# Patient Record
Sex: MALE | Race: OTHER | NOT HISPANIC OR LATINO | Employment: OTHER | RURAL
[De-identification: names, ages, dates, MRNs, and addresses within clinical notes are randomized per-mention and may not be internally consistent; named-entity substitution may affect disease eponyms.]

---

## 2018-01-29 ENCOUNTER — HISTORICAL (OUTPATIENT)
Dept: ADMINISTRATIVE | Facility: HOSPITAL | Age: 63
End: 2018-01-29

## 2018-01-30 ENCOUNTER — HISTORICAL (OUTPATIENT)
Dept: ADMINISTRATIVE | Facility: HOSPITAL | Age: 63
End: 2018-01-30

## 2018-01-30 LAB
LAB AP CLINICAL INFORMATION: NORMAL
LAB AP DIAGNOSIS - HISTORICAL: NORMAL
LAB AP GROSS PATHOLOGY - HISTORICAL: NORMAL
LAB AP SPECIMEN SUBMITTED - HISTORICAL: NORMAL

## 2018-01-31 LAB
LAB AP CLINICAL INFORMATION: NORMAL
LAB AP COMMENTS: NORMAL
LAB AP DIAGNOSIS - HISTORICAL: NORMAL
LAB AP GROSS PATHOLOGY - HISTORICAL: NORMAL
LAB AP SPECIMEN SUBMITTED - HISTORICAL: NORMAL

## 2020-06-09 ENCOUNTER — HISTORICAL (OUTPATIENT)
Dept: ADMINISTRATIVE | Facility: HOSPITAL | Age: 65
End: 2020-06-09

## 2020-06-10 LAB
ALT SERPL W P-5'-P-CCNC: 46 U/L (ref 16–61)
ANISOCYTOSIS BLD QL SMEAR: ABNORMAL
AST SERPL W P-5'-P-CCNC: 28 U/L (ref 15–37)
BASOPHILS # BLD AUTO: 0.04 X10E3/UL (ref 0–0.2)
BASOPHILS NFR BLD AUTO: 0.6 % (ref 0–1)
EOSINOPHIL # BLD AUTO: 0.07 X10E3/UL (ref 0–0.5)
EOSINOPHIL NFR BLD AUTO: 1.1 % (ref 1–4)
EOSINOPHIL NFR BLD MANUAL: 1 % (ref 1–4)
ERYTHROCYTE [DISTWIDTH] IN BLOOD BY AUTOMATED COUNT: 16 % (ref 11.5–14.5)
HCT VFR BLD AUTO: 45 % (ref 40–54)
HGB BLD-MCNC: 13.5 G/DL (ref 13.5–18)
IMM GRANULOCYTES # BLD AUTO: 0.05 X10E3/UL (ref 0–0.04)
IMM GRANULOCYTES NFR BLD: 0.8 % (ref 0–0.4)
LYMPHOCYTES # BLD AUTO: 1.84 X10E3/UL (ref 1–4.8)
LYMPHOCYTES NFR BLD AUTO: 29 % (ref 27–41)
LYMPHOCYTES NFR BLD MANUAL: 40 % (ref 27–41)
MCH RBC QN AUTO: 28.4 PG (ref 27–31)
MCHC RBC AUTO-ENTMCNC: 30 G/DL (ref 32–36)
MCV RBC AUTO: 94.7 FL (ref 80–96)
MONOCYTES # BLD AUTO: 0.4 X10E3/UL (ref 0–0.8)
MONOCYTES NFR BLD AUTO: 6.3 % (ref 2–6)
MONOCYTES NFR BLD MANUAL: 6 % (ref 2–6)
MPC BLD CALC-MCNC: 10.6 FL (ref 9.4–12.4)
NEUTROPHILS # BLD AUTO: 3.94 X10E3/UL (ref 1.8–7.7)
NEUTROPHILS NFR BLD AUTO: 62.2 % (ref 53–65)
NEUTS SEG NFR BLD MANUAL: 53 % (ref 50–62)
NRBC # BLD AUTO: 0 X10E3/UL (ref 0–0)
NRBC, AUTO (.00): 0 /100 (ref 0–0)
PLATELET # BLD AUTO: 352 X10E3/UL (ref 150–400)
PLATELET MORPHOLOGY: NORMAL
RBC # BLD AUTO: 4.75 X10E6/UL (ref 4.6–6.2)
WBC # BLD AUTO: 6.34 X10E3/UL (ref 4.5–11)

## 2020-06-12 LAB — CREAT SERPL-MCNC: 1.38 MG/DL (ref 0.7–1.3)

## 2021-03-18 ENCOUNTER — OFFICE VISIT (OUTPATIENT)
Dept: NEUROLOGY | Facility: CLINIC | Age: 66
End: 2021-03-18
Payer: MEDICARE

## 2021-03-18 VITALS
WEIGHT: 222 LBS | SYSTOLIC BLOOD PRESSURE: 118 MMHG | DIASTOLIC BLOOD PRESSURE: 76 MMHG | BODY MASS INDEX: 28.49 KG/M2 | HEART RATE: 113 BPM | OXYGEN SATURATION: 96 % | RESPIRATION RATE: 18 BRPM | HEIGHT: 74 IN

## 2021-03-18 DIAGNOSIS — G43.019 INTRACTABLE MIGRAINE WITHOUT AURA AND WITHOUT STATUS MIGRAINOSUS: Primary | ICD-10-CM

## 2021-03-18 DIAGNOSIS — G89.29 CHRONIC NONINTRACTABLE HEADACHE, UNSPECIFIED HEADACHE TYPE: ICD-10-CM

## 2021-03-18 DIAGNOSIS — R20.2 NUMBNESS AND TINGLING OF LEFT ARM AND LEG: ICD-10-CM

## 2021-03-18 DIAGNOSIS — R42 DIZZINESS: ICD-10-CM

## 2021-03-18 DIAGNOSIS — R20.0 NUMBNESS AND TINGLING OF LEFT ARM AND LEG: ICD-10-CM

## 2021-03-18 DIAGNOSIS — R51.9 CHRONIC NONINTRACTABLE HEADACHE, UNSPECIFIED HEADACHE TYPE: ICD-10-CM

## 2021-03-18 PROCEDURE — 99214 OFFICE O/P EST MOD 30 MIN: CPT | Mod: PBBFAC | Performed by: NURSE PRACTITIONER

## 2021-03-18 PROCEDURE — 99999 PR PBB SHADOW E&M-EST. PATIENT-LVL IV: CPT | Mod: PBBFAC,,, | Performed by: NURSE PRACTITIONER

## 2021-03-18 PROCEDURE — 99213 OFFICE O/P EST LOW 20 MIN: CPT | Mod: S$PBB,,, | Performed by: NURSE PRACTITIONER

## 2021-03-18 PROCEDURE — 99213 PR OFFICE/OUTPT VISIT, EST, LEVL III, 20-29 MIN: ICD-10-PCS | Mod: S$PBB,,, | Performed by: NURSE PRACTITIONER

## 2021-03-18 PROCEDURE — 99999 PR PBB SHADOW E&M-EST. PATIENT-LVL IV: ICD-10-PCS | Mod: PBBFAC,,, | Performed by: NURSE PRACTITIONER

## 2021-03-18 RX ORDER — FERROUS SULFATE 325(65) MG
325 TABLET ORAL
COMMUNITY
End: 2023-04-17 | Stop reason: SDUPTHER

## 2021-03-18 RX ORDER — HYDROCODONE BITARTRATE AND ACETAMINOPHEN 10; 325 MG/1; MG/1
1 TABLET ORAL 2 TIMES DAILY
Status: ON HOLD | COMMUNITY
End: 2021-08-28 | Stop reason: SDUPTHER

## 2021-03-18 RX ORDER — OMEPRAZOLE 40 MG/1
40 CAPSULE, DELAYED RELEASE ORAL DAILY
COMMUNITY
End: 2021-12-22

## 2021-03-18 RX ORDER — HYDROXYCHLOROQUINE SULFATE 200 MG/1
200 TABLET, FILM COATED ORAL 2 TIMES DAILY
COMMUNITY
End: 2023-04-17 | Stop reason: SDUPTHER

## 2021-03-18 RX ORDER — ZONISAMIDE 50 MG/1
50 CAPSULE ORAL NIGHTLY
COMMUNITY
End: 2021-03-18

## 2021-03-18 RX ORDER — TOFACITINIB 11 MG/1
11 TABLET, FILM COATED, EXTENDED RELEASE ORAL DAILY
Status: ON HOLD | COMMUNITY
End: 2021-08-27 | Stop reason: ALTCHOICE

## 2021-03-18 RX ORDER — PRAVASTATIN SODIUM 40 MG/1
40 TABLET ORAL DAILY
COMMUNITY
End: 2021-08-02 | Stop reason: SDUPTHER

## 2021-03-18 RX ORDER — METHOCARBAMOL 500 MG/1
500 TABLET, FILM COATED ORAL 3 TIMES DAILY PRN
COMMUNITY
End: 2021-08-02 | Stop reason: SDUPTHER

## 2021-03-18 RX ORDER — SILODOSIN 8 MG/1
8 CAPSULE ORAL DAILY
COMMUNITY
End: 2023-04-17 | Stop reason: SDUPTHER

## 2021-03-18 RX ORDER — PROMETHAZINE HYDROCHLORIDE 25 MG/1
25 TABLET ORAL 2 TIMES DAILY PRN
COMMUNITY
End: 2023-04-17 | Stop reason: SDUPTHER

## 2021-03-18 RX ORDER — LISINOPRIL 20 MG/1
20 TABLET ORAL DAILY
COMMUNITY
End: 2021-08-02 | Stop reason: SDUPTHER

## 2021-03-18 RX ORDER — PREGABALIN 150 MG/1
150 CAPSULE ORAL 3 TIMES DAILY
COMMUNITY
End: 2022-08-29

## 2021-03-18 RX ORDER — DULOXETIN HYDROCHLORIDE 60 MG/1
60 CAPSULE, DELAYED RELEASE ORAL DAILY
COMMUNITY
End: 2023-04-17 | Stop reason: SDUPTHER

## 2021-04-30 DIAGNOSIS — G47.30 SLEEP APNEA, UNSPECIFIED: Primary | ICD-10-CM

## 2021-05-12 ENCOUNTER — HOSPITAL ENCOUNTER (OUTPATIENT)
Dept: RADIOLOGY | Facility: HOSPITAL | Age: 66
Discharge: HOME | End: 2021-05-12
Attending: FAMILY MEDICINE
Payer: MEDICARE

## 2021-05-12 DIAGNOSIS — E05.10 THYROTOXICOSIS WITH TOXIC SINGLE THYROID NODULE WITHOUT THYROTOXIC CRISIS OR STORM: ICD-10-CM

## 2021-05-12 DIAGNOSIS — M54.50 LOWER BACK PAIN: ICD-10-CM

## 2021-05-12 DIAGNOSIS — N18.32 CHRONIC KIDNEY DISEASE, STAGE 3B: ICD-10-CM

## 2021-05-12 PROCEDURE — 76770 US KIDNEY: ICD-10-PCS | Mod: 26,,, | Performed by: RADIOLOGY

## 2021-05-12 PROCEDURE — 76536 US EXAM OF HEAD AND NECK: CPT | Mod: TC

## 2021-05-12 PROCEDURE — 76770 US EXAM ABDO BACK WALL COMP: CPT | Mod: TC

## 2021-05-12 PROCEDURE — 76536 US EXAM OF HEAD AND NECK: CPT | Mod: 26,,, | Performed by: RADIOLOGY

## 2021-05-12 PROCEDURE — 76536 US THYROID: ICD-10-PCS | Mod: 26,,, | Performed by: RADIOLOGY

## 2021-05-12 PROCEDURE — 76770 US EXAM ABDO BACK WALL COMP: CPT | Mod: 26,,, | Performed by: RADIOLOGY

## 2021-05-17 DIAGNOSIS — E04.1 THYROID NODULE: Primary | ICD-10-CM

## 2021-05-19 ENCOUNTER — HOSPITAL ENCOUNTER (OUTPATIENT)
Dept: RADIOLOGY | Facility: HOSPITAL | Age: 66
Discharge: HOME | End: 2021-05-19
Attending: FAMILY MEDICINE
Payer: MEDICARE

## 2021-05-19 VITALS — RESPIRATION RATE: 18 BRPM | HEART RATE: 79 BPM | SYSTOLIC BLOOD PRESSURE: 110 MMHG | DIASTOLIC BLOOD PRESSURE: 70 MMHG

## 2021-05-19 DIAGNOSIS — E04.9 NON-TOXIC GOITER: ICD-10-CM

## 2021-05-19 PROCEDURE — 10005 FNA BX W/US GDN 1ST LES: CPT | Mod: ,,,

## 2021-05-19 PROCEDURE — 10006 PR FINE NEEDLE ASP BIOPSY, W/US GUIDANCE, EA ADDTL LESION: ICD-10-PCS | Mod: ,,,

## 2021-05-19 PROCEDURE — 88173 CYTOPATH EVAL FNA REPORT: CPT | Mod: MCY | Performed by: FAMILY MEDICINE

## 2021-05-19 PROCEDURE — 10006 FNA BX W/US GDN EA ADDL: CPT | Mod: ,,,

## 2021-05-19 PROCEDURE — 10005 FNA BX W/US GDN 1ST LES: CPT

## 2021-05-19 PROCEDURE — 88305 TISSUE EXAM BY PATHOLOGIST: CPT | Mod: MCY | Performed by: FAMILY MEDICINE

## 2021-05-19 PROCEDURE — 10005 US FINE NEEDLE ASPIRATION BIOPSY, FIRST LESION: ICD-10-PCS | Mod: ,,,

## 2021-05-20 LAB
ESTROGEN SERPL-MCNC: NORMAL PG/ML
INSULIN SERPL-ACNC: NORMAL U[IU]/ML
LAB AP CLINICAL INFORMATION: NORMAL
LAB AP COMMENTS: NORMAL
T3RU NFR SERPL: NORMAL %

## 2021-06-02 ENCOUNTER — OFFICE VISIT (OUTPATIENT)
Dept: FAMILY MEDICINE | Facility: CLINIC | Age: 66
End: 2021-06-02
Payer: MEDICARE

## 2021-06-02 VITALS
TEMPERATURE: 98 F | HEIGHT: 74 IN | OXYGEN SATURATION: 95 % | SYSTOLIC BLOOD PRESSURE: 122 MMHG | RESPIRATION RATE: 18 BRPM | DIASTOLIC BLOOD PRESSURE: 84 MMHG | BODY MASS INDEX: 27.67 KG/M2 | WEIGHT: 215.63 LBS | HEART RATE: 91 BPM

## 2021-06-02 DIAGNOSIS — E04.1 THYROID NODULE: Primary | ICD-10-CM

## 2021-06-02 PROCEDURE — 99212 PR OFFICE/OUTPT VISIT, EST, LEVL II, 10-19 MIN: ICD-10-PCS | Mod: ,,, | Performed by: FAMILY MEDICINE

## 2021-06-02 PROCEDURE — 99212 OFFICE O/P EST SF 10 MIN: CPT | Mod: ,,, | Performed by: FAMILY MEDICINE

## 2021-06-03 DIAGNOSIS — E04.1 THYROID NODULE: Primary | ICD-10-CM

## 2021-06-10 ENCOUNTER — HOSPITAL ENCOUNTER (OUTPATIENT)
Dept: RADIOLOGY | Facility: HOSPITAL | Age: 66
Discharge: HOME | End: 2021-06-10
Attending: FAMILY MEDICINE
Payer: MEDICARE

## 2021-06-10 DIAGNOSIS — E04.1 THYROID NODULE: ICD-10-CM

## 2021-06-10 PROCEDURE — 78014 NM THYROID UPTAKE AND SCAN: ICD-10-PCS | Mod: 26,,, | Performed by: RADIOLOGY

## 2021-06-10 PROCEDURE — 78014 THYROID IMAGING W/BLOOD FLOW: CPT | Mod: TC

## 2021-06-10 PROCEDURE — 78014 THYROID IMAGING W/BLOOD FLOW: CPT | Mod: 26,,, | Performed by: RADIOLOGY

## 2021-06-11 ENCOUNTER — HOSPITAL ENCOUNTER (OUTPATIENT)
Dept: RADIOLOGY | Facility: HOSPITAL | Age: 66
Discharge: HOME | End: 2021-06-11
Attending: FAMILY MEDICINE
Payer: MEDICARE

## 2021-08-02 RX ORDER — CETIRIZINE HYDROCHLORIDE 5 MG/1
5 TABLET ORAL DAILY
Qty: 90 TABLET | Refills: 0 | Status: SHIPPED | OUTPATIENT
Start: 2021-08-02 | End: 2021-11-11 | Stop reason: SDUPTHER

## 2021-08-02 RX ORDER — METHOCARBAMOL 500 MG/1
500 TABLET, FILM COATED ORAL 3 TIMES DAILY PRN
Qty: 90 TABLET | Refills: 0 | Status: SHIPPED | OUTPATIENT
Start: 2021-08-02 | End: 2021-11-11 | Stop reason: SDUPTHER

## 2021-08-02 RX ORDER — CETIRIZINE HYDROCHLORIDE 5 MG/1
5 TABLET ORAL DAILY
COMMUNITY
Start: 2021-07-26 | End: 2021-08-02 | Stop reason: SDUPTHER

## 2021-08-02 RX ORDER — LISINOPRIL 20 MG/1
20 TABLET ORAL DAILY
Qty: 90 TABLET | Refills: 0 | Status: SHIPPED | OUTPATIENT
Start: 2021-08-02 | End: 2021-11-11 | Stop reason: SDUPTHER

## 2021-08-02 RX ORDER — PRAVASTATIN SODIUM 40 MG/1
40 TABLET ORAL DAILY
Qty: 90 TABLET | Refills: 0 | Status: SHIPPED | OUTPATIENT
Start: 2021-08-02 | End: 2021-11-11 | Stop reason: SDUPTHER

## 2021-08-23 ENCOUNTER — OFFICE VISIT (OUTPATIENT)
Dept: SURGERY | Facility: CLINIC | Age: 66
End: 2021-08-23
Attending: SURGERY
Payer: MEDICARE

## 2021-08-23 ENCOUNTER — CLINICAL SUPPORT (OUTPATIENT)
Dept: CARDIOLOGY | Facility: CLINIC | Age: 66
End: 2021-08-23
Attending: SURGERY
Payer: MEDICARE

## 2021-08-23 DIAGNOSIS — E04.2 MULTIPLE THYROID NODULES: ICD-10-CM

## 2021-08-23 DIAGNOSIS — E04.2 MULTIPLE THYROID NODULES: Primary | ICD-10-CM

## 2021-08-23 PROCEDURE — 99212 OFFICE O/P EST SF 10 MIN: CPT | Mod: PBBFAC

## 2021-08-23 PROCEDURE — 93010 ELECTROCARDIOGRAM REPORT: CPT | Mod: S$PBB,,, | Performed by: INTERNAL MEDICINE

## 2021-08-23 PROCEDURE — 93010 EKG 12-LEAD: ICD-10-PCS | Mod: S$PBB,,, | Performed by: INTERNAL MEDICINE

## 2021-08-23 PROCEDURE — 99204 PR OFFICE/OUTPT VISIT, NEW, LEVL IV, 45-59 MIN: ICD-10-PCS | Mod: S$PBB,,, | Performed by: SURGERY

## 2021-08-23 PROCEDURE — 99215 OFFICE O/P EST HI 40 MIN: CPT | Mod: PBBFAC | Performed by: SURGERY

## 2021-08-23 PROCEDURE — 93005 ELECTROCARDIOGRAM TRACING: CPT | Mod: PBBFAC | Performed by: INTERNAL MEDICINE

## 2021-08-23 PROCEDURE — 99204 OFFICE O/P NEW MOD 45 MIN: CPT | Mod: S$PBB,,, | Performed by: SURGERY

## 2021-08-23 RX ORDER — SODIUM CHLORIDE 9 MG/ML
INJECTION, SOLUTION INTRAVENOUS CONTINUOUS
Status: CANCELLED | OUTPATIENT
Start: 2021-08-23

## 2021-08-27 ENCOUNTER — ANESTHESIA (OUTPATIENT)
Dept: SURGERY | Facility: HOSPITAL | Age: 66
End: 2021-08-27
Payer: MEDICARE

## 2021-08-27 ENCOUNTER — HOSPITAL ENCOUNTER (OUTPATIENT)
Facility: HOSPITAL | Age: 66
LOS: 1 days | Discharge: HOME | End: 2021-08-28
Attending: SURGERY | Admitting: SURGERY
Payer: MEDICARE

## 2021-08-27 ENCOUNTER — ANESTHESIA EVENT (OUTPATIENT)
Dept: SURGERY | Facility: HOSPITAL | Age: 66
End: 2021-08-27
Payer: MEDICARE

## 2021-08-27 DIAGNOSIS — E04.2 MULTIPLE THYROID NODULES: Primary | ICD-10-CM

## 2021-08-27 PROCEDURE — D9220A PRA ANESTHESIA: Mod: ANES,ICN,, | Performed by: ANESTHESIOLOGY

## 2021-08-27 PROCEDURE — 36000707: Performed by: SURGERY

## 2021-08-27 PROCEDURE — 63600175 PHARM REV CODE 636 W HCPCS: Performed by: ANESTHESIOLOGY

## 2021-08-27 PROCEDURE — 88311 DECALCIFY TISSUE: CPT | Mod: 26,,, | Performed by: PATHOLOGY

## 2021-08-27 PROCEDURE — 36000706: Performed by: SURGERY

## 2021-08-27 PROCEDURE — 25000003 PHARM REV CODE 250: Performed by: SURGERY

## 2021-08-27 PROCEDURE — D9220A PRA ANESTHESIA: ICD-10-PCS | Mod: ANES,ICN,, | Performed by: ANESTHESIOLOGY

## 2021-08-27 PROCEDURE — 27000716 HC OXISENSOR PROBE, ANY SIZE: Performed by: ANESTHESIOLOGY

## 2021-08-27 PROCEDURE — 88311 SURGICAL PATHOLOGY: ICD-10-PCS | Mod: 26,,, | Performed by: PATHOLOGY

## 2021-08-27 PROCEDURE — 25000003 PHARM REV CODE 250: Performed by: NURSE ANESTHETIST, CERTIFIED REGISTERED

## 2021-08-27 PROCEDURE — D9220A PRA ANESTHESIA: Mod: CRNA,,, | Performed by: NURSE ANESTHETIST, CERTIFIED REGISTERED

## 2021-08-27 PROCEDURE — 27000260 *HC AIRWAY ORAL: Performed by: ANESTHESIOLOGY

## 2021-08-27 PROCEDURE — 37000009 HC ANESTHESIA EA ADD 15 MINS: Performed by: SURGERY

## 2021-08-27 PROCEDURE — 88307 TISSUE EXAM BY PATHOLOGIST: CPT | Mod: 26,,, | Performed by: PATHOLOGY

## 2021-08-27 PROCEDURE — 27201423 OPTIME MED/SURG SUP & DEVICES STERILE SUPPLY: Performed by: SURGERY

## 2021-08-27 PROCEDURE — 27000509 HC TUBE SALEM SUMP ANY SIZE: Performed by: ANESTHESIOLOGY

## 2021-08-27 PROCEDURE — 63600175 PHARM REV CODE 636 W HCPCS: Performed by: NURSE ANESTHETIST, CERTIFIED REGISTERED

## 2021-08-27 PROCEDURE — 37000008 HC ANESTHESIA 1ST 15 MINUTES: Performed by: SURGERY

## 2021-08-27 PROCEDURE — D9220A PRA ANESTHESIA: ICD-10-PCS | Mod: CRNA,,, | Performed by: NURSE ANESTHETIST, CERTIFIED REGISTERED

## 2021-08-27 PROCEDURE — 27000510 HC BLANKET BAIR HUGGER ANY SIZE: Performed by: ANESTHESIOLOGY

## 2021-08-27 PROCEDURE — 60240 PR THYROIDECTOMY: ICD-10-PCS | Mod: ,,, | Performed by: SURGERY

## 2021-08-27 PROCEDURE — 88307 SURGICAL PATHOLOGY: ICD-10-PCS | Mod: 26,,, | Performed by: PATHOLOGY

## 2021-08-27 PROCEDURE — 94761 N-INVAS EAR/PLS OXIMETRY MLT: CPT | Mod: GZ

## 2021-08-27 PROCEDURE — 71000016 HC POSTOP RECOV ADDL HR: Performed by: SURGERY

## 2021-08-27 PROCEDURE — 99900035 HC TECH TIME PER 15 MIN (STAT)

## 2021-08-27 PROCEDURE — 71000033 HC RECOVERY, INTIAL HOUR: Performed by: SURGERY

## 2021-08-27 PROCEDURE — 60240 REMOVAL OF THYROID: CPT | Mod: ,,, | Performed by: SURGERY

## 2021-08-27 PROCEDURE — 71000015 HC POSTOP RECOV 1ST HR: Performed by: SURGERY

## 2021-08-27 PROCEDURE — 88307 TISSUE EXAM BY PATHOLOGIST: CPT | Mod: SUR,59 | Performed by: SURGERY

## 2021-08-27 PROCEDURE — 27000689 HC BLADE LARYNGOSCOPE ANY SIZE: Performed by: ANESTHESIOLOGY

## 2021-08-27 PROCEDURE — 27000165 HC TUBE, ETT CUFFED: Performed by: ANESTHESIOLOGY

## 2021-08-27 RX ORDER — HYDROCODONE BITARTRATE AND ACETAMINOPHEN 7.5; 325 MG/1; MG/1
1 TABLET ORAL EVERY 6 HOURS PRN
Status: DISCONTINUED | OUTPATIENT
Start: 2021-08-27 | End: 2021-08-28 | Stop reason: HOSPADM

## 2021-08-27 RX ORDER — ACETAMINOPHEN 325 MG/1
650 TABLET ORAL EVERY 6 HOURS PRN
Status: DISCONTINUED | OUTPATIENT
Start: 2021-08-27 | End: 2021-08-28 | Stop reason: HOSPADM

## 2021-08-27 RX ORDER — LIDOCAINE HYDROCHLORIDE 20 MG/ML
INJECTION, SOLUTION EPIDURAL; INFILTRATION; INTRACAUDAL; PERINEURAL
Status: DISCONTINUED | OUTPATIENT
Start: 2021-08-27 | End: 2021-08-27

## 2021-08-27 RX ORDER — DIPHENHYDRAMINE HYDROCHLORIDE 50 MG/ML
25 INJECTION INTRAMUSCULAR; INTRAVENOUS EVERY 6 HOURS PRN
Status: DISCONTINUED | OUTPATIENT
Start: 2021-08-27 | End: 2021-08-27 | Stop reason: HOSPADM

## 2021-08-27 RX ORDER — MIDAZOLAM HYDROCHLORIDE 1 MG/ML
INJECTION INTRAMUSCULAR; INTRAVENOUS
Status: DISCONTINUED | OUTPATIENT
Start: 2021-08-27 | End: 2021-08-27

## 2021-08-27 RX ORDER — MEPERIDINE HYDROCHLORIDE 25 MG/ML
25 INJECTION INTRAMUSCULAR; INTRAVENOUS; SUBCUTANEOUS EVERY 10 MIN PRN
Status: DISCONTINUED | OUTPATIENT
Start: 2021-08-27 | End: 2021-08-27 | Stop reason: HOSPADM

## 2021-08-27 RX ORDER — SODIUM CHLORIDE, SODIUM LACTATE, POTASSIUM CHLORIDE, CALCIUM CHLORIDE 600; 310; 30; 20 MG/100ML; MG/100ML; MG/100ML; MG/100ML
INJECTION, SOLUTION INTRAVENOUS CONTINUOUS
Status: CANCELLED | OUTPATIENT
Start: 2021-08-27

## 2021-08-27 RX ORDER — PROPOFOL 10 MG/ML
VIAL (ML) INTRAVENOUS
Status: DISCONTINUED | OUTPATIENT
Start: 2021-08-27 | End: 2021-08-27

## 2021-08-27 RX ORDER — FENTANYL CITRATE 50 UG/ML
INJECTION, SOLUTION INTRAMUSCULAR; INTRAVENOUS
Status: DISCONTINUED | OUTPATIENT
Start: 2021-08-27 | End: 2021-08-27

## 2021-08-27 RX ORDER — CEFAZOLIN SODIUM 2 G/50ML
2 SOLUTION INTRAVENOUS
Status: DISCONTINUED | OUTPATIENT
Start: 2021-08-27 | End: 2021-08-27 | Stop reason: HOSPADM

## 2021-08-27 RX ORDER — PHENYLEPHRINE HYDROCHLORIDE 10 MG/ML
INJECTION INTRAVENOUS
Status: DISCONTINUED | OUTPATIENT
Start: 2021-08-27 | End: 2021-08-27

## 2021-08-27 RX ORDER — MORPHINE SULFATE 10 MG/ML
4 INJECTION INTRAMUSCULAR; INTRAVENOUS; SUBCUTANEOUS EVERY 5 MIN PRN
Status: DISCONTINUED | OUTPATIENT
Start: 2021-08-27 | End: 2021-08-27 | Stop reason: HOSPADM

## 2021-08-27 RX ORDER — OXYCODONE HYDROCHLORIDE 5 MG/1
5 TABLET ORAL
Status: DISCONTINUED | OUTPATIENT
Start: 2021-08-27 | End: 2021-08-27 | Stop reason: HOSPADM

## 2021-08-27 RX ORDER — EPHEDRINE SULFATE 50 MG/ML
INJECTION, SOLUTION INTRAVENOUS
Status: DISCONTINUED | OUTPATIENT
Start: 2021-08-27 | End: 2021-08-27

## 2021-08-27 RX ORDER — LIDOCAINE HYDROCHLORIDE 10 MG/ML
1 INJECTION, SOLUTION EPIDURAL; INFILTRATION; INTRACAUDAL; PERINEURAL ONCE
Status: CANCELLED | OUTPATIENT
Start: 2021-08-27 | End: 2021-08-27

## 2021-08-27 RX ORDER — ROCURONIUM BROMIDE 50 MG/5 ML
SYRINGE (ML) INTRAVENOUS
Status: DISCONTINUED | OUTPATIENT
Start: 2021-08-27 | End: 2021-08-27

## 2021-08-27 RX ORDER — IPRATROPIUM BROMIDE AND ALBUTEROL SULFATE 2.5; .5 MG/3ML; MG/3ML
3 SOLUTION RESPIRATORY (INHALATION)
Status: DISCONTINUED | OUTPATIENT
Start: 2021-08-27 | End: 2021-08-28 | Stop reason: HOSPADM

## 2021-08-27 RX ORDER — SODIUM CHLORIDE, SODIUM LACTATE, POTASSIUM CHLORIDE, CALCIUM CHLORIDE 600; 310; 30; 20 MG/100ML; MG/100ML; MG/100ML; MG/100ML
125 INJECTION, SOLUTION INTRAVENOUS CONTINUOUS
Status: DISCONTINUED | OUTPATIENT
Start: 2021-08-27 | End: 2021-08-28 | Stop reason: HOSPADM

## 2021-08-27 RX ORDER — CEFAZOLIN SODIUM 1 G/3ML
INJECTION, POWDER, FOR SOLUTION INTRAMUSCULAR; INTRAVENOUS
Status: DISCONTINUED | OUTPATIENT
Start: 2021-08-27 | End: 2021-08-27

## 2021-08-27 RX ORDER — HYDROMORPHONE HYDROCHLORIDE 2 MG/ML
0.5 INJECTION, SOLUTION INTRAMUSCULAR; INTRAVENOUS; SUBCUTANEOUS EVERY 5 MIN PRN
Status: DISCONTINUED | OUTPATIENT
Start: 2021-08-27 | End: 2021-08-27 | Stop reason: HOSPADM

## 2021-08-27 RX ORDER — ONDANSETRON 2 MG/ML
4 INJECTION INTRAMUSCULAR; INTRAVENOUS DAILY PRN
Status: DISCONTINUED | OUTPATIENT
Start: 2021-08-27 | End: 2021-08-27 | Stop reason: HOSPADM

## 2021-08-27 RX ORDER — DOCUSATE SODIUM 100 MG/1
100 CAPSULE, LIQUID FILLED ORAL 2 TIMES DAILY
Status: DISCONTINUED | OUTPATIENT
Start: 2021-08-27 | End: 2021-08-28 | Stop reason: HOSPADM

## 2021-08-27 RX ORDER — SODIUM CHLORIDE 9 MG/ML
INJECTION, SOLUTION INTRAVENOUS CONTINUOUS
Status: DISCONTINUED | OUTPATIENT
Start: 2021-08-27 | End: 2021-08-28 | Stop reason: HOSPADM

## 2021-08-27 RX ORDER — ZOLPIDEM TARTRATE 5 MG/1
5 TABLET ORAL NIGHTLY PRN
Status: DISCONTINUED | OUTPATIENT
Start: 2021-08-27 | End: 2021-08-28 | Stop reason: HOSPADM

## 2021-08-27 RX ORDER — ONDANSETRON 2 MG/ML
INJECTION INTRAMUSCULAR; INTRAVENOUS
Status: DISCONTINUED | OUTPATIENT
Start: 2021-08-27 | End: 2021-08-27

## 2021-08-27 RX ORDER — MUPIROCIN 20 MG/G
1 OINTMENT TOPICAL 2 TIMES DAILY
Status: DISCONTINUED | OUTPATIENT
Start: 2021-08-27 | End: 2021-08-28 | Stop reason: HOSPADM

## 2021-08-27 RX ORDER — ONDANSETRON 4 MG/1
8 TABLET, ORALLY DISINTEGRATING ORAL EVERY 8 HOURS PRN
Status: DISCONTINUED | OUTPATIENT
Start: 2021-08-27 | End: 2021-08-28 | Stop reason: HOSPADM

## 2021-08-27 RX ORDER — DEXAMETHASONE SODIUM PHOSPHATE 4 MG/ML
INJECTION, SOLUTION INTRA-ARTICULAR; INTRALESIONAL; INTRAMUSCULAR; INTRAVENOUS; SOFT TISSUE
Status: DISCONTINUED | OUTPATIENT
Start: 2021-08-27 | End: 2021-08-27

## 2021-08-27 RX ADMIN — DOCUSATE SODIUM 100 MG: 100 CAPSULE, LIQUID FILLED ORAL at 09:08

## 2021-08-27 RX ADMIN — SODIUM CHLORIDE: 9 INJECTION, SOLUTION INTRAVENOUS at 09:08

## 2021-08-27 RX ADMIN — SODIUM CHLORIDE, POTASSIUM CHLORIDE, SODIUM LACTATE AND CALCIUM CHLORIDE: 600; 310; 30; 20 INJECTION, SOLUTION INTRAVENOUS at 08:08

## 2021-08-27 RX ADMIN — MIDAZOLAM HYDROCHLORIDE 2 MG: 1 INJECTION, SOLUTION INTRAMUSCULAR; INTRAVENOUS at 07:08

## 2021-08-27 RX ADMIN — PHENYLEPHRINE HYDROCHLORIDE 100 MCG: 10 INJECTION INTRAVENOUS at 08:08

## 2021-08-27 RX ADMIN — MORPHINE SULFATE 4 MG: 10 INJECTION INTRAVENOUS at 09:08

## 2021-08-27 RX ADMIN — PHENYLEPHRINE HYDROCHLORIDE 100 MCG: 10 INJECTION INTRAVENOUS at 09:08

## 2021-08-27 RX ADMIN — SUGAMMADEX 200 MG: 100 INJECTION, SOLUTION INTRAVENOUS at 09:08

## 2021-08-27 RX ADMIN — FENTANYL CITRATE 50 MCG: 50 INJECTION INTRAMUSCULAR; INTRAVENOUS at 07:08

## 2021-08-27 RX ADMIN — EPHEDRINE SULFATE 10 MG: 50 INJECTION INTRAVENOUS at 09:08

## 2021-08-27 RX ADMIN — MUPIROCIN 1 G: 20 OINTMENT TOPICAL at 09:08

## 2021-08-27 RX ADMIN — FENTANYL CITRATE 50 MCG: 50 INJECTION INTRAMUSCULAR; INTRAVENOUS at 08:08

## 2021-08-27 RX ADMIN — LIDOCAINE HYDROCHLORIDE 80 MG: 20 INJECTION, SOLUTION INTRAVENOUS at 07:08

## 2021-08-27 RX ADMIN — SODIUM CHLORIDE: 9 INJECTION, SOLUTION INTRAVENOUS at 07:08

## 2021-08-27 RX ADMIN — DEXAMETHASONE SODIUM PHOSPHATE 12 MG: 4 INJECTION, SOLUTION INTRA-ARTICULAR; INTRALESIONAL; INTRAMUSCULAR; INTRAVENOUS; SOFT TISSUE at 07:08

## 2021-08-27 RX ADMIN — HYDROCODONE BITARTRATE AND ACETAMINOPHEN 1 TABLET: 7.5; 325 TABLET ORAL at 09:08

## 2021-08-27 RX ADMIN — PHENYLEPHRINE HYDROCHLORIDE 200 MCG: 10 INJECTION INTRAVENOUS at 08:08

## 2021-08-27 RX ADMIN — CEFAZOLIN 2 G: 1 INJECTION, POWDER, FOR SOLUTION INTRAMUSCULAR; INTRAVENOUS; PARENTERAL at 07:08

## 2021-08-27 RX ADMIN — ONDANSETRON 8 MG: 2 INJECTION INTRAMUSCULAR; INTRAVENOUS at 07:08

## 2021-08-27 RX ADMIN — SODIUM CHLORIDE, POTASSIUM CHLORIDE, SODIUM LACTATE AND CALCIUM CHLORIDE 125 ML/HR: 600; 310; 30; 20 INJECTION, SOLUTION INTRAVENOUS at 01:08

## 2021-08-27 RX ADMIN — Medication 50 MG: at 07:08

## 2021-08-27 RX ADMIN — PHENYLEPHRINE HYDROCHLORIDE 200 MCG: 10 INJECTION INTRAVENOUS at 09:08

## 2021-08-27 RX ADMIN — HYDROCODONE BITARTRATE AND ACETAMINOPHEN 1 TABLET: 7.5; 325 TABLET ORAL at 01:08

## 2021-08-27 RX ADMIN — MORPHINE SULFATE 2 MG: 10 INJECTION INTRAVENOUS at 10:08

## 2021-08-27 RX ADMIN — PHENYLEPHRINE HYDROCHLORIDE 100 MCG: 10 INJECTION INTRAVENOUS at 07:08

## 2021-08-27 RX ADMIN — PROPOFOL 150 MG: 10 INJECTION, EMULSION INTRAVENOUS at 07:08

## 2021-08-28 VITALS
OXYGEN SATURATION: 93 % | TEMPERATURE: 98 F | RESPIRATION RATE: 18 BRPM | DIASTOLIC BLOOD PRESSURE: 80 MMHG | SYSTOLIC BLOOD PRESSURE: 135 MMHG | WEIGHT: 210 LBS | BODY MASS INDEX: 26.95 KG/M2 | HEART RATE: 89 BPM | HEIGHT: 74 IN

## 2021-08-28 LAB
ANION GAP SERPL CALCULATED.3IONS-SCNC: 13 MMOL/L (ref 7–16)
BUN SERPL-MCNC: 21 MG/DL (ref 7–18)
BUN/CREAT SERPL: 14 (ref 6–20)
CALCIUM SERPL-MCNC: 8.3 MG/DL (ref 8.5–10.1)
CHLORIDE SERPL-SCNC: 109 MMOL/L (ref 98–107)
CO2 SERPL-SCNC: 26 MMOL/L (ref 21–32)
CREAT SERPL-MCNC: 1.46 MG/DL (ref 0.7–1.3)
GLUCOSE SERPL-MCNC: 104 MG/DL (ref 74–106)
POTASSIUM SERPL-SCNC: 5.1 MMOL/L (ref 3.5–5.1)
SODIUM SERPL-SCNC: 143 MMOL/L (ref 136–145)

## 2021-08-28 PROCEDURE — 25000003 PHARM REV CODE 250: Performed by: SURGERY

## 2021-08-28 PROCEDURE — 80048 BASIC METABOLIC PNL TOTAL CA: CPT | Performed by: SURGERY

## 2021-08-28 PROCEDURE — 36415 COLL VENOUS BLD VENIPUNCTURE: CPT | Performed by: SURGERY

## 2021-08-28 RX ORDER — HYDROCODONE BITARTRATE AND ACETAMINOPHEN 10; 325 MG/1; MG/1
1 TABLET ORAL EVERY 4 HOURS PRN
Qty: 15 TABLET | Refills: 0 | Status: SHIPPED | OUTPATIENT
Start: 2021-08-28

## 2021-08-28 RX ORDER — LEVOTHYROXINE SODIUM 75 UG/1
75 TABLET ORAL
Qty: 30 TABLET | Refills: 0 | Status: SHIPPED | OUTPATIENT
Start: 2021-08-28 | End: 2021-08-30

## 2021-08-28 RX ORDER — CALCITRIOL 0.25 UG/1
0.25 CAPSULE ORAL 2 TIMES DAILY
Qty: 28 CAPSULE | Refills: 0 | Status: SHIPPED | OUTPATIENT
Start: 2021-08-28 | End: 2022-04-11

## 2021-08-28 RX ADMIN — DOCUSATE SODIUM 100 MG: 100 CAPSULE, LIQUID FILLED ORAL at 09:08

## 2021-08-28 RX ADMIN — SODIUM CHLORIDE: 9 INJECTION, SOLUTION INTRAVENOUS at 05:08

## 2021-08-28 RX ADMIN — MUPIROCIN 1 G: 20 OINTMENT TOPICAL at 09:08

## 2021-08-31 ENCOUNTER — TELEPHONE (OUTPATIENT)
Dept: SURGERY | Facility: CLINIC | Age: 66
End: 2021-08-31

## 2021-09-01 LAB
ESTROGEN SERPL-MCNC: NORMAL PG/ML
LAB AP GROSS DESCRIPTION: NORMAL
LAB AP LABORATORY NOTES: NORMAL
T3RU NFR SERPL: NORMAL %

## 2021-09-14 ENCOUNTER — OFFICE VISIT (OUTPATIENT)
Dept: SURGERY | Facility: CLINIC | Age: 66
End: 2021-09-14
Attending: SURGERY
Payer: MEDICARE

## 2021-09-14 DIAGNOSIS — Z09 FOLLOW-UP EXAMINATION, FOLLOWING OTHER SURGERY: Primary | ICD-10-CM

## 2021-09-14 PROCEDURE — 99024 PR POST-OP FOLLOW-UP VISIT: ICD-10-PCS | Mod: ,,, | Performed by: SURGERY

## 2021-09-14 PROCEDURE — 99024 POSTOP FOLLOW-UP VISIT: CPT | Mod: ,,, | Performed by: SURGERY

## 2021-09-14 PROCEDURE — 99213 OFFICE O/P EST LOW 20 MIN: CPT | Mod: PBBFAC | Performed by: SURGERY

## 2021-09-21 ENCOUNTER — PROCEDURE VISIT (OUTPATIENT)
Dept: SLEEP MEDICINE | Facility: HOSPITAL | Age: 66
End: 2021-09-21
Attending: INTERNAL MEDICINE
Payer: MEDICARE

## 2021-09-21 DIAGNOSIS — G47.30 SLEEP APNEA, UNSPECIFIED: ICD-10-CM

## 2021-09-21 PROCEDURE — 95811 POLYSOM 6/>YRS CPAP 4/> PARM: CPT | Mod: PO

## 2021-09-22 ENCOUNTER — OFFICE VISIT (OUTPATIENT)
Dept: FAMILY MEDICINE | Facility: CLINIC | Age: 66
End: 2021-09-22
Payer: MEDICARE

## 2021-09-22 VITALS
DIASTOLIC BLOOD PRESSURE: 78 MMHG | WEIGHT: 209.63 LBS | TEMPERATURE: 98 F | BODY MASS INDEX: 26.9 KG/M2 | SYSTOLIC BLOOD PRESSURE: 109 MMHG | HEART RATE: 110 BPM | HEIGHT: 74 IN | OXYGEN SATURATION: 95 % | RESPIRATION RATE: 16 BRPM

## 2021-09-22 DIAGNOSIS — Z23 NEED FOR IMMUNIZATION AGAINST INFLUENZA: ICD-10-CM

## 2021-09-22 DIAGNOSIS — E03.9 ACQUIRED HYPOTHYROIDISM: ICD-10-CM

## 2021-09-22 DIAGNOSIS — M54.41 CHRONIC BILATERAL LOW BACK PAIN WITH BILATERAL SCIATICA: Primary | ICD-10-CM

## 2021-09-22 DIAGNOSIS — M54.42 CHRONIC BILATERAL LOW BACK PAIN WITH BILATERAL SCIATICA: Primary | ICD-10-CM

## 2021-09-22 DIAGNOSIS — G47.33 OSA (OBSTRUCTIVE SLEEP APNEA): Primary | ICD-10-CM

## 2021-09-22 DIAGNOSIS — M06.9 RHEUMATOID ARTHRITIS INVOLVING MULTIPLE SITES, UNSPECIFIED WHETHER RHEUMATOID FACTOR PRESENT: ICD-10-CM

## 2021-09-22 DIAGNOSIS — G89.29 CHRONIC BILATERAL LOW BACK PAIN WITH BILATERAL SCIATICA: Primary | ICD-10-CM

## 2021-09-22 PROBLEM — E55.9 VITAMIN D DEFICIENCY: Status: ACTIVE | Noted: 2021-09-22

## 2021-09-22 PROBLEM — J30.9 ALLERGIC RHINITIS: Status: ACTIVE | Noted: 2021-09-22

## 2021-09-22 PROBLEM — Z87.891 EX-SMOKER: Status: ACTIVE | Noted: 2021-09-22

## 2021-09-22 PROBLEM — K59.09 CHRONIC CONSTIPATION: Status: ACTIVE | Noted: 2021-09-22

## 2021-09-22 PROBLEM — N18.32 CHRONIC KIDNEY DISEASE, STAGE 3B: Status: ACTIVE | Noted: 2021-09-22

## 2021-09-22 PROBLEM — K21.9 GASTROESOPHAGEAL REFLUX DISEASE WITHOUT ESOPHAGITIS: Status: ACTIVE | Noted: 2021-09-22

## 2021-09-22 PROCEDURE — 99215 PR OFFICE/OUTPT VISIT, EST, LEVL V, 40-54 MIN: ICD-10-PCS | Mod: 25,,, | Performed by: FAMILY MEDICINE

## 2021-09-22 PROCEDURE — G0008 ADMIN INFLUENZA VIRUS VAC: HCPCS | Mod: ,,, | Performed by: FAMILY MEDICINE

## 2021-09-22 PROCEDURE — 90686 IIV4 VACC NO PRSV 0.5 ML IM: CPT | Mod: ,,, | Performed by: FAMILY MEDICINE

## 2021-09-22 PROCEDURE — 96372 THER/PROPH/DIAG INJ SC/IM: CPT | Mod: 59,,, | Performed by: FAMILY MEDICINE

## 2021-09-22 PROCEDURE — 99215 OFFICE O/P EST HI 40 MIN: CPT | Mod: 25,,, | Performed by: FAMILY MEDICINE

## 2021-09-22 PROCEDURE — G0008 FLU VACCINE (QUAD) GREATER THAN OR EQUAL TO 3YO PRESERVATIVE FREE IM: ICD-10-PCS | Mod: ,,, | Performed by: FAMILY MEDICINE

## 2021-09-22 PROCEDURE — 96372 PR INJECTION,THERAP/PROPH/DIAG2ST, IM OR SUBCUT: ICD-10-PCS | Mod: 59,,, | Performed by: FAMILY MEDICINE

## 2021-09-22 PROCEDURE — 90686 FLU VACCINE (QUAD) GREATER THAN OR EQUAL TO 3YO PRESERVATIVE FREE IM: ICD-10-PCS | Mod: ,,, | Performed by: FAMILY MEDICINE

## 2021-09-22 RX ORDER — KETOROLAC TROMETHAMINE 30 MG/ML
60 INJECTION, SOLUTION INTRAMUSCULAR; INTRAVENOUS
Status: COMPLETED | OUTPATIENT
Start: 2021-09-22 | End: 2021-09-22

## 2021-09-22 RX ORDER — METHOTREXATE 25 MG/ML
INJECTION INTRA-ARTERIAL; INTRAMUSCULAR; INTRATHECAL; INTRAVENOUS
COMMUNITY
Start: 2021-06-01 | End: 2021-09-22

## 2021-09-22 RX ORDER — FOLIC ACID 1 MG/1
1000 TABLET ORAL DAILY
COMMUNITY
Start: 2021-04-29 | End: 2021-09-22

## 2021-09-22 RX ORDER — CYANOCOBALAMIN 1000 UG/ML
1000 INJECTION, SOLUTION INTRAMUSCULAR; SUBCUTANEOUS
Status: COMPLETED | OUTPATIENT
Start: 2021-09-22 | End: 2021-09-22

## 2021-09-22 RX ORDER — LEVOTHYROXINE SODIUM 100 UG/1
100 TABLET ORAL
COMMUNITY
End: 2021-11-15 | Stop reason: SDUPTHER

## 2021-09-22 RX ORDER — LEFLUNOMIDE 20 MG/1
20 TABLET ORAL DAILY
COMMUNITY
End: 2022-04-11 | Stop reason: SDUPTHER

## 2021-09-22 RX ADMIN — KETOROLAC TROMETHAMINE 60 MG: 30 INJECTION, SOLUTION INTRAMUSCULAR; INTRAVENOUS at 09:09

## 2021-09-22 RX ADMIN — CYANOCOBALAMIN 1000 MCG: 1000 INJECTION, SOLUTION INTRAMUSCULAR; SUBCUTANEOUS at 09:09

## 2021-10-25 ENCOUNTER — PROCEDURE VISIT (OUTPATIENT)
Dept: SLEEP MEDICINE | Facility: HOSPITAL | Age: 66
End: 2021-10-25
Payer: MEDICARE

## 2021-10-25 DIAGNOSIS — G47.33 OSA (OBSTRUCTIVE SLEEP APNEA): ICD-10-CM

## 2021-10-25 PROCEDURE — 95811 POLYSOM 6/>YRS CPAP 4/> PARM: CPT | Mod: PO | Performed by: INTERNAL MEDICINE

## 2021-11-11 ENCOUNTER — OFFICE VISIT (OUTPATIENT)
Dept: FAMILY MEDICINE | Facility: CLINIC | Age: 66
End: 2021-11-11
Payer: MEDICARE

## 2021-11-11 VITALS
OXYGEN SATURATION: 99 % | SYSTOLIC BLOOD PRESSURE: 124 MMHG | RESPIRATION RATE: 18 BRPM | HEART RATE: 94 BPM | WEIGHT: 216 LBS | DIASTOLIC BLOOD PRESSURE: 81 MMHG | HEIGHT: 74 IN | BODY MASS INDEX: 27.72 KG/M2 | TEMPERATURE: 98 F

## 2021-11-11 DIAGNOSIS — Z23 NEED FOR VACCINATION: ICD-10-CM

## 2021-11-11 DIAGNOSIS — I10 PRIMARY HYPERTENSION: ICD-10-CM

## 2021-11-11 DIAGNOSIS — K21.9 GASTROESOPHAGEAL REFLUX DISEASE WITHOUT ESOPHAGITIS: ICD-10-CM

## 2021-11-11 DIAGNOSIS — E78.2 MIXED HYPERLIPIDEMIA: ICD-10-CM

## 2021-11-11 DIAGNOSIS — M47.816 LUMBAR SPONDYLOSIS: ICD-10-CM

## 2021-11-11 DIAGNOSIS — N18.32 CHRONIC KIDNEY DISEASE, STAGE 3B: Primary | ICD-10-CM

## 2021-11-11 DIAGNOSIS — J30.9 ALLERGIC RHINITIS, UNSPECIFIED SEASONALITY, UNSPECIFIED TRIGGER: ICD-10-CM

## 2021-11-11 DIAGNOSIS — E03.9 ACQUIRED HYPOTHYROIDISM: ICD-10-CM

## 2021-11-11 DIAGNOSIS — E04.2 MULTIPLE THYROID NODULES: ICD-10-CM

## 2021-11-11 DIAGNOSIS — Z23 NEED FOR INFLUENZA VACCINATION: ICD-10-CM

## 2021-11-11 DIAGNOSIS — E55.9 VITAMIN D DEFICIENCY: ICD-10-CM

## 2021-11-11 PROBLEM — M15.9 GENERALIZED OSTEOARTHRITIS: Status: ACTIVE | Noted: 2021-11-11

## 2021-11-11 PROBLEM — G89.29 CHRONIC PAIN: Status: ACTIVE | Noted: 2021-11-11

## 2021-11-11 PROBLEM — M10.9 GOUT: Status: ACTIVE | Noted: 2021-11-11

## 2021-11-11 PROBLEM — M51.369 DEGENERATION OF LUMBAR INTERVERTEBRAL DISC: Status: ACTIVE | Noted: 2021-11-11

## 2021-11-11 PROBLEM — M51.36 DEGENERATION OF LUMBAR INTERVERTEBRAL DISC: Status: ACTIVE | Noted: 2021-11-11

## 2021-11-11 LAB
ALBUMIN SERPL BCP-MCNC: 3.8 G/DL (ref 3.5–5)
ALBUMIN/GLOB SERPL: 0.8 {RATIO}
ALP SERPL-CCNC: 134 U/L (ref 45–115)
ALT SERPL W P-5'-P-CCNC: 40 U/L (ref 16–61)
ANION GAP SERPL CALCULATED.3IONS-SCNC: 10 MMOL/L (ref 7–16)
AST SERPL W P-5'-P-CCNC: 33 U/L (ref 15–37)
BILIRUB SERPL-MCNC: 0.5 MG/DL (ref 0–1.2)
BUN SERPL-MCNC: 20 MG/DL (ref 7–18)
BUN/CREAT SERPL: 12 (ref 6–20)
CALCIUM SERPL-MCNC: 9.3 MG/DL (ref 8.5–10.1)
CHLORIDE SERPL-SCNC: 104 MMOL/L (ref 98–107)
CHOLEST SERPL-MCNC: 243 MG/DL (ref 0–200)
CHOLEST/HDLC SERPL: 3.7 {RATIO}
CO2 SERPL-SCNC: 30 MMOL/L (ref 21–32)
CREAT SERPL-MCNC: 1.67 MG/DL (ref 0.7–1.3)
GLOBULIN SER-MCNC: 4.6 G/DL (ref 2–4)
GLUCOSE SERPL-MCNC: 93 MG/DL (ref 74–106)
HDLC SERPL-MCNC: 66 MG/DL (ref 40–60)
LDLC SERPL CALC-MCNC: 134 MG/DL
LDLC/HDLC SERPL: 2 {RATIO}
NONHDLC SERPL-MCNC: 177 MG/DL
POTASSIUM SERPL-SCNC: 4.6 MMOL/L (ref 3.5–5.1)
PROT SERPL-MCNC: 8.4 G/DL (ref 6.4–8.2)
SODIUM SERPL-SCNC: 139 MMOL/L (ref 136–145)
TRIGL SERPL-MCNC: 216 MG/DL (ref 35–150)
TSH SERPL DL<=0.005 MIU/L-ACNC: 15.9 UIU/ML (ref 0.36–3.74)
VLDLC SERPL-MCNC: 43 MG/DL

## 2021-11-11 PROCEDURE — 99214 OFFICE O/P EST MOD 30 MIN: CPT | Mod: 25,,, | Performed by: FAMILY MEDICINE

## 2021-11-11 PROCEDURE — 84443 TSH: ICD-10-PCS | Mod: ,,, | Performed by: CLINICAL MEDICAL LABORATORY

## 2021-11-11 PROCEDURE — 80053 COMPREHEN METABOLIC PANEL: CPT | Mod: ,,, | Performed by: CLINICAL MEDICAL LABORATORY

## 2021-11-11 PROCEDURE — 84443 ASSAY THYROID STIM HORMONE: CPT | Mod: ,,, | Performed by: CLINICAL MEDICAL LABORATORY

## 2021-11-11 PROCEDURE — 90670 PCV13 VACCINE IM: CPT | Mod: ,,, | Performed by: FAMILY MEDICINE

## 2021-11-11 PROCEDURE — G0009 PNEUMOCOCCAL CONJUGATE VACCINE 13-VALENT LESS THAN 5YO & GREATER THAN: ICD-10-PCS | Mod: 59,,, | Performed by: FAMILY MEDICINE

## 2021-11-11 PROCEDURE — G0009 ADMIN PNEUMOCOCCAL VACCINE: HCPCS | Mod: 59,,, | Performed by: FAMILY MEDICINE

## 2021-11-11 PROCEDURE — 90670 PNEUMOCOCCAL CONJUGATE VACCINE 13-VALENT LESS THAN 5YO & GREATER THAN: ICD-10-PCS | Mod: ,,, | Performed by: FAMILY MEDICINE

## 2021-11-11 PROCEDURE — 96372 THER/PROPH/DIAG INJ SC/IM: CPT | Mod: ,,, | Performed by: FAMILY MEDICINE

## 2021-11-11 PROCEDURE — 96372 PR INJECTION,THERAP/PROPH/DIAG2ST, IM OR SUBCUT: ICD-10-PCS | Mod: ,,, | Performed by: FAMILY MEDICINE

## 2021-11-11 PROCEDURE — 80061 LIPID PANEL: ICD-10-PCS | Mod: ,,, | Performed by: CLINICAL MEDICAL LABORATORY

## 2021-11-11 PROCEDURE — 99214 PR OFFICE/OUTPT VISIT, EST, LEVL IV, 30-39 MIN: ICD-10-PCS | Mod: 25,,, | Performed by: FAMILY MEDICINE

## 2021-11-11 PROCEDURE — 80061 LIPID PANEL: CPT | Mod: ,,, | Performed by: CLINICAL MEDICAL LABORATORY

## 2021-11-11 PROCEDURE — 80053 COMPREHENSIVE METABOLIC PANEL: ICD-10-PCS | Mod: ,,, | Performed by: CLINICAL MEDICAL LABORATORY

## 2021-11-11 RX ORDER — KETOROLAC TROMETHAMINE 30 MG/ML
30 INJECTION, SOLUTION INTRAMUSCULAR; INTRAVENOUS
Status: COMPLETED | OUTPATIENT
Start: 2021-11-11 | End: 2021-11-11

## 2021-11-11 RX ORDER — PRAVASTATIN SODIUM 40 MG/1
40 TABLET ORAL DAILY
Qty: 90 TABLET | Refills: 0 | Status: SHIPPED | OUTPATIENT
Start: 2021-11-11 | End: 2022-01-07 | Stop reason: SDUPTHER

## 2021-11-11 RX ORDER — MONTELUKAST SODIUM 10 MG/1
10 TABLET ORAL NIGHTLY
Qty: 90 TABLET | Refills: 3 | Status: SHIPPED | OUTPATIENT
Start: 2021-11-11 | End: 2022-04-11 | Stop reason: SDUPTHER

## 2021-11-11 RX ORDER — METHOCARBAMOL 500 MG/1
500 TABLET, FILM COATED ORAL 3 TIMES DAILY PRN
Qty: 90 TABLET | Refills: 0 | Status: SHIPPED | OUTPATIENT
Start: 2021-11-11 | End: 2022-01-07 | Stop reason: SDUPTHER

## 2021-11-11 RX ORDER — KETOROLAC TROMETHAMINE 30 MG/ML
30 INJECTION, SOLUTION INTRAMUSCULAR; INTRAVENOUS
Status: DISCONTINUED | OUTPATIENT
Start: 2021-11-11 | End: 2021-11-11

## 2021-11-11 RX ORDER — LISINOPRIL 20 MG/1
20 TABLET ORAL DAILY
Qty: 90 TABLET | Refills: 0 | Status: SHIPPED | OUTPATIENT
Start: 2021-11-11 | End: 2022-01-07 | Stop reason: SDUPTHER

## 2021-11-11 RX ORDER — LEVOTHYROXINE SODIUM 100 UG/1
100 TABLET ORAL
Qty: 90 TABLET | Refills: 1 | Status: CANCELLED | OUTPATIENT
Start: 2021-11-11

## 2021-11-11 RX ORDER — METHYLPREDNISOLONE ACETATE 40 MG/ML
40 INJECTION, SUSPENSION INTRA-ARTICULAR; INTRALESIONAL; INTRAMUSCULAR; SOFT TISSUE
Status: COMPLETED | OUTPATIENT
Start: 2021-11-11 | End: 2021-11-11

## 2021-11-11 RX ORDER — CETIRIZINE HYDROCHLORIDE 5 MG/1
5 TABLET ORAL DAILY
Qty: 90 TABLET | Refills: 0 | Status: SHIPPED | OUTPATIENT
Start: 2021-11-11 | End: 2022-01-07 | Stop reason: ALTCHOICE

## 2021-11-11 RX ADMIN — METHYLPREDNISOLONE ACETATE 40 MG: 40 INJECTION, SUSPENSION INTRA-ARTICULAR; INTRALESIONAL; INTRAMUSCULAR; SOFT TISSUE at 02:11

## 2021-11-11 RX ADMIN — KETOROLAC TROMETHAMINE 30 MG: 30 INJECTION, SOLUTION INTRAMUSCULAR; INTRAVENOUS at 02:11

## 2021-11-15 DIAGNOSIS — E04.2 MULTIPLE THYROID NODULES: Primary | ICD-10-CM

## 2021-11-15 RX ORDER — LEVOTHYROXINE SODIUM 125 UG/1
125 TABLET ORAL
Qty: 90 TABLET | Refills: 1 | Status: SHIPPED | OUTPATIENT
Start: 2021-11-15 | End: 2022-01-07 | Stop reason: SDUPTHER

## 2021-12-29 ENCOUNTER — OFFICE VISIT (OUTPATIENT)
Dept: FAMILY MEDICINE | Facility: CLINIC | Age: 66
End: 2021-12-29
Payer: MEDICARE

## 2021-12-29 VITALS
RESPIRATION RATE: 20 BRPM | HEART RATE: 94 BPM | WEIGHT: 212.38 LBS | TEMPERATURE: 98 F | HEIGHT: 74 IN | OXYGEN SATURATION: 94 % | BODY MASS INDEX: 27.25 KG/M2 | DIASTOLIC BLOOD PRESSURE: 75 MMHG | SYSTOLIC BLOOD PRESSURE: 108 MMHG

## 2021-12-29 DIAGNOSIS — G89.29 CHRONIC MIDLINE LOW BACK PAIN, UNSPECIFIED WHETHER SCIATICA PRESENT: Primary | ICD-10-CM
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
## 2021-12-29 DIAGNOSIS — M54.50 CHRONIC MIDLINE LOW BACK PAIN, UNSPECIFIED WHETHER SCIATICA PRESENT: Primary | ICD-10-CM

## 2021-12-29 DIAGNOSIS — R21 RASH AND NONSPECIFIC SKIN ERUPTION: ICD-10-CM

## 2021-12-29 PROCEDURE — 96372 THER/PROPH/DIAG INJ SC/IM: CPT | Mod: ,,, | Performed by: NURSE PRACTITIONER

## 2021-12-29 PROCEDURE — 96372 PR INJECTION,THERAP/PROPH/DIAG2ST, IM OR SUBCUT: ICD-10-PCS | Mod: ,,, | Performed by: NURSE PRACTITIONER

## 2021-12-29 PROCEDURE — 99213 PR OFFICE/OUTPT VISIT, EST, LEVL III, 20-29 MIN: ICD-10-PCS | Mod: 25,,, | Performed by: NURSE PRACTITIONER

## 2021-12-29 PROCEDURE — 99213 OFFICE O/P EST LOW 20 MIN: CPT | Mod: 25,,, | Performed by: NURSE PRACTITIONER

## 2021-12-29 RX ORDER — METHYLPREDNISOLONE 4 MG/1
TABLET ORAL
Qty: 21 EACH | Refills: 0 | Status: SHIPPED | OUTPATIENT
Start: 2021-12-29 | End: 2022-01-07 | Stop reason: ALTCHOICE

## 2021-12-29 RX ORDER — DEXAMETHASONE SODIUM PHOSPHATE 4 MG/ML
4 INJECTION, SOLUTION INTRA-ARTICULAR; INTRALESIONAL; INTRAMUSCULAR; INTRAVENOUS; SOFT TISSUE
Status: COMPLETED | OUTPATIENT
Start: 2021-12-29 | End: 2021-12-29

## 2021-12-29 RX ORDER — KETOROLAC TROMETHAMINE 30 MG/ML
30 INJECTION, SOLUTION INTRAMUSCULAR; INTRAVENOUS
Status: COMPLETED | OUTPATIENT
Start: 2021-12-29 | End: 2021-12-29

## 2021-12-29 RX ORDER — KETOROLAC TROMETHAMINE 10 MG/1
10 TABLET, FILM COATED ORAL EVERY 6 HOURS
Qty: 30 TABLET | Refills: 0 | Status: SHIPPED | OUTPATIENT
Start: 2021-12-29 | End: 2022-01-03

## 2021-12-29 RX ADMIN — KETOROLAC TROMETHAMINE 30 MG: 30 INJECTION, SOLUTION INTRAMUSCULAR; INTRAVENOUS at 03:12

## 2021-12-29 RX ADMIN — DEXAMETHASONE SODIUM PHOSPHATE 4 MG: 4 INJECTION, SOLUTION INTRA-ARTICULAR; INTRALESIONAL; INTRAMUSCULAR; INTRAVENOUS; SOFT TISSUE at 03:12

## 2022-01-04 NOTE — PROGRESS NOTES
ROBBIE Li   [facility]  [address] [address]  [phone]      PATIENT NAME: Niranjan Whittaker Jr.  : 1955  DATE: 21  MRN: [id number]      Billing Provider: ROBBIE Li  Level of Service:   Patient PCP Information     Provider PCP Type    Kristine Mehta MD General          Reason for Visit / Chief Complaint: Back Pain and Rash (Itching, welts for 3 days)       Update PCP  Update Chief Complaint         History of Present Illness / Problem Focused Workflow     Presents with complaints of low back pain and rash to bilat arms x 3 days  Denies contact with any known allergens. He has had back pain since falling last week.     Review of Systems     Review of Systems   Constitutional: Negative for chills and fever.   HENT: Negative for congestion, ear pain and sore throat.    Eyes: Negative for discharge.   Respiratory: Negative for cough.    Cardiovascular: Negative for chest pain.   Gastrointestinal: Negative for abdominal pain, diarrhea and nausea.   Genitourinary: Negative for dysuria and hematuria.   Musculoskeletal: Positive for back pain.   Skin: Positive for rash.   Neurological: Positive for weakness, numbness and headaches.   Psychiatric/Behavioral: Negative for dysphoric mood. The patient is not nervous/anxious.        Medical / Social / Family History     Past Medical History:   Diagnosis Date    FH: total knee replacement     Headache     Hypertension     Neuropathy        Past Surgical History:   Procedure Laterality Date    BACK SURGERY      THYROIDECTOMY N/A 2021    Procedure: THYROIDECTOMY;  Surgeon: Manish Valadez MD;  Location: [facility];  Service: General;  Laterality: N/A;       Social History    reports that he has never smoked. He has never used smokeless tobacco. He reports current alcohol use. He reports current drug use. Drug: Hydrocodone.    Family History  MrLauryn's family history  includes Heart disease in his mother; Hypertension in his father and mother.    Medications and Allergies     Medications  Outpatient Medications Marked as Taking for the 12/29/21 encounter (Office Visit) with ROBBIE Li   Medication Sig Dispense Refill    calcitRIOL (ROCALTROL) 0.25 MCG Cap Take 1 capsule (0.25 mcg total) by mouth 2 (two) times daily. 28 capsule 0    cetirizine (ZYRTEC) 5 MG tablet Take 1 tablet (5 mg total) by mouth once daily. 90 tablet 0    DULoxetine (CYMBALTA) 60 MG capsule Take 60 mg by mouth once daily.      ferrous sulfate (FEOSOL) 325 mg (65 mg iron) Tab tablet Take 325 mg by mouth daily with breakfast.      HYDROcodone-acetaminophen (NORCO)  mg per tablet Take 1 tablet by mouth every 4 (four) hours as needed for Pain. 15 tablet 0    hydrOXYchloroQUINE (PLAQUENIL) 200 mg tablet Take 200 mg by mouth 2 (two) times a day.      leflunomide (ARAVA) 20 MG Tab Take 20 mg by mouth once daily.      levothyroxine (SYNTHROID) 125 MCG tablet Take 1 tablet (125 mcg total) by mouth before breakfast. 90 tablet 1    lisinopriL (PRINIVIL,ZESTRIL) 20 MG tablet Take 1 tablet (20 mg total) by mouth once daily. 90 tablet 0    methocarbamoL (ROBAXIN) 500 MG Tab Take 1 tablet (500 mg total) by mouth 3 (three) times daily as needed (cramps). 90 tablet 0    montelukast (SINGULAIR) 10 mg tablet Take 1 tablet (10 mg total) by mouth every evening. 90 tablet 3    omeprazole (PRILOSEC) 40 MG capsule TAKE 1 CAPSULE BY MOUTH EVERY DAY 90 capsule 1    pravastatin (PRAVACHOL) 40 MG tablet Take 1 tablet (40 mg total) by mouth once daily. 90 tablet 0    pregabalin (LYRICA) 150 MG capsule Take 150 mg by mouth 3 (three) times daily.      promethazine (PHENERGAN) 25 MG tablet Take 25 mg by mouth 2 (two) times daily as needed for Nausea. 2 times daily prn headache no more then 2 days in a week      silodosin (RAPAFLO) 8 mg Cap capsule Take 8 mg by mouth once daily.         Allergies  Review of  patient's allergies indicates:  No Known Allergies    Physical Examination     Vitals:    12/29/21 1343   BP: 108/75   Pulse: 94   Resp: 20   Temp: 97.8 °F (36.6 °C)     Physical Exam  Constitutional:       General: He is not in acute distress.  HENT:      Head: Normocephalic.      Nose: Nose normal. No congestion.      Mouth/Throat:      Mouth: Mucous membranes are moist.      Pharynx: No posterior oropharyngeal erythema.   Eyes:      Extraocular Movements: Extraocular movements intact.   Cardiovascular:      Rate and Rhythm: Normal rate.      Heart sounds: Normal heart sounds.   Pulmonary:      Effort: Pulmonary effort is normal. No respiratory distress.   Abdominal:      General: Bowel sounds are normal.   Musculoskeletal:         General: Signs of injury (fall at home) present. Normal range of motion.      Cervical back: Normal range of motion.      Comments: Uses cane   Skin:     General: Skin is warm.      Findings: Erythema and rash present.   Neurological:      Mental Status: He is alert and oriented to person, place, and time.   Psychiatric:         Mood and Affect: Mood normal.           Imaging / Labs       No visits with results within 1 Day(s) from this visit.   Latest known visit with results is:   Office Visit on 11/11/2021   Component Date Value Ref Range Status    Sodium 11/11/2021 139  136 - 145 mmol/L Final    Potassium 11/11/2021 4.6  3.5 - 5.1 mmol/L Final    Chloride 11/11/2021 104  98 - 107 mmol/L Final    CO2 11/11/2021 30  21 - 32 mmol/L Final    Anion Gap 11/11/2021 10  7 - 16 mmol/L Final    Glucose 11/11/2021 93  74 - 106 mg/dL Final    BUN 11/11/2021 20* 7 - 18 mg/dL Final    Creatinine 11/11/2021 1.67* 0.70 - 1.30 mg/dL Final    BUN/Creatinine Ratio 11/11/2021 12  6 - 20 Final    Calcium 11/11/2021 9.3  8.5 - 10.1 mg/dL Final    Total Protein 11/11/2021 8.4* 6.4 - 8.2 g/dL Final    Albumin 11/11/2021 3.8  3.5 - 5.0 g/dL Final    Globulin 11/11/2021 4.6* 2.0 - 4.0 g/dL Final     A/G Ratio 11/11/2021 0.8   Final    Bilirubin, Total 11/11/2021 0.5  0.0 - 1.2 mg/dL Final    Alk Phos 11/11/2021 134* 45 - 115 U/L Final    ALT 11/11/2021 40  16 - 61 U/L Final    AST 11/11/2021 33  15 - 37 U/L Final    eGFR  11/11/2021 53* >=60 mL/min/1.73m² Final    Triglycerides 11/11/2021 216* 35 - 150 mg/dL Final    Cholesterol 11/11/2021 243* 0 - 200 mg/dL Final    HDL Cholesterol 11/11/2021 66* 40 - 60 mg/dL Final    Cholesterol/HDL Ratio (Risk Factor) 11/11/2021 3.7   Final    Non-HDL 11/11/2021 177  mg/dL Final    LDL Calculated 11/11/2021 134  mg/dL Final    LDL/HDL 11/11/2021 2.0   Final    VLDL 11/11/2021 43  mg/dL Final    TSH 11/11/2021 15.900* 0.358 - 3.740 uIU/mL Final       Assessment and Plan (including Health Maintenance)      Problem List  Smart Sets  Document Outside HM   :    Health Maintenance Due   Topic Date Due    Hepatitis C Screening  Never done    TETANUS VACCINE  Never done    COVID-19 Vaccine (3 - Booster for Pfizer series) 09/18/2021       Problem List Items Addressed This Visit        Derm    Rash and nonspecific skin eruption       Orthopedic    Low back pain - Primary    Overview     Dr Anna pain management is following         Relevant Medications    methylPREDNISolone (MEDROL DOSEPACK) 4 mg tablet      Other Visit Diagnoses     Fall, initial encounter        Relevant Medications    ketorolac injection 30 mg (Completed)    methylPREDNISolone (MEDROL DOSEPACK) 4 mg tablet    dexamethasone injection 4 mg (Completed)          Health Maintenance Topics with due status: Not Due       Topic Last Completion Date    Colorectal Cancer Screening 01/29/2018    Pneumococcal Vaccines (Age 65+) 11/11/2021    Lipid Panel 11/11/2021       Future Appointments   Date Time Provider Department Center   1/7/2022  1:15 PM [facility] [place]          Signature:  ROBBIE Li [facility]  [address] [address]  [phone]    Date of encounter: 12/29/21

## 2022-01-07 ENCOUNTER — APPOINTMENT (OUTPATIENT)
Dept: RADIOLOGY | Facility: CLINIC | Age: 67
End: 2022-01-07
Attending: NURSE PRACTITIONER
Payer: MEDICARE

## 2022-01-07 ENCOUNTER — OFFICE VISIT (OUTPATIENT)
Dept: FAMILY MEDICINE | Facility: CLINIC | Age: 67
End: 2022-01-07
Payer: MEDICARE

## 2022-01-07 VITALS
HEIGHT: 74 IN | OXYGEN SATURATION: 95 % | BODY MASS INDEX: 27.46 KG/M2 | WEIGHT: 214 LBS | DIASTOLIC BLOOD PRESSURE: 72 MMHG | SYSTOLIC BLOOD PRESSURE: 136 MMHG | TEMPERATURE: 99 F | HEART RATE: 99 BPM | RESPIRATION RATE: 18 BRPM

## 2022-01-07 DIAGNOSIS — M51.36 DEGENERATION OF LUMBAR INTERVERTEBRAL DISC: ICD-10-CM

## 2022-01-07 DIAGNOSIS — E78.2 MIXED HYPERLIPIDEMIA: ICD-10-CM

## 2022-01-07 DIAGNOSIS — E04.2 MULTIPLE THYROID NODULES: ICD-10-CM

## 2022-01-07 DIAGNOSIS — I10 PRIMARY HYPERTENSION: ICD-10-CM

## 2022-01-07 DIAGNOSIS — R21 RASH AND NONSPECIFIC SKIN ERUPTION: ICD-10-CM

## 2022-01-07 DIAGNOSIS — K21.9 GASTROESOPHAGEAL REFLUX DISEASE WITHOUT ESOPHAGITIS: ICD-10-CM

## 2022-01-07 DIAGNOSIS — M25.552 LEFT HIP PAIN: ICD-10-CM

## 2022-01-07 DIAGNOSIS — M25.552 LEFT HIP PAIN: Primary | ICD-10-CM

## 2022-01-07 DIAGNOSIS — Z91.09 ENVIRONMENTAL ALLERGIES: ICD-10-CM

## 2022-01-07 PROCEDURE — 73502 X-RAY EXAM HIP UNI 2-3 VIEWS: CPT | Mod: 26,LT,, | Performed by: RADIOLOGY

## 2022-01-07 PROCEDURE — 73502 X-RAY EXAM HIP UNI 2-3 VIEWS: CPT | Mod: TC,RHCUB,FY,LT | Performed by: NURSE PRACTITIONER

## 2022-01-07 PROCEDURE — 96372 PR INJECTION,THERAP/PROPH/DIAG2ST, IM OR SUBCUT: ICD-10-PCS | Mod: ,,, | Performed by: NURSE PRACTITIONER

## 2022-01-07 PROCEDURE — 99213 PR OFFICE/OUTPT VISIT, EST, LEVL III, 20-29 MIN: ICD-10-PCS | Mod: 25,,, | Performed by: NURSE PRACTITIONER

## 2022-01-07 PROCEDURE — 99213 OFFICE O/P EST LOW 20 MIN: CPT | Mod: 25,,, | Performed by: NURSE PRACTITIONER

## 2022-01-07 PROCEDURE — 73502 XR HIP WITH PELVIS WHEN PERFORMED, 2 OR 3 VIEWS LEFT: ICD-10-PCS | Mod: 26,LT,, | Performed by: RADIOLOGY

## 2022-01-07 PROCEDURE — 96372 THER/PROPH/DIAG INJ SC/IM: CPT | Mod: ,,, | Performed by: NURSE PRACTITIONER

## 2022-01-07 RX ORDER — LORATADINE 10 MG/1
10 TABLET ORAL DAILY
Qty: 90 TABLET | Refills: 1 | Status: SHIPPED | OUTPATIENT
Start: 2022-01-07 | End: 2022-04-11 | Stop reason: SDUPTHER

## 2022-01-07 RX ORDER — LEVOTHYROXINE SODIUM 125 UG/1
125 TABLET ORAL
Qty: 90 TABLET | Refills: 1 | Status: SHIPPED | OUTPATIENT
Start: 2022-01-07 | End: 2022-04-11 | Stop reason: SDUPTHER

## 2022-01-07 RX ORDER — KETOROLAC TROMETHAMINE 30 MG/ML
30 INJECTION, SOLUTION INTRAMUSCULAR; INTRAVENOUS
Status: COMPLETED | OUTPATIENT
Start: 2022-01-07 | End: 2022-01-07

## 2022-01-07 RX ORDER — PRAVASTATIN SODIUM 40 MG/1
40 TABLET ORAL DAILY
Qty: 90 TABLET | Refills: 1 | Status: SHIPPED | OUTPATIENT
Start: 2022-01-07 | End: 2022-04-11 | Stop reason: SDUPTHER

## 2022-01-07 RX ORDER — LISINOPRIL 20 MG/1
20 TABLET ORAL DAILY
Qty: 90 TABLET | Refills: 1 | Status: SHIPPED | OUTPATIENT
Start: 2022-01-07 | End: 2022-04-11 | Stop reason: SDUPTHER

## 2022-01-07 RX ORDER — METHOCARBAMOL 500 MG/1
500 TABLET, FILM COATED ORAL 3 TIMES DAILY PRN
Qty: 90 TABLET | Refills: 0 | Status: SHIPPED | OUTPATIENT
Start: 2022-01-07 | End: 2022-04-11 | Stop reason: SDUPTHER

## 2022-01-07 RX ORDER — OMEPRAZOLE 40 MG/1
40 CAPSULE, DELAYED RELEASE ORAL DAILY
Qty: 90 CAPSULE | Refills: 1 | Status: SHIPPED | OUTPATIENT
Start: 2022-01-07 | End: 2022-04-11 | Stop reason: SDUPTHER

## 2022-01-07 RX ADMIN — KETOROLAC TROMETHAMINE 30 MG: 30 INJECTION, SOLUTION INTRAMUSCULAR; INTRAVENOUS at 02:01

## 2022-01-10 NOTE — PROGRESS NOTES
ROBBIE Li   [facility]  [address] [address]  [phone]      PATIENT NAME: Niranjan Whittaker Jr.  : 1955  DATE: 22  MRN: [id number]      Billing Provider: ROBBIE Li  Level of Service:   Patient PCP Information     Provider PCP Type    Kristine Mehta MD General          Reason for Visit / Chief Complaint: Itching (Arms, feels like its more on the inside. )       Update PCP  Update Chief Complaint         History of Present Illness / Problem Focused Workflow     Presents for follow up on rash to bilat arms. He reports he has decreased itching and arms with decreased redness  He is seeing Dr Pierre at [facility] center for hip pain and wants x-ray done. States he would like to have it here      Review of Systems     Review of Systems   Constitutional: Negative for chills and fever.   HENT: Negative for congestion, ear pain and sore throat.    Eyes: Negative for discharge.   Respiratory: Negative for cough.    Cardiovascular: Negative for chest pain.   Gastrointestinal: Negative for abdominal pain, diarrhea and nausea.   Genitourinary: Negative for dysuria and hematuria.   Musculoskeletal: Positive for back pain.   Skin: Positive for rash.   Neurological: Positive for weakness, numbness and headaches.   Psychiatric/Behavioral: Negative for dysphoric mood. The patient is not nervous/anxious.        Medical / Social / Family History     Past Medical History:   Diagnosis Date    FH: total knee replacement     Headache     Hypertension     Neuropathy        Past Surgical History:   Procedure Laterality Date    BACK SURGERY      THYROIDECTOMY N/A 2021    Procedure: THYROIDECTOMY;  Surgeon: Manish Valadez MD;  Location: [facility];  Service: General;  Laterality: N/A;       Social History    reports that he has never smoked. He has never used smokeless tobacco. He reports current alcohol use. He reports current drug  use. Drug: Hydrocodone.    Family History  's family history includes Heart disease in his mother; Hypertension in his father and mother.    Medications and Allergies     Medications  Outpatient Medications Marked as Taking for the 1/7/22 encounter (Office Visit) with ROBBIE Li   Medication Sig Dispense Refill    calcitRIOL (ROCALTROL) 0.25 MCG Cap Take 1 capsule (0.25 mcg total) by mouth 2 (two) times daily. 28 capsule 0    DULoxetine (CYMBALTA) 60 MG capsule Take 60 mg by mouth once daily.      ferrous sulfate (FEOSOL) 325 mg (65 mg iron) Tab tablet Take 325 mg by mouth daily with breakfast.      HYDROcodone-acetaminophen (NORCO)  mg per tablet Take 1 tablet by mouth every 4 (four) hours as needed for Pain. 15 tablet 0    hydrOXYchloroQUINE (PLAQUENIL) 200 mg tablet Take 200 mg by mouth 2 (two) times a day.      leflunomide (ARAVA) 20 MG Tab Take 20 mg by mouth once daily.      montelukast (SINGULAIR) 10 mg tablet Take 1 tablet (10 mg total) by mouth every evening. 90 tablet 3    omeprazole (PRILOSEC) 40 MG capsule Take 1 capsule (40 mg total) by mouth once daily. 90 capsule 1    pregabalin (LYRICA) 150 MG capsule Take 150 mg by mouth 3 (three) times daily.      promethazine (PHENERGAN) 25 MG tablet Take 25 mg by mouth 2 (two) times daily as needed for Nausea. 2 times daily prn headache no more then 2 days in a week      silodosin (RAPAFLO) 8 mg Cap capsule Take 8 mg by mouth once daily.      [DISCONTINUED] cetirizine (ZYRTEC) 5 MG tablet Take 1 tablet (5 mg total) by mouth once daily. 90 tablet 0    [DISCONTINUED] levothyroxine (SYNTHROID) 125 MCG tablet Take 1 tablet (125 mcg total) by mouth before breakfast. 90 tablet 1    [DISCONTINUED] lisinopriL (PRINIVIL,ZESTRIL) 20 MG tablet Take 1 tablet (20 mg total) by mouth once daily. 90 tablet 0    [DISCONTINUED] methocarbamoL (ROBAXIN) 500 MG Tab Take 1 tablet (500 mg total) by mouth 3 (three) times daily as needed (cramps). 90  tablet 0    [DISCONTINUED] methylPREDNISolone (MEDROL DOSEPACK) 4 mg tablet use as directed 21 each 0    [DISCONTINUED] omeprazole (PRILOSEC) 40 MG capsule TAKE 1 CAPSULE BY MOUTH EVERY DAY 90 capsule 1    [DISCONTINUED] pravastatin (PRAVACHOL) 40 MG tablet Take 1 tablet (40 mg total) by mouth once daily. 90 tablet 0       Allergies  Review of patient's allergies indicates:  No Known Allergies    Physical Examination     Vitals:    01/07/22 1333   BP: 136/72   Pulse: 99   Resp: 18   Temp: 98.5 °F (36.9 °C)     Physical Exam  Constitutional:       General: He is not in acute distress.  HENT:      Head: Normocephalic.      Nose: Nose normal. No congestion.      Mouth/Throat:      Mouth: Mucous membranes are moist.      Pharynx: No posterior oropharyngeal erythema.   Eyes:      Extraocular Movements: Extraocular movements intact.   Cardiovascular:      Rate and Rhythm: Normal rate.      Heart sounds: Normal heart sounds.   Pulmonary:      Effort: Pulmonary effort is normal. No respiratory distress.   Abdominal:      General: Bowel sounds are normal.   Musculoskeletal:         General: Signs of injury (fall at home) present. Normal range of motion.      Cervical back: Normal range of motion.      Comments: Uses cane   Skin:     General: Skin is warm.      Findings: Erythema and rash present.   Neurological:      Mental Status: He is alert and oriented to person, place, and time.   Psychiatric:         Mood and Affect: Mood normal.           Imaging / Labs       No visits with results within 1 Day(s) from this visit.   Latest known visit with results is:   Office Visit on 11/11/2021   Component Date Value Ref Range Status    Sodium 11/11/2021 139  136 - 145 mmol/L Final    Potassium 11/11/2021 4.6  3.5 - 5.1 mmol/L Final    Chloride 11/11/2021 104  98 - 107 mmol/L Final    CO2 11/11/2021 30  21 - 32 mmol/L Final    Anion Gap 11/11/2021 10  7 - 16 mmol/L Final    Glucose 11/11/2021 93  74 - 106 mg/dL Final     BUN 11/11/2021 20* 7 - 18 mg/dL Final    Creatinine 11/11/2021 1.67* 0.70 - 1.30 mg/dL Final    BUN/Creatinine Ratio 11/11/2021 12  6 - 20 Final    Calcium 11/11/2021 9.3  8.5 - 10.1 mg/dL Final    Total Protein 11/11/2021 8.4* 6.4 - 8.2 g/dL Final    Albumin 11/11/2021 3.8  3.5 - 5.0 g/dL Final    Globulin 11/11/2021 4.6* 2.0 - 4.0 g/dL Final    A/G Ratio 11/11/2021 0.8   Final    Bilirubin, Total 11/11/2021 0.5  0.0 - 1.2 mg/dL Final    Alk Phos 11/11/2021 134* 45 - 115 U/L Final    ALT 11/11/2021 40  16 - 61 U/L Final    AST 11/11/2021 33  15 - 37 U/L Final    eGFR  11/11/2021 53* >=60 mL/min/1.73m² Final    Triglycerides 11/11/2021 216* 35 - 150 mg/dL Final    Cholesterol 11/11/2021 243* 0 - 200 mg/dL Final    HDL Cholesterol 11/11/2021 66* 40 - 60 mg/dL Final    Cholesterol/HDL Ratio (Risk Factor) 11/11/2021 3.7   Final    Non-HDL 11/11/2021 177  mg/dL Final    LDL Calculated 11/11/2021 134  mg/dL Final    LDL/HDL 11/11/2021 2.0   Final    VLDL 11/11/2021 43  mg/dL Final    TSH 11/11/2021 15.900* 0.358 - 3.740 uIU/mL Final       Assessment and Plan (including Health Maintenance)      Problem List  Smart Sets  Document Outside HM   :    Health Maintenance Due   Topic Date Due    Hepatitis C Screening  Never done    TETANUS VACCINE  Never done    COVID-19 Vaccine (3 - Booster for Pfizer series) 09/18/2021       Problem List Items Addressed This Visit        Neuro    Degeneration of lumbar intervertebral disc    Relevant Medications    methocarbamoL (ROBAXIN) 500 MG Tab       Derm    Rash and nonspecific skin eruption       Endocrine    Multiple thyroid nodules    Relevant Medications    levothyroxine (SYNTHROID) 125 MCG tablet       GI    Gastroesophageal reflux disease without esophagitis    Relevant Medications    omeprazole (PRILOSEC) 40 MG capsule      Other Visit Diagnoses     Left hip pain    -  Primary    Relevant Medications    ketorolac injection 30 mg (Completed)     Other Relevant Orders    X-Ray Hip 2 or 3 views Left (with Pelvis when performed) (Completed)    Primary hypertension        Relevant Medications    lisinopriL (PRINIVIL,ZESTRIL) 20 MG tablet    Mixed hyperlipidemia        Relevant Medications    pravastatin (PRAVACHOL) 40 MG tablet    Environmental allergies        Relevant Medications    loratadine (CLARITIN) 10 mg tablet        Will x-ray right hip and sent to Dr Singh for treatment. Will continue claritin for rash. Follow up 6 months    Health Maintenance Topics with due status: Not Due       Topic Last Completion Date    Colorectal Cancer Screening 01/29/2018    Pneumococcal Vaccines (Age 65+) 11/11/2021    Lipid Panel 11/11/2021       Future Appointments   Date Time Provider Department Center   2/7/2022  1:00 PM AWV NURSE, LA[facility]          Signature:  ROBBIE Li  [facility]  [id number] [place] [id number]  [phone]    Date of encounter: 1/7/22

## 2022-02-10 ENCOUNTER — OFFICE VISIT (OUTPATIENT)
Dept: FAMILY MEDICINE | Facility: CLINIC | Age: 67
End: 2022-02-10
Payer: MEDICARE

## 2022-02-10 VITALS
WEIGHT: 211.06 LBS | BODY MASS INDEX: 27.09 KG/M2 | SYSTOLIC BLOOD PRESSURE: 110 MMHG | HEIGHT: 74 IN | TEMPERATURE: 98 F | DIASTOLIC BLOOD PRESSURE: 74 MMHG | HEART RATE: 97 BPM | OXYGEN SATURATION: 99 % | RESPIRATION RATE: 20 BRPM

## 2022-02-10 DIAGNOSIS — Z00.00 ENCOUNTER FOR INITIAL ANNUAL WELLNESS VISIT (AWV) IN MEDICARE PATIENT: Primary | ICD-10-CM

## 2022-02-10 DIAGNOSIS — Z13.5 SCREENING FOR EYE CONDITION: ICD-10-CM

## 2022-02-10 DIAGNOSIS — Z11.59 ENCOUNTER FOR HEPATITIS C SCREENING TEST FOR LOW RISK PATIENT: ICD-10-CM

## 2022-02-10 PROCEDURE — G0438 PR WELCOME MEDICARE ANNUAL WELLNESS INITIAL VISIT: ICD-10-PCS | Mod: ,,, | Performed by: FAMILY MEDICINE

## 2022-02-10 PROCEDURE — G0438 PPPS, INITIAL VISIT: HCPCS | Mod: ,,, | Performed by: FAMILY MEDICINE

## 2022-02-10 RX ORDER — LEVOTHYROXINE SODIUM 75 UG/1
TABLET ORAL
COMMUNITY
Start: 2021-10-15 | End: 2022-04-11

## 2022-02-10 RX ORDER — OXYCODONE HYDROCHLORIDE 30 MG/1
30 TABLET, FILM COATED, EXTENDED RELEASE ORAL 2 TIMES DAILY PRN
COMMUNITY
Start: 2022-01-13

## 2022-02-10 RX ORDER — TAMSULOSIN HYDROCHLORIDE 0.4 MG/1
CAPSULE ORAL
COMMUNITY
End: 2022-04-11 | Stop reason: SDUPTHER

## 2022-02-10 RX ORDER — GABAPENTIN 300 MG/1
CAPSULE ORAL
COMMUNITY
Start: 2022-02-04 | End: 2022-08-15

## 2022-02-10 RX ORDER — SULFASALAZINE 500 MG/1
500 TABLET ORAL 2 TIMES DAILY
COMMUNITY
Start: 2022-02-04 | End: 2023-04-17 | Stop reason: SDUPTHER

## 2022-02-10 NOTE — PROGRESS NOTES
[facility]     PATIENT NAME: Niranjan Whittaker Jr.   : 1955    AGE: 67 y.o. DATE: 02/10/2022   MRN: [id number]        Reason for Visit / Chief Complaint: Medicare AWV (Medicare Initial AWV )        Niranjan Whittaker Jr. presents for an Initial Medicare AWV today.     The following components were reviewed and updated:    Medical/Social/Family History:  Past Medical History:   Diagnosis Date    FH: total knee replacement     GERD (gastroesophageal reflux disease)     Headache     Hyperlipidemia     Hypertension     Neuropathy     Thyroid disease         Family History   Problem Relation Age of Onset    Heart disease Mother     Hypertension Mother     Hypertension Father         Social History     Tobacco Use   Smoking Status Never Smoker   Smokeless Tobacco Never Used      Social History     Substance and Sexual Activity   Alcohol Use Not Currently       Family History   Problem Relation Age of Onset    Heart disease Mother     Hypertension Mother     Hypertension Father        Past Surgical History:   Procedure Laterality Date    BACK SURGERY      THYROIDECTOMY N/A 2021    Procedure: THYROIDECTOMY;  Surgeon: Manish Valadez MD;  Location: [facility];  Service: General;  Laterality: N/A;         · Allergies and Current Medications   Review of patient's allergies indicates:  No Known Allergies    Current Outpatient Medications:     DULoxetine (CYMBALTA) 60 MG capsule, Take 60 mg by mouth once daily., Disp: , Rfl:     gabapentin (NEURONTIN) 300 MG capsule, , Disp: , Rfl:     HYDROcodone-acetaminophen (NORCO)  mg per tablet, Take 1 tablet by mouth every 4 (four) hours as needed for Pain., Disp: 15 tablet, Rfl: 0    hydrOXYchloroQUINE (PLAQUENIL) 200 mg tablet, Take 200 mg by mouth 2 (two) times a day., Disp: , Rfl:     leflunomide (ARAVA) 20 MG Tab, Take 20 mg by mouth once daily., Disp: , Rfl:     levothyroxine (SYNTHROID) 75 MCG tablet,  , Disp: , Rfl:     lisinopriL (PRINIVIL,ZESTRIL) 20 MG tablet, Take 1 tablet (20 mg total) by mouth once daily., Disp: 90 tablet, Rfl: 1    loratadine (CLARITIN) 10 mg tablet, Take 1 tablet (10 mg total) by mouth once daily., Disp: 90 tablet, Rfl: 1    methocarbamoL (ROBAXIN) 500 MG Tab, Take 1 tablet (500 mg total) by mouth 3 (three) times daily as needed (cramps)., Disp: 90 tablet, Rfl: 0    montelukast (SINGULAIR) 10 mg tablet, Take 1 tablet (10 mg total) by mouth every evening., Disp: 90 tablet, Rfl: 3    omeprazole (PRILOSEC) 40 MG capsule, Take 1 capsule (40 mg total) by mouth once daily., Disp: 90 capsule, Rfl: 1    OXYCONTIN 30 mg TR12 12 hr tablet, Take 30 mg by mouth 2 (two) times daily as needed., Disp: , Rfl:     pravastatin (PRAVACHOL) 40 MG tablet, Take 1 tablet (40 mg total) by mouth once daily., Disp: 90 tablet, Rfl: 1    pregabalin (LYRICA) 150 MG capsule, Take 150 mg by mouth 3 (three) times daily., Disp: , Rfl:     silodosin (RAPAFLO) 8 mg Cap capsule, Take 8 mg by mouth once daily., Disp: , Rfl:     sulfaSALAzine (AZULFIDINE) 500 mg Tab, Take 500 mg by mouth 2 (two) times daily., Disp: , Rfl:     tamsulosin (FLOMAX) 0.4 mg Cap, TAKE 1 CAPSULE BY MOUTH EVERY DAY 30 MINS FOLLOWING THE SAME MEAL EVERY DAY, Disp: , Rfl:     calcitRIOL (ROCALTROL) 0.25 MCG Cap, Take 1 capsule (0.25 mcg total) by mouth 2 (two) times daily. (Patient not taking: Reported on 2/10/2022), Disp: 28 capsule, Rfl: 0    ferrous sulfate (FEOSOL) 325 mg (65 mg iron) Tab tablet, Take 325 mg by mouth daily with breakfast., Disp: , Rfl:     levothyroxine (SYNTHROID) 125 MCG tablet, Take 1 tablet (125 mcg total) by mouth before breakfast. (Patient not taking: Reported on 2/10/2022), Disp: 90 tablet, Rfl: 1    promethazine (PHENERGAN) 25 MG tablet, Take 25 mg by mouth 2 (two) times daily as needed for Nausea. 2 times daily prn headache no more then 2 days in a week, Disp: , Rfl:     · Health Risk Assessment   Fall Risk:  No   Obesity: BMI 27.10. Education given     Advance Directive:  Does not have an advanced directive. Verbal education and written education included in today's AVS.   Depression: PHQ9 2    HTN:  yes, DASH diet, exercise, weight management, med compliance, home BP monitoring, and follow-up discussed   Tobacco use: Denies  STI: Na    Alcohol misuse: Denies   Statin Use: Yes      · Health Risk Assessment  What [age] 65-69  Are you male or female?: Male  During the past four weeks, how much have you been bothered by emotional problems such as feeling anxious, depressed, irritable, sad, or downhearted and blue?: Not at all  During the past five weeks, has your physical and/or emotional health limited your social activities with family, friends, neighbors, or groups?: Not at all  During the past four weeks, how much bodily pain have you generally had?: Severe pain  During the past four weeks, was someone available to help if you needed and wanted help?: Yes, as much as I wanted  During the past four weeks, what was the hardest physical activity you could do for at least two minutes?: Heavy  Can you get to places out of walking distance without help?  (For example, can you travel alone on buses or taxis, or drive your own car?): Yes  Can you go shopping for groceries or clothes without someone's help?: Yes  Can you prepare your own meals?: Yes  Can you do your own housework without help?: No  Because of any health problems, do you need the help of another person with your personal care needs such as eating, bathing, dressing, or getting around the house?: No  Can you handle your own money without help?: Yes  During the past four weeks, how would you rate your health in general?: Fair  How have things been going for you during the past four weeks?: Pretty well  Are you having difficulties driving your car?: No  Do you always fasten your seat belt when you are in a car?: Yes, usually  How often in the past four  weeks have you been bothered by falling or dizzy when standing up?: Never  How often in the past four weeks have you been bothered by sexual problems?: Never  How often in the past four weeks have you been bothered by trouble eating well?: Never  How often in the past four weeks have you been bothered by teeth or denture problems?: Never  How often in the past four weeks have you been bothered with problems using the telephone?: Never  How often in the past four weeks have you been bothered by tiredness or fatigue?: Sometimes  Have you fallen two or more times in the past year?: No  Are you afraid of falling?: Yes  Are you a smoker?: No  During the past four weeks, how many drinks of wine, beer, or other alcoholic beverages did you have?: No alcohol at all  Do you exercise for about 20 minutes three or more days a week?: No, I usually do not exercise this much  Have you been given any information to help you with hazards in your house that might hurt you?: No  Have you been given any information to help you with keeping track of your medications?: No  How often do you have trouble taking medicines the way you've been told to take them?: I always take them as prescribed  How confident are you that you can control and manage most of your health problems?: Very confident  What is your race? (Check all that apply.):     · Health Maintenance   Last eye exam: Pt reports been few years and request referral   Last CV screen with lipids: 04/29/2021   Diabetes screening with fasting glucose or A1c: 08/23/2021   Colonoscopy: 01/29/2018   Flu Vaccine: 09/22/2021   Pneumonia vaccines: 11/11/2021-13   COVID vaccine: Pfizer 02/25/2021 03/18/2021 11/22/2021   Hep B vaccine: NA   DEXA: NA   Last pap/pelvic: NA   Last Mammogram: NA   Last PSA screen: Will request records   AAA screening: NA (once in lifetime for males 65-75 who have smoked > 100 cigarettes in lifetime)  HIV Screeing: NA  Hepatitis C Screen:  Eligible  Low Dose CT Scan: NA    Health Maintenance Topics with due status: Not Due       Topic Last Completion Date    Colorectal Cancer Screening 01/29/2018    Pneumococcal Vaccines (Age 65+) 11/11/2021    Lipid Panel 11/11/2021     Health Maintenance Due   Topic Date Due    Hepatitis C Screening  Never done        Incontinence  Bowel: No  Bladder: No    Lab results available in Epic or see dates from [facility] above:   Lab Results   Component Value Date    CHOL 243 (H) 11/11/2021    CHOL 202 (H) 04/29/2021     Lab Results   Component Value Date    HDL 66 (H) 11/11/2021    HDL 59 04/29/2021     Lab Results   Component Value Date    LDLCALC 134 11/11/2021    LDLCALC 113 04/29/2021     Lab Results   Component Value Date    TRIG 216 (H) 11/11/2021    TRIG 151 (H) 04/29/2021     Lab Results   Component Value Date    CHOLHDL 3.7 11/11/2021    CHOLHDL 3.4 04/29/2021       No results found for: LABA1C, HGBA1C    Sodium   Date Value Ref Range Status   11/11/2021 139 136 - 145 mmol/L Final     Potassium   Date Value Ref Range Status   11/11/2021 4.6 3.5 - 5.1 mmol/L Final     Chloride   Date Value Ref Range Status   11/11/2021 104 98 - 107 mmol/L Final     CO2   Date Value Ref Range Status   11/11/2021 30 21 - 32 mmol/L Final     Glucose   Date Value Ref Range Status   11/11/2021 93 74 - 106 mg/dL Final     BUN   Date Value Ref Range Status   11/11/2021 20 (H) 7 - 18 mg/dL Final     Creatinine   Date Value Ref Range Status   11/11/2021 1.67 (H) 0.70 - 1.30 mg/dL Final     Calcium   Date Value Ref Range Status   11/11/2021 9.3 8.5 - 10.1 mg/dL Final     Total Protein   Date Value Ref Range Status   11/11/2021 8.4 (H) 6.4 - 8.2 g/dL Final     Albumin   Date Value Ref Range Status   11/11/2021 3.8 3.5 - 5.0 g/dL Final     Bilirubin, Total   Date Value Ref Range Status   11/11/2021 0.5 0.0 - 1.2 mg/dL Final     Alk Phos   Date Value Ref Range Status   11/11/2021 134 (H) 45 - 115 U/L Final     AST   Date Value Ref Range Status  "  11/11/2021 33 15 - 37 U/L Final     ALT   Date Value Ref Range Status   11/11/2021 40 16 - 61 U/L Final     Anion Gap   Date Value Ref Range Status   11/11/2021 10 7 - 16 mmol/L Final     eGFR    Date Value Ref Range Status   11/11/2021 53 (L) >=60 mL/min/1.73m² Final     eGFR   Date Value Ref Range Status   06/09/2020 55           No results found for: PSA (Delete this line if female pt)        · Care Team   PCP: Dr. Mehta    Urologist: Dr. Ladd       **See Completed Assessments for Annual Wellness visit within the encounter summary    The following assessments were completed & reviewed:  · Depression Screening  · Cognitive function Screening  · Timed Get Up Test  · Whisper Test  · Vision Screen  · Health Risk Assessment  · Checklist of ADLs and IADLs      Objective  Vitals:    02/10/22 0903   BP: 110/74   Pulse: 97   Resp: 20   Temp: 97.6 °F (36.4 °C)   SpO2: 99%   Weight: 95.7 kg (211 lb 0.6 oz)   Height: 6' 2" (1.88 m)   PainSc:   7   PainLoc: Back      Body mass index is 27.1 kg/m².  Ideal body weight: 82.2 kg (181 lb 3.5 oz)       Physical Exam  Constitutional:       Appearance: Normal appearance. He is obese. He is not ill-appearing.   Cardiovascular:      Rate and Rhythm: Normal rate and regular rhythm.   Pulmonary:      Effort: Pulmonary effort is normal.      Breath sounds: Normal breath sounds.   Neurological:      Mental Status: He is alert and oriented to person, place, and time.   Psychiatric:         Mood and Affect: Mood normal.         Behavior: Behavior normal.         Judgment: Judgment normal.           Assessment:     1. Encounter for initial annual wellness visit (AWV) in Medicare patient    2. Body mass index 27.0-27.9, adult    3. Encounter for hepatitis C screening test for low risk patient    4. Screening for eye condition  - Ambulatory referral/consult to Ophthalmology; Future         Plan:    Referrals:       Advised to call office if does not hear from anyone with " referral appt within 2-3 weeks to check on status of referral. Voiced understanding.        Discussed and provided with a screening schedule and personal prevention plan in accordance with USPSTF age appropriate recommendations and Medicare screening guidelines.   Education, counseling, and referrals were provided as needed.  After Visit Summary printed and given to patient which includes written education and a list of any referrals if indicated.     F/u plan for yearly AWV.    Signature: Kristine Mehta MD

## 2022-02-10 NOTE — PATIENT INSTRUCTIONS
"Annual  Counseling and Referral of Other Preventative  (Italic type indicates deductible and co-insurance are waived)    Patient Name: Niranjan Whittaker  Today's Date: 2/10/2022    Health Maintenance       Date Due Completion Date    Hepatitis C Screening Never done ---    Shingles Vaccine (1 of 2) 11/11/2022 (Originally 1/9/2006) ---    TETANUS VACCINE 02/10/2023 (Originally 1/9/1973) ---    Pneumococcal Vaccines (Age 65+) (2 of 2 - PPSV23) 11/11/2022 11/11/2021    Lipid Panel 11/11/2022 11/11/2021    Colorectal Cancer Screening 01/29/2023 1/29/2018 (Done)    Override on 1/29/2018: Done        No orders of the defined types were placed in this encounter.  Patient Education       Preventive Health Care for Older Adults   The Basics   Written by the doctors and editors at [facility]   What is preventive health care? -- Preventive health care is any medical treatment or test that you have to try to prevent a health problem. Some of the most important preventive steps you can take are listed below.  · Lead a healthy lifestyle - People don't tend to think of lifestyle changes as a form of "treatment." But the truth is, lifestyle changes are often just as effective as, or even more effective than, any medicine you can take. If you exercise often, eat right, maintain a healthy weight, limit alcohol, and avoid smoking, you are less likely to develop all sorts of health problems than people who do not have these habits.  · Get all the right vaccines - Vaccines are shots that help protect you from certain diseases. They help prevent infections such as the flu and pneumonia. If you are 65 or older, you should get:  ? The flu vaccine once a year - Ask about getting the high-dose version, if it is available. In older people, the high-dose version works a little better than the standard dose version. But the standard dose is also helpful.  ? A pneumonia vaccine - Some people might need 2 vaccines for this.  ? The shingles vaccine " "once  ? A tetanus-diphtheria (Td) vaccine booster, probably once every 10 years. (A booster is a dose of a vaccine given to "jog" the immune system after you've gotten the first group of vaccine shots. Some experts think it's OK to have Td boosters less often.)   · Get screened when screening might help - Screening refers to any test that looks for early signs of a disease before the disease has started causing symptoms or problems. The most well-known screening tests are the ones that check for cancer, but other screening tests also exist.  ? All people age 65 and older should ask their doctor which forms of cancer screening might be appropriate for them. As you get older, some screening tests might no longer be needed. If your doctor suggests that you no longer need a screening test, such as a Pap test, mammogram, or colon cancer test, this does not mean that they think you are too old to care about. Rather, since many cancers take a long time to develop, screening as you get older might not be helpful and can even be harmful. That's because screening can lead to unnecessary treatment for findings that will never cause problems for you.  ? Men should get screened for a condition called "abdominal aortic aneurysm" once between the ages of 65 and 75 if they have ever smoked or have a close relative who  from or needed surgery for an abdominal aneurysm. This condition is a swelling of the main blood vessel that feeds the lower half of the body.  ? Women age 65 and older should get screened for osteoporosis (a bone thinning disorder).  · See your doctor or nurse regularly - During each visit, you will likely have several routine tests that are important in keeping track of your health. For instance, the doctor or nurse will probably check your blood pressure, body weight, and ask about your mood. They might also check your cholesterol level, depending on what other health issues you have. Plus, the doctor or nurse " will review your medicines, and might suggest stopping some that are less helpful and that can cause side effects as you get older.  · Take medicines that can prevent problems - Some people need to take medicines to keep from having heart attacks, strokes, or other problems. For example, many people in this age group also take calcium and vitamin D to reduce the risk of breaking a bone.  Why is my age important? -- As people get older, the way their body responds to medical problems changes. For instance, the body cannot fight infections as well. As a result, it is more important to get certain vaccines. The way people respond to medicines can also change as they get older. That's because the liver and kidneys, which break down or get rid of medicines, do not work as well as they once did.  Certain diseases also become more common as people age. Most forms of cancer and heart disease, for example, develop more often in older people than they do in younger people.  Keep in mind, though, that getting older does not have to mean that you get sicker or frail. Always check with your doctor about any changes in your body or your health. That way you can find out if the changes might be a problem, and if there are any treatments that might help.  What else can I do to stay as healthy as possible? -- You should:  · Take care to avoid falling - Here are some things that might help:  ? Make sure that all walkways in your home are well-lit, and clear of clutter, electrical cords, and loose rugs. Tuck electric cords out of the way and secure them to the wall or floor. Check that your loose rugs have nonskid backing, so they won't slip (figure 1).  ? Wear sturdy, comfortable shoes.  ? Try to stay active, because people who do some form of exercise are less likely to fall than people who don't.  ? Review the medicines you take with your doctor. Some medicines, such as sleeping pills, can increase your risk of falling and can be  "unsafe to use as you get older.  · Ask your doctor if it is still safe to drive - This is a tough question to ask, but it's an important one. As people get older, they sometimes have vision and hearing loss, and they react more slowly to things. These changes can increase the risk of car accidents.  · Talk to your doctor or nurse if you have trouble controlling your bladder or bowels - Not being able to control your bladder or bowels is called being "incontinent." If you have this problem, don't be embarrassed to bring it up at the doctor's office. Many treatments are available.  · Keep an up-to-date medicine list - Always bring your medicine list with you when you see the doctor or nurse. You can find an example of this kind of list at the following website: www.fda.gov/drugs/resources-you-drugs/my-medicine-record.  · Get help with bills or meals if you need it - If you cannot afford to pay all your bills or have trouble making meals for yourself, find out about services in your area that can help. This website is a good place to start: www.eldercare.[facility].gov.  · Stay socially connected - Social connection is important to good health. Let your doctor or nurse know if you spend almost all your time alone. They can help you find ways to meet new people and become involved in new activities.  Tell your doctor if you are being hurt, neglected, or cheated -- If one of your family members or someone who is supposed to take care of you is mistreating you, stealing money, or taking advantage of you in any way, tell your doctor. They can get you the help you need.  All topics are updated as new evidence becomes available and our peer review process is complete.  This topic retrieved from [employer] on: Sep 21, 2021.  Topic 44513 Version 20.0  Release: 29.4.2 - C29.263  © 2021 UpToDate, Inc. and/or its affiliates. All rights reserved.  figure 1: How to avoid falling at home     This picture shows some of the things that can " cause a fall in your home. Look around and remove any loose rugs, electrical cords, clutter, or furniture that could trip you.  Graphic 79633 Version 1.0    Consumer Information Use and Disclaimer   This information is not specific medical advice and does not replace information you receive from your health care provider. This is only a brief summary of general information. It does NOT include all information about conditions, illnesses, injuries, tests, procedures, treatments, therapies, discharge instructions or life-style choices that may apply to you. You must talk with your health care provider for complete information about your health and treatment options. This information should not be used to decide whether or not to accept your health care provider's advice, instructions or recommendations. Only your health care provider has the knowledge and training to provide advice that is right for you. The use of this information is governed by the [employer] End User License Agreement, available at https://www.PrivateGriffe/en/solutions/KPA/about/apolonia.The use of [employer] content is governed by the [employer] Terms of Use. ©2021 UpToDate, Inc. All rights reserved.  Copyright   © 2021 UpToDate, Inc. and/or its affiliates. All rights reserved.  Patient Education       Advance Directives   The Basics   Written by the doctors and editors at [facility]   What are advance directives? -- Advance directives are legal documents that allow you to spell out ahead of time what of types medical care you would want if you ever became unable to speak for yourself. These documents can help ensure that you get the care you want even if you have an unexpected serious illness or accident. The documents can also make things easier for the people who will need to make decisions for you if you ever become unable to make them for yourself.  Are there different kinds of advance directives? -- Yes. The most useful kinds of advance  directives are:  · Health care proxy (also called the durable power of  for health care) - The health care proxy document allows you to choose someone to make medical decisions for you if you become unable to speak for yourself. The benefit of having this document is that it makes your choice of a decision-maker clear to your doctors and family members. When you choose a health care proxy, it is important to talk to the person you choose about the things that you do or don't want. That way your decision-maker knows what to do later on if they ever have to speak for you.  · Living will - A living will is the document that tells health care providers what type of care you want if you become unable to speak for yourself. For instance, a living will allows you to record in writing whether you would want a feeding tube put in if you had a serious illness or accident.  · Do not resuscitate/do not intubate order (also called a DNR/DNI) - If you decide you do not want your heart restarted if it stops and you do not want a breathing tube put in if you stop breathing, you can ask for a DNR/DNI. This is a form that must be signed by a doctor. It tells all your health care providers that you have decided you do not want these treatments.  Advance directives work best when they are part of a team effort that includes not only the person making the decisions, but also doctors, emergency health workers, and places like hospitals and nursing homes.  The Physician Orders for Life Sustaining Treatment (POLST) is a form for people who already have a serious illness or are very weak and likely to need medical help. The POLST form spells out exactly what care should be given, and not given, based on your choices and wishes. It is signed by your doctor, and you can keep a copy at home to be used in the event of an emergency. A copy is also kept on file at any hospital or other place where you might get medical care. Not every  "[facility] has a POLST program. To find out if your [facility] has one, you can go online to www.polst.org.  How do I choose a health care proxy? -- Choose someone who:  · You know and trust  · Can separate their own wishes from yours  · You know would carry out your wishes if that became necessary  · Could be easily reached if they were needed  · Could handle it if other family members or loved ones wanted you to get treated differently than you would want  Some people choose a second person as an alternate proxy, in case their first choice cannot be reached at the time decisions need to be made.  Who should have an advance directive? -- Advance directives are a good idea for anyone, but they are especially important if:  · You are older than 65.  · You have a serious life-threatening illness, such as advanced cancer, or end-stage heart or liver failure.  · You want to make sure you can choose the person you would like as your health care proxy (decision-maker).  What kinds of decisions will I need to make? -- Your advance directives can have as much or as little detail as you want. But many people who have advance directives record their wishes about the following treatments:  · Breathing tubes - If you stop breathing or are having a very hard time breathing, you can get attached to a machine that will help you breathe. For that to happen, you will have to be "intubated." That means that a tube will be put down your throat and into your lungs. Then the tube will be connected to a breathing machine. When the tube is in place, you will not be able to talk, at least at first. Plus, you will probably be sedated, meaning that you are on medicines that make you sleep.  Sometimes a breathing machine is needed only for a short time. For instance, some people need the breathing machine just while they recover from a lung infection. When deciding about a breathing tube, consider whether you would want it at all, want it only for a " "short time, or want it no matter what. Also, keep in mind that any time a breathing machine is used, it is hard to know for sure if and when it will be able to be disconnected.  · Cardiopulmonary resuscitation (CPR) - If your heart stops beating suddenly, doctors might be able to restart it by pumping on your chest, putting in a breathing tube and pushing air into your lungs, giving you an electric shock (called "defibrillation"), and/or giving you special medicines. Some people recover completely after having their heart restarted. Others have permanent brain damage from a lack of blood flow to the brain; this is most likely in people who have an advanced, serious illness.  · Feeding tubes - If you become unable to eat, you can have a tube put into your stomach or intestines that can deliver nutrients. A feeding tube can keep a person's body going while they heal and gets strong. But it can also keep a person alive for a long time even if there is no chance the person will recover.  Can I change my mind? -- Yes. You can change your mind at any time. If you sign an advance directive and you decide you want a different kind of treatment or you no longer want the health care proxy you chose, all you have to do is tell your doctor or nurse about your new decision. If you want to name a new health care proxy or want to record new wishes, you can draw up new documents.  How can I draw up an advance directive? -- The following table lists resources that can help you learn more about making your own advance directives (table 1).  All topics are updated as new evidence becomes available and our peer review process is complete.  This topic retrieved from [employer] on: Sep 21, 2021.  Topic 04273 Version 13.0  Release: 29.4.2 - C29.263  © 2021 UpToDate, Inc. and/or its affiliates. All rights reserved.  table 1: Resources that can help you make advance directives     Address  Phone number  Website    [facility]  [address] " [facility] [place] [address] Toll-free: [phone] [facility]  [phone]] http://assets.[facility]arp.org/external_sites/ caregiving/multimedia/EG_AdvanceDirectives.html    Aging with Dignity (Five Wishes form)  [address]  [place] [address] Toll-free: [phone]  [phone]] www.agingwithdignity.org    [employer]    Toll-free: [phone] www.caringinfo.org    [employer]  [employer].  [address] [place] [address] [phone] www.[employer]    Graphic 28994 Version 7.0  Consumer Information Use and Disclaimer   This information is not specific medical advice and does not replace information you receive from your health care provider. This is only a brief summary of general information. It does NOT include all information about conditions, illnesses, injuries, tests, procedures, treatments, therapies, discharge instructions or life-style choices that may apply to you. You must talk with your health care provider for complete information about your health and treatment options. This information should not be used to decide whether or not to accept your health care provider's advice, instructions or recommendations. Only your health care provider has the knowledge and training to provide advice that is right for you. The use of this information is governed by the [employer] End User License Agreement, available at https://www.Summon.Decurate/en/solutions/Mosso/about/apolonia.The use of [employer] content is governed by the [employer] Terms of Use. ©2021 UpToDate, Inc. All rights reserved.  Copyright   © 2021 UpToDate, Inc. and/or its affiliates. All rights reserved.  Patient Education       Preventing Falls ED   General Information   You came to the Emergency Department (ED) and the doctors are concerned about your risk for falling. You may be at a higher risk of falling based on your age or your illness. Some medicines make your risk of falling higher, as  does adding new medicines or being in an area that is not familiar.  What care is needed at home?   · Call your regular doctor to let them know you were in the ED. Make a follow-up appointment if you were told to. Ask your doctor if you need to take vitamin D to help keep your bones strong.  · Make your home safer. Get rid of things that might make you trip or slip. These are things like loose rugs, electrical cords, or clutter. Add grab bars, a shower seat, and handrails.  · Wear sturdy shoes that fit well. Shoes should fit well, have a low heel, and the soles of the shoe should not be slippery. Walking in socks or with bare feet can raise your chance for falling.  · Stay active. Walk, garden, swim, or do something active on a regular basis. These activities may prevent you from getting hurt if you do fall. They also help with your strength and balance.  · Use a cane, walker, or other safety device. Be sure it is the right size for you and that you know how to use it safely. Be sure to wear your eyeglasses if they have been ordered for you.  · Get up slowly after you sit or lie down. Try to change positions slowly.  When do I need to get emergency help?   · Call an ambulance right away if:   ? You are hurt or cannot get up on your own.  ? You are not able to walk on your own.  ? You fall and hit your head and are on blood thinners.  ? You have severe neck pain after a fall.  ? You cannot stop any bleeding with 5 minutes of direct pressure.  ? You lose consciousness after a fall.  ? You have clear fluid coming from your ears, nose, or mouth.  · Return to the ED if:   ? You have severe pain.  ? You have a cut that may need stitches.  ? You have headaches, dizziness, confusion, or ringing in your ears after your fall.  When do I need to call the doctor?   · You notice a lack of strength or trouble standing up.  · You are worried you may fall again.  · You are dizzy or feel unsteady when you walk.  · You have new or  worsening symptoms.  Last Reviewed Date   2021-05-05  Consumer Information Use and Disclaimer   This information is not specific medical advice and does not replace information you receive from your health care provider. This is only a brief summary of general information. It does NOT include all information about conditions, illnesses, injuries, tests, procedures, treatments, therapies, discharge instructions or life-style choices that may apply to you. You must talk with your health care provider for complete information about your health and treatment options. This information should not be used to decide whether or not to accept your health care providers advice, instructions or recommendations. Only your health care provider has the knowledge and training to provide advice that is right for you.  Copyright   Copyright © 2021 UpToDate, Inc. and its affiliates and/or licensors. All rights reserved.  Patient Education       Body Mass Index, Adult   About this topic   Body mass index is often called your BMI. It is a number that is calculated from your height and weight. Many people use it as a way to see if they are overweight. BMI is an indirect measure of your body fat. Often, the more body fat or higher your BMI, the more likely it is you will have health problems like:  · Heart disease  · High blood pressure  · Type 2 diabetes  · Fatty liver disease  · Joint and back pain  · Obstructive sleep apnea  · Asthma  The right weight or BMI for you depends on a few things, like gender, age, and height. Finding out your BMI is easy to do. Your doctor will use this tool to learn more about your risk of having health problems.  BMI does not include things like your habits, where you live, or family history. BMI also does not discriminate between body fat and muscle weight. Some people with a normal BMI are still not healthy. Other people with a high BMI may be healthy. Most of the time, a person with a higher BMI is  less healthy and will need more care in their lifetime. Ask your doctor for their opinion of your total health during a well visit or physical.  Being overweight or having a high BMI can hurt many parts of your body. Learn about your BMI and how your weight changes your health risks. Then you can make changes to keep yourself as healthy as you can.  General   By using a range, the BMI measurement tries to take into account many shapes and sizes of bodies. Most often the BMI categories are:  · Underweight = Less than 18.5  · Normal weight = Between 18.5 and 24.9  · Overweight = Between 25 and 29.9  · Obese = BMI of 30 or higher  There are a few ways to calculate your BMI. You will need to know how tall you are and how much you weigh to find your BMI. Note whether you are measuring in inches and pounds or meters and kilograms.  · Use a BMI calculator found on the internet. You may have to select your gender and then enter your height and weight. Be sure to use the correct description for your measurements: inches, meters, pounds, or kilograms. The calculator will tell you your BMI, based on the numbers you enter.  · Use a BMI table. You may be able to find a BMI table on the internet, at your library, gym, or doctor's office. You find your height and weight on a table. These go along with a certain BMI or BMI range.  · Calculate your BMI. Example: man who is 6 feet (1.83 meters) tall and weighs 210 pounds (95 kg)  What to Do Nonmetric Unit of Measure Example Metric Unit of Measure Example   Measure your height. Multiple that number by itself. This is your height squared. 6 feet = 72 inches  72 inches x 72 inches = 5,184 square inches 1.83 meters  1.83 m x 1.83 m = 3.3489 square meters   Divide your weight by your height squared. 210 pounds ÷ 5,184 = [id number] 95.5 kg ÷ 3.3489 = 28.5   Multiply by 703 if using inches and pounds to calculate BMI [id number] x 703=28.5 Not needed   Find BMI BMI is 28.5 BMI is 28.5   What  problems could happen?   Your BMI is more likely to overestimate how much body fat you have if you are an athlete or are very muscular. Your BMI may underestimate how much body fat you have if you are older or have lost a lot of muscle.  Where can I learn more?   Centers for Disease Control and Prevention  http://www.cdc.gov/healthyweight/assessing/bmi/adult_bmi/index.html   Last Reviewed Date   2021-10-11  Consumer Information Use and Disclaimer   This information is not specific medical advice and does not replace information you receive from your health care provider. This is only a brief summary of general information. It does NOT include all information about conditions, illnesses, injuries, tests, procedures, treatments, therapies, discharge instructions or life-style choices that may apply to you. You must talk with your health care provider for complete information about your health and treatment options. This information should not be used to decide whether or not to accept your health care providers advice, instructions or recommendations. Only your health care provider has the knowledge and training to provide advice that is right for you.  Copyright   Copyright © 2021 [employer] and its affiliates and/or licensors. All rights reserved.  Patient Education       Exercise and Aging   General   Exercise can help older adults prevent falls and stay independent longer. Exercise may also help:  · You have stronger muscles and bones and better balance  · You lose weight and have more energy  · Make your heart and lungs stronger  · Lower your chance of health problems like heart disease, high blood sugar, or breast or colon cancer  · Control conditions like high blood pressure and diabetes  · You manage stress and get better sleep  · Your mood, self-esteem, and self-image  · How you think, plan, and pay attention  There are four types of exercise: endurance, strength, balance, and flexibility.  · Endurance  exercises get your heart rate up and keep it up for a while. Most people should get at least 30 minutes of exercise that gets your heart rate up on 5 or more days each week. Walking, swimming, biking, or going up and down stairs are kinds of exercises to get your heart rate up. You do not have to do all 30 minutes at one time. Try doing 10 minutes 3 times a day.  · Strength exercises build muscle. Lifting weights or doing knee bends are kinds of strength exercises.  · Balance exercises help to prevent falls. Raise up on your toes or stand on one leg as a kind of balance exercise.  · Flexibility exercises move your joints through a full range of motion and stretch your muscles. Bend forward in a chair to stretch your back, do stretches, or bend and extend your arms or legs as a kind of flexibility exercise. Try doing 10 minutes twice a week.  Plan to include all these exercise types in your exercise program. Always check with your doctor before you start a new exercise program.  Some people do not like formal exercise classes, but there are many ways to work your muscles each day. Here are some things you can do.  · Use steps instead of elevators.  · Park far away in parking lots to walk farther.  · Use the time while you wait. Do knee bends as you hold onto the kitchen counter while you wait for your coffee to brew.  · When you unload groceries, lift the bags or a container of milk a few times to exercise your arms and legs.  · Walk the long way when you go somewhere.  · After you walk to the bathroom, walk a few laps around the house before sitting down.  · Try doing different standing exercises after you wash your hands each time in the bathroom.  · Do balance exercises while you brush your teeth.  · Do an exercise or get up and walk during TV commercials.  · Don't forget to exercise small muscle groups like your hands, fingers, ankles, toes, and neck. Wiggle, bend, flex, and rotate these joints from left to right  and back to front to help to keep these joints flexible.  When do I need to call the doctor?   Stop exercising and talk to your doctor if you have any of these problems:  · Dizziness  · Shortness of breath  · Pain or pressure in the chest, arms, throat, jaw, or back  · Nausea or vomiting  · Blood clots  · Infection  · Joint swelling  · Open wounds  · Recent hip, back, or eye surgery  · New problems that start during exercise  Helpful tips   · If you have a health problem like heart disease or diabetes, talk with your doctor about the best exercises for you.  · Always warm up before you stretch. Heated muscles stretch much easier than cool muscles. Try to walk or bike at an easy pace for a few minutes to warm up your muscles.  · Slow your pace again after you exercise to cool down and bring your heart rate down slowly.  · Be sure you do not hold your breath when you exercise because it can raise your blood pressure. If you tend to hold your breath, try to count out loud when you exercise.  · Never bounce when doing stretches. Use slow and steady motions and hold your stretch for 20 to 30 seconds.  · Have a routine. Doing exercises before a meal may be a good way to get into a routine.  · Set small goals for yourself when you start to exercise. Use a chart to see how much you are doing. Ask someone to exercise with you.  · Exercise may be slightly uncomfortable, but you should not have sharp pains. If you do get sharp pains, stop what you are doing. If the sharp pains continue, call your doctor.  · Drink plenty of fluids without caffeine when you exercise and afterwards. Avoid outdoor exercise if it is too cold or too hot.  · Wear the right clothes and shoes. Try layers of clothes, so that you can take them off if you get too hot. Shoes should fit well and support your feet.  Where can I learn more?   [employer] [place] [employer] A[employer]  https://www.veronica.nih.gov/health/publication/exercise-physical-activity/introduction   Last  Reviewed Date   2021-03-18  Consumer Information Use and Disclaimer   This information is not specific medical advice and does not replace information you receive from your health care provider. This is only a brief summary of general information. It does NOT include all information about conditions, illnesses, injuries, tests, procedures, treatments, therapies, discharge instructions or life-style choices that may apply to you. You must talk with your health care provider for complete information about your health and treatment options. This information should not be used to decide whether or not to accept your health care providers advice, instructions or recommendations. Only your health care provider has the knowledge and training to provide advice that is right for you.  Copyright   Copyright © 2021 [employer], [employer]. and its affiliates and/or licensors. All rights reserved.  Patient Education       Heart Healthy Diet   General   With a heart healthy food plan, you will learn to make better food choices. This diet may help you lower your blood cholesterol level, manage your blood pressure, and lower your risk for heart problems. Smaller portions may also be helpful.  Sodium is a type of mineral found in many foods. It helps keep the balance of fluids in your body. Too much sodium can raise your blood pressure. It can also make you take on extra water. This is called edema. Pay careful attention to how much salt or sodium is in your food. You may need to avoid salt or eat foods with less sodium.  Cholesterol is a fat-like, waxy substance in your blood. It is normal to have some cholesterol in your blood because your body makes it. You also get extra cholesterol from all animal products. These are foods like meats, eggs, and dairy products. Too much cholesterol in your blood can block or damage your blood vessels. This can lead to a heart attack or stroke.  Fats in your food have calories which give energy. Not all  fats are bad. Some fats are healthy, like the fat found in fish, nuts, and olive oil. These are called unsaturated fats. They help manage body functions and lower cholesterol levels. Learn about the best fats to use in your diet and where to use them. Eating too much fat may make you more likely to weigh more than is healthy. This raises your risk of many heart problems.  Fiber is found in plants. Meat and dairy products do not have fiber in them. Fiber can help you lower your unhealthy cholesterol level. You may need more water as you eat more fiber so you do not get hard stools.  What lifestyle changes are needed?   Eat a healthy diet and workout often. Try to use as many calories as you take in each day.  What changes to diet are needed?   · Eat oily fish at least 2 times a week. These are fish like tuna, salmon, and mackerel.  · Limit sodium to no more than 2,300 mg of sodium per day. This is about 1 teaspoon (5 grams) of table salt. Use little or no salt when making food. Try other spices or seasoning instead.  · Limit how much cholesterol you eat to less than 300 mg per day. You can do this by having lean meats. Also eat lots of fruits, vegetables, and fat-free and low-fat dairy products.  · Limit how much trans fats you eat. Trans fats are found in many processed foods like stick margarine, shortening, and some fried foods. Also, lower how much hydrogenated fats you eat. They are used to make pastries, biscuits, cookies, crackers, chips, and many snack foods.  · Have no more than 1 drink per day of beer, wine, and mixed drinks (alcohol).  Who should use this diet?   A heart healthy diet is good for everyone.  What foods are good to eat?   · Grains: Try to eat 6 to 8 servings of whole grain, high fiber foods each day. These are whole grain bread, cereals, brown rice, or pasta.  · Fruits and vegetables: Eat 4 to 5 servings each day. Try to pick many kinds and colors. Try to eat more that are fresh or frozen.  Look for low sodium or salt-free if you choose canned. Rinse canned items before cooking or eating. Dried peas, beans, and lentils are also good.  · Dairy: Choose low fat (1%) or fat-free milk. Eat nonfat or low-fat products.  · Protein: Try to eat more low fat or lean meats like chicken and turkey. Eat less red meat and eat more fish, eggs, egg whites, and beans instead.  · Fats: Use good fats found in fish, nuts, and avocados. Try using olive oil, canola oil, and low-sodium and low-fat salad dressing and mayonnaise. Use corn, safflower, sunflower, and soybean oils.  · Condiments: Use low-sodium or salt-free broths, soups, soy sauce, and condiments. Pepper, herbs, spices, vinegar, lemon or lime juices are great for seasoning. Sugar, cocoa powder, honey, syrup, and jams may be eaten in small amounts.  · Sweets: Low-fat, trans fat-free cookies, cakes, and pies; joaquin crackers; animal crackers; low-fat fig bars; and ari snaps.     What foods should be limited or avoided?   · Grains: Salted breads, rolls, crackers, quick breads, self-rising flours, biscuit mixes, regular bread crumbs, instant hot cereals, commercially-prepared rice, pasta, stuffing mixes  · Fruits and vegetables: Commercially-prepared potatoes and vegetable mixes, regular canned vegetables and juices, vegetables frozen with sauce or pickled vegetables, processed fruits with added sugar or salt  · Dairy: Whole milk, malted milk, chocolate milk, buttermilk  · Protein: Smoked, cured, salted, or canned meat, fish, or poultry such as frazier and sausages  · Fats: Cut back on solid fats like butter, lard, and margarine.  · Condiments and snacks: Salted and canned peas, beans, and olives; salted snack foods; fried foods; soda, juices, or other sweetened drinks; commercially-softened water. Miso, salsa, ketchup, barbecue sauce, [facility] sauce, soy sauce, and teriyaki sauce are also high in salt.  · Sweets: High-fat baked goods such as muffins, donuts,  pastries, commercial baked goods  Helpful tips   · When you go to a grocery store, have a list or a meal plan. Do not shop when you are hungry to avoid cravings for foods.  · You need to know about the sodium and fat content of the food you eat. Read food labels with care. They will show you how much of each is in a serving. This amount is given as a percentage of the total amount you need each day. Reading the labels will help you make healthy food choices.     · Avoid fast foods.  · Watch your portions when eating out. Split an order or bring home half for another meal.     · Talk to a dietitian for help.  Where can I learn more?   American Academy [employer]  https://familydoctor.org/diet-and-exercise-for-a-healthy-heart/   American Heart Association  https://www.heart.org/en/healthy-living/healthy-eating/eat-smart/nutrition-basics/aha-diet-and-lifestyle-recommendations   [facility]  https://www.nhs.uk/live-well/eat-well/   Last Reviewed Date   2020-03-27  Consumer Information Use and Disclaimer   This information is not specific medical advice and does not replace information you receive from your health care provider. This is only a brief summary of general information. It does NOT include all information about conditions, illnesses, injuries, tests, procedures, treatments, therapies, discharge instructions or life-style choices that may apply to you. You must talk with your health care provider for complete information about your health and treatment options. This information should not be used to decide whether or not to accept your health care providers advice, instructions or recommendations. Only your health care provider has the knowledge and training to provide advice that is right for you.  Copyright   Copyright © 2021 [employer], Inc. and its affiliates and/or licensors. All rights reserved.

## 2022-03-11 DIAGNOSIS — Z71.89 COMPLEX CARE COORDINATION: ICD-10-CM

## 2022-04-11 ENCOUNTER — OFFICE VISIT (OUTPATIENT)
Dept: FAMILY MEDICINE | Facility: CLINIC | Age: 67
End: 2022-04-11
Payer: MEDICARE

## 2022-04-11 VITALS
WEIGHT: 214.63 LBS | HEIGHT: 74 IN | HEART RATE: 117 BPM | TEMPERATURE: 99 F | OXYGEN SATURATION: 95 % | RESPIRATION RATE: 20 BRPM | BODY MASS INDEX: 27.54 KG/M2 | SYSTOLIC BLOOD PRESSURE: 125 MMHG | DIASTOLIC BLOOD PRESSURE: 88 MMHG

## 2022-04-11 DIAGNOSIS — E04.2 MULTIPLE THYROID NODULES: ICD-10-CM

## 2022-04-11 DIAGNOSIS — I10 ESSENTIAL HYPERTENSION: ICD-10-CM

## 2022-04-11 DIAGNOSIS — Z91.09 ENVIRONMENTAL ALLERGIES: ICD-10-CM

## 2022-04-11 DIAGNOSIS — I10 PRIMARY HYPERTENSION: ICD-10-CM

## 2022-04-11 DIAGNOSIS — E78.2 MIXED HYPERLIPIDEMIA: ICD-10-CM

## 2022-04-11 DIAGNOSIS — R60.9 EDEMA, UNSPECIFIED TYPE: ICD-10-CM

## 2022-04-11 DIAGNOSIS — J30.9 ALLERGIC RHINITIS, UNSPECIFIED SEASONALITY, UNSPECIFIED TRIGGER: ICD-10-CM

## 2022-04-11 DIAGNOSIS — K21.9 GASTROESOPHAGEAL REFLUX DISEASE WITHOUT ESOPHAGITIS: ICD-10-CM

## 2022-04-11 DIAGNOSIS — M51.36 DEGENERATION OF LUMBAR INTERVERTEBRAL DISC: ICD-10-CM

## 2022-04-11 DIAGNOSIS — I73.9 CLAUDICATION: Primary | ICD-10-CM

## 2022-04-11 PROCEDURE — 99213 PR OFFICE/OUTPT VISIT, EST, LEVL III, 20-29 MIN: ICD-10-PCS | Mod: ,,, | Performed by: FAMILY MEDICINE

## 2022-04-11 PROCEDURE — 99213 OFFICE O/P EST LOW 20 MIN: CPT | Mod: ,,, | Performed by: FAMILY MEDICINE

## 2022-04-11 PROCEDURE — 96372 THER/PROPH/DIAG INJ SC/IM: CPT | Mod: ,,, | Performed by: FAMILY MEDICINE

## 2022-04-11 PROCEDURE — 96372 PR INJECTION,THERAP/PROPH/DIAG2ST, IM OR SUBCUT: ICD-10-PCS | Mod: ,,, | Performed by: FAMILY MEDICINE

## 2022-04-11 RX ORDER — TAMSULOSIN HYDROCHLORIDE 0.4 MG/1
CAPSULE ORAL
Qty: 180 CAPSULE | Refills: 1 | Status: SHIPPED | OUTPATIENT
Start: 2022-04-11 | End: 2022-12-07

## 2022-04-11 RX ORDER — MONTELUKAST SODIUM 10 MG/1
10 TABLET ORAL NIGHTLY
Qty: 90 TABLET | Refills: 3 | Status: SHIPPED | OUTPATIENT
Start: 2022-04-11 | End: 2022-08-15 | Stop reason: SDUPTHER

## 2022-04-11 RX ORDER — CYANOCOBALAMIN 1000 UG/ML
1000 INJECTION, SOLUTION INTRAMUSCULAR; SUBCUTANEOUS
Status: COMPLETED | OUTPATIENT
Start: 2022-04-11 | End: 2022-04-11

## 2022-04-11 RX ORDER — OMEPRAZOLE 40 MG/1
40 CAPSULE, DELAYED RELEASE ORAL DAILY
Qty: 90 CAPSULE | Refills: 1 | Status: SHIPPED | OUTPATIENT
Start: 2022-04-11 | End: 2022-08-15 | Stop reason: SDUPTHER

## 2022-04-11 RX ORDER — PRAVASTATIN SODIUM 40 MG/1
40 TABLET ORAL DAILY
Qty: 90 TABLET | Refills: 1 | Status: SHIPPED | OUTPATIENT
Start: 2022-04-11 | End: 2022-08-15 | Stop reason: SDUPTHER

## 2022-04-11 RX ORDER — KETOROLAC TROMETHAMINE 30 MG/ML
60 INJECTION, SOLUTION INTRAMUSCULAR; INTRAVENOUS
Status: COMPLETED | OUTPATIENT
Start: 2022-04-11 | End: 2022-04-11

## 2022-04-11 RX ORDER — LEVOTHYROXINE SODIUM 125 UG/1
125 TABLET ORAL
Qty: 90 TABLET | Refills: 1 | Status: SHIPPED | OUTPATIENT
Start: 2022-04-11 | End: 2022-08-15 | Stop reason: SDUPTHER

## 2022-04-11 RX ORDER — METHOCARBAMOL 500 MG/1
500 TABLET, FILM COATED ORAL 3 TIMES DAILY PRN
Qty: 90 TABLET | Refills: 0 | Status: SHIPPED | OUTPATIENT
Start: 2022-04-11 | End: 2022-07-25

## 2022-04-11 RX ORDER — LORATADINE 10 MG/1
10 TABLET ORAL DAILY
Qty: 90 TABLET | Refills: 1 | Status: SHIPPED | OUTPATIENT
Start: 2022-04-11 | End: 2022-08-15

## 2022-04-11 RX ORDER — LEFLUNOMIDE 20 MG/1
20 TABLET ORAL DAILY
Qty: 90 TABLET | Refills: 1 | Status: SHIPPED | OUTPATIENT
Start: 2022-04-11 | End: 2023-04-17 | Stop reason: SDUPTHER

## 2022-04-11 RX ORDER — LISINOPRIL 20 MG/1
20 TABLET ORAL DAILY
Qty: 90 TABLET | Refills: 1 | Status: SHIPPED | OUTPATIENT
Start: 2022-04-11 | End: 2022-08-15 | Stop reason: SDUPTHER

## 2022-04-11 RX ADMIN — KETOROLAC TROMETHAMINE 60 MG: 30 INJECTION, SOLUTION INTRAMUSCULAR; INTRAVENOUS at 10:04

## 2022-04-11 RX ADMIN — CYANOCOBALAMIN 1000 MCG: 1000 INJECTION, SOLUTION INTRAMUSCULAR; SUBCUTANEOUS at 10:04

## 2022-04-14 ENCOUNTER — HOSPITAL ENCOUNTER (OUTPATIENT)
Dept: RADIOLOGY | Facility: HOSPITAL | Age: 67
Discharge: HOME | End: 2022-04-14
Attending: FAMILY MEDICINE
Payer: MEDICARE

## 2022-04-14 DIAGNOSIS — R60.9 EDEMA, UNSPECIFIED TYPE: ICD-10-CM

## 2022-04-14 DIAGNOSIS — I73.9 CLAUDICATION: ICD-10-CM

## 2022-04-14 PROCEDURE — 93925 LOWER EXTREMITY STUDY: CPT | Mod: TC

## 2022-04-14 PROCEDURE — 93970 EXTREMITY STUDY: CPT | Mod: TC

## 2022-05-02 ENCOUNTER — OFFICE VISIT (OUTPATIENT)
Dept: GASTROENTEROLOGY | Facility: CLINIC | Age: 67
End: 2022-05-02
Payer: MEDICARE

## 2022-05-02 VITALS
WEIGHT: 217 LBS | OXYGEN SATURATION: 96 % | DIASTOLIC BLOOD PRESSURE: 82 MMHG | HEIGHT: 74 IN | HEART RATE: 99 BPM | SYSTOLIC BLOOD PRESSURE: 126 MMHG | BODY MASS INDEX: 27.85 KG/M2

## 2022-05-02 DIAGNOSIS — K62.5 RECTAL BLEEDING: ICD-10-CM

## 2022-05-02 DIAGNOSIS — D64.9 ANEMIA, UNSPECIFIED TYPE: Primary | ICD-10-CM

## 2022-05-02 DIAGNOSIS — N18.32 CHRONIC KIDNEY DISEASE, STAGE 3B: ICD-10-CM

## 2022-05-02 DIAGNOSIS — K59.09 CHRONIC CONSTIPATION: ICD-10-CM

## 2022-05-02 DIAGNOSIS — K21.9 GASTROESOPHAGEAL REFLUX DISEASE WITHOUT ESOPHAGITIS: ICD-10-CM

## 2022-05-02 PROCEDURE — 99214 PR OFFICE/OUTPT VISIT, EST, LEVL IV, 30-39 MIN: ICD-10-PCS | Mod: ,,, | Performed by: NURSE PRACTITIONER

## 2022-05-02 PROCEDURE — 99214 OFFICE O/P EST MOD 30 MIN: CPT | Mod: ,,, | Performed by: NURSE PRACTITIONER

## 2022-05-02 NOTE — PROGRESS NOTES
Niranjan Whittaker Jr. is a 67 y.o. male here for Anemia        PCP: Kristine Mehta  Referring Provider: No referring provider defined for this encounter.     HPI:  Presents for anemia. States that he recently had lab drawn at Dr. Ladd's office and was advised to follow up here. Reports are not available. Last Hgb 12.3 and Hct 39.4 reviewed in the chart. Patient does have RA and chronic renal disease stage III. He reports hematochezia. Does endorse constipation. No weight loss or dysphagia reported. No nausea or vomiting. Last colonoscopy 1/29/18, per Dr. Koo, tubular adenoma. Last EGD 1/30/18 per Dr. Koo.        ROS:  Review of Systems   Constitutional: Negative for activity change, appetite change, fatigue, fever and unexpected weight change.   HENT: Negative for dental problem and trouble swallowing.    Respiratory: Negative for cough, shortness of breath and wheezing.    Cardiovascular: Negative for chest pain, palpitations and leg swelling.   Gastrointestinal: Positive for blood in stool and constipation. Negative for abdominal distention, abdominal pain, change in bowel habit, diarrhea, nausea, vomiting, reflux and change in bowel habit.   Musculoskeletal: Positive for arthralgias and leg pain. Negative for gait problem.   Integumentary:  Negative for color change.   Neurological: Positive for numbness (feet). Negative for dizziness, weakness and light-headedness.   Hematological: Does not bruise/bleed easily.   Psychiatric/Behavioral: Negative for sleep disturbance. The patient is not nervous/anxious.           PMHX:  has a past medical history of FH: total knee replacement, GERD (gastroesophageal reflux disease), Headache, Hyperlipidemia, Hypertension, Neuropathy, and Thyroid disease.    PSHX:  has a past surgical history that includes Back surgery and Thyroidectomy (N/A, 8/27/2021).    PFHX: family history includes Heart disease in his mother; Hypertension in his father and mother.    PSlHX:   reports that he has never smoked. He has never used smokeless tobacco. He reports previous alcohol use. He reports current drug use. Drug: Hydrocodone.        Review of patient's allergies indicates:  No Known Allergies    Medication List with Changes/Refills   Current Medications    DULOXETINE (CYMBALTA) 60 MG CAPSULE    Take 60 mg by mouth once daily.    FERROUS SULFATE (FEOSOL) 325 MG (65 MG IRON) TAB TABLET    Take 325 mg by mouth daily with breakfast.    GABAPENTIN (NEURONTIN) 300 MG CAPSULE        HYDROCODONE-ACETAMINOPHEN (NORCO)  MG PER TABLET    Take 1 tablet by mouth every 4 (four) hours as needed for Pain.    HYDROXYCHLOROQUINE (PLAQUENIL) 200 MG TABLET    Take 200 mg by mouth 2 (two) times a day.    LEFLUNOMIDE (ARAVA) 20 MG TAB    Take 1 tablet (20 mg total) by mouth once daily.    LEVOTHYROXINE (SYNTHROID) 125 MCG TABLET    Take 1 tablet (125 mcg total) by mouth before breakfast.    LISINOPRIL (PRINIVIL,ZESTRIL) 20 MG TABLET    Take 1 tablet (20 mg total) by mouth once daily.    LORATADINE (CLARITIN) 10 MG TABLET    Take 1 tablet (10 mg total) by mouth once daily.    METHOCARBAMOL (ROBAXIN) 500 MG TAB    Take 1 tablet (500 mg total) by mouth 3 (three) times daily as needed (cramps).    MONTELUKAST (SINGULAIR) 10 MG TABLET    Take 1 tablet (10 mg total) by mouth every evening.    OMEPRAZOLE (PRILOSEC) 40 MG CAPSULE    Take 1 capsule (40 mg total) by mouth once daily.    OXYCONTIN 30 MG TR12 12 HR TABLET    Take 30 mg by mouth 2 (two) times daily as needed.    PRAVASTATIN (PRAVACHOL) 40 MG TABLET    Take 1 tablet (40 mg total) by mouth once daily.    PREGABALIN (LYRICA) 150 MG CAPSULE    Take 150 mg by mouth 3 (three) times daily.    PROMETHAZINE (PHENERGAN) 25 MG TABLET    Take 25 mg by mouth 2 (two) times daily as needed for Nausea. 2 times daily prn headache no more then 2 days in a week    SILODOSIN (RAPAFLO) 8 MG CAP CAPSULE    Take 8 mg by mouth once daily.    SULFASALAZINE (AZULFIDINE) 500  "MG TAB    Take 500 mg by mouth 2 (two) times daily.    TAMSULOSIN (FLOMAX) 0.4 MG CAP    TAKE 1 CAPSULE BY MOUTH EVERY DAY 30 MINS FOLLOWING THE SAME MEAL EVERY DAY        Objective Findings:  Vital Signs:  /82   Pulse 99   Ht 6' 2" (1.88 m)   Wt 98.4 kg (217 lb)   SpO2 96%   BMI 27.86 kg/m²  Body mass index is 27.86 kg/m².    Physical Exam:  Physical Exam  Vitals and nursing note reviewed.   Constitutional:       General: He is not in acute distress.     Appearance: Normal appearance. He is not ill-appearing.   HENT:      Mouth/Throat:      Mouth: Mucous membranes are moist.   Cardiovascular:      Rate and Rhythm: Normal rate and regular rhythm.   Pulmonary:      Effort: Pulmonary effort is normal.      Breath sounds: No wheezing, rhonchi or rales.   Abdominal:      General: Bowel sounds are normal. There is no distension.      Palpations: Abdomen is soft. There is no mass.      Tenderness: There is no abdominal tenderness. There is no guarding or rebound.      Hernia: No hernia is present.   Musculoskeletal:      Right lower leg: No edema.      Left lower leg: No edema.   Skin:     General: Skin is warm and dry.      Coloration: Skin is not jaundiced or pale.      Findings: No bruising.   Neurological:      Mental Status: He is alert and oriented to person, place, and time.   Psychiatric:         Mood and Affect: Mood normal.          Labs:  Lab Results   Component Value Date    WBC 5.47 08/23/2021    HGB 13.1 (L) 08/23/2021    HCT 42.0 08/23/2021    MCV 91.5 08/23/2021    RDW 13.1 08/23/2021     08/23/2021    LYMPH 46.8 (H) 08/23/2021    LYMPH 2.56 08/23/2021    MONO 9.5 (H) 08/23/2021    EOS 0.43 08/23/2021    BASO 0.06 08/23/2021     Lab Results   Component Value Date     11/11/2021    K 4.6 11/11/2021     11/11/2021    CO2 30 11/11/2021    GLU 93 11/11/2021    BUN 20 (H) 11/11/2021    CREATININE 1.67 (H) 11/11/2021    CALCIUM 9.3 11/11/2021    PROT 8.4 (H) 11/11/2021    ALBUMIN " 3.8 11/11/2021    BILITOT 0.5 11/11/2021    ALKPHOS 134 (H) 11/11/2021    AST 33 11/11/2021    ALT 40 11/11/2021         Imaging: US Lower Extremity Veins Bilateral    Result Date: 4/14/2022  EXAMINATION: US LOWER EXTREMITY VEINS BILATERAL CLINICAL HISTORY: Peripheral vascular disease, unspecified TECHNIQUE: Duplex and color flow Doppler and dynamic compression was performed of the lower extremity veins was performed. COMPARISON: None. FINDINGS: No evidence of echogenic, non-compressible thrombus seen in the visualized veins of the extremities.  Color Doppler venous waveform pattern is within normal limits.     No evidence of deep venous thrombosis. Ultrasound images stored and captured. Electronically signed by: Terry Domingo Date:    04/14/2022 Time:    11:05    US Lower Extrem Arteries Bilat with MARISABEL (xpd)    Result Date: 4/14/2022  EXAMINATION: US ARTERIAL LOWER EXTREMITY BILAT WITH MARISABEL (XPD) CLINICAL HISTORY: Claudication TECHNIQUE: Grayscale, pulsed-wave Doppler, and color flow Doppler images of both lower extremities were obtained. Real-time ultrasound images are captured and archived. Blood pressures were also obtained at the upper arms and lower extremities bilaterally. COMPARISON: No previous similar FINDINGS: Right brachial pressure: 136 mmHg Right  MARISABEL: 0.97 Left brachial pressure: 119 mm mass Left  MARISABEL: 0.97 Peak systolic arterial velocities are as follows: Right CFA: 99 cm/s Right profundus: 72 cm/s Right SFA proximal: 147 cm/s Right SFA mid: 102 cm/s Right SFA distal: 100 cm/s Right popliteal : 104 cm/s Right PTA: 98 cm/s Right WESTON: 72 centimeters/second Right DPA: 51 cm/s Left CFA: 138 cm/s Left profundus: 76 cm/s Left SFA proximal: 122 cm/s Left SFA mid: 83 cm/s Left SFA distal: 63 cm/s Left popliteal : 77 cm/s Left PTA: 92 cm/s Left WESTON: 39 centimeters/second Left DPA: 75 cm/s There are no areas of focal occlusion. There is no doubling of peak systolic velocity between adjacent stations. On the  right, there are triphasic waveforms in the common femoral artery with biphasic waveforms in the SFA and distal. On the left, there are triphasic waveforms in the left common femoral and superficial femoral arteries with biphasic waveforms at the popliteal and distal     Ankle brachial indices measure acceptable at 0.97 bilaterally No areas of focal occlusion No doubling of peak systolic velocity between adjacent stations MARISABEL index values >1.0 = normal >0.9-1.0= may be within normal limits 0.8 and-0.9 = mild arterial disease 0.5-0.8= claudication moderate disease <0.5 = rest pain severe arterial disease The Ultrasound images were captured and stored. Place of service: [facility] Electronically signed by: Yaw Bah Date:    04/14/2022 Time:    11:26        Assessment:  Niranjan Whittaker Jr. is a 67 y.o. male here with:  1. Anemia, unspecified type    2. Rectal bleeding    3. Chronic constipation    4. Gastroesophageal reflux disease without esophagitis    5. Chronic kidney disease, stage 3b          Recommendations:  1. Iron studies today  2. Request labs from Dr. Ladd  3. Schedule colonoscopy for rectal bleeding  4. May take Miralax powder for constipation, avoid magnesium containing products due to renal insufficiency  5. Avoid spicy, greasy foods  Avoid caffeine, citric acid, chocolate, peppermint, and carbonated drinks  Do not lay down within 3 hours of eating  Exercise 150 minutes per week  Increase fluid to 64 ounces daily  Avoid antiinflammatory medications such as motrin, advil, aleve, ibuprofen, and BC powder        Follow up in about 3 months (around 8/2/2022).      Order summary:  Orders Placed This Encounter    Iron and TIBC    Ferritin    Colonoscopy       Thank you for allowing me to participate in the care of Niranjan Whittaker Jr..      JJ Menjivar

## 2022-05-12 ENCOUNTER — ANESTHESIA EVENT (OUTPATIENT)
Dept: GASTROENTEROLOGY | Facility: HOSPITAL | Age: 67
End: 2022-05-12
Payer: MEDICARE

## 2022-05-12 ENCOUNTER — ANESTHESIA (OUTPATIENT)
Dept: GASTROENTEROLOGY | Facility: HOSPITAL | Age: 67
End: 2022-05-12
Payer: MEDICARE

## 2022-05-12 ENCOUNTER — HOSPITAL ENCOUNTER (OUTPATIENT)
Dept: GASTROENTEROLOGY | Facility: HOSPITAL | Age: 67
Discharge: HOME | End: 2022-05-12
Attending: NURSE PRACTITIONER
Payer: MEDICARE

## 2022-05-12 VITALS
RESPIRATION RATE: 15 BRPM | SYSTOLIC BLOOD PRESSURE: 115 MMHG | DIASTOLIC BLOOD PRESSURE: 72 MMHG | TEMPERATURE: 98 F | OXYGEN SATURATION: 97 % | HEART RATE: 67 BPM

## 2022-05-12 DIAGNOSIS — K62.5 RECTAL BLEEDING: ICD-10-CM

## 2022-05-12 DIAGNOSIS — Z86.010 HISTORY OF COLON POLYPS: ICD-10-CM

## 2022-05-12 DIAGNOSIS — D64.9 ANEMIA, UNSPECIFIED TYPE: ICD-10-CM

## 2022-05-12 DIAGNOSIS — D12.3 ADENOMATOUS POLYP OF TRANSVERSE COLON: ICD-10-CM

## 2022-05-12 DIAGNOSIS — K57.30 DIVERTICULA OF COLON: ICD-10-CM

## 2022-05-12 DIAGNOSIS — Z12.11 SCREENING FOR COLON CANCER: ICD-10-CM

## 2022-05-12 PROBLEM — Z86.0100 HISTORY OF COLON POLYPS: Status: ACTIVE | Noted: 2022-05-12

## 2022-05-12 PROCEDURE — 88305 TISSUE EXAM BY PATHOLOGIST: CPT | Mod: 26,,, | Performed by: PATHOLOGY

## 2022-05-12 PROCEDURE — 37000009 HC ANESTHESIA EA ADD 15 MINS

## 2022-05-12 PROCEDURE — D9220A PRA ANESTHESIA: ICD-10-PCS | Mod: PT,,, | Performed by: NURSE ANESTHETIST, CERTIFIED REGISTERED

## 2022-05-12 PROCEDURE — 27201423 OPTIME MED/SURG SUP & DEVICES STERILE SUPPLY

## 2022-05-12 PROCEDURE — 88305 TISSUE EXAM BY PATHOLOGIST: CPT | Mod: SUR | Performed by: INTERNAL MEDICINE

## 2022-05-12 PROCEDURE — 45380 COLONOSCOPY AND BIOPSY: CPT | Mod: PT

## 2022-05-12 PROCEDURE — C1889 IMPLANT/INSERT DEVICE, NOC: HCPCS

## 2022-05-12 PROCEDURE — 63600175 PHARM REV CODE 636 W HCPCS: Performed by: NURSE ANESTHETIST, CERTIFIED REGISTERED

## 2022-05-12 PROCEDURE — 25000003 PHARM REV CODE 250: Performed by: NURSE ANESTHETIST, CERTIFIED REGISTERED

## 2022-05-12 PROCEDURE — 37000008 HC ANESTHESIA 1ST 15 MINUTES

## 2022-05-12 PROCEDURE — 88305 SURGICAL PATHOLOGY: ICD-10-PCS | Mod: 26,,, | Performed by: PATHOLOGY

## 2022-05-12 PROCEDURE — D9220A PRA ANESTHESIA: Mod: PT,,, | Performed by: NURSE ANESTHETIST, CERTIFIED REGISTERED

## 2022-05-12 RX ORDER — SODIUM CHLORIDE 0.9 % (FLUSH) 0.9 %
10 SYRINGE (ML) INJECTION
Status: CANCELLED | OUTPATIENT
Start: 2022-05-12

## 2022-05-12 RX ORDER — PROPOFOL 10 MG/ML
VIAL (ML) INTRAVENOUS
Status: DISCONTINUED | OUTPATIENT
Start: 2022-05-12 | End: 2022-05-12

## 2022-05-12 RX ORDER — LIDOCAINE HYDROCHLORIDE 20 MG/ML
INJECTION, SOLUTION EPIDURAL; INFILTRATION; INTRACAUDAL; PERINEURAL
Status: DISCONTINUED | OUTPATIENT
Start: 2022-05-12 | End: 2022-05-12

## 2022-05-12 RX ADMIN — PROPOFOL 30 MG: 10 INJECTION, EMULSION INTRAVENOUS at 07:05

## 2022-05-12 RX ADMIN — LIDOCAINE HYDROCHLORIDE 50 MG: 20 INJECTION, SOLUTION EPIDURAL; INFILTRATION; INTRACAUDAL; PERINEURAL at 07:05

## 2022-05-12 RX ADMIN — PROPOFOL 20 MG: 10 INJECTION, EMULSION INTRAVENOUS at 08:05

## 2022-05-12 RX ADMIN — PROPOFOL 20 MG: 10 INJECTION, EMULSION INTRAVENOUS at 07:05

## 2022-05-12 RX ADMIN — PROPOFOL 50 MG: 10 INJECTION, EMULSION INTRAVENOUS at 07:05

## 2022-05-12 RX ADMIN — SODIUM CHLORIDE: 9 INJECTION, SOLUTION INTRAVENOUS at 07:05

## 2022-05-12 NOTE — DISCHARGE INSTRUCTIONS
Procedure Date  5/12/22     Impression  Overall Impression: Diverticula were scattered in the transverse, descending and sigmoid colon. Two diminutive polyps were removed with biopsy from the transverse colon.     Recommendation  Await pathology results  Repeat colonoscopy in 5 years; high fiber diet.    THE NURSE WILL CALL YOU WITH YOUR BIOPSY RESULTS IN A FEW DAYS.     NO DRIVING, OPERATING EQUIPMENT, OR SIGNING LEGAL DOCUMENTS FOR 24 HOURS.

## 2022-05-12 NOTE — H&P
[facility]  Gastroenterology  H&P    Patient Name: Niranjan Whittaker Jr.  MRN: [id number]  Admission Date: 5/12/2022  Code Status: Prior    Attending Provider: Padilla Johnson MD  Primary Care Physician: Kristine Mehta MD  Principal Problem:<principal problem not specified>    Subjective:     History of Present Illness: Pt has hx of ta polyp at last colonoscopy in 2018.    Past Medical History:   Diagnosis Date    FH: total knee replacement     GERD (gastroesophageal reflux disease)     Headache     Hyperlipidemia     Hypertension     Neuropathy     Thyroid disease        Past Surgical History:   Procedure Laterality Date    BACK SURGERY      THYROIDECTOMY N/A 8/27/2021    Procedure: THYROIDECTOMY;  Surgeon: Manish Valadez MD;  Location: [facility] OR;  Service: General;  Laterality: N/A;       Review of patient's allergies indicates:  No Known Allergies  Family History     Problem Relation (Age of Onset)    Heart disease Mother    Hypertension Mother, Father        Tobacco Use    Smoking status: Never Smoker    Smokeless tobacco: Never Used   Substance and Sexual Activity    Alcohol use: Not Currently    Drug use: Yes     Types: Hydrocodone    Sexual activity: Yes     Review of Systems   Respiratory: Negative.    Cardiovascular: Negative.    Gastrointestinal: Positive for blood in stool.     Objective:     Vital Signs (Most Recent):    Vital Signs (24h Range):           There is no height or weight on file to calculate BMI.    No intake or output data in the 24 hours ending 05/12/22 0753    Lines/Drains/Airways     Peripheral Intravenous Line  Duration                Peripheral IV - Single Lumen 05/12/22 0740 22 G Right Antecubital <1 day                Physical Exam  Vitals reviewed.   Constitutional:       General: He is not in acute distress.     Appearance: Normal appearance. He is well-developed. He is not ill-appearing.   HENT:      Head: Normocephalic and atraumatic.      Nose:  Nose normal.   Eyes:      Pupils: Pupils are equal, round, and reactive to light.   Cardiovascular:      Rate and Rhythm: Normal rate and regular rhythm.   Pulmonary:      Effort: Pulmonary effort is normal.      Breath sounds: Normal breath sounds. No wheezing.   Abdominal:      General: Abdomen is flat. Bowel sounds are normal. There is no distension.      Palpations: Abdomen is soft.      Tenderness: There is no abdominal tenderness. There is no guarding.   Skin:     General: Skin is warm and dry.      Coloration: Skin is not jaundiced.   Neurological:      Mental Status: He is alert.   Psychiatric:         Attention and Perception: Attention normal.         Mood and Affect: Affect normal.         Speech: Speech normal.         Behavior: Behavior is cooperative.      Comments: Pt was calm while speaking.         Significant Labs:  CBC: No results for input(s): WBC, HGB, HCT, PLT in the last 48 hours.  CMP: No results for input(s): GLU, CALCIUM, ALBUMIN, PROT, NA, K, CO2, CL, BUN, CREATININE, ALKPHOS, ALT, AST, BILITOT in the last 48 hours.    Significant Imaging:  Imaging results within the past 24 hours have been reviewed.    Assessment/Plan:     There are no hospital problems to display for this patient.        Imp: history of colon polyps  Plan: colonoscopy    Padilla Johnson MD  Gastroenterology  [facility] End[facility]

## 2022-05-12 NOTE — ANESTHESIA PREPROCEDURE EVALUATION
05/12/2022  Niranjan Whittaker Jr. is a 67 y.o., male.      Pre-op Assessment    I have reviewed the Patient Summary Reports.     I have reviewed the Nursing Notes. I have reviewed the NPO Status.   I have reviewed the Medications.     Review of Systems  Anesthesia Hx:  History of prior surgery of interest to airway management or planning: Denies Family Hx of Anesthesia complications.   Denies Personal Hx of Anesthesia complications.   Social:  Former Smoker, Social Alcohol Use    Hematology/Oncology:         -- Anemia:   Cardiovascular:   Hypertension  Denies Angina. hyperlipidemia    Pulmonary:   Denies Shortness of breath. Sleep Apnea, CPAP    Education provided regarding risk of obstructive sleep apnea     Renal/:   Chronic Renal Disease    Hepatic/GI:   Bowel Prep. GERD    Musculoskeletal:   Arthritis     Neurological:   Headaches   Chronic Pain Syndrome  Peripheral Neuropathy    Endocrine:   Hypothyroidism    Psych:   depression          Physical Exam  General: Well nourished    Dental:       Past Medical History:   Diagnosis Date    FH: total knee replacement     GERD (gastroesophageal reflux disease)     Headache     Hyperlipidemia     Hypertension     Neuropathy     Thyroid disease        Past Surgical History:   Procedure Laterality Date    BACK SURGERY      THYROIDECTOMY N/A 8/27/2021    Procedure: THYROIDECTOMY;  Surgeon: Manish Valadez MD;  Location: [facility];  Service: General;  Laterality: N/A;       Family History   Problem Relation Age of Onset    Heart disease Mother     Hypertension Mother     Hypertension Father        Social History     Socioeconomic History    Marital status:    Occupational History    Occupation: Disbaled   Tobacco Use    Smoking status: Never Smoker    Smokeless tobacco: Never Used   Substance and Sexual Activity    Alcohol use: Not  Currently    Drug use: Yes     Types: Hydrocodone    Sexual activity: Yes       Current Outpatient Medications   Medication Sig Dispense Refill    cetirizine (ZYRTEC) 5 MG tablet TAKE 1 TABLET(5 MG) BY MOUTH EVERY DAY 90 tablet 0    DULoxetine (CYMBALTA) 60 MG capsule Take 60 mg by mouth once daily.      ferrous sulfate (FEOSOL) 325 mg (65 mg iron) Tab tablet Take 325 mg by mouth daily with breakfast.      gabapentin (NEURONTIN) 300 MG capsule       HYDROcodone-acetaminophen (NORCO)  mg per tablet Take 1 tablet by mouth every 4 (four) hours as needed for Pain. 15 tablet 0    hydrOXYchloroQUINE (PLAQUENIL) 200 mg tablet Take 200 mg by mouth 2 (two) times a day.      leflunomide (ARAVA) 20 MG Tab Take 1 tablet (20 mg total) by mouth once daily. 90 tablet 1    levothyroxine (SYNTHROID) 125 MCG tablet Take 1 tablet (125 mcg total) by mouth before breakfast. 90 tablet 1    lisinopriL (PRINIVIL,ZESTRIL) 20 MG tablet Take 1 tablet (20 mg total) by mouth once daily. 90 tablet 1    loratadine (CLARITIN) 10 mg tablet Take 1 tablet (10 mg total) by mouth once daily. 90 tablet 1    methocarbamoL (ROBAXIN) 500 MG Tab Take 1 tablet (500 mg total) by mouth 3 (three) times daily as needed (cramps). 90 tablet 0    montelukast (SINGULAIR) 10 mg tablet Take 1 tablet (10 mg total) by mouth every evening. 90 tablet 3    omeprazole (PRILOSEC) 40 MG capsule Take 1 capsule (40 mg total) by mouth once daily. 90 capsule 1    OXYCONTIN 30 mg TR12 12 hr tablet Take 30 mg by mouth 2 (two) times daily as needed.      pravastatin (PRAVACHOL) 40 MG tablet Take 1 tablet (40 mg total) by mouth once daily. 90 tablet 1    pregabalin (LYRICA) 150 MG capsule Take 150 mg by mouth 3 (three) times daily.      promethazine (PHENERGAN) 25 MG tablet Take 25 mg by mouth 2 (two) times daily as needed for Nausea. 2 times daily prn headache no more then 2 days in a week      silodosin (RAPAFLO) 8 mg Cap capsule Take 8 mg by mouth once  daily.      sulfaSALAzine (AZULFIDINE) 500 mg Tab Take 500 mg by mouth 2 (two) times daily.      tamsulosin (FLOMAX) 0.4 mg Cap TAKE 1 CAPSULE BY MOUTH EVERY DAY 30 MINS FOLLOWING THE SAME MEAL EVERY  capsule 1     No current facility-administered medications for this encounter.       Review of patient's allergies indicates:  No Known Allergies    Anesthesia Plan  Type of Anesthesia, risks & benefits discussed:    Anesthesia Type: Gen Natural Airway  Intra-op Monitoring Plan: Standard ASA Monitors  Post Op Pain Control Plan: multimodal analgesia  Induction:  IV  Informed Consent: Informed consent signed with the Patient and all parties understand the risks and agree with anesthesia plan.  All questions answered. Patient consented to blood products? Yes  ASA Score: 3  Day of Surgery Review of History & Physical: I have interviewed and examined the patient. I have reviewed the patient's H&P dated:     Ready For Surgery From Anesthesia Perspective.      Outpatient Medications as of 5/12/2022   Medication Sig Dispense Refill    cetirizine (ZYRTEC) 5 MG tablet TAKE 1 TABLET(5 MG) BY MOUTH EVERY DAY 90 tablet 0    DULoxetine (CYMBALTA) 60 MG capsule Take 60 mg by mouth once daily.      ferrous sulfate (FEOSOL) 325 mg (65 mg iron) Tab tablet Take 325 mg by mouth daily with breakfast.      gabapentin (NEURONTIN) 300 MG capsule       HYDROcodone-acetaminophen (NORCO)  mg per tablet Take 1 tablet by mouth every 4 (four) hours as needed for Pain. 15 tablet 0    hydrOXYchloroQUINE (PLAQUENIL) 200 mg tablet Take 200 mg by mouth 2 (two) times a day.      leflunomide (ARAVA) 20 MG Tab Take 1 tablet (20 mg total) by mouth once daily. 90 tablet 1    levothyroxine (SYNTHROID) 125 MCG tablet Take 1 tablet (125 mcg total) by mouth before breakfast. 90 tablet 1    lisinopriL (PRINIVIL,ZESTRIL) 20 MG tablet Take 1 tablet (20 mg total) by mouth once daily. 90 tablet 1    loratadine (CLARITIN) 10 mg tablet Take 1  tablet (10 mg total) by mouth once daily. 90 tablet 1    methocarbamoL (ROBAXIN) 500 MG Tab Take 1 tablet (500 mg total) by mouth 3 (three) times daily as needed (cramps). 90 tablet 0    montelukast (SINGULAIR) 10 mg tablet Take 1 tablet (10 mg total) by mouth every evening. 90 tablet 3    omeprazole (PRILOSEC) 40 MG capsule Take 1 capsule (40 mg total) by mouth once daily. 90 capsule 1    OXYCONTIN 30 mg TR12 12 hr tablet Take 30 mg by mouth 2 (two) times daily as needed.      pravastatin (PRAVACHOL) 40 MG tablet Take 1 tablet (40 mg total) by mouth once daily. 90 tablet 1    pregabalin (LYRICA) 150 MG capsule Take 150 mg by mouth 3 (three) times daily.      promethazine (PHENERGAN) 25 MG tablet Take 25 mg by mouth 2 (two) times daily as needed for Nausea. 2 times daily prn headache no more then 2 days in a week      silodosin (RAPAFLO) 8 mg Cap capsule Take 8 mg by mouth once daily.      sulfaSALAzine (AZULFIDINE) 500 mg Tab Take 500 mg by mouth 2 (two) times daily.      tamsulosin (FLOMAX) 0.4 mg Cap TAKE 1 CAPSULE BY MOUTH EVERY DAY 30 MINS FOLLOWING THE SAME MEAL EVERY  capsule 1     No current facility-administered medications on file as of 5/12/2022.     .

## 2022-05-12 NOTE — TRANSFER OF CARE
Anesthesia Transfer of Care Note    Patient: Niranjan Whittaker     Procedure(s) Performed: colonoscopy   Patient location: GI    Anesthesia Type: general    Transport from OR: Transported from OR on room air with adequate spontaneous ventilation. Continuos invasive BP monitoring in transport    Post pain: adequate analgesia    Post assessment: no apparent anesthetic complications    Post vital signs: stable    Level of consciousness: awake and alert    Nausea/Vomiting: no nausea/vomiting    Complications: none    Transfer of care protocol was followed      Last vitals:   Visit Vitals  BP (!) 93/58 (BP Location: Left arm)   Pulse 77   Temp 36.6 °C (97.9 °F) (Oral)   Resp 14   SpO2 98%

## 2022-05-12 NOTE — ANESTHESIA POSTPROCEDURE EVALUATION
Anesthesia Post Evaluation    Patient: Niranjan Whittaker JrLauryn    Procedure(s) Performed: colonoscopy     Final Anesthesia Type: general      Patient location during evaluation: GI PACU  Patient participation: Yes- Able to Participate  Level of consciousness: awake and alert  Post-procedure vital signs: reviewed and stable  Pain management: adequate  Airway patency: patent  ZULLY mitigation strategies: Multimodal analgesia  PONV status at discharge: No PONV  Anesthetic complications: no      Cardiovascular status: blood pressure returned to baseline  Respiratory status: spontaneous ventilation and room air  Hydration status: euvolemic  Follow-up not needed.          Vitals Value Taken Time   /72 05/12/22 0845   Temp 36.6 °C (97.9 °F) 05/12/22 0812   Pulse 68 05/12/22 0847   Resp 14 05/12/22 0847   SpO2 96 % 05/12/22 0847   Vitals shown include unvalidated device data.      Event Time   Out of Recovery 09:06:21         Pain/Tanya Score: Tanya Score: 10 (5/12/2022  8:37 AM)

## 2022-05-13 LAB
ESTROGEN SERPL-MCNC: NORMAL PG/ML
INSULIN SERPL-ACNC: NORMAL U[IU]/ML
LAB AP GROSS DESCRIPTION: NORMAL
LAB AP LABORATORY NOTES: NORMAL
T3RU NFR SERPL: NORMAL %

## 2022-05-31 ENCOUNTER — EXTERNAL CHRONIC CARE MANAGEMENT (OUTPATIENT)
Dept: FAMILY MEDICINE | Facility: CLINIC | Age: 67
End: 2022-05-31
Payer: MEDICARE

## 2022-05-31 PROCEDURE — G0511 PR CHRONIC CARE MGMT, RHC OR FQHC ONLY, 20 MINS OR MORE: ICD-10-PCS | Mod: ,,, | Performed by: FAMILY MEDICINE

## 2022-05-31 PROCEDURE — G0511 CCM/BHI BY RHC/FQHC 20MIN MO: HCPCS | Mod: ,,, | Performed by: FAMILY MEDICINE

## 2022-06-30 ENCOUNTER — EXTERNAL CHRONIC CARE MANAGEMENT (OUTPATIENT)
Dept: FAMILY MEDICINE | Facility: CLINIC | Age: 67
End: 2022-06-30
Payer: MEDICARE

## 2022-06-30 PROCEDURE — G0511 PR CHRONIC CARE MGMT, RHC OR FQHC ONLY, 20 MINS OR MORE: ICD-10-PCS | Mod: ,,, | Performed by: FAMILY MEDICINE

## 2022-06-30 PROCEDURE — G0511 CCM/BHI BY RHC/FQHC 20MIN MO: HCPCS | Mod: ,,, | Performed by: FAMILY MEDICINE

## 2022-07-31 ENCOUNTER — EXTERNAL CHRONIC CARE MANAGEMENT (OUTPATIENT)
Dept: FAMILY MEDICINE | Facility: CLINIC | Age: 67
End: 2022-07-31
Payer: MEDICARE

## 2022-07-31 PROCEDURE — G0511 PR CHRONIC CARE MGMT, RHC OR FQHC ONLY, 20 MINS OR MORE: ICD-10-PCS | Mod: ,,, | Performed by: FAMILY MEDICINE

## 2022-07-31 PROCEDURE — G0511 CCM/BHI BY RHC/FQHC 20MIN MO: HCPCS | Mod: ,,, | Performed by: FAMILY MEDICINE

## 2022-08-02 ENCOUNTER — OFFICE VISIT (OUTPATIENT)
Dept: GASTROENTEROLOGY | Facility: CLINIC | Age: 67
End: 2022-08-02
Payer: MEDICARE

## 2022-08-02 VITALS
DIASTOLIC BLOOD PRESSURE: 79 MMHG | HEART RATE: 96 BPM | HEIGHT: 74 IN | SYSTOLIC BLOOD PRESSURE: 117 MMHG | BODY MASS INDEX: 28.34 KG/M2 | WEIGHT: 220.81 LBS | OXYGEN SATURATION: 94 %

## 2022-08-02 DIAGNOSIS — K59.09 CHRONIC CONSTIPATION: Primary | ICD-10-CM

## 2022-08-02 DIAGNOSIS — K21.9 GASTROESOPHAGEAL REFLUX DISEASE WITHOUT ESOPHAGITIS: ICD-10-CM

## 2022-08-02 PROCEDURE — 99214 PR OFFICE/OUTPT VISIT, EST, LEVL IV, 30-39 MIN: ICD-10-PCS | Mod: ,,, | Performed by: NURSE PRACTITIONER

## 2022-08-02 PROCEDURE — 99214 OFFICE O/P EST MOD 30 MIN: CPT | Mod: ,,, | Performed by: NURSE PRACTITIONER

## 2022-08-02 RX ORDER — PREDNISONE 5 MG/1
5-10 TABLET ORAL DAILY PRN
COMMUNITY
Start: 2022-07-26 | End: 2023-04-17 | Stop reason: SDUPTHER

## 2022-08-02 RX ORDER — FOLIC ACID 1 MG/1
TABLET ORAL
COMMUNITY
End: 2022-12-07

## 2022-08-02 NOTE — PROGRESS NOTES
Niranjan Whittaker Jr. is a 67 y.o. male here for Follow-up        PCP: Kristine Mehta  Referring Provider: No referring provider defined for this encounter.     HPI:  Presents for follow up after colonoscopy on 5/12/22. Report reviewed, tubular adenoma removed. Recommended that patient follow up in 5 years. Does have occasional hematochezia. Chronic constipation. He is taking Norco and oxycodone. Reports bloating related to constipation. He is not taking an oral laxative. Reports that he was previously prescribed Linzess, but that he is unable to afford this medication. Reports that he has been using enemas. Endorses reflux at times. He is taking Omeprazole 40 mg daily. No dysphagia or weight loss. Discussed EGD with the patient. Will consider if needed following the next office visit. Mild anemia Hgb 12.3 and Hct 39.4, iron studies have been normal. Creat elevated. Discussed that this may be followed up with primary care. He verbalized understanding of follow up.        ROS:  Review of Systems   Constitutional: Negative for activity change, appetite change, fatigue, fever and unexpected weight change.   HENT: Negative for trouble swallowing.    Gastrointestinal: Positive for abdominal distention, constipation and reflux. Negative for abdominal pain, blood in stool, change in bowel habit, diarrhea, nausea, vomiting and change in bowel habit.   Musculoskeletal: Positive for arthralgias and back pain. Negative for gait problem.   Integumentary:  Negative for color change.          PMHX:  has a past medical history of Adenomatous polyp of transverse colon (5/12/2022), Diverticula of colon (5/12/2022), FH: total knee replacement, GERD (gastroesophageal reflux disease), Headache, History of colon polyps (5/12/2022), Hyperlipidemia, Hypertension, Neuropathy, Screening for colon cancer (5/12/2022), and Thyroid disease.    PSHX:  has a past surgical history that includes Back surgery and Thyroidectomy (N/A,  8/27/2021).    PFHX: family history includes Heart disease in his mother; Hypertension in his father and mother.    PSlHX:  reports that he has never smoked. He has never used smokeless tobacco. He reports previous alcohol use. He reports current drug use. Drug: Hydrocodone.        Review of patient's allergies indicates:  No Known Allergies    Medication List with Changes/Refills   Current Medications    CETIRIZINE (ZYRTEC) 5 MG TABLET    TAKE 1 TABLET(5 MG) BY MOUTH EVERY DAY    DULOXETINE (CYMBALTA) 60 MG CAPSULE    Take 60 mg by mouth once daily.    FERROUS SULFATE (FEOSOL) 325 MG (65 MG IRON) TAB TABLET    Take 325 mg by mouth daily with breakfast.    FOLIC ACID (FOLVITE) 1 MG TABLET    1 tablet    GABAPENTIN (NEURONTIN) 300 MG CAPSULE        HYDROCODONE-ACETAMINOPHEN (NORCO)  MG PER TABLET    Take 1 tablet by mouth every 4 (four) hours as needed for Pain.    HYDROXYCHLOROQUINE (PLAQUENIL) 200 MG TABLET    Take 200 mg by mouth 2 (two) times a day.    LEFLUNOMIDE (ARAVA) 20 MG TAB    Take 1 tablet (20 mg total) by mouth once daily.    LEVOTHYROXINE (SYNTHROID) 125 MCG TABLET    Take 1 tablet (125 mcg total) by mouth before breakfast.    LISINOPRIL (PRINIVIL,ZESTRIL) 20 MG TABLET    Take 1 tablet (20 mg total) by mouth once daily.    LORATADINE (CLARITIN) 10 MG TABLET    Take 1 tablet (10 mg total) by mouth once daily.    METHOCARBAMOL (ROBAXIN) 500 MG TAB    TAKE 1 TABLET(500 MG) BY MOUTH THREE TIMES DAILY AS NEEDED FOR CRAMPS    MONTELUKAST (SINGULAIR) 10 MG TABLET    Take 1 tablet (10 mg total) by mouth every evening.    OMEPRAZOLE (PRILOSEC) 40 MG CAPSULE    Take 1 capsule (40 mg total) by mouth once daily.    OXYCONTIN 30 MG TR12 12 HR TABLET    Take 30 mg by mouth 2 (two) times daily as needed.    PRAVASTATIN (PRAVACHOL) 40 MG TABLET    Take 1 tablet (40 mg total) by mouth once daily.    PREDNISONE (DELTASONE) 5 MG TABLET    Take 5-10 mg by mouth daily as needed.    PREGABALIN (LYRICA) 150 MG CAPSULE   "  Take 150 mg by mouth 3 (three) times daily.    PROMETHAZINE (PHENERGAN) 25 MG TABLET    Take 25 mg by mouth 2 (two) times daily as needed for Nausea. 2 times daily prn headache no more then 2 days in a week    SILODOSIN (RAPAFLO) 8 MG CAP CAPSULE    Take 8 mg by mouth once daily.    SULFASALAZINE (AZULFIDINE) 500 MG TAB    Take 500 mg by mouth 2 (two) times daily.    TAMSULOSIN (FLOMAX) 0.4 MG CAP    TAKE 1 CAPSULE BY MOUTH EVERY DAY 30 MINS FOLLOWING THE SAME MEAL EVERY DAY        Objective Findings:  Vital Signs:  /79   Pulse 96   Ht 6' 2" (1.88 m)   Wt 100.2 kg (220 lb 12.8 oz)   SpO2 (!) 94%   BMI 28.35 kg/m²  Body mass index is 28.35 kg/m².    Physical Exam:  Physical Exam  Vitals and nursing note reviewed.   Constitutional:       General: He is not in acute distress.     Appearance: Normal appearance.   HENT:      Mouth/Throat:      Mouth: Mucous membranes are moist.   Cardiovascular:      Rate and Rhythm: Normal rate.   Pulmonary:      Breath sounds: No wheezing, rhonchi or rales.   Abdominal:      General: Bowel sounds are normal. There is no distension.      Palpations: Abdomen is soft. There is no mass.      Tenderness: There is no abdominal tenderness. There is no guarding or rebound.      Hernia: No hernia is present.   Musculoskeletal:      Right lower leg: No edema.      Left lower leg: No edema.   Skin:     General: Skin is warm and dry.      Coloration: Skin is not jaundiced or pale.   Neurological:      Mental Status: He is alert and oriented to person, place, and time.   Psychiatric:         Mood and Affect: Mood normal.          Labs:  Lab Results   Component Value Date    WBC 5.47 08/23/2021    HGB 13.1 (L) 08/23/2021    HCT 42.0 08/23/2021    MCV 91.5 08/23/2021    RDW 13.1 08/23/2021     08/23/2021    LYMPH 46.8 (H) 08/23/2021    LYMPH 2.56 08/23/2021    MONO 9.5 (H) 08/23/2021    EOS 0.43 08/23/2021    BASO 0.06 08/23/2021     Lab Results   Component Value Date     " 11/11/2021    K 4.6 11/11/2021     11/11/2021    CO2 30 11/11/2021    GLU 93 11/11/2021    BUN 20 (H) 11/11/2021    CREATININE 1.67 (H) 11/11/2021    CALCIUM 9.3 11/11/2021    PROT 8.4 (H) 11/11/2021    ALBUMIN 3.8 11/11/2021    BILITOT 0.5 11/11/2021    ALKPHOS 134 (H) 11/11/2021    AST 33 11/11/2021    ALT 40 11/11/2021         Imaging: No results found.      Assessment:  Niranjan Whittaker Jr. is a 67 y.o. male here with:  1. Chronic constipation    2. Gastroesophageal reflux disease without esophagitis          Recommendations:  1. Take Miralax 1 capful twice daily in 8 ounces of liquid  2. Increase water intake  3. Continue Omeprazole  Avoid spicy, greasy foods  Avoid caffeine, citric acid, chocolate, peppermint, and carbonated drinks  Do not lay down within 3 hours of eating  Increase fluid to 64 ounces daily  Avoid antiinflammatory medications such as motrin, advil, aleve, ibuprofen, and BC powder  4. Consider EGD  5. Follow up with primary care        Follow up in about 3 months (around 11/2/2022).      Order summary:       Thank you for allowing me to participate in the care of Niranjan Whittaker Jr..      JJ Menjivar

## 2022-08-02 NOTE — PATIENT INSTRUCTIONS
Miralax powder 1 capful in 8 ounces of liquid  Avoid spicy, greasy foods  Avoid caffeine, citric acid, chocolate, peppermint, and carbonated drinks  Do not lay down within 3 hours of eating  Exercise 150 minutes per week  Increase fluid to 64 ounces daily  Avoid antiinflammatory medications such as motrin, advil, aleve, ibuprofen, and BC powder

## 2022-08-15 ENCOUNTER — OFFICE VISIT (OUTPATIENT)
Dept: FAMILY MEDICINE | Facility: CLINIC | Age: 67
End: 2022-08-15
Payer: MEDICARE

## 2022-08-15 VITALS
RESPIRATION RATE: 20 BRPM | OXYGEN SATURATION: 96 % | WEIGHT: 217.63 LBS | DIASTOLIC BLOOD PRESSURE: 84 MMHG | HEART RATE: 98 BPM | BODY MASS INDEX: 27.93 KG/M2 | SYSTOLIC BLOOD PRESSURE: 124 MMHG | HEIGHT: 74 IN

## 2022-08-15 DIAGNOSIS — Z11.59 ENCOUNTER FOR HEPATITIS C SCREENING TEST FOR LOW RISK PATIENT: ICD-10-CM

## 2022-08-15 DIAGNOSIS — K21.9 GASTROESOPHAGEAL REFLUX DISEASE WITHOUT ESOPHAGITIS: ICD-10-CM

## 2022-08-15 DIAGNOSIS — E04.2 MULTIPLE THYROID NODULES: ICD-10-CM

## 2022-08-15 DIAGNOSIS — M51.36 DEGENERATION OF LUMBAR INTERVERTEBRAL DISC: ICD-10-CM

## 2022-08-15 DIAGNOSIS — E78.2 MIXED HYPERLIPIDEMIA: ICD-10-CM

## 2022-08-15 DIAGNOSIS — Z91.09 ENVIRONMENTAL ALLERGIES: ICD-10-CM

## 2022-08-15 DIAGNOSIS — I10 PRIMARY HYPERTENSION: Primary | ICD-10-CM

## 2022-08-15 DIAGNOSIS — J30.9 ALLERGIC RHINITIS, UNSPECIFIED SEASONALITY, UNSPECIFIED TRIGGER: ICD-10-CM

## 2022-08-15 PROBLEM — R20.9 SKIN SENSATION DISTURBANCE: Status: ACTIVE | Noted: 2022-04-19

## 2022-08-15 PROBLEM — R51.9 HEADACHE: Status: ACTIVE | Noted: 2022-04-19

## 2022-08-15 PROBLEM — M25.50 PAIN IN JOINTS: Status: ACTIVE | Noted: 2022-04-19

## 2022-08-15 PROBLEM — Z79.899 HIGH RISK MEDICATION USE: Status: ACTIVE | Noted: 2022-04-19

## 2022-08-15 PROBLEM — R53.82 CHRONIC FATIGUE: Status: ACTIVE | Noted: 2022-04-19

## 2022-08-15 PROBLEM — K25.9 STOMACH ULCER: Status: ACTIVE | Noted: 2022-04-19

## 2022-08-15 PROBLEM — M06.00 SERONEGATIVE RHEUMATOID ARTHRITIS: Status: ACTIVE | Noted: 2021-09-22

## 2022-08-15 LAB
BASOPHILS # BLD AUTO: 0.11 K/UL (ref 0–0.2)
BASOPHILS NFR BLD AUTO: 1.7 % (ref 0–1)
DIFFERENTIAL METHOD BLD: ABNORMAL
EOSINOPHIL # BLD AUTO: 0.25 K/UL (ref 0–0.5)
EOSINOPHIL NFR BLD AUTO: 3.8 % (ref 1–4)
ERYTHROCYTE [DISTWIDTH] IN BLOOD BY AUTOMATED COUNT: 13.5 % (ref 11.5–14.5)
HCT VFR BLD AUTO: 40.6 % (ref 40–54)
HGB BLD-MCNC: 12.6 G/DL (ref 13.5–18)
IMM GRANULOCYTES # BLD AUTO: 0.02 K/UL (ref 0–0.04)
IMM GRANULOCYTES NFR BLD: 0.3 % (ref 0–0.4)
LYMPHOCYTES # BLD AUTO: 2.81 K/UL (ref 1–4.8)
LYMPHOCYTES NFR BLD AUTO: 43 % (ref 27–41)
MCH RBC QN AUTO: 29.3 PG (ref 27–31)
MCHC RBC AUTO-ENTMCNC: 31 G/DL (ref 32–36)
MCV RBC AUTO: 94.4 FL (ref 80–96)
MONOCYTES # BLD AUTO: 0.54 K/UL (ref 0–0.8)
MONOCYTES NFR BLD AUTO: 8.3 % (ref 2–6)
MPC BLD CALC-MCNC: 11.1 FL (ref 9.4–12.4)
NEUTROPHILS # BLD AUTO: 2.81 K/UL (ref 1.8–7.7)
NEUTROPHILS NFR BLD AUTO: 42.9 % (ref 53–65)
NRBC # BLD AUTO: 0 X10E3/UL
NRBC, AUTO (.00): 0 %
PLATELET # BLD AUTO: 386 K/UL (ref 150–400)
RBC # BLD AUTO: 4.3 M/UL (ref 4.6–6.2)
WBC # BLD AUTO: 6.54 K/UL (ref 4.5–11)

## 2022-08-15 PROCEDURE — 84443 ASSAY THYROID STIM HORMONE: CPT | Mod: ,,, | Performed by: CLINICAL MEDICAL LABORATORY

## 2022-08-15 PROCEDURE — 96372 THER/PROPH/DIAG INJ SC/IM: CPT | Mod: ,,, | Performed by: FAMILY MEDICINE

## 2022-08-15 PROCEDURE — 99214 OFFICE O/P EST MOD 30 MIN: CPT | Mod: ,,, | Performed by: FAMILY MEDICINE

## 2022-08-15 PROCEDURE — 86803 HEPATITIS C AB TEST: CPT | Mod: ,,, | Performed by: CLINICAL MEDICAL LABORATORY

## 2022-08-15 PROCEDURE — 96372 PR INJECTION,THERAP/PROPH/DIAG2ST, IM OR SUBCUT: ICD-10-PCS | Mod: ,,, | Performed by: FAMILY MEDICINE

## 2022-08-15 PROCEDURE — 80053 COMPREHENSIVE METABOLIC PANEL: ICD-10-PCS | Mod: ,,, | Performed by: CLINICAL MEDICAL LABORATORY

## 2022-08-15 PROCEDURE — 85025 CBC WITH DIFFERENTIAL: ICD-10-PCS | Mod: ,,, | Performed by: CLINICAL MEDICAL LABORATORY

## 2022-08-15 PROCEDURE — 99214 PR OFFICE/OUTPT VISIT, EST, LEVL IV, 30-39 MIN: ICD-10-PCS | Mod: ,,, | Performed by: FAMILY MEDICINE

## 2022-08-15 PROCEDURE — 86803 HEPATITIS C ANTIBODY: ICD-10-PCS | Mod: ,,, | Performed by: CLINICAL MEDICAL LABORATORY

## 2022-08-15 PROCEDURE — 84443 TSH: ICD-10-PCS | Mod: ,,, | Performed by: CLINICAL MEDICAL LABORATORY

## 2022-08-15 PROCEDURE — 80053 COMPREHEN METABOLIC PANEL: CPT | Mod: ,,, | Performed by: CLINICAL MEDICAL LABORATORY

## 2022-08-15 PROCEDURE — 85025 COMPLETE CBC W/AUTO DIFF WBC: CPT | Mod: ,,, | Performed by: CLINICAL MEDICAL LABORATORY

## 2022-08-15 RX ORDER — FLUTICASONE PROPIONATE 50 MCG
1 SPRAY, SUSPENSION (ML) NASAL DAILY
Qty: 16 G | Refills: 3 | Status: SHIPPED | OUTPATIENT
Start: 2022-08-15 | End: 2023-02-11

## 2022-08-15 RX ORDER — LORATADINE 10 MG/1
10 TABLET ORAL DAILY
Qty: 90 TABLET | Refills: 1 | Status: CANCELLED | OUTPATIENT
Start: 2022-08-15 | End: 2023-08-15

## 2022-08-15 RX ORDER — KETOROLAC TROMETHAMINE 30 MG/ML
30 INJECTION, SOLUTION INTRAMUSCULAR; INTRAVENOUS
Status: COMPLETED | OUTPATIENT
Start: 2022-08-15 | End: 2022-08-15

## 2022-08-15 RX ORDER — PRAVASTATIN SODIUM 40 MG/1
40 TABLET ORAL DAILY
Qty: 90 TABLET | Refills: 1 | Status: SHIPPED | OUTPATIENT
Start: 2022-08-15 | End: 2022-11-15 | Stop reason: SDUPTHER

## 2022-08-15 RX ORDER — MONTELUKAST SODIUM 10 MG/1
10 TABLET ORAL NIGHTLY
Qty: 90 TABLET | Refills: 3 | Status: SHIPPED | OUTPATIENT
Start: 2022-08-15 | End: 2022-11-15 | Stop reason: SDUPTHER

## 2022-08-15 RX ORDER — OMEPRAZOLE 40 MG/1
40 CAPSULE, DELAYED RELEASE ORAL DAILY
Qty: 90 CAPSULE | Refills: 1 | Status: SHIPPED | OUTPATIENT
Start: 2022-08-15 | End: 2022-11-15 | Stop reason: SDUPTHER

## 2022-08-15 RX ORDER — LISINOPRIL 20 MG/1
20 TABLET ORAL DAILY
Qty: 90 TABLET | Refills: 1 | Status: SHIPPED | OUTPATIENT
Start: 2022-08-15 | End: 2022-11-15 | Stop reason: SDUPTHER

## 2022-08-15 RX ORDER — LEVOTHYROXINE SODIUM 125 UG/1
125 TABLET ORAL
Qty: 90 TABLET | Refills: 1 | Status: SHIPPED | OUTPATIENT
Start: 2022-08-15 | End: 2022-08-16 | Stop reason: SDUPTHER

## 2022-08-15 RX ORDER — METHYLPREDNISOLONE ACETATE 40 MG/ML
40 INJECTION, SUSPENSION INTRA-ARTICULAR; INTRALESIONAL; INTRAMUSCULAR; SOFT TISSUE
Status: COMPLETED | OUTPATIENT
Start: 2022-08-15 | End: 2022-08-15

## 2022-08-15 RX ORDER — METHOCARBAMOL 500 MG/1
TABLET, FILM COATED ORAL
Qty: 90 TABLET | Refills: 0 | Status: SHIPPED | OUTPATIENT
Start: 2022-08-15 | End: 2022-10-05 | Stop reason: SDUPTHER

## 2022-08-15 RX ORDER — CETIRIZINE HYDROCHLORIDE 10 MG/1
10 TABLET ORAL DAILY
Qty: 90 TABLET | Refills: 1 | Status: SHIPPED | OUTPATIENT
Start: 2022-08-15 | End: 2022-11-15 | Stop reason: SDUPTHER

## 2022-08-15 RX ADMIN — KETOROLAC TROMETHAMINE 30 MG: 30 INJECTION, SOLUTION INTRAMUSCULAR; INTRAVENOUS at 03:08

## 2022-08-15 RX ADMIN — METHYLPREDNISOLONE ACETATE 40 MG: 40 INJECTION, SUSPENSION INTRA-ARTICULAR; INTRALESIONAL; INTRAMUSCULAR; SOFT TISSUE at 03:08

## 2022-08-15 NOTE — PROGRESS NOTES
Kristine Mehta MD        PATIENT NAME: Niranjan Whittaker Jr.  : 1955  DATE: 8/15/22  MRN: [id number]      Billing Provider: Kristine Mehta MD  Level of Service: [place] OFFICE/OUTPT VISIT, EST, LEVL IV, 30-39 MIN  Patient PCP Information     Provider PCP Type    Kristine Mehta MD General          Reason for Visit / Chief Complaint: Medication Refill and Injections (Depo Medrol and Toradol)       History of Present Illness      Niranjan Whittaker Jr. presents to the clinic with Medication Refill and Injections (Depo Medrol and Toradol)     He needs medication refills, he is doing ok, continues to have lower extremity pain, he has been following with rheumatology and pain management, he is currently awaiting results from a biopsy of his lower extremities     Medication Refill  Associated symptoms include arthralgias, headaches and neck pain. Pertinent negatives include no chest pain, joint swelling, vomiting or weakness.       Review of Systems     Review of Systems   Constitutional: Negative for activity change and unexpected weight change.   HENT: Negative for hearing loss, rhinorrhea and trouble swallowing.    Eyes: Negative for discharge and visual disturbance.   Respiratory: Negative for chest tightness and wheezing.    Cardiovascular: Negative for chest pain and palpitations.   Gastrointestinal: Positive for constipation. Negative for blood in stool, diarrhea and vomiting.   Endocrine: Negative for polydipsia and polyuria.   Genitourinary: Negative for difficulty urinating, hematuria and urgency.   Musculoskeletal: Positive for arthralgias and neck pain. Negative for joint swelling.   Neurological: Positive for headaches. Negative for weakness.   Psychiatric/Behavioral: Negative for confusion and dysphoric mood.       Medical / Social / Family History     Past Medical History:   Diagnosis Date    Adenomatous polyp of transverse colon 2022    Diverticula of colon 2022    FH: total knee  replacement     GERD (gastroesophageal reflux disease)     Headache     History of colon polyps 5/12/2022    Hyperlipidemia     Hypertension     Neuropathy     Screening for colon cancer 5/12/2022    Thyroid disease        Past Surgical History:   Procedure Laterality Date    BACK SURGERY      THYROIDECTOMY N/A 8/27/2021    Procedure: THYROIDECTOMY;  Surgeon: Manish Valadez MD;  Location: [facility];  Service: General;  Laterality: N/A;       Social History    reports that he has never smoked. He has never used smokeless tobacco. He reports previous alcohol use. He reports current drug use. Drug: Hydrocodone.    Family History  's family history includes Heart disease in his mother; Hypertension in his father and mother.    Medications and Allergies     Medications  Outpatient Medications Marked as Taking for the 8/15/22 encounter (Office Visit) with Kristine Mehta MD   Medication Sig Dispense Refill    DULoxetine (CYMBALTA) 60 MG capsule Take 60 mg by mouth once daily.      ferrous sulfate (FEOSOL) 325 mg (65 mg iron) Tab tablet Take 325 mg by mouth daily with breakfast.      folic acid (FOLVITE) 1 MG tablet 1 tablet      HYDROcodone-acetaminophen (NORCO)  mg per tablet Take 1 tablet by mouth every 4 (four) hours as needed for Pain. 15 tablet 0    hydrOXYchloroQUINE (PLAQUENIL) 200 mg tablet Take 200 mg by mouth 2 (two) times a day.      leflunomide (ARAVA) 20 MG Tab Take 1 tablet (20 mg total) by mouth once daily. 90 tablet 1    OXYCONTIN 30 mg TR12 12 hr tablet Take 30 mg by mouth 2 (two) times daily as needed.      predniSONE (DELTASONE) 5 MG tablet Take 5-10 mg by mouth daily as needed.      pregabalin (LYRICA) 150 MG capsule Take 150 mg by mouth 3 (three) times daily.      promethazine (PHENERGAN) 25 MG tablet Take 25 mg by mouth 2 (two) times daily as needed for Nausea. 2 times daily prn headache no more then 2 days in a week      silodosin (RAPAFLO) 8 mg Cap capsule Take  "8 mg by mouth once daily.      sulfaSALAzine (AZULFIDINE) 500 mg Tab Take 500 mg by mouth 2 (two) times daily.      tamsulosin (FLOMAX) 0.4 mg Cap TAKE 1 CAPSULE BY MOUTH EVERY DAY 30 MINS FOLLOWING THE SAME MEAL EVERY  capsule 1    [DISCONTINUED] cetirizine (ZYRTEC) 5 MG tablet TAKE 1 TABLET(5 MG) BY MOUTH EVERY DAY 90 tablet 0    [DISCONTINUED] gabapentin (NEURONTIN) 300 MG capsule       [DISCONTINUED] levothyroxine (SYNTHROID) 125 MCG tablet Take 1 tablet (125 mcg total) by mouth before breakfast. 90 tablet 1    [DISCONTINUED] lisinopriL (PRINIVIL,ZESTRIL) 20 MG tablet Take 1 tablet (20 mg total) by mouth once daily. 90 tablet 1    [DISCONTINUED] loratadine (CLARITIN) 10 mg tablet Take 1 tablet (10 mg total) by mouth once daily. 90 tablet 1    [DISCONTINUED] methocarbamoL (ROBAXIN) 500 MG Tab TAKE 1 TABLET(500 MG) BY MOUTH THREE TIMES DAILY AS NEEDED FOR CRAMPS 90 tablet 0    [DISCONTINUED] montelukast (SINGULAIR) 10 mg tablet Take 1 tablet (10 mg total) by mouth every evening. 90 tablet 3    [DISCONTINUED] omeprazole (PRILOSEC) 40 MG capsule Take 1 capsule (40 mg total) by mouth once daily. 90 capsule 1    [DISCONTINUED] pravastatin (PRAVACHOL) 40 MG tablet Take 1 tablet (40 mg total) by mouth once daily. 90 tablet 1     Current Facility-Administered Medications for the 8/15/22 encounter (Office Visit) with Kristine Mehta MD   Medication Dose Route Frequency Provider Last Rate Last Admin    [COMPLETED] ketorolac injection 30 mg  30 mg Intramuscular 1 time in Clinic/HOD Kristine Mehta MD   30 mg at 08/15/22 1537    [COMPLETED] methylPREDNISolone acetate injection 40 mg  40 mg Intramuscular 1 time in Clinic/HOD Kristine Mehta MD   40 mg at 08/15/22 1538       Allergies  Review of patient's allergies indicates:  No Known Allergies    Physical Examination   /84 (BP Location: Right arm, Patient Position: Sitting, BP Method: Medium (Automatic))   Pulse 98   Resp 20   Ht 6' 2" (1.88 m)   " Wt 98.7 kg (217 lb 9.6 oz)   SpO2 96%   BMI 27.94 kg/m²     Physical Exam  Constitutional:       Appearance: Normal appearance. He is normal weight.   Cardiovascular:      Rate and Rhythm: Normal rate and regular rhythm.   Pulmonary:      Effort: Pulmonary effort is normal.      Breath sounds: Normal breath sounds.   Neurological:      Mental Status: He is alert.      Motor: Weakness present.      Gait: Gait abnormal.   Psychiatric:         Mood and Affect: Mood normal.         Behavior: Behavior normal.         Assessment and Plan (including Health Maintenance)     Plan:         Problem List Items Addressed This Visit        Neuro    Degeneration of lumbar intervertebral disc    Relevant Medications    methocarbamoL (ROBAXIN) 500 MG Tab    ketorolac injection 30 mg (Completed)       ENT    Allergic rhinitis    Relevant Medications    cetirizine (ZYRTEC) 10 MG tablet    montelukast (SINGULAIR) 10 mg tablet    fluticasone propionate (FLONASE) 50 mcg/actuation nasal spray    methylPREDNISolone acetate injection 40 mg (Completed)       Endocrine    Multiple thyroid nodules    Relevant Medications    levothyroxine (SYNTHROID) 125 MCG tablet    Other Relevant Orders    TSH (Completed)       GI    Gastroesophageal reflux disease without esophagitis    Relevant Medications    omeprazole (PRILOSEC) 40 MG capsule      Other Visit Diagnoses     Primary hypertension    -  Primary    Relevant Medications    lisinopriL (PRINIVIL,ZESTRIL) 20 MG tablet    Environmental allergies        Mixed hyperlipidemia        Relevant Medications    pravastatin (PRAVACHOL) 40 MG tablet    Other Relevant Orders    Comprehensive Metabolic Panel (Completed)    CBC Auto Differential (Completed)    Encounter for hepatitis C screening test for low risk patient        Relevant Orders    Hepatitis C Antibody (Completed)            Follow up in about 6 months (around 2/15/2023).        Signature:  Kristine Mehta MD      Date of encounter:  8/15/22

## 2022-08-16 LAB
ALBUMIN SERPL BCP-MCNC: 3.8 G/DL (ref 3.5–5)
ALBUMIN/GLOB SERPL: 1 {RATIO}
ALP SERPL-CCNC: 107 U/L (ref 45–115)
ALT SERPL W P-5'-P-CCNC: 34 U/L (ref 16–61)
ANION GAP SERPL CALCULATED.3IONS-SCNC: 11 MMOL/L (ref 7–16)
AST SERPL W P-5'-P-CCNC: 28 U/L (ref 15–37)
BILIRUB SERPL-MCNC: 0.4 MG/DL (ref 0–1.2)
BUN SERPL-MCNC: 18 MG/DL (ref 7–18)
BUN/CREAT SERPL: 11 (ref 6–20)
CALCIUM SERPL-MCNC: 9.5 MG/DL (ref 8.5–10.1)
CHLORIDE SERPL-SCNC: 103 MMOL/L (ref 98–107)
CO2 SERPL-SCNC: 26 MMOL/L (ref 21–32)
CREAT SERPL-MCNC: 1.65 MG/DL (ref 0.7–1.3)
EGFR (NO RACE VARIABLE) (RUSH/TITUS): 46 ML/MIN/1.73M²
GLOBULIN SER-MCNC: 4 G/DL (ref 2–4)
GLUCOSE SERPL-MCNC: 84 MG/DL (ref 74–106)
HCV AB SER QL: NORMAL
POTASSIUM SERPL-SCNC: 4.2 MMOL/L (ref 3.5–5.1)
PROT SERPL-MCNC: 7.8 G/DL (ref 6.4–8.2)
SODIUM SERPL-SCNC: 136 MMOL/L (ref 136–145)
TSH SERPL DL<=0.005 MIU/L-ACNC: 6.92 UIU/ML (ref 0.36–3.74)

## 2022-08-16 RX ORDER — LEVOTHYROXINE SODIUM 137 UG/1
137 TABLET ORAL
Qty: 90 TABLET | Refills: 1 | Status: SHIPPED | OUTPATIENT
Start: 2022-08-16 | End: 2022-11-15 | Stop reason: SDUPTHER

## 2022-08-17 ENCOUNTER — OFFICE VISIT (OUTPATIENT)
Dept: NEUROLOGY | Facility: CLINIC | Age: 67
End: 2022-08-17
Payer: MEDICARE

## 2022-08-17 VITALS
BODY MASS INDEX: 27.46 KG/M2 | HEIGHT: 74 IN | SYSTOLIC BLOOD PRESSURE: 128 MMHG | WEIGHT: 214 LBS | OXYGEN SATURATION: 99 % | HEART RATE: 90 BPM | DIASTOLIC BLOOD PRESSURE: 82 MMHG

## 2022-08-17 DIAGNOSIS — M54.16 LUMBAR RADICULOPATHY: ICD-10-CM

## 2022-08-17 DIAGNOSIS — M54.12 CERVICAL RADICULOPATHY: Primary | ICD-10-CM

## 2022-08-17 DIAGNOSIS — R20.2 PARESTHESIAS: ICD-10-CM

## 2022-08-17 DIAGNOSIS — G89.4 CHRONIC PAIN SYNDROME: ICD-10-CM

## 2022-08-17 PROCEDURE — 99214 OFFICE O/P EST MOD 30 MIN: CPT | Mod: S$PBB,,, | Performed by: PSYCHIATRY & NEUROLOGY

## 2022-08-17 PROCEDURE — 99215 OFFICE O/P EST HI 40 MIN: CPT | Mod: PBBFAC | Performed by: PSYCHIATRY & NEUROLOGY

## 2022-08-17 PROCEDURE — 99214 PR OFFICE/OUTPT VISIT, EST, LEVL IV, 30-39 MIN: ICD-10-PCS | Mod: S$PBB,,, | Performed by: PSYCHIATRY & NEUROLOGY

## 2022-08-17 NOTE — PATIENT INSTRUCTIONS
Caution w/ polypharmacy and opioid narcotics  Control risk factors   Needs Pain mgmt tx  Cont Lyrica 150 mg bid but consider incr to 225 mg bid   May consider future Mri  C and L spine r/o pathology   Activity as tolerated   F/u 3 months

## 2022-08-17 NOTE — PROGRESS NOTES
Subjective:       Patient ID: Niranjan Whittaker Jr. is a 67 y.o. male     Chief Complaint:    Chief Complaint   Patient presents with    Referral     RF Dr. Hancock Dx neck and back pain with numbness bilateral hands, legs, feet        Allergies:  Patient has no known allergies.    Current Medications:    Outpatient Encounter Medications as of 8/17/2022   Medication Sig Dispense Refill    cetirizine (ZYRTEC) 10 MG tablet Take 1 tablet (10 mg total) by mouth once daily. 90 tablet 1    DULoxetine (CYMBALTA) 60 MG capsule Take 60 mg by mouth once daily.      ferrous sulfate (FEOSOL) 325 mg (65 mg iron) Tab tablet Take 325 mg by mouth daily with breakfast.      fluticasone propionate (FLONASE) 50 mcg/actuation nasal spray 1 spray (50 mcg total) by Each Nostril route once daily. 16 g 3    folic acid (FOLVITE) 1 MG tablet 1 tablet      HYDROcodone-acetaminophen (NORCO)  mg per tablet Take 1 tablet by mouth every 4 (four) hours as needed for Pain. 15 tablet 0    hydrOXYchloroQUINE (PLAQUENIL) 200 mg tablet Take 200 mg by mouth 2 (two) times a day.      leflunomide (ARAVA) 20 MG Tab Take 1 tablet (20 mg total) by mouth once daily. 90 tablet 1    levothyroxine (SYNTHROID) 137 MCG Tab tablet Take 1 tablet (137 mcg total) by mouth before breakfast. 90 tablet 1    lisinopriL (PRINIVIL,ZESTRIL) 20 MG tablet Take 1 tablet (20 mg total) by mouth once daily. 90 tablet 1    methocarbamoL (ROBAXIN) 500 MG Tab TAKE 1 TABLET(500 MG) BY MOUTH THREE TIMES DAILY AS NEEDED FOR CRAMPS 90 tablet 0    montelukast (SINGULAIR) 10 mg tablet Take 1 tablet (10 mg total) by mouth every evening. 90 tablet 3    omeprazole (PRILOSEC) 40 MG capsule Take 1 capsule (40 mg total) by mouth once daily. 90 capsule 1    OXYCONTIN 30 mg TR12 12 hr tablet Take 30 mg by mouth 2 (two) times daily as needed.      pravastatin (PRAVACHOL) 40 MG tablet Take 1 tablet (40 mg total) by mouth once daily. 90 tablet 1    predniSONE (DELTASONE)  5 MG tablet Take 5-10 mg by mouth daily as needed.      pregabalin (LYRICA) 150 MG capsule Take 150 mg by mouth 3 (three) times daily.      promethazine (PHENERGAN) 25 MG tablet Take 25 mg by mouth 2 (two) times daily as needed for Nausea. 2 times daily prn headache no more then 2 days in a week      silodosin (RAPAFLO) 8 mg Cap capsule Take 8 mg by mouth once daily.      sulfaSALAzine (AZULFIDINE) 500 mg Tab Take 500 mg by mouth 2 (two) times daily.      tamsulosin (FLOMAX) 0.4 mg Cap TAKE 1 CAPSULE BY MOUTH EVERY DAY 30 MINS FOLLOWING THE SAME MEAL EVERY  capsule 1    [DISCONTINUED] cetirizine (ZYRTEC) 5 MG tablet TAKE 1 TABLET(5 MG) BY MOUTH EVERY DAY 90 tablet 0    [DISCONTINUED] gabapentin (NEURONTIN) 300 MG capsule       [DISCONTINUED] levothyroxine (SYNTHROID) 125 MCG tablet Take 1 tablet (125 mcg total) by mouth before breakfast. 90 tablet 1    [DISCONTINUED] levothyroxine (SYNTHROID) 125 MCG tablet Take 1 tablet (125 mcg total) by mouth before breakfast. 90 tablet 1    [DISCONTINUED] lisinopriL (PRINIVIL,ZESTRIL) 20 MG tablet Take 1 tablet (20 mg total) by mouth once daily. 90 tablet 1    [DISCONTINUED] loratadine (CLARITIN) 10 mg tablet Take 1 tablet (10 mg total) by mouth once daily. 90 tablet 1    [DISCONTINUED] methocarbamoL (ROBAXIN) 500 MG Tab TAKE 1 TABLET(500 MG) BY MOUTH THREE TIMES DAILY AS NEEDED FOR CRAMPS 90 tablet 0    [DISCONTINUED] montelukast (SINGULAIR) 10 mg tablet Take 1 tablet (10 mg total) by mouth every evening. 90 tablet 3    [DISCONTINUED] omeprazole (PRILOSEC) 40 MG capsule Take 1 capsule (40 mg total) by mouth once daily. 90 capsule 1    [DISCONTINUED] pravastatin (PRAVACHOL) 40 MG tablet Take 1 tablet (40 mg total) by mouth once daily. 90 tablet 1     No facility-administered encounter medications on file as of 8/17/2022.       History of Present Illness  70 yo BM who is unsure why in clinic ? Referral states neck and LBP- he has had chronic pain syndrome  "for many yrs-now on opioid narcotics, skel muscle relaxants and neuropathic pain meds-  also has increased paresthesias of numbness/tingling in feet and hands- now on Lyrical 150 mg bid had been taking Annie 300 mg bid recently taken off- also taking Cymbalta 60 mg bid   Pt has had 6 total spine surgeries- 3 cervical and 3 lumbar thus failed back syndrome and chronic pain syndrome   Pt has also seen Pain mgtm for yrs w/ failed FABRIZIO's and nerve ablations - has done PT/Rehab mult times w/o success   Very little else to offer from Neuro perspective         Past Medical History:   Diagnosis Date    Adenomatous polyp of transverse colon 5/12/2022    Diverticula of colon 5/12/2022    FH: total knee replacement     GERD (gastroesophageal reflux disease)     Headache     History of colon polyps 5/12/2022    Hyperlipidemia     Hypertension     Neuropathy     Screening for colon cancer 5/12/2022    Thyroid disease        Past Surgical History:   Procedure Laterality Date    BACK SURGERY      THYROIDECTOMY N/A 8/27/2021    Procedure: THYROIDECTOMY;  Surgeon: Manish Valadez MD;  Location: [facility];  Service: General;  Laterality: N/A;       Social History  Mr. Whittaker  reports that he has never smoked. He has never used smokeless tobacco. He reports previous alcohol use. He reports current drug use. Drug: Hydrocodone.    Family History  Mr.'s Whittaker family history includes Heart disease in his mother; Hypertension in his father and mother.    Review of Systems  Review of Systems   Musculoskeletal: Positive for back pain, joint pain and neck pain.   Neurological: Positive for tingling, sensory change and focal weakness.   Psychiatric/Behavioral: Positive for depression.   All other systems reviewed and are negative.     Objective:   /82 (BP Location: Left arm, Patient Position: Sitting)   Pulse 90   Ht 6' 2" (1.88 m)   Wt 97.1 kg (214 lb)   SpO2 99%   BMI 27.48 kg/m²    NEUROLOGICAL EXAMINATION: "     MENTAL STATUS   Oriented to person, place, and time.   Level of consciousness: alert  Knowledge: consistent with education.     CRANIAL NERVES   Cranial nerves II through XII intact.     MOTOR EXAM   Muscle bulk: normal  Overall muscle tone: normal    Strength   Strength 5/5 throughout.        Functional weakness secondary to pain     REFLEXES     Reflexes   Reflexes 2+ except as noted.   Right brachioradialis: 1+  Left brachioradialis: 1+  Right biceps: 1+  Left biceps: 1+  Right triceps: 1+  Left triceps: 1+  Right patellar: 1+  Left patellar: 1+  Right achilles: 1+  Left achilles: 1+  Right : 1+  Left : 1+       Brisk DTR's LLE   Absent R patellar      SENSORY EXAM        Paresthesias in all ext's     GAIT AND COORDINATION        Antalgic gait using cane for ambulation        Physical Exam  Vitals reviewed.   Neurological:      Mental Status: He is alert and oriented to person, place, and time. Mental status is at baseline.      Cranial Nerves: Cranial nerves 2-12 are intact.      Motor: Motor strength is normal.      Deep Tendon Reflexes:      Reflex Scores:       Tricep reflexes are 1+ on the right side and 1+ on the left side.       Bicep reflexes are 1+ on the right side and 1+ on the left side.       Brachioradialis reflexes are 1+ on the right side and 1+ on the left side.       Patellar reflexes are 1+ on the right side and 1+ on the left side.       Achilles reflexes are 1+ on the right side and 1+ on the left side.         Assessment:     Cervical radiculopathy    Lumbar radiculopathy    Paresthesias    Chronic pain syndrome         Primary Diagnosis and ICD10  Cervical radiculopathy [M54.12]    Plan:     Patient Instructions   Caution w/ polypharmacy and opioid narcotics  Control risk factors   Needs Pain mgmt tx  Cont Lyrica 150 mg bid but consider incr to 225 mg bid   May consider future Mri  C and L spine r/o pathology   Activity as tolerated   F/u 3 months       There are no discontinued  medications.    Requested Prescriptions      No prescriptions requested or ordered in this encounter

## 2022-08-29 DIAGNOSIS — M79.2 NEURALGIA AND NEURITIS: Primary | ICD-10-CM

## 2022-08-29 RX ORDER — PREGABALIN 225 MG/1
225 CAPSULE ORAL 3 TIMES DAILY
Qty: 270 CAPSULE | Refills: 3 | Status: SHIPPED | OUTPATIENT
Start: 2022-08-29 | End: 2022-11-17 | Stop reason: SDUPTHER

## 2022-08-31 ENCOUNTER — EXTERNAL CHRONIC CARE MANAGEMENT (OUTPATIENT)
Dept: FAMILY MEDICINE | Facility: CLINIC | Age: 67
End: 2022-08-31
Payer: MEDICARE

## 2022-08-31 PROCEDURE — G0511 PR CHRONIC CARE MGMT, RHC OR FQHC ONLY, 20 MINS OR MORE: ICD-10-PCS | Mod: ,,, | Performed by: FAMILY MEDICINE

## 2022-08-31 PROCEDURE — G0511 CCM/BHI BY RHC/FQHC 20MIN MO: HCPCS | Mod: ,,, | Performed by: FAMILY MEDICINE

## 2022-09-30 ENCOUNTER — EXTERNAL CHRONIC CARE MANAGEMENT (OUTPATIENT)
Dept: FAMILY MEDICINE | Facility: CLINIC | Age: 67
End: 2022-09-30
Payer: MEDICARE

## 2022-09-30 PROCEDURE — G0511 PR CHRONIC CARE MGMT, RHC OR FQHC ONLY, 20 MINS OR MORE: ICD-10-PCS | Mod: ,,, | Performed by: FAMILY MEDICINE

## 2022-09-30 PROCEDURE — G0511 CCM/BHI BY RHC/FQHC 20MIN MO: HCPCS | Mod: ,,, | Performed by: FAMILY MEDICINE

## 2022-10-31 ENCOUNTER — EXTERNAL CHRONIC CARE MANAGEMENT (OUTPATIENT)
Dept: FAMILY MEDICINE | Facility: CLINIC | Age: 67
End: 2022-10-31
Payer: MEDICARE

## 2022-10-31 PROCEDURE — G0511 CCM/BHI BY RHC/FQHC 20MIN MO: HCPCS | Mod: ,,, | Performed by: FAMILY MEDICINE

## 2022-10-31 PROCEDURE — G0511 PR CHRONIC CARE MGMT, RHC OR FQHC ONLY, 20 MINS OR MORE: ICD-10-PCS | Mod: ,,, | Performed by: FAMILY MEDICINE

## 2022-11-15 ENCOUNTER — OFFICE VISIT (OUTPATIENT)
Dept: FAMILY MEDICINE | Facility: CLINIC | Age: 67
End: 2022-11-15
Payer: MEDICARE

## 2022-11-15 VITALS
RESPIRATION RATE: 18 BRPM | TEMPERATURE: 99 F | OXYGEN SATURATION: 96 % | DIASTOLIC BLOOD PRESSURE: 80 MMHG | BODY MASS INDEX: 29.57 KG/M2 | SYSTOLIC BLOOD PRESSURE: 120 MMHG | HEIGHT: 74 IN | WEIGHT: 230.38 LBS | HEART RATE: 95 BPM

## 2022-11-15 DIAGNOSIS — I10 PRIMARY HYPERTENSION: Primary | ICD-10-CM

## 2022-11-15 DIAGNOSIS — K21.9 GASTROESOPHAGEAL REFLUX DISEASE WITHOUT ESOPHAGITIS: ICD-10-CM

## 2022-11-15 DIAGNOSIS — E04.2 MULTIPLE THYROID NODULES: ICD-10-CM

## 2022-11-15 DIAGNOSIS — E03.9 HYPOTHYROIDISM, UNSPECIFIED: ICD-10-CM

## 2022-11-15 DIAGNOSIS — E78.2 MIXED HYPERLIPIDEMIA: ICD-10-CM

## 2022-11-15 DIAGNOSIS — J30.9 ALLERGIC RHINITIS, UNSPECIFIED SEASONALITY, UNSPECIFIED TRIGGER: ICD-10-CM

## 2022-11-15 DIAGNOSIS — Z23 FLU VACCINE NEED: ICD-10-CM

## 2022-11-15 DIAGNOSIS — Z23 NEED FOR PNEUMOCOCCAL VACCINE: ICD-10-CM

## 2022-11-15 DIAGNOSIS — M51.36 DEGENERATION OF LUMBAR INTERVERTEBRAL DISC: ICD-10-CM

## 2022-11-15 LAB
ALBUMIN SERPL BCP-MCNC: 3.7 G/DL (ref 3.5–5)
ALBUMIN/GLOB SERPL: 1 {RATIO}
ALP SERPL-CCNC: 87 U/L (ref 45–115)
ALT SERPL W P-5'-P-CCNC: 39 U/L (ref 16–61)
ANION GAP SERPL CALCULATED.3IONS-SCNC: 9 MMOL/L (ref 7–16)
AST SERPL W P-5'-P-CCNC: 27 U/L (ref 15–37)
BASOPHILS # BLD AUTO: 0.07 K/UL (ref 0–0.2)
BASOPHILS NFR BLD AUTO: 1.1 % (ref 0–1)
BILIRUB SERPL-MCNC: 0.3 MG/DL (ref ?–1.2)
BUN SERPL-MCNC: 28 MG/DL (ref 7–18)
BUN/CREAT SERPL: 17 (ref 6–20)
CALCIUM SERPL-MCNC: 8.7 MG/DL (ref 8.5–10.1)
CHLORIDE SERPL-SCNC: 105 MMOL/L (ref 98–107)
CHOLEST SERPL-MCNC: 195 MG/DL (ref 0–200)
CHOLEST/HDLC SERPL: 3.4 {RATIO}
CO2 SERPL-SCNC: 28 MMOL/L (ref 21–32)
CREAT SERPL-MCNC: 1.63 MG/DL (ref 0.7–1.3)
DIFFERENTIAL METHOD BLD: ABNORMAL
EGFR (NO RACE VARIABLE) (RUSH/TITUS): 46 ML/MIN/1.73M²
EOSINOPHIL # BLD AUTO: 0.08 K/UL (ref 0–0.5)
EOSINOPHIL NFR BLD AUTO: 1.3 % (ref 1–4)
ERYTHROCYTE [DISTWIDTH] IN BLOOD BY AUTOMATED COUNT: 13.8 % (ref 11.5–14.5)
GLOBULIN SER-MCNC: 3.8 G/DL (ref 2–4)
GLUCOSE SERPL-MCNC: 106 MG/DL (ref 74–106)
HCT VFR BLD AUTO: 40.9 % (ref 40–54)
HDLC SERPL-MCNC: 58 MG/DL (ref 40–60)
HGB BLD-MCNC: 12.9 G/DL (ref 13.5–18)
IMM GRANULOCYTES # BLD AUTO: 0.03 K/UL (ref 0–0.04)
IMM GRANULOCYTES NFR BLD: 0.5 % (ref 0–0.4)
LDLC SERPL CALC-MCNC: 103 MG/DL
LDLC/HDLC SERPL: 1.8 {RATIO}
LYMPHOCYTES # BLD AUTO: 1.14 K/UL (ref 1–4.8)
LYMPHOCYTES NFR BLD AUTO: 18.4 % (ref 27–41)
MCH RBC QN AUTO: 30.2 PG (ref 27–31)
MCHC RBC AUTO-ENTMCNC: 31.5 G/DL (ref 32–36)
MCV RBC AUTO: 95.8 FL (ref 80–96)
MONOCYTES # BLD AUTO: 0.45 K/UL (ref 0–0.8)
MONOCYTES NFR BLD AUTO: 7.3 % (ref 2–6)
MPC BLD CALC-MCNC: 10.8 FL (ref 9.4–12.4)
NEUTROPHILS # BLD AUTO: 4.43 K/UL (ref 1.8–7.7)
NEUTROPHILS NFR BLD AUTO: 71.4 % (ref 53–65)
NONHDLC SERPL-MCNC: 137 MG/DL
NRBC # BLD AUTO: 0 X10E3/UL
NRBC, AUTO (.00): 0 %
PLATELET # BLD AUTO: 359 K/UL (ref 150–400)
POTASSIUM SERPL-SCNC: 4.6 MMOL/L (ref 3.5–5.1)
PROT SERPL-MCNC: 7.5 G/DL (ref 6.4–8.2)
RBC # BLD AUTO: 4.27 M/UL (ref 4.6–6.2)
SODIUM SERPL-SCNC: 137 MMOL/L (ref 136–145)
TRIGL SERPL-MCNC: 172 MG/DL (ref 35–150)
TSH SERPL DL<=0.005 MIU/L-ACNC: 3.71 UIU/ML (ref 0.36–3.74)
VLDLC SERPL-MCNC: 34 MG/DL
WBC # BLD AUTO: 6.2 K/UL (ref 4.5–11)

## 2022-11-15 PROCEDURE — 90732 PPSV23 VACC 2 YRS+ SUBQ/IM: CPT | Mod: ,,, | Performed by: FAMILY MEDICINE

## 2022-11-15 PROCEDURE — G0008 FLU VACCINE - QUADRIVALENT - ADJUVANTED: ICD-10-PCS | Mod: ,,, | Performed by: FAMILY MEDICINE

## 2022-11-15 PROCEDURE — 96372 THER/PROPH/DIAG INJ SC/IM: CPT | Mod: ,,, | Performed by: FAMILY MEDICINE

## 2022-11-15 PROCEDURE — 80061 LIPID PANEL: ICD-10-PCS | Mod: ,,, | Performed by: CLINICAL MEDICAL LABORATORY

## 2022-11-15 PROCEDURE — 80061 LIPID PANEL: CPT | Mod: ,,, | Performed by: CLINICAL MEDICAL LABORATORY

## 2022-11-15 PROCEDURE — 84443 TSH: ICD-10-PCS | Mod: ,,, | Performed by: CLINICAL MEDICAL LABORATORY

## 2022-11-15 PROCEDURE — G0009 ADMIN PNEUMOCOCCAL VACCINE: HCPCS | Mod: ,,, | Performed by: FAMILY MEDICINE

## 2022-11-15 PROCEDURE — 84443 ASSAY THYROID STIM HORMONE: CPT | Mod: ,,, | Performed by: CLINICAL MEDICAL LABORATORY

## 2022-11-15 PROCEDURE — 85025 CBC WITH DIFFERENTIAL: ICD-10-PCS | Mod: ,,, | Performed by: CLINICAL MEDICAL LABORATORY

## 2022-11-15 PROCEDURE — 96372 PR INJECTION,THERAP/PROPH/DIAG2ST, IM OR SUBCUT: ICD-10-PCS | Mod: ,,, | Performed by: FAMILY MEDICINE

## 2022-11-15 PROCEDURE — 85025 COMPLETE CBC W/AUTO DIFF WBC: CPT | Mod: ,,, | Performed by: CLINICAL MEDICAL LABORATORY

## 2022-11-15 PROCEDURE — G0008 ADMIN INFLUENZA VIRUS VAC: HCPCS | Mod: ,,, | Performed by: FAMILY MEDICINE

## 2022-11-15 PROCEDURE — G0009 PNEUMOCOCCAL POLYSACCHARIDE VACCINE 23-VALENT =>2YO SQ IM: ICD-10-PCS | Mod: ,,, | Performed by: FAMILY MEDICINE

## 2022-11-15 PROCEDURE — 80053 COMPREHENSIVE METABOLIC PANEL: ICD-10-PCS | Mod: ,,, | Performed by: CLINICAL MEDICAL LABORATORY

## 2022-11-15 PROCEDURE — 99214 OFFICE O/P EST MOD 30 MIN: CPT | Mod: ,,, | Performed by: FAMILY MEDICINE

## 2022-11-15 PROCEDURE — 80053 COMPREHEN METABOLIC PANEL: CPT | Mod: ,,, | Performed by: CLINICAL MEDICAL LABORATORY

## 2022-11-15 PROCEDURE — 90694 FLU VACCINE - QUADRIVALENT - ADJUVANTED: ICD-10-PCS | Mod: ,,, | Performed by: FAMILY MEDICINE

## 2022-11-15 PROCEDURE — 90694 VACC AIIV4 NO PRSRV 0.5ML IM: CPT | Mod: ,,, | Performed by: FAMILY MEDICINE

## 2022-11-15 PROCEDURE — 99214 PR OFFICE/OUTPT VISIT, EST, LEVL IV, 30-39 MIN: ICD-10-PCS | Mod: ,,, | Performed by: FAMILY MEDICINE

## 2022-11-15 PROCEDURE — 90732 PNEUMOCOCCAL POLYSACCHARIDE VACCINE 23-VALENT =>2YO SQ IM: ICD-10-PCS | Mod: ,,, | Performed by: FAMILY MEDICINE

## 2022-11-15 RX ORDER — CETIRIZINE HYDROCHLORIDE 10 MG/1
10 TABLET ORAL DAILY
Qty: 90 TABLET | Refills: 1 | Status: SHIPPED | OUTPATIENT
Start: 2022-11-15 | End: 2023-04-17 | Stop reason: SDUPTHER

## 2022-11-15 RX ORDER — LEVOTHYROXINE SODIUM 137 UG/1
137 TABLET ORAL
Qty: 90 TABLET | Refills: 1 | Status: SHIPPED | OUTPATIENT
Start: 2022-11-15 | End: 2023-04-17 | Stop reason: SDUPTHER

## 2022-11-15 RX ORDER — LISINOPRIL 20 MG/1
20 TABLET ORAL DAILY
Qty: 90 TABLET | Refills: 1 | Status: SHIPPED | OUTPATIENT
Start: 2022-11-15 | End: 2023-04-17 | Stop reason: SDUPTHER

## 2022-11-15 RX ORDER — PRAVASTATIN SODIUM 40 MG/1
40 TABLET ORAL DAILY
Qty: 90 TABLET | Refills: 1 | Status: SHIPPED | OUTPATIENT
Start: 2022-11-15 | End: 2023-04-17 | Stop reason: SDUPTHER

## 2022-11-15 RX ORDER — KETOROLAC TROMETHAMINE 30 MG/ML
30 INJECTION, SOLUTION INTRAMUSCULAR; INTRAVENOUS
Status: COMPLETED | OUTPATIENT
Start: 2022-11-15 | End: 2022-11-15

## 2022-11-15 RX ORDER — ANASTROZOLE 1 MG/1
2 TABLET ORAL
COMMUNITY
Start: 2022-09-29 | End: 2023-04-17 | Stop reason: SDUPTHER

## 2022-11-15 RX ORDER — LORATADINE 10 MG/1
10 TABLET ORAL DAILY
COMMUNITY
Start: 2022-11-01 | End: 2022-11-15 | Stop reason: SDUPTHER

## 2022-11-15 RX ORDER — OMEPRAZOLE 40 MG/1
40 CAPSULE, DELAYED RELEASE ORAL DAILY
Qty: 90 CAPSULE | Refills: 1 | Status: SHIPPED | OUTPATIENT
Start: 2022-11-15 | End: 2023-04-17 | Stop reason: SDUPTHER

## 2022-11-15 RX ORDER — MONTELUKAST SODIUM 10 MG/1
10 TABLET ORAL NIGHTLY
Qty: 90 TABLET | Refills: 3 | Status: SHIPPED | OUTPATIENT
Start: 2022-11-15 | End: 2023-04-17 | Stop reason: SDUPTHER

## 2022-11-15 RX ORDER — METHOCARBAMOL 500 MG/1
500 TABLET, FILM COATED ORAL 3 TIMES DAILY PRN
Qty: 90 TABLET | Refills: 1 | Status: SHIPPED | OUTPATIENT
Start: 2022-11-15 | End: 2023-04-17 | Stop reason: SDUPTHER

## 2022-11-15 RX ORDER — LORATADINE 10 MG/1
10 TABLET ORAL DAILY
Qty: 90 TABLET | Refills: 1 | Status: SHIPPED | OUTPATIENT
Start: 2022-11-15 | End: 2023-04-17

## 2022-11-15 RX ADMIN — KETOROLAC TROMETHAMINE 30 MG: 30 INJECTION, SOLUTION INTRAMUSCULAR; INTRAVENOUS at 03:11

## 2022-11-17 ENCOUNTER — OFFICE VISIT (OUTPATIENT)
Dept: NEUROLOGY | Facility: CLINIC | Age: 67
End: 2022-11-17
Payer: MEDICARE

## 2022-11-17 VITALS
HEART RATE: 102 BPM | WEIGHT: 231.63 LBS | OXYGEN SATURATION: 94 % | DIASTOLIC BLOOD PRESSURE: 88 MMHG | BODY MASS INDEX: 29.73 KG/M2 | SYSTOLIC BLOOD PRESSURE: 150 MMHG | HEIGHT: 74 IN

## 2022-11-17 DIAGNOSIS — M79.2 NEURALGIA AND NEURITIS: ICD-10-CM

## 2022-11-17 DIAGNOSIS — G89.4 CHRONIC PAIN SYNDROME: Primary | ICD-10-CM

## 2022-11-17 PROCEDURE — 99214 OFFICE O/P EST MOD 30 MIN: CPT | Mod: S$PBB,,, | Performed by: PSYCHIATRY & NEUROLOGY

## 2022-11-17 PROCEDURE — 99215 OFFICE O/P EST HI 40 MIN: CPT | Mod: PBBFAC | Performed by: PSYCHIATRY & NEUROLOGY

## 2022-11-17 PROCEDURE — 99214 PR OFFICE/OUTPT VISIT, EST, LEVL IV, 30-39 MIN: ICD-10-PCS | Mod: S$PBB,,, | Performed by: PSYCHIATRY & NEUROLOGY

## 2022-11-17 RX ORDER — PREGABALIN 225 MG/1
225 CAPSULE ORAL 3 TIMES DAILY
Qty: 270 CAPSULE | Refills: 3 | Status: SHIPPED | OUTPATIENT
Start: 2022-11-17 | End: 2023-04-17 | Stop reason: SDUPTHER

## 2022-11-17 NOTE — PROGRESS NOTES
Subjective:       Patient ID: Niranjan Whittaker Jr. is a 67 y.o. male     Chief Complaint:    Chief Complaint   Patient presents with    Follow-up        Allergies:  Patient has no known allergies.    Current Medications:    Outpatient Encounter Medications as of 11/17/2022   Medication Sig Dispense Refill    anastrozole (ARIMIDEX) 1 mg Tab Take 2 mg by mouth every 7 days.      cetirizine (ZYRTEC) 10 MG tablet Take 1 tablet (10 mg total) by mouth once daily. 90 tablet 1    DULoxetine (CYMBALTA) 60 MG capsule Take 60 mg by mouth once daily.      ferrous sulfate (FEOSOL) 325 mg (65 mg iron) Tab tablet Take 325 mg by mouth daily with breakfast.      fluticasone propionate (FLONASE) 50 mcg/actuation nasal spray 1 spray (50 mcg total) by Each Nostril route once daily. 16 g 3    folic acid (FOLVITE) 1 MG tablet 1 tablet      HYDROcodone-acetaminophen (NORCO)  mg per tablet Take 1 tablet by mouth every 4 (four) hours as needed for Pain. 15 tablet 0    hydrOXYchloroQUINE (PLAQUENIL) 200 mg tablet Take 200 mg by mouth 2 (two) times a day.      levothyroxine (SYNTHROID) 137 MCG Tab tablet Take 1 tablet (137 mcg total) by mouth before breakfast. 90 tablet 1    lisinopriL (PRINIVIL,ZESTRIL) 20 MG tablet Take 1 tablet (20 mg total) by mouth once daily. 90 tablet 1    loratadine (CLARITIN) 10 mg tablet Take 1 tablet (10 mg total) by mouth once daily. 90 tablet 1    methocarbamoL (ROBAXIN) 500 MG Tab Take 1 tablet (500 mg total) by mouth 3 (three) times daily as needed (muscle spasm). 90 tablet 1    montelukast (SINGULAIR) 10 mg tablet Take 1 tablet (10 mg total) by mouth every evening. 90 tablet 3    omeprazole (PRILOSEC) 40 MG capsule Take 1 capsule (40 mg total) by mouth once daily. 90 capsule 1    OXYCONTIN 30 mg TR12 12 hr tablet Take 30 mg by mouth 2 (two) times daily as needed.      pravastatin (PRAVACHOL) 40 MG tablet Take 1 tablet (40 mg total) by mouth once daily. 90 tablet 1    predniSONE (DELTASONE) 5 MG  tablet Take 5-10 mg by mouth daily as needed.      pregabalin (LYRICA) 225 MG Cap Take 1 capsule (225 mg total) by mouth 3 (three) times daily. 270 capsule 3    promethazine (PHENERGAN) 25 MG tablet Take 25 mg by mouth 2 (two) times daily as needed for Nausea. 2 times daily prn headache no more then 2 days in a week      silodosin (RAPAFLO) 8 mg Cap capsule Take 8 mg by mouth once daily.      sulfaSALAzine (AZULFIDINE) 500 mg Tab Take 500 mg by mouth 2 (two) times daily.      tamsulosin (FLOMAX) 0.4 mg Cap TAKE 1 CAPSULE BY MOUTH EVERY DAY 30 MINS FOLLOWING THE SAME MEAL EVERY  capsule 1    leflunomide (ARAVA) 20 MG Tab Take 1 tablet (20 mg total) by mouth once daily. 90 tablet 1    [DISCONTINUED] cetirizine (ZYRTEC) 10 MG tablet Take 1 tablet (10 mg total) by mouth once daily. 90 tablet 1    [DISCONTINUED] levothyroxine (SYNTHROID) 137 MCG Tab tablet Take 1 tablet (137 mcg total) by mouth before breakfast. 90 tablet 1    [DISCONTINUED] lisinopriL (PRINIVIL,ZESTRIL) 20 MG tablet Take 1 tablet (20 mg total) by mouth once daily. 90 tablet 1    [DISCONTINUED] methocarbamoL (ROBAXIN) 500 MG Tab TAKE 1 TABLET(500 MG) BY MOUTH THREE TIMES DAILY AS NEEDED FOR CRAMPS 90 tablet 0    [DISCONTINUED] montelukast (SINGULAIR) 10 mg tablet Take 1 tablet (10 mg total) by mouth every evening. 90 tablet 3    [DISCONTINUED] omeprazole (PRILOSEC) 40 MG capsule Take 1 capsule (40 mg total) by mouth once daily. 90 capsule 1    [DISCONTINUED] pravastatin (PRAVACHOL) 40 MG tablet Take 1 tablet (40 mg total) by mouth once daily. 90 tablet 1     No facility-administered encounter medications on file as of 11/17/2022.       History of Present Illness  66 yo BM w/ chronic pain syndrome and mult prev spinal surgeries and FABRIZIO's and still in pain mgmt but only getting opioid narcotics - failed ablations and FABRIZIO's   He has failed Gi max dose and still on Cymbalta   Only our recent incr dose of Lyrica 225 mg tid has helped his sx    "    Past Medical History:   Diagnosis Date    Adenomatous polyp of transverse colon 5/12/2022    Diverticula of colon 5/12/2022    FH: total knee replacement     GERD (gastroesophageal reflux disease)     Headache     History of colon polyps 5/12/2022    Hyperlipidemia     Hypertension     Neuropathy     Screening for colon cancer 5/12/2022    Thyroid disease        Past Surgical History:   Procedure Laterality Date    BACK SURGERY      THYROIDECTOMY N/A 8/27/2021    Procedure: THYROIDECTOMY;  Surgeon: Manish Valadez MD;  Location: [facility];  Service: General;  Laterality: N/A;       Social History  Mr. Whittaker  reports that he has never smoked. He has never used smokeless tobacco. He reports that he does not currently use alcohol. He reports current drug use. Drug: Hydrocodone.    Family History  Mr.'s Whittaker family history includes Heart disease in his mother; Hypertension in his father and mother.    Review of Systems  Review of Systems   Musculoskeletal:  Positive for back pain, joint pain and neck pain.   Neurological:  Positive for tingling and sensory change.   Psychiatric/Behavioral:  Positive for depression.    All other systems reviewed and are negative.   Objective:   BP (!) 150/88 (BP Location: Right arm, Patient Position: Sitting, BP Method: Large (Manual))   Pulse 102   Ht 6' 2" (1.88 m)   Wt 105.1 kg (231 lb 9.6 oz)   SpO2 (!) 94%   BMI 29.74 kg/m²    NEUROLOGICAL EXAMINATION:     MENTAL STATUS   Oriented to person, place, and time.   Level of consciousness: alert  Knowledge: good.     CRANIAL NERVES   Cranial nerves II through XII intact.     MOTOR EXAM     Strength   Strength 5/5 throughout.     SENSORY EXAM        Paresthesias in all ext's      GAIT AND COORDINATION        Antalgic gait       Physical Exam  Vitals reviewed.   Neurological:      Mental Status: He is alert and oriented to person, place, and time. Mental status is at baseline.      Cranial Nerves: Cranial nerves 2-12 are " intact.      Motor: Motor strength is normal.        Assessment:     Chronic pain syndrome       Primary Diagnosis and ICD10  Chronic pain syndrome [G89.4]    Plan:     Patient Instructions   Cont Pain mgmt  Caution w/ opioid narcotics   Cont Lyrica at current dosage  Nohting else to offer from Neuro perspective  F/u prn    There are no discontinued medications.    Requested Prescriptions      No prescriptions requested or ordered in this encounter

## 2022-11-17 NOTE — PATIENT INSTRUCTIONS
Cont Pain mgmt  Caution w/ opioid narcotics   Cont Lyrica at current dosage  Nohting else to offer from Neuro perspective  F/u prn

## 2022-11-30 ENCOUNTER — EXTERNAL CHRONIC CARE MANAGEMENT (OUTPATIENT)
Dept: FAMILY MEDICINE | Facility: CLINIC | Age: 67
End: 2022-11-30
Payer: MEDICARE

## 2022-11-30 PROCEDURE — G0511 PR CHRONIC CARE MGMT, RHC OR FQHC ONLY, 20 MINS OR MORE: ICD-10-PCS | Mod: ,,, | Performed by: FAMILY MEDICINE

## 2022-11-30 PROCEDURE — G0511 CCM/BHI BY RHC/FQHC 20MIN MO: HCPCS | Mod: ,,, | Performed by: FAMILY MEDICINE

## 2022-12-02 ENCOUNTER — OFFICE VISIT (OUTPATIENT)
Dept: GASTROENTEROLOGY | Facility: CLINIC | Age: 67
End: 2022-12-02
Payer: MEDICARE

## 2022-12-02 VITALS
SYSTOLIC BLOOD PRESSURE: 128 MMHG | WEIGHT: 234.63 LBS | HEART RATE: 112 BPM | BODY MASS INDEX: 30.11 KG/M2 | DIASTOLIC BLOOD PRESSURE: 79 MMHG | OXYGEN SATURATION: 92 % | HEIGHT: 74 IN

## 2022-12-02 DIAGNOSIS — K21.9 GASTROESOPHAGEAL REFLUX DISEASE WITHOUT ESOPHAGITIS: Primary | ICD-10-CM

## 2022-12-02 DIAGNOSIS — K59.09 CHRONIC CONSTIPATION: ICD-10-CM

## 2022-12-02 PROCEDURE — 99214 PR OFFICE/OUTPT VISIT, EST, LEVL IV, 30-39 MIN: ICD-10-PCS | Mod: ,,, | Performed by: NURSE PRACTITIONER

## 2022-12-02 PROCEDURE — 99214 OFFICE O/P EST MOD 30 MIN: CPT | Mod: ,,, | Performed by: NURSE PRACTITIONER

## 2022-12-02 NOTE — PATIENT INSTRUCTIONS
Avoid spicy, greasy foods  Avoid caffeine, citric acid, chocolate, peppermint, and carbonated drinks  Do not lay down within 3 hours of eating  Exercise 150 minutes per week  Increase fluid to 64 ounces daily  Avoid antiinflammatory medications such as motrin, advil, aleve, ibuprofen, and BC powder

## 2022-12-02 NOTE — PROGRESS NOTES
Niranjan Whittaker Jr. is a 67 y.o. male here for Follow-up        PCP: Kristine Mehta  Referring Provider: No referring provider defined for this encounter.     HPI:  Presents for follow up GERD and constipation. Reports that reflux symptoms have increased. He is taking Omeprazole 40 mg daily without relief. No recent EGD. Reports weight gain. Appetite is okay. No hematochezia. He currently taking oral iron. Colonoscopy 5/12/22, tubular adenoma. Constipation is improved. He is currently taking Miralax. He is scheduled to see PCM for weight gain and edema in lower extremities. Sept labs reviewed Hgb is 12.9.     Follow-up  Pertinent negatives include no abdominal pain, change in bowel habit, chest pain, coughing, fatigue, fever, nausea or vomiting.       ROS:  Review of Systems   Constitutional:  Positive for unexpected weight change. Negative for activity change, fatigue and fever.   HENT:  Negative for trouble swallowing.    Respiratory:  Negative for cough.    Cardiovascular:  Positive for leg swelling. Negative for chest pain.   Gastrointestinal:  Positive for constipation and reflux. Negative for abdominal distention, abdominal pain, blood in stool, change in bowel habit, diarrhea, nausea, vomiting and change in bowel habit.   Musculoskeletal:  Negative for gait problem.   Integumentary:  Negative for color change.   Neurological:  Negative for dizziness.   Psychiatric/Behavioral:  Negative for sleep disturbance. The patient is not nervous/anxious.         PMHX:  has a past medical history of Adenomatous polyp of transverse colon (5/12/2022), Diverticula of colon (5/12/2022), FH: total knee replacement, GERD (gastroesophageal reflux disease), Headache, History of colon polyps (5/12/2022), Hyperlipidemia, Hypertension, Neuropathy, Screening for colon cancer (5/12/2022), and Thyroid disease.    PSHX:  has a past surgical history that includes Back surgery and Thyroidectomy (N/A, 8/27/2021).    PFHX: family  history includes Heart disease in his mother; Hypertension in his father and mother.    PSlHX:  reports that he has never smoked. He has never used smokeless tobacco. He reports that he does not currently use alcohol. He reports current drug use. Drug: Hydrocodone.        Review of patient's allergies indicates:  No Known Allergies    Medication List with Changes/Refills   Current Medications    ANASTROZOLE (ARIMIDEX) 1 MG TAB    Take 2 mg by mouth every 7 days.    CETIRIZINE (ZYRTEC) 10 MG TABLET    Take 1 tablet (10 mg total) by mouth once daily.    DULOXETINE (CYMBALTA) 60 MG CAPSULE    Take 60 mg by mouth once daily.    FERROUS SULFATE (FEOSOL) 325 MG (65 MG IRON) TAB TABLET    Take 325 mg by mouth daily with breakfast.    FLUTICASONE PROPIONATE (FLONASE) 50 MCG/ACTUATION NASAL SPRAY    1 spray (50 mcg total) by Each Nostril route once daily.    FOLIC ACID (FOLVITE) 1 MG TABLET    1 tablet    HYDROCODONE-ACETAMINOPHEN (NORCO)  MG PER TABLET    Take 1 tablet by mouth every 4 (four) hours as needed for Pain.    HYDROXYCHLOROQUINE (PLAQUENIL) 200 MG TABLET    Take 200 mg by mouth 2 (two) times a day.    LEFLUNOMIDE (ARAVA) 20 MG TAB    Take 1 tablet (20 mg total) by mouth once daily.    LEVOTHYROXINE (SYNTHROID) 137 MCG TAB TABLET    Take 1 tablet (137 mcg total) by mouth before breakfast.    LISINOPRIL (PRINIVIL,ZESTRIL) 20 MG TABLET    Take 1 tablet (20 mg total) by mouth once daily.    LORATADINE (CLARITIN) 10 MG TABLET    Take 1 tablet (10 mg total) by mouth once daily.    METHOCARBAMOL (ROBAXIN) 500 MG TAB    Take 1 tablet (500 mg total) by mouth 3 (three) times daily as needed (muscle spasm).    MONTELUKAST (SINGULAIR) 10 MG TABLET    Take 1 tablet (10 mg total) by mouth every evening.    OMEPRAZOLE (PRILOSEC) 40 MG CAPSULE    Take 1 capsule (40 mg total) by mouth once daily.    OXYCONTIN 30 MG TR12 12 HR TABLET    Take 30 mg by mouth 2 (two) times daily as needed.    PRAVASTATIN (PRAVACHOL) 40 MG  "TABLET    Take 1 tablet (40 mg total) by mouth once daily.    PREDNISONE (DELTASONE) 5 MG TABLET    Take 5-10 mg by mouth daily as needed.    PREGABALIN (LYRICA) 225 MG CAP    Take 1 capsule (225 mg total) by mouth 3 (three) times daily.    PROMETHAZINE (PHENERGAN) 25 MG TABLET    Take 25 mg by mouth 2 (two) times daily as needed for Nausea. 2 times daily prn headache no more then 2 days in a week    SILODOSIN (RAPAFLO) 8 MG CAP CAPSULE    Take 8 mg by mouth once daily.    SULFASALAZINE (AZULFIDINE) 500 MG TAB    Take 500 mg by mouth 2 (two) times daily.    TAMSULOSIN (FLOMAX) 0.4 MG CAP    TAKE 1 CAPSULE BY MOUTH EVERY DAY 30 MINS FOLLOWING THE SAME MEAL EVERY DAY        Objective Findings:  Vital Signs:  /79   Pulse (!) 112   Ht 6' 2" (1.88 m)   Wt 106.4 kg (234 lb 9.6 oz)   SpO2 (!) 92%   BMI 30.12 kg/m²  Body mass index is 30.12 kg/m².    Physical Exam:  Physical Exam  Vitals and nursing note reviewed.   Constitutional:       General: He is not in acute distress.     Appearance: Normal appearance.   HENT:      Mouth/Throat:      Mouth: Mucous membranes are moist.   Cardiovascular:      Rate and Rhythm: Tachycardia present.   Pulmonary:      Effort: Pulmonary effort is normal.      Breath sounds: No wheezing, rhonchi or rales.   Abdominal:      General: Bowel sounds are normal. There is no distension.      Palpations: Abdomen is soft. There is no mass.      Tenderness: There is no abdominal tenderness. There is no guarding or rebound.      Hernia: No hernia is present.   Skin:     General: Skin is warm and dry.      Coloration: Skin is not jaundiced or pale.   Neurological:      Mental Status: He is alert and oriented to person, place, and time.   Psychiatric:         Mood and Affect: Mood normal.        Labs:  Lab Results   Component Value Date    WBC 6.20 11/15/2022    HGB 12.9 (L) 11/15/2022    HCT 40.9 11/15/2022    MCV 95.8 11/15/2022    RDW 13.8 11/15/2022     11/15/2022    LYMPH 18.4 (L) " 11/15/2022    LYMPH 1.14 11/15/2022    MONO 7.3 (H) 11/15/2022    EOS 0.08 11/15/2022    BASO 0.07 11/15/2022     Lab Results   Component Value Date     11/15/2022    K 4.6 11/15/2022     11/15/2022    CO2 28 11/15/2022     11/15/2022    BUN 28 (H) 11/15/2022    CREATININE 1.63 (H) 11/15/2022    CALCIUM 8.7 11/15/2022    PROT 7.5 11/15/2022    ALBUMIN 3.7 11/15/2022    BILITOT 0.3 11/15/2022    ALKPHOS 87 11/15/2022    AST 27 11/15/2022    ALT 39 11/15/2022         Imaging: No results found.      Assessment:  Niranjan Whittaker Jr. is a 67 y.o. male here with:  1. Gastroesophageal reflux disease without esophagitis    2. Chronic constipation          Recommendations:  1. Schedule EGD  2. Continue Omeprazole  3. Avoid spicy, greasy foods  Avoid caffeine, citric acid, chocolate, peppermint, and carbonated drinks  Do not lay down within 3 hours of eating  Increase fluid to 64 ounces daily  Avoid antiinflammatory medications such as motrin, advil, aleve, ibuprofen, and BC powder  4. Miralax powder 17 grams in 8 ounces of water      Follow up in about 3 months (around 3/2/2023).      Order summary:  Orders Placed This Encounter    EGD       Thank you for allowing me to participate in the care of Niranjan Whittaker Jr..      JJ Menjivar

## 2022-12-05 ENCOUNTER — HOSPITAL ENCOUNTER (OUTPATIENT)
Dept: GASTROENTEROLOGY | Facility: HOSPITAL | Age: 67
Discharge: HOME | End: 2022-12-05
Attending: NURSE PRACTITIONER
Payer: MEDICARE

## 2022-12-05 ENCOUNTER — ANESTHESIA EVENT (OUTPATIENT)
Dept: GASTROENTEROLOGY | Facility: HOSPITAL | Age: 67
End: 2022-12-05
Payer: MEDICARE

## 2022-12-05 ENCOUNTER — ANESTHESIA (OUTPATIENT)
Dept: GASTROENTEROLOGY | Facility: HOSPITAL | Age: 67
End: 2022-12-05
Payer: MEDICARE

## 2022-12-05 VITALS
RESPIRATION RATE: 14 BRPM | HEART RATE: 79 BPM | OXYGEN SATURATION: 93 % | SYSTOLIC BLOOD PRESSURE: 100 MMHG | HEIGHT: 74 IN | WEIGHT: 235 LBS | DIASTOLIC BLOOD PRESSURE: 57 MMHG | BODY MASS INDEX: 30.16 KG/M2 | TEMPERATURE: 98 F

## 2022-12-05 DIAGNOSIS — K21.9 GASTROESOPHAGEAL REFLUX DISEASE WITHOUT ESOPHAGITIS: ICD-10-CM

## 2022-12-05 DIAGNOSIS — R10.13 DYSPEPSIA: ICD-10-CM

## 2022-12-05 DIAGNOSIS — K29.00 ACUTE SUPERFICIAL GASTRITIS WITHOUT HEMORRHAGE: Primary | ICD-10-CM

## 2022-12-05 PROCEDURE — D9220A PRA ANESTHESIA: Mod: ,,, | Performed by: NURSE ANESTHETIST, CERTIFIED REGISTERED

## 2022-12-05 PROCEDURE — 88305 TISSUE EXAM BY PATHOLOGIST: CPT | Mod: TC,59,SUR | Performed by: INTERNAL MEDICINE

## 2022-12-05 PROCEDURE — 63600175 PHARM REV CODE 636 W HCPCS: Performed by: NURSE ANESTHETIST, CERTIFIED REGISTERED

## 2022-12-05 PROCEDURE — 88342 SURGICAL PATHOLOGY: ICD-10-PCS | Mod: 26,,, | Performed by: PATHOLOGY

## 2022-12-05 PROCEDURE — 37000008 HC ANESTHESIA 1ST 15 MINUTES

## 2022-12-05 PROCEDURE — 25000003 PHARM REV CODE 250: Performed by: NURSE ANESTHETIST, CERTIFIED REGISTERED

## 2022-12-05 PROCEDURE — D9220A PRA ANESTHESIA: ICD-10-PCS | Mod: ,,, | Performed by: NURSE ANESTHETIST, CERTIFIED REGISTERED

## 2022-12-05 PROCEDURE — 88305 SURGICAL PATHOLOGY: ICD-10-PCS | Mod: 26,XU,, | Performed by: PATHOLOGY

## 2022-12-05 PROCEDURE — 88342 IMHCHEM/IMCYTCHM 1ST ANTB: CPT | Mod: 26,,, | Performed by: PATHOLOGY

## 2022-12-05 PROCEDURE — 43239 EGD BIOPSY SINGLE/MULTIPLE: CPT | Performed by: INTERNAL MEDICINE

## 2022-12-05 PROCEDURE — 88305 TISSUE EXAM BY PATHOLOGIST: CPT | Mod: 26,,, | Performed by: PATHOLOGY

## 2022-12-05 PROCEDURE — 27201423 OPTIME MED/SURG SUP & DEVICES STERILE SUPPLY

## 2022-12-05 RX ORDER — PROPOFOL 10 MG/ML
VIAL (ML) INTRAVENOUS
Status: DISCONTINUED | OUTPATIENT
Start: 2022-12-05 | End: 2022-12-05

## 2022-12-05 RX ORDER — PHENYLEPHRINE HYDROCHLORIDE 10 MG/ML
INJECTION INTRAVENOUS
Status: DISCONTINUED | OUTPATIENT
Start: 2022-12-05 | End: 2022-12-05

## 2022-12-05 RX ORDER — LIDOCAINE HYDROCHLORIDE 20 MG/ML
INJECTION, SOLUTION EPIDURAL; INFILTRATION; INTRACAUDAL; PERINEURAL
Status: DISCONTINUED | OUTPATIENT
Start: 2022-12-05 | End: 2022-12-05

## 2022-12-05 RX ORDER — FENTANYL CITRATE 50 UG/ML
INJECTION, SOLUTION INTRAMUSCULAR; INTRAVENOUS
Status: DISCONTINUED | OUTPATIENT
Start: 2022-12-05 | End: 2022-12-05

## 2022-12-05 RX ADMIN — FENTANYL CITRATE 100 MCG: 50 INJECTION INTRAMUSCULAR; INTRAVENOUS at 09:12

## 2022-12-05 RX ADMIN — PHENYLEPHRINE HYDROCHLORIDE 100 MCG: 10 INJECTION INTRAVENOUS at 09:12

## 2022-12-05 RX ADMIN — PROPOFOL 80 MG: 10 INJECTION, EMULSION INTRAVENOUS at 09:12

## 2022-12-05 RX ADMIN — LIDOCAINE HYDROCHLORIDE 80 MG: 20 INJECTION, SOLUTION EPIDURAL; INFILTRATION; INTRACAUDAL; PERINEURAL at 09:12

## 2022-12-05 RX ADMIN — SODIUM CHLORIDE: 9 INJECTION, SOLUTION INTRAVENOUS at 09:12

## 2022-12-05 RX ADMIN — PROPOFOL 40 MG: 10 INJECTION, EMULSION INTRAVENOUS at 09:12

## 2022-12-05 NOTE — TRANSFER OF CARE
"Anesthesia Transfer of Care Note    Patient: Niranjan Whittaker Jr.    Procedure(s) Performed: * No procedures listed *    Patient location: GI    Anesthesia Type: general    Transport from OR: Transported from OR on room air with adequate spontaneous ventilation. Continuous ECG monitoring in transport. Continuous SpO2 monitoring in transport    Post pain: adequate analgesia    Post assessment: no apparent anesthetic complications    Post vital signs: stable    Level of consciousness: sedated and responds to stimulation    Nausea/Vomiting: no nausea/vomiting    Complications: none    Transfer of care protocol was followedComments: Good SV continue, NAD, VSS, RTRN      Last vitals:   Visit Vitals  BP (!) 100/57   Pulse 77   Temp 36.4 °C (97.5 °F)   Resp 17   Ht 6' 2" (1.88 m)   Wt 106.6 kg (235 lb)   SpO2 97%   BMI 30.17 kg/m²     "

## 2022-12-05 NOTE — H&P
[facility]  Gastroenterology  H&P    Patient Name: Niranjan Whittaker Jr.  MRN: [id number]  Admission Date: 12/5/2022  Code Status: Prior    Attending Provider: ROBBIE Nunez   Primary Care Physician: Kristine Mehta MD  Principal Problem:<principal problem not specified>    Subjective:     History of Present Illness: Pt c/o gerd on ppi; for egd. + mild chronic anemia    Past Medical History:   Diagnosis Date    Adenomatous polyp of transverse colon 5/12/2022    Diverticula of colon 5/12/2022    FH: total knee replacement     GERD (gastroesophageal reflux disease)     Headache     History of colon polyps 5/12/2022    Hyperlipidemia     Hypertension     Neuropathy     Screening for colon cancer 5/12/2022    Thyroid disease        Past Surgical History:   Procedure Laterality Date    BACK SURGERY      THYROIDECTOMY N/A 8/27/2021    Procedure: THYROIDECTOMY;  Surgeon: Manish Valadez MD;  Location: [facility];  Service: General;  Laterality: N/A;       Review of patient's allergies indicates:  No Known Allergies  Family History       Problem Relation (Age of Onset)    Heart disease Mother    Hypertension Mother, Father          Tobacco Use    Smoking status: Never    Smokeless tobacco: Never   Substance and Sexual Activity    Alcohol use: Not Currently    Drug use: Yes     Types: Hydrocodone    Sexual activity: Yes     Review of Systems   Respiratory: Negative.     Cardiovascular: Negative.    Gastrointestinal: Negative.    Objective:     Vital Signs (Most Recent):  Pulse: 78 (12/05/22 0851)  Resp: 16 (12/05/22 0851)  BP: 133/82 (12/05/22 0851)  SpO2: (!) 94 % (12/05/22 0851)   Vital Signs (24h Range):  Pulse:  [78] 78  Resp:  [16] 16  SpO2:  [94 %] 94 %  BP: (133)/(82) 133/82     Weight: 106.6 kg (235 lb) (12/05/22 0851)  Body mass index is 30.17 kg/m².    No intake or output data in the 24 hours ending 12/05/22 0936    Lines/Drains/Airways       Peripheral Intravenous Line  Duration                   Peripheral IV - Single Lumen 12/05/22 0850 22 G Anterior;Right Forearm <1 day                    Physical Exam  Vitals reviewed.   Constitutional:       General: He is not in acute distress.     Appearance: Normal appearance. He is well-developed. He is not ill-appearing.   HENT:      Head: Normocephalic and atraumatic.      Nose: Nose normal.   Eyes:      Pupils: Pupils are equal, round, and reactive to light.   Cardiovascular:      Rate and Rhythm: Normal rate and regular rhythm.   Pulmonary:      Effort: Pulmonary effort is normal.      Breath sounds: Normal breath sounds. No wheezing.   Abdominal:      General: Abdomen is flat. Bowel sounds are normal. There is no distension.      Palpations: Abdomen is soft.      Tenderness: There is no abdominal tenderness. There is no guarding.   Skin:     General: Skin is warm and dry.      Coloration: Skin is not jaundiced.   Neurological:      Mental Status: He is alert.   Psychiatric:         Attention and Perception: Attention normal.         Mood and Affect: Affect normal.         Speech: Speech normal.         Behavior: Behavior is cooperative.      Comments: Pt was calm while speaking.       Significant Labs:  CBC: No results for input(s): WBC, HGB, HCT, PLT in the last 48 hours.  CMP: No results for input(s): GLU, CALCIUM, ALBUMIN, PROT, NA, K, CO2, CL, BUN, CREATININE, ALKPHOS, ALT, AST, BILITOT in the last 48 hours.    Significant Imaging:  Imaging results within the past 24 hours have been reviewed.    Assessment/Plan:     There are no hospital problems to display for this patient.        Imp: gerd, anemia  Plan: egd    Padilla Johnson MD  Gastroenterology  [facility]

## 2022-12-05 NOTE — DISCHARGE INSTRUCTIONS
Procedure Date  12/5/22     Impression  Overall Impression: Mucosa of the esophagus was normal with z-line at 42cm. The distal esophagus was biopsied. The stomach has erythema without ulcers and biopsies were obtained. The pyloric channel is patents. Mucosa of the duodenum is normal.     Recommendation    Await pathology results      Avoid nsaids; continue ppi.  Schedule abdominal ultrasound for dyspepsia and return to GI clinic.  Disp: DC to home in stable condition. Resume diet and activity. No driving x 24 hours. F/U with PCP as scheduled. Dx: gerd, gastritis     Indication  Gastroesophageal reflux disease without esophagitis

## 2022-12-05 NOTE — ANESTHESIA PREPROCEDURE EVALUATION
12/05/2022  Niranjan Whittaker Jr. is a 67 y.o., male.    Past Medical History:   Diagnosis Date    Adenomatous polyp of transverse colon 5/12/2022    Diverticula of colon 5/12/2022    FH: total knee replacement     GERD (gastroesophageal reflux disease)     Headache     History of colon polyps 5/12/2022    Hyperlipidemia     Hypertension     Neuropathy     Screening for colon cancer 5/12/2022    Thyroid disease        Past Surgical History:   Procedure Laterality Date    BACK SURGERY      THYROIDECTOMY N/A 8/27/2021    Procedure: THYROIDECTOMY;  Surgeon: Manish Valadez MD;  Location: [facility];  Service: General;  Laterality: N/A;       Family History   Problem Relation Age of Onset    Heart disease Mother     Hypertension Mother     Hypertension Father        Social History     Socioeconomic History    Marital status:    Occupational History    Occupation: Disbaled   Tobacco Use    Smoking status: Never    Smokeless tobacco: Never   Substance and Sexual Activity    Alcohol use: Not Currently    Drug use: Yes     Types: Hydrocodone    Sexual activity: Yes       Current Outpatient Medications   Medication Sig Dispense Refill    anastrozole (ARIMIDEX) 1 mg Tab Take 2 mg by mouth every 7 days.      cetirizine (ZYRTEC) 10 MG tablet Take 1 tablet (10 mg total) by mouth once daily. 90 tablet 1    DULoxetine (CYMBALTA) 60 MG capsule Take 60 mg by mouth once daily.      ferrous sulfate (FEOSOL) 325 mg (65 mg iron) Tab tablet Take 325 mg by mouth daily with breakfast.      fluticasone propionate (FLONASE) 50 mcg/actuation nasal spray 1 spray (50 mcg total) by Each Nostril route once daily. 16 g 3    folic acid (FOLVITE) 1 MG tablet 1 tablet      HYDROcodone-acetaminophen (NORCO)  mg per tablet Take 1 tablet by mouth every 4 (four) hours as needed for Pain. 15 tablet 0     hydrOXYchloroQUINE (PLAQUENIL) 200 mg tablet Take 200 mg by mouth 2 (two) times a day.      leflunomide (ARAVA) 20 MG Tab Take 1 tablet (20 mg total) by mouth once daily. 90 tablet 1    levothyroxine (SYNTHROID) 137 MCG Tab tablet Take 1 tablet (137 mcg total) by mouth before breakfast. 90 tablet 1    lisinopriL (PRINIVIL,ZESTRIL) 20 MG tablet Take 1 tablet (20 mg total) by mouth once daily. 90 tablet 1    loratadine (CLARITIN) 10 mg tablet Take 1 tablet (10 mg total) by mouth once daily. 90 tablet 1    methocarbamoL (ROBAXIN) 500 MG Tab Take 1 tablet (500 mg total) by mouth 3 (three) times daily as needed (muscle spasm). 90 tablet 1    montelukast (SINGULAIR) 10 mg tablet Take 1 tablet (10 mg total) by mouth every evening. 90 tablet 3    omeprazole (PRILOSEC) 40 MG capsule Take 1 capsule (40 mg total) by mouth once daily. 90 capsule 1    OXYCONTIN 30 mg TR12 12 hr tablet Take 30 mg by mouth 2 (two) times daily as needed.      pravastatin (PRAVACHOL) 40 MG tablet Take 1 tablet (40 mg total) by mouth once daily. 90 tablet 1    predniSONE (DELTASONE) 5 MG tablet Take 5-10 mg by mouth daily as needed.      pregabalin (LYRICA) 225 MG Cap Take 1 capsule (225 mg total) by mouth 3 (three) times daily. 270 capsule 3    promethazine (PHENERGAN) 25 MG tablet Take 25 mg by mouth 2 (two) times daily as needed for Nausea. 2 times daily prn headache no more then 2 days in a week      silodosin (RAPAFLO) 8 mg Cap capsule Take 8 mg by mouth once daily.      sulfaSALAzine (AZULFIDINE) 500 mg Tab Take 500 mg by mouth 2 (two) times daily.      tamsulosin (FLOMAX) 0.4 mg Cap TAKE 1 CAPSULE BY MOUTH EVERY DAY 30 MINS FOLLOWING THE SAME MEAL EVERY  capsule 1     No current facility-administered medications for this encounter.       Review of patient's allergies indicates:  No Known Allergies  Pre-op Assessment    I have reviewed the Patient Summary Reports.    I have reviewed the NPO Status.   I have reviewed  the Medications.     Review of Systems  Anesthesia Hx:  No problems with previous Anesthesia  Denies Family Hx of Anesthesia complications.   Denies Personal Hx of Anesthesia complications.   Social:  Former Smoker, No Alcohol Use    Hematology/Oncology:     Oncology Normal    -- Anemia:   EENT/Dental:EENT/Dental Normal   Cardiovascular:   Hypertension    Pulmonary:  Pulmonary Normal    Renal/:   Chronic Renal Disease, CKD    Hepatic/GI:   PUD, GERD    Musculoskeletal:   Arthritis     Neurological:   Headaches    Endocrine:   Hypothyroidism        Physical Exam  General: Well nourished, Cooperative, Alert and Oriented    Airway:  Mallampati: II   Mouth Opening: Normal  TM Distance: Normal  Tongue: Normal  Neck ROM: Normal ROM    Dental:  Intact        Anesthesia Plan  Type of Anesthesia, risks & benefits discussed:    Anesthesia Type: Gen Natural Airway  Intra-op Monitoring Plan: Standard ASA Monitors  Post Op Pain Control Plan: multimodal analgesia and IV/PO Opioids PRN  Induction:  IV  Informed Consent: Informed consent signed with the Patient and all parties understand the risks and agree with anesthesia plan.  All questions answered. Patient consented to blood products? Yes  ASA Score: 3  Day of Surgery Review of History & Physical: I have interviewed and examined the patient. I have reviewed the patient's H&P dated: There are no significant changes.     Ready For Surgery From Anesthesia Perspective.     .

## 2022-12-05 NOTE — ANESTHESIA POSTPROCEDURE EVALUATION
Anesthesia Post Evaluation    Patient: Niranjan Whittaker JrLauryn    Procedure(s) Performed: * No procedures listed *    Final Anesthesia Type: general      Patient location during evaluation: GI PACU  Patient participation: Yes- Able to Participate  Level of consciousness: awake and alert  Post-procedure vital signs: reviewed and stable  Pain management: adequate  Airway patency: patent    PONV status at discharge: No PONV  Anesthetic complications: no      Cardiovascular status: blood pressure returned to baseline and hemodynamically stable  Respiratory status: spontaneous ventilation  Hydration status: euvolemic  Follow-up not needed.  Comments: Pt voices appreciation for care          Vitals Value Taken Time   /57 12/05/22 0952   Temp 36.4 °C (97.5 °F) 12/05/22 0944   Pulse 70 12/05/22 0954   Resp 14 12/05/22 0954   SpO2 96 % 12/05/22 0954   Vitals shown include unvalidated device data.      No case tracking events are documented in the log.      Pain/Tanya Score: Tanya Score: 8 (12/5/2022  9:47 AM)

## 2022-12-07 ENCOUNTER — OFFICE VISIT (OUTPATIENT)
Dept: FAMILY MEDICINE | Facility: CLINIC | Age: 67
End: 2022-12-07
Payer: MEDICARE

## 2022-12-07 ENCOUNTER — TELEPHONE (OUTPATIENT)
Dept: GASTROENTEROLOGY | Facility: CLINIC | Age: 67
End: 2022-12-07
Payer: MEDICARE

## 2022-12-07 VITALS
SYSTOLIC BLOOD PRESSURE: 134 MMHG | OXYGEN SATURATION: 94 % | WEIGHT: 231.19 LBS | TEMPERATURE: 99 F | HEART RATE: 94 BPM | RESPIRATION RATE: 18 BRPM | HEIGHT: 74 IN | DIASTOLIC BLOOD PRESSURE: 88 MMHG | BODY MASS INDEX: 29.67 KG/M2

## 2022-12-07 DIAGNOSIS — M79.672 BILATERAL FOOT PAIN: Primary | ICD-10-CM

## 2022-12-07 DIAGNOSIS — R07.9 CHEST PAIN, UNSPECIFIED TYPE: ICD-10-CM

## 2022-12-07 DIAGNOSIS — R73.03 PRE-DIABETES: ICD-10-CM

## 2022-12-07 DIAGNOSIS — M79.671 BILATERAL FOOT PAIN: Primary | ICD-10-CM

## 2022-12-07 DIAGNOSIS — E53.8 VITAMIN B 12 DEFICIENCY: ICD-10-CM

## 2022-12-07 LAB
EKG 12-LEAD: NORMAL
EST. AVERAGE GLUCOSE BLD GHB EST-MCNC: 81 MG/DL
HBA1C MFR BLD HPLC: 5 % (ref 4.5–6.6)
PR INTERVAL: NORMAL
PRT AXES: NORMAL
QRS DURATION: NORMAL
QT/QTC: NORMAL
VENTRICULAR RATE: NORMAL
VIT B12 SERPL-MCNC: 898 PG/ML (ref 193–986)

## 2022-12-07 PROCEDURE — 96372 THER/PROPH/DIAG INJ SC/IM: CPT | Mod: ,,, | Performed by: FAMILY MEDICINE

## 2022-12-07 PROCEDURE — 82607 VITAMIN B12: ICD-10-PCS | Mod: ,,, | Performed by: CLINICAL MEDICAL LABORATORY

## 2022-12-07 PROCEDURE — 82607 VITAMIN B-12: CPT | Mod: ,,, | Performed by: CLINICAL MEDICAL LABORATORY

## 2022-12-07 PROCEDURE — 83036 HEMOGLOBIN GLYCOSYLATED A1C: CPT | Mod: ,,, | Performed by: CLINICAL MEDICAL LABORATORY

## 2022-12-07 PROCEDURE — 99213 PR OFFICE/OUTPT VISIT, EST, LEVL III, 20-29 MIN: ICD-10-PCS | Mod: ,,, | Performed by: FAMILY MEDICINE

## 2022-12-07 PROCEDURE — 96372 PR INJECTION,THERAP/PROPH/DIAG2ST, IM OR SUBCUT: ICD-10-PCS | Mod: ,,, | Performed by: FAMILY MEDICINE

## 2022-12-07 PROCEDURE — 93005 ELECTROCARDIOGRAM TRACING: CPT | Mod: RHCUB | Performed by: FAMILY MEDICINE

## 2022-12-07 PROCEDURE — 83036 HEMOGLOBIN A1C: ICD-10-PCS | Mod: ,,, | Performed by: CLINICAL MEDICAL LABORATORY

## 2022-12-07 PROCEDURE — 99213 OFFICE O/P EST LOW 20 MIN: CPT | Mod: ,,, | Performed by: FAMILY MEDICINE

## 2022-12-07 RX ORDER — CYANOCOBALAMIN 1000 UG/ML
1000 INJECTION, SOLUTION INTRAMUSCULAR; SUBCUTANEOUS
Status: COMPLETED | OUTPATIENT
Start: 2022-12-07 | End: 2022-12-07

## 2022-12-07 RX ORDER — AMITRIPTYLINE HYDROCHLORIDE 25 MG/1
25 TABLET, FILM COATED ORAL NIGHTLY
Qty: 90 TABLET | Refills: 3 | Status: SHIPPED | OUTPATIENT
Start: 2022-12-07 | End: 2023-04-17 | Stop reason: SDUPTHER

## 2022-12-07 RX ADMIN — CYANOCOBALAMIN 1000 MCG: 1000 INJECTION, SOLUTION INTRAMUSCULAR; SUBCUTANEOUS at 04:12

## 2022-12-07 NOTE — TELEPHONE ENCOUNTER
Attempted to call patient about results and left vm for a call back.        ----- Message from Padilla Johnson MD sent at 12/7/2022  8:30 AM CST -----  Egd bx shows gastritis and reflux esophagitis without H.pylori.

## 2022-12-07 NOTE — PROGRESS NOTES
Kristine Mehta MD        PATIENT NAME: Niranjan Whittaker Jr.  : 1955  DATE: 22  MRN: [id number]      Billing Provider: Kristine Mehta MD  Level of Service:   Patient PCP Information       Provider PCP Type    Kristine Mehta MD General            Reason for Visit / Chief Complaint: Edema (C/o swelling in lower legs and feet. Saw Dr. Gayle for the numbness in his feet. The swelling does not go down over night. ) and Leg Pain (C/o bilateral feet and legs hurting worse when he walks but hurts to even move his feet. Gait more unsteady. )       History of Present Illness      Niranjan Whittaker Jr. presents to the clinic with Edema (C/o swelling in lower legs and feet. Saw Dr. Gayle for the numbness in his feet. The swelling does not go down over night. ) and Leg Pain (C/o bilateral feet and legs hurting worse when he walks but hurts to even move his feet. Gait more unsteady. )     Edema  This is a chronic problem. Associated symptoms include chest pain and myalgias. Pertinent negatives include no abdominal pain, anorexia, arthralgias, change in bowel habit, congestion, coughing, diaphoresis, headaches, joint swelling, nausea, neck pain, numbness, rash, sore throat, urinary symptoms, vertigo, visual change, vomiting or weakness.   Leg Pain   Pertinent negatives include no numbness.   Review of Systems     Review of Systems   Constitutional:  Negative for diaphoresis.   HENT:  Negative for congestion and sore throat.    Respiratory:  Negative for cough.    Cardiovascular:  Positive for chest pain.   Gastrointestinal:  Negative for abdominal pain, anorexia, change in bowel habit, nausea and vomiting.   Musculoskeletal:  Positive for gait problem and myalgias. Negative for arthralgias, joint swelling and neck pain.   Skin:  Negative for rash.   Neurological:  Negative for vertigo, weakness, numbness and headaches.     Medical / Social / Family History     Past Medical History:   Diagnosis Date    Adenomatous  polyp of transverse colon 5/12/2022    Diverticula of colon 5/12/2022    FH: total knee replacement     GERD (gastroesophageal reflux disease)     Headache     History of colon polyps 5/12/2022    Hyperlipidemia     Hypertension     Neuropathy     Screening for colon cancer 5/12/2022    Thyroid disease        Past Surgical History:   Procedure Laterality Date    BACK SURGERY      THYROIDECTOMY N/A 8/27/2021    Procedure: THYROIDECTOMY;  Surgeon: Manish Valadez MD;  Location: [facility];  Service: General;  Laterality: N/A;       Social History    reports that he has never smoked. He has never used smokeless tobacco. He reports that he does not currently use alcohol. He reports current drug use. Drugs: Hydrocodone and Oxycodone.    Family History  's family history includes Heart disease in his mother; Hypertension in his father, mother, and sister.    Medications and Allergies     Medications  Outpatient Medications Marked as Taking for the 12/7/22 encounter (Office Visit) with Kristine Mehta MD   Medication Sig Dispense Refill    anastrozole (ARIMIDEX) 1 mg Tab Take 2 mg by mouth every 7 days.      cetirizine (ZYRTEC) 10 MG tablet Take 1 tablet (10 mg total) by mouth once daily. 90 tablet 1    DULoxetine (CYMBALTA) 60 MG capsule Take 60 mg by mouth once daily.      ferrous sulfate (FEOSOL) 325 mg (65 mg iron) Tab tablet Take 325 mg by mouth daily with breakfast.      fluticasone propionate (FLONASE) 50 mcg/actuation nasal spray 1 spray (50 mcg total) by Each Nostril route once daily. 16 g 3    HYDROcodone-acetaminophen (NORCO)  mg per tablet Take 1 tablet by mouth every 4 (four) hours as needed for Pain. 15 tablet 0    hydrOXYchloroQUINE (PLAQUENIL) 200 mg tablet Take 200 mg by mouth 2 (two) times a day.      leflunomide (ARAVA) 20 MG Tab Take 1 tablet (20 mg total) by mouth once daily. 90 tablet 1    levothyroxine (SYNTHROID) 137 MCG Tab tablet Take 1 tablet (137 mcg total) by mouth before  "breakfast. 90 tablet 1    lisinopriL (PRINIVIL,ZESTRIL) 20 MG tablet Take 1 tablet (20 mg total) by mouth once daily. 90 tablet 1    loratadine (CLARITIN) 10 mg tablet Take 1 tablet (10 mg total) by mouth once daily. 90 tablet 1    methocarbamoL (ROBAXIN) 500 MG Tab Take 1 tablet (500 mg total) by mouth 3 (three) times daily as needed (muscle spasm). 90 tablet 1    montelukast (SINGULAIR) 10 mg tablet Take 1 tablet (10 mg total) by mouth every evening. 90 tablet 3    omeprazole (PRILOSEC) 40 MG capsule Take 1 capsule (40 mg total) by mouth once daily. 90 capsule 1    OXYCONTIN 30 mg TR12 12 hr tablet Take 30 mg by mouth 2 (two) times daily as needed.      pravastatin (PRAVACHOL) 40 MG tablet Take 1 tablet (40 mg total) by mouth once daily. 90 tablet 1    predniSONE (DELTASONE) 5 MG tablet Take 5-10 mg by mouth daily as needed.      pregabalin (LYRICA) 225 MG Cap Take 1 capsule (225 mg total) by mouth 3 (three) times daily. 270 capsule 3    promethazine (PHENERGAN) 25 MG tablet Take 25 mg by mouth 2 (two) times daily as needed for Nausea. 2 times daily prn headache no more then 2 days in a week      silodosin (RAPAFLO) 8 mg Cap capsule Take 8 mg by mouth once daily.      sulfaSALAzine (AZULFIDINE) 500 mg Tab Take 500 mg by mouth 2 (two) times daily.       Current Facility-Administered Medications for the 12/7/22 encounter (Office Visit) with Kristine Mehta MD   Medication Dose Route Frequency Provider Last Rate Last Admin    [COMPLETED] cyanocobalamin injection 1,000 mcg  1,000 mcg Intramuscular 1 time in Clinic/HOD Kristine Mehta MD   1,000 mcg at 12/07/22 9535       Allergies  Review of patient's allergies indicates:  No Known Allergies    Physical Examination   /88 (BP Location: Right arm, Patient Position: Sitting)   Pulse 94   Temp 98.9 °F (37.2 °C) (Oral)   Resp 18   Ht 6' 2" (1.88 m)   Wt 104.9 kg (231 lb 3.2 oz)   SpO2 (!) 94%   BMI 29.68 kg/m²     Physical Exam  Constitutional:       Appearance: " Normal appearance. He is normal weight.   Cardiovascular:      Rate and Rhythm: Normal rate and regular rhythm.   Pulmonary:      Effort: Pulmonary effort is normal.      Breath sounds: Normal breath sounds.   Abdominal:      General: Abdomen is flat.      Palpations: Abdomen is soft.   Neurological:      Mental Status: He is alert.      Motor: Weakness present.      Coordination: Coordination abnormal.      Gait: Gait abnormal.   Psychiatric:         Mood and Affect: Mood normal.         Behavior: Behavior normal.       Assessment and Plan (including Health Maintenance)     Plan:         Problem List Items Addressed This Visit    None  Visit Diagnoses       Bilateral foot pain    -  Primary    Relevant Medications    amitriptyline (ELAVIL) 25 MG tablet    Other Relevant Orders    Ambulatory referral/consult to Podiatry    Vitamin B 12 deficiency        Relevant Medications    cyanocobalamin injection 1,000 mcg (Completed)    Other Relevant Orders    Vitamin B12    Pre-diabetes        Relevant Orders    Hemoglobin A1C    Chest pain, unspecified type        Relevant Orders    POCT EKG 12-LEAD (NOT FOR [facility] USE)          Added amitriptyline today  Will send to podiatry  Vitamin  b 12 injection done    Follow up in about 3 months (around 3/7/2023).        Signature:  Kristine Mehta MD      Date of encounter: 12/7/22

## 2022-12-19 ENCOUNTER — HOSPITAL ENCOUNTER (OUTPATIENT)
Dept: RADIOLOGY | Facility: HOSPITAL | Age: 67
Discharge: HOME | End: 2022-12-19
Attending: INTERNAL MEDICINE
Payer: MEDICARE

## 2022-12-19 DIAGNOSIS — R10.13 DYSPEPSIA: ICD-10-CM

## 2022-12-22 ENCOUNTER — HOSPITAL ENCOUNTER (OUTPATIENT)
Dept: RADIOLOGY | Facility: HOSPITAL | Age: 67
Discharge: HOME | End: 2022-12-22
Attending: INTERNAL MEDICINE
Payer: MEDICARE

## 2022-12-22 DIAGNOSIS — R10.13 DYSPEPSIA: ICD-10-CM

## 2022-12-22 PROCEDURE — 76700 US ABDOMEN COMPLETE: ICD-10-PCS | Mod: 26,,, | Performed by: RADIOLOGY

## 2022-12-22 PROCEDURE — 76700 US EXAM ABDOM COMPLETE: CPT | Mod: 26,,, | Performed by: RADIOLOGY

## 2022-12-22 PROCEDURE — 76700 US EXAM ABDOM COMPLETE: CPT | Mod: TC

## 2022-12-27 ENCOUNTER — TELEPHONE (OUTPATIENT)
Dept: GASTROENTEROLOGY | Facility: CLINIC | Age: 67
End: 2022-12-27
Payer: MEDICARE

## 2022-12-27 NOTE — TELEPHONE ENCOUNTER
Attempted to call patient about results and left vm for a call back.        ----- Message from Padilla Johnson MD sent at 12/22/2022  6:36 PM CST -----  Ultrasound shows sludge in the gallbladder. Offer pt a HIDA with CCK for gallbladder function and return to GI clinic with MUSTAPHA Wilburn.

## 2022-12-31 ENCOUNTER — EXTERNAL CHRONIC CARE MANAGEMENT (OUTPATIENT)
Dept: FAMILY MEDICINE | Facility: CLINIC | Age: 67
End: 2022-12-31
Payer: MEDICARE

## 2022-12-31 PROCEDURE — G0511 PR CHRONIC CARE MGMT, RHC OR FQHC ONLY, 20 MINS OR MORE: ICD-10-PCS | Mod: ,,, | Performed by: FAMILY MEDICINE

## 2022-12-31 PROCEDURE — G0511 CCM/BHI BY RHC/FQHC 20MIN MO: HCPCS | Mod: ,,, | Performed by: FAMILY MEDICINE

## 2023-01-31 ENCOUNTER — EXTERNAL CHRONIC CARE MANAGEMENT (OUTPATIENT)
Dept: FAMILY MEDICINE | Facility: CLINIC | Age: 68
End: 2023-01-31
Payer: MEDICARE

## 2023-01-31 PROCEDURE — G0511 CCM/BHI BY RHC/FQHC 20MIN MO: HCPCS | Mod: ,,, | Performed by: FAMILY MEDICINE

## 2023-01-31 PROCEDURE — G0511 PR CHRONIC CARE MGMT, RHC OR FQHC ONLY, 20 MINS OR MORE: ICD-10-PCS | Mod: ,,, | Performed by: FAMILY MEDICINE

## 2023-02-28 ENCOUNTER — EXTERNAL CHRONIC CARE MANAGEMENT (OUTPATIENT)
Dept: FAMILY MEDICINE | Facility: CLINIC | Age: 68
End: 2023-02-28
Payer: MEDICARE

## 2023-02-28 PROCEDURE — G0511 PR CHRONIC CARE MGMT, RHC OR FQHC ONLY, 20 MINS OR MORE: ICD-10-PCS | Mod: ,,, | Performed by: FAMILY MEDICINE

## 2023-02-28 PROCEDURE — G0511 CCM/BHI BY RHC/FQHC 20MIN MO: HCPCS | Mod: ,,, | Performed by: FAMILY MEDICINE

## 2023-03-31 ENCOUNTER — EXTERNAL CHRONIC CARE MANAGEMENT (OUTPATIENT)
Dept: FAMILY MEDICINE | Facility: CLINIC | Age: 68
End: 2023-03-31
Payer: MEDICARE

## 2023-03-31 PROCEDURE — G0511 PR CHRONIC CARE MGMT, RHC OR FQHC ONLY, 20 MINS OR MORE: ICD-10-PCS | Mod: ,,, | Performed by: FAMILY MEDICINE

## 2023-03-31 PROCEDURE — G0511 CCM/BHI BY RHC/FQHC 20MIN MO: HCPCS | Mod: ,,, | Performed by: FAMILY MEDICINE

## 2023-04-11 ENCOUNTER — OFFICE VISIT (OUTPATIENT)
Dept: GASTROENTEROLOGY | Facility: CLINIC | Age: 68
End: 2023-04-11
Payer: MEDICARE

## 2023-04-11 VITALS
WEIGHT: 219.81 LBS | HEIGHT: 74 IN | DIASTOLIC BLOOD PRESSURE: 81 MMHG | HEART RATE: 108 BPM | SYSTOLIC BLOOD PRESSURE: 123 MMHG | BODY MASS INDEX: 28.21 KG/M2 | OXYGEN SATURATION: 94 %

## 2023-04-11 DIAGNOSIS — R10.9 ABDOMINAL PAIN, UNSPECIFIED ABDOMINAL LOCATION: ICD-10-CM

## 2023-04-11 DIAGNOSIS — K82.8 SLUDGE IN GALLBLADDER: ICD-10-CM

## 2023-04-11 DIAGNOSIS — K76.9 LIVER LESION: Primary | ICD-10-CM

## 2023-04-11 DIAGNOSIS — K59.00 CONSTIPATION, UNSPECIFIED CONSTIPATION TYPE: ICD-10-CM

## 2023-04-11 PROCEDURE — 99214 OFFICE O/P EST MOD 30 MIN: CPT | Mod: S$PBB,,, | Performed by: NURSE PRACTITIONER

## 2023-04-11 PROCEDURE — 99215 OFFICE O/P EST HI 40 MIN: CPT | Mod: PBBFAC | Performed by: NURSE PRACTITIONER

## 2023-04-11 PROCEDURE — 99214 PR OFFICE/OUTPT VISIT, EST, LEVL IV, 30-39 MIN: ICD-10-PCS | Mod: S$PBB,,, | Performed by: NURSE PRACTITIONER

## 2023-04-11 NOTE — PROGRESS NOTES
Niranjan Whittaker Jr. is a 68 y.o. male here for No chief complaint on file.        PCP: Kristine Mehta  Referring Provider: No referring provider defined for this encounter.     HPI:  Presents for follow-up after EGD.  EGD was performed on 12/05/2022, report reviewed, erythema without any ulcers.  Abdominal ultrasound on 12/22, report reviewed, recommendation for HIDA scan due to gallbladder sludge.  Also, there was a Solid 27 mm hyperechoic area in the right hepatic lobe.  While this could possibly represent hemangioma, this was not previously demonstrated on the May 12, 2021 abdominal ultrasound. Therefore, recommend further evaluation with pre and postcontrast MRI or CT of the liver.  The HIDA scan has not been scheduled. Discussed with the patient that we will schedule a HIDA scan due to gallbladder sludge. Also advised that we will recommended MRI for further clarification of the liver lesion seen on ultrasound as recommended by Radiology.  He reports intermittent abdominal pain at times.  No report of correlation with food intake.  He denies any nausea or vomiting.  He does have chronic constipation.  States that he stopped taking the MiraLax as often because he did not feel like it was emptying his colon.  Advised that he may need to increase the MiraLax to better empty his colon due to constipation.  Also advised that he should increase fluid intake to 64 oz of water per day.        ROS:  Review of Systems   Constitutional:  Negative for activity change, appetite change, fatigue, fever and unexpected weight change.   HENT:  Negative for trouble swallowing.    Respiratory:  Negative for cough.    Cardiovascular:  Negative for chest pain.   Gastrointestinal:  Positive for abdominal pain and constipation. Negative for abdominal distention, blood in stool, change in bowel habit, diarrhea, nausea, vomiting, reflux and change in bowel habit.   Musculoskeletal:  Negative for gait problem.   Integumentary:   Negative for color change.   Neurological:  Negative for syncope and light-headedness.   Psychiatric/Behavioral:  Negative for sleep disturbance. The patient is not nervous/anxious.         PMHX:  has a past medical history of Adenomatous polyp of transverse colon (5/12/2022), Diverticula of colon (5/12/2022), FH: total knee replacement, GERD (gastroesophageal reflux disease), Headache, History of colon polyps (5/12/2022), Hyperlipidemia, Hypertension, Neuropathy, Screening for colon cancer (5/12/2022), and Thyroid disease.    PSHX:  has a past surgical history that includes Back surgery and Thyroidectomy (N/A, 8/27/2021).    PFHX: family history includes Heart disease in his mother; Hypertension in his father, mother, and sister.    PSlHX:  reports that he has never smoked. He has never used smokeless tobacco. He reports that he does not currently use alcohol. He reports current drug use. Drugs: Hydrocodone and Oxycodone.        Review of patient's allergies indicates:  No Known Allergies    Medication List with Changes/Refills   Current Medications    AMITRIPTYLINE (ELAVIL) 25 MG TABLET    Take 1 tablet (25 mg total) by mouth every evening.    ANASTROZOLE (ARIMIDEX) 1 MG TAB    Take 2 mg by mouth every 7 days.    CETIRIZINE (ZYRTEC) 10 MG TABLET    Take 1 tablet (10 mg total) by mouth once daily.    DULOXETINE (CYMBALTA) 60 MG CAPSULE    Take 60 mg by mouth once daily.    FERROUS SULFATE (FEOSOL) 325 MG (65 MG IRON) TAB TABLET    Take 325 mg by mouth daily with breakfast.    HYDROCODONE-ACETAMINOPHEN (NORCO)  MG PER TABLET    Take 1 tablet by mouth every 4 (four) hours as needed for Pain.    HYDROXYCHLOROQUINE (PLAQUENIL) 200 MG TABLET    Take 200 mg by mouth 2 (two) times a day.    LEFLUNOMIDE (ARAVA) 20 MG TAB    Take 1 tablet (20 mg total) by mouth once daily.    LEVOTHYROXINE (SYNTHROID) 137 MCG TAB TABLET    Take 1 tablet (137 mcg total) by mouth before breakfast.    LISINOPRIL (PRINIVIL,ZESTRIL) 20 MG  "TABLET    Take 1 tablet (20 mg total) by mouth once daily.    LORATADINE (CLARITIN) 10 MG TABLET    Take 1 tablet (10 mg total) by mouth once daily.    METHOCARBAMOL (ROBAXIN) 500 MG TAB    Take 1 tablet (500 mg total) by mouth 3 (three) times daily as needed (muscle spasm).    MONTELUKAST (SINGULAIR) 10 MG TABLET    Take 1 tablet (10 mg total) by mouth every evening.    OMEPRAZOLE (PRILOSEC) 40 MG CAPSULE    Take 1 capsule (40 mg total) by mouth once daily.    OXYCONTIN 30 MG TR12 12 HR TABLET    Take 30 mg by mouth 2 (two) times daily as needed.    PRAVASTATIN (PRAVACHOL) 40 MG TABLET    Take 1 tablet (40 mg total) by mouth once daily.    PREDNISONE (DELTASONE) 5 MG TABLET    Take 5-10 mg by mouth daily as needed.    PREGABALIN (LYRICA) 225 MG CAP    Take 1 capsule (225 mg total) by mouth 3 (three) times daily.    PROMETHAZINE (PHENERGAN) 25 MG TABLET    Take 25 mg by mouth 2 (two) times daily as needed for Nausea. 2 times daily prn headache no more then 2 days in a week    SILODOSIN (RAPAFLO) 8 MG CAP CAPSULE    Take 8 mg by mouth once daily.    SULFASALAZINE (AZULFIDINE) 500 MG TAB    Take 500 mg by mouth 2 (two) times daily.        Objective Findings:  Vital Signs:  /81   Pulse 108   Ht 6' 2" (1.88 m)   Wt 99.7 kg (219 lb 12.8 oz)   SpO2 (!) 94%   BMI 28.22 kg/m²  Body mass index is 28.22 kg/m².    Physical Exam:  Physical Exam  Vitals and nursing note reviewed.   Constitutional:       General: He is not in acute distress.     Appearance: Normal appearance.   HENT:      Mouth/Throat:      Mouth: Mucous membranes are moist.   Cardiovascular:      Rate and Rhythm: Normal rate.   Pulmonary:      Effort: Pulmonary effort is normal.      Breath sounds: No wheezing, rhonchi or rales.   Abdominal:      General: Bowel sounds are normal. There is no distension.      Palpations: Abdomen is soft. There is no mass.      Tenderness: There is no abdominal tenderness. There is no guarding.   Skin:     General: Skin " is warm and dry.      Coloration: Skin is not jaundiced or pale.   Neurological:      Mental Status: He is alert and oriented to person, place, and time.   Psychiatric:         Mood and Affect: Mood normal.        Labs:  Lab Results   Component Value Date    WBC 6.20 11/15/2022    HGB 12.9 (L) 11/15/2022    HCT 40.9 11/15/2022    MCV 95.8 11/15/2022    RDW 13.8 11/15/2022     11/15/2022    LYMPH 18.4 (L) 11/15/2022    LYMPH 1.14 11/15/2022    MONO 7.3 (H) 11/15/2022    EOS 0.08 11/15/2022    BASO 0.07 11/15/2022     Lab Results   Component Value Date     11/15/2022    K 4.6 11/15/2022     11/15/2022    CO2 28 11/15/2022     11/15/2022    BUN 28 (H) 11/15/2022    CREATININE 1.63 (H) 11/15/2022    CALCIUM 8.7 11/15/2022    PROT 7.5 11/15/2022    ALBUMIN 3.7 11/15/2022    BILITOT 0.3 11/15/2022    ALKPHOS 87 11/15/2022    AST 27 11/15/2022    ALT 39 11/15/2022         Imaging: No results found.      Assessment:  Niranjan Whittaker Jr. is a 68 y.o. male here with:  1. Liver lesion    2. Abdominal pain, unspecified abdominal location    3. Sludge in gallbladder    4. Constipation, unspecified constipation type          Recommendations:  1. Schedule MRI   2. HIDA scan  3. CBC, CMP  4. Increase fiber to 35 grams daily  Drink 64 ounces of water daily  Exercise 150 minutes per week  Miralax powder 1 capful 17 grams in 8 ounces of liquid daily      Follow up in about 4 weeks (around 5/9/2023).      Order summary:  Orders Placed This Encounter    MRI Abdomen W WO Contrast    NM Hepatobiliary (HIDA) W Pharm and EF When Performed    CBC Auto Differential    Comprehensive Metabolic Panel       Thank you for allowing me to participate in the care of Niranjan Whittaker Jr..      JJ Menjivar

## 2023-04-12 ENCOUNTER — TELEPHONE (OUTPATIENT)
Dept: GASTROENTEROLOGY | Facility: CLINIC | Age: 68
End: 2023-04-12
Payer: MEDICARE

## 2023-04-12 NOTE — TELEPHONE ENCOUNTER
Results called to patient. Verbalized understanding.          ----- Message from ROBBIE Nunez sent at 4/12/2023  8:02 AM CDT -----  Creat is 1.55. He is supposed to have an MRI. Did they give you a cut off for Creat?

## 2023-04-17 ENCOUNTER — OFFICE VISIT (OUTPATIENT)
Dept: FAMILY MEDICINE | Facility: CLINIC | Age: 68
End: 2023-04-17
Payer: MEDICARE

## 2023-04-17 ENCOUNTER — APPOINTMENT (OUTPATIENT)
Dept: RADIOLOGY | Facility: CLINIC | Age: 68
End: 2023-04-17
Attending: NURSE PRACTITIONER
Payer: MEDICARE

## 2023-04-17 VITALS
HEART RATE: 99 BPM | BODY MASS INDEX: 29.39 KG/M2 | SYSTOLIC BLOOD PRESSURE: 134 MMHG | WEIGHT: 229 LBS | DIASTOLIC BLOOD PRESSURE: 88 MMHG | RESPIRATION RATE: 18 BRPM | HEIGHT: 74 IN | TEMPERATURE: 98 F | OXYGEN SATURATION: 96 %

## 2023-04-17 DIAGNOSIS — M51.36 DEGENERATION OF LUMBAR INTERVERTEBRAL DISC: ICD-10-CM
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
## 2023-04-17 DIAGNOSIS — E78.2 MIXED HYPERLIPIDEMIA: ICD-10-CM

## 2023-04-17 DIAGNOSIS — M79.672 BILATERAL FOOT PAIN: ICD-10-CM

## 2023-04-17 DIAGNOSIS — M79.2 NEURALGIA AND NEURITIS: ICD-10-CM
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
## 2023-04-17 DIAGNOSIS — G89.4 CHRONIC PAIN SYNDROME: ICD-10-CM

## 2023-04-17 DIAGNOSIS — I10 PRIMARY HYPERTENSION: ICD-10-CM

## 2023-04-17 DIAGNOSIS — R33.9 URINARY RETENTION: ICD-10-CM

## 2023-04-17 DIAGNOSIS — K21.9 GASTROESOPHAGEAL REFLUX DISEASE WITHOUT ESOPHAGITIS: ICD-10-CM

## 2023-04-17 DIAGNOSIS — E04.2 MULTIPLE THYROID NODULES: ICD-10-CM

## 2023-04-17 DIAGNOSIS — M25.551 RIGHT HIP PAIN: ICD-10-CM

## 2023-04-17 DIAGNOSIS — M79.671 BILATERAL FOOT PAIN: ICD-10-CM

## 2023-04-17 DIAGNOSIS — J30.9 ALLERGIC RHINITIS, UNSPECIFIED SEASONALITY, UNSPECIFIED TRIGGER: ICD-10-CM

## 2023-04-17 PROCEDURE — 96372 PR INJECTION,THERAP/PROPH/DIAG2ST, IM OR SUBCUT: ICD-10-PCS | Mod: ,,, | Performed by: NURSE PRACTITIONER

## 2023-04-17 PROCEDURE — 99214 PR OFFICE/OUTPT VISIT, EST, LEVL IV, 30-39 MIN: ICD-10-PCS | Mod: ,,, | Performed by: NURSE PRACTITIONER

## 2023-04-17 PROCEDURE — 71100 X-RAY EXAM RIBS UNI 2 VIEWS: CPT | Mod: TC,RHCUB,FY,RT | Performed by: NURSE PRACTITIONER

## 2023-04-17 PROCEDURE — 71100 X-RAY EXAM RIBS UNI 2 VIEWS: CPT | Mod: 26,RT,, | Performed by: RADIOLOGY

## 2023-04-17 PROCEDURE — 99214 OFFICE O/P EST MOD 30 MIN: CPT | Mod: ,,, | Performed by: NURSE PRACTITIONER

## 2023-04-17 PROCEDURE — 96372 THER/PROPH/DIAG INJ SC/IM: CPT | Mod: ,,, | Performed by: NURSE PRACTITIONER

## 2023-04-17 PROCEDURE — 71100 XR RIBS 2 VIEW RIGHT: ICD-10-PCS | Mod: 26,RT,, | Performed by: RADIOLOGY

## 2023-04-17 RX ORDER — LEFLUNOMIDE 20 MG/1
20 TABLET ORAL DAILY
Qty: 90 TABLET | Refills: 1 | Status: SHIPPED | OUTPATIENT
Start: 2023-04-17 | End: 2023-12-16

## 2023-04-17 RX ORDER — SULFASALAZINE 500 MG/1
500 TABLET ORAL 2 TIMES DAILY
Qty: 180 TABLET | Refills: 1 | Status: SHIPPED | OUTPATIENT
Start: 2023-04-17

## 2023-04-17 RX ORDER — DULOXETIN HYDROCHLORIDE 60 MG/1
60 CAPSULE, DELAYED RELEASE ORAL DAILY
Qty: 90 CAPSULE | Refills: 1 | Status: SHIPPED | OUTPATIENT
Start: 2023-04-17 | End: 2023-12-06 | Stop reason: SDUPTHER

## 2023-04-17 RX ORDER — HYDROXYCHLOROQUINE SULFATE 200 MG/1
200 TABLET, FILM COATED ORAL 2 TIMES DAILY
Qty: 180 TABLET | Refills: 1 | Status: SHIPPED | OUTPATIENT
Start: 2023-04-17

## 2023-04-17 RX ORDER — DEXAMETHASONE SODIUM PHOSPHATE 4 MG/ML
4 INJECTION, SOLUTION INTRA-ARTICULAR; INTRALESIONAL; INTRAMUSCULAR; INTRAVENOUS; SOFT TISSUE
Status: COMPLETED | OUTPATIENT
Start: 2023-04-17 | End: 2023-04-17

## 2023-04-17 RX ORDER — OMEPRAZOLE 40 MG/1
40 CAPSULE, DELAYED RELEASE ORAL DAILY
Qty: 90 CAPSULE | Refills: 1 | Status: SHIPPED | OUTPATIENT
Start: 2023-04-17 | End: 2023-12-06 | Stop reason: SDUPTHER

## 2023-04-17 RX ORDER — AMITRIPTYLINE HYDROCHLORIDE 25 MG/1
25 TABLET, FILM COATED ORAL NIGHTLY
Qty: 90 TABLET | Refills: 3 | Status: SHIPPED | OUTPATIENT
Start: 2023-04-17 | End: 2023-12-06 | Stop reason: SDUPTHER

## 2023-04-17 RX ORDER — METHOCARBAMOL 500 MG/1
500 TABLET, FILM COATED ORAL 3 TIMES DAILY PRN
Qty: 90 TABLET | Refills: 1 | Status: SHIPPED | OUTPATIENT
Start: 2023-04-17 | End: 2023-04-24

## 2023-04-17 RX ORDER — ANASTROZOLE 1 MG/1
2 TABLET ORAL
Qty: 180 TABLET | Refills: 1 | Status: SHIPPED | OUTPATIENT
Start: 2023-04-17

## 2023-04-17 RX ORDER — METHYLPREDNISOLONE ACETATE 40 MG/ML
40 INJECTION, SUSPENSION INTRA-ARTICULAR; INTRALESIONAL; INTRAMUSCULAR; SOFT TISSUE
Status: COMPLETED | OUTPATIENT
Start: 2023-04-17 | End: 2023-04-17

## 2023-04-17 RX ORDER — PRAVASTATIN SODIUM 40 MG/1
40 TABLET ORAL DAILY
Qty: 90 TABLET | Refills: 1 | Status: SHIPPED | OUTPATIENT
Start: 2023-04-17 | End: 2023-06-30 | Stop reason: SDUPTHER

## 2023-04-17 RX ORDER — MONTELUKAST SODIUM 10 MG/1
10 TABLET ORAL NIGHTLY
Qty: 90 TABLET | Refills: 3 | Status: SHIPPED | OUTPATIENT
Start: 2023-04-17 | End: 2023-05-22

## 2023-04-17 RX ORDER — HYDROCODONE BITARTRATE AND ACETAMINOPHEN 10; 325 MG/1; MG/1
1 TABLET ORAL EVERY 4 HOURS PRN
Qty: 15 TABLET | Refills: 0 | Status: CANCELLED | OUTPATIENT
Start: 2023-04-17

## 2023-04-17 RX ORDER — PROMETHAZINE HYDROCHLORIDE 25 MG/1
25 TABLET ORAL 2 TIMES DAILY PRN
Qty: 90 TABLET | Refills: 1 | Status: SHIPPED | OUTPATIENT
Start: 2023-04-17

## 2023-04-17 RX ORDER — CETIRIZINE HYDROCHLORIDE 10 MG/1
10 TABLET ORAL DAILY
Qty: 90 TABLET | Refills: 1 | Status: SHIPPED | OUTPATIENT
Start: 2023-04-17 | End: 2023-05-04

## 2023-04-17 RX ORDER — PREGABALIN 225 MG/1
225 CAPSULE ORAL 3 TIMES DAILY
Qty: 270 CAPSULE | Refills: 3 | Status: SHIPPED | OUTPATIENT
Start: 2023-04-17 | End: 2023-12-15

## 2023-04-17 RX ORDER — FERROUS SULFATE 325(65) MG
325 TABLET ORAL
Qty: 90 TABLET | Refills: 1 | Status: SHIPPED | OUTPATIENT
Start: 2023-04-17 | End: 2023-06-30 | Stop reason: SDUPTHER

## 2023-04-17 RX ORDER — OXYCODONE HYDROCHLORIDE 30 MG/1
30 TABLET, FILM COATED, EXTENDED RELEASE ORAL 2 TIMES DAILY PRN
Qty: 30 TABLET | Refills: 1 | Status: CANCELLED | OUTPATIENT
Start: 2023-04-17

## 2023-04-17 RX ORDER — LORATADINE 10 MG/1
10 TABLET ORAL DAILY
Qty: 90 TABLET | Refills: 1 | Status: CANCELLED | OUTPATIENT
Start: 2023-04-17

## 2023-04-17 RX ORDER — SILODOSIN 8 MG/1
8 CAPSULE ORAL DAILY
Qty: 90 CAPSULE | Refills: 1 | Status: SHIPPED | OUTPATIENT
Start: 2023-04-17 | End: 2024-02-20

## 2023-04-17 RX ORDER — PREDNISONE 5 MG/1
5-10 TABLET ORAL DAILY PRN
Qty: 90 TABLET | Refills: 1 | Status: SHIPPED | OUTPATIENT
Start: 2023-04-17

## 2023-04-17 RX ORDER — LEVOTHYROXINE SODIUM 137 UG/1
137 TABLET ORAL
Qty: 90 TABLET | Refills: 1 | Status: SHIPPED | OUTPATIENT
Start: 2023-04-17 | End: 2023-05-22

## 2023-04-17 RX ORDER — LISINOPRIL 20 MG/1
20 TABLET ORAL DAILY
Qty: 90 TABLET | Refills: 1 | Status: SHIPPED | OUTPATIENT
Start: 2023-04-17 | End: 2023-05-22

## 2023-04-17 RX ADMIN — METHYLPREDNISOLONE ACETATE 40 MG: 40 INJECTION, SUSPENSION INTRA-ARTICULAR; INTRALESIONAL; INTRAMUSCULAR; SOFT TISSUE at 12:04

## 2023-04-17 RX ADMIN — DEXAMETHASONE SODIUM PHOSPHATE 4 MG: 4 INJECTION, SOLUTION INTRA-ARTICULAR; INTRALESIONAL; INTRAMUSCULAR; INTRAVENOUS; SOFT TISSUE at 12:04

## 2023-04-17 NOTE — PROGRESS NOTES
ROBBIE Li   [facility] [id number] [place] [address]  [phone]      PATIENT NAME: Niranjan Whittaker Jr.  : 1955  DATE: 23  MRN: [id number]      Billing Provider: ROBBIE Li  Level of Service: MI OFFICE/OUTPT VISIT, EST, LEVL IV, 30-39 MIN  Patient PCP Information       Provider PCP Type    ROBBIE Li General            Reason for Visit / Chief Complaint: Numbness (States his feet get numb and tingly. States they also swell. /States he has been seen for this by Kristine Mehta and Dr. Gayle. /Pt had a CMP and CBC 23 Creatine was elevated./Pt has MRI scheduled for Thursday. States dr. Mehta was concerned with his liver and kidney function. //), Back Pain (States he fell on his R side 3 days ago and hurt his back around his ribs and buttocks.  /States he does have chronic back pain. ), and Medication Refill       Update PCP  Update Chief Complaint         History of Present Illness / Problem Focused Workflow     68 year old male presents with complaints of neuropathy pain to feet  He as been treated by neurology and by Dr Mehta  He also is seen by pain treatment   Reports fall about 3 days ago and hurting in right hip  Complaints of needing medication refills today    He has multiple morbidities; see below       Review of Systems     Review of Systems   Constitutional:  Positive for fatigue. Negative for chills.   HENT:  Negative for congestion.    Respiratory:  Negative for cough.    Cardiovascular:  Negative for palpitations.   Gastrointestinal:  Negative for abdominal pain, constipation and diarrhea.   Endocrine: Negative for cold intolerance and heat intolerance.   Musculoskeletal:  Positive for arthralgias and back pain. Negative for gait problem.   Neurological:  Negative for dizziness, weakness and headaches.   Psychiatric/Behavioral:  Negative for agitation and dysphoric mood.      Medical / Social /  Family History     Past Medical History:   Diagnosis Date    Adenomatous polyp of transverse colon 5/12/2022    Diverticula of colon 5/12/2022    FH: total knee replacement     GERD (gastroesophageal reflux disease)     Headache     History of colon polyps 5/12/2022    Hyperlipidemia     Hypertension     Neuropathy     Screening for colon cancer 5/12/2022    Thyroid disease        Past Surgical History:   Procedure Laterality Date    BACK SURGERY      THYROIDECTOMY N/A 8/27/2021    Procedure: THYROIDECTOMY;  Surgeon: Manish Valadez MD;  Location: [facility];  Service: General;  Laterality: N/A;       Social History    reports that he has never smoked. He has never used smokeless tobacco. He reports that he does not currently use alcohol. He reports current drug use. Drugs: Hydrocodone and Oxycodone.    Family History  's family history includes Heart disease in his mother; Hypertension in his father, mother, and sister.    Medications and Allergies     Medications  Outpatient Medications Marked as Taking for the 4/17/23 encounter (Office Visit) with ROBBIE Li   Medication Sig Dispense Refill    HYDROcodone-acetaminophen (NORCO)  mg per tablet Take 1 tablet by mouth every 4 (four) hours as needed for Pain. 15 tablet 0    OXYCONTIN 30 mg TR12 12 hr tablet Take 30 mg by mouth 2 (two) times daily as needed.      [DISCONTINUED] amitriptyline (ELAVIL) 25 MG tablet Take 1 tablet (25 mg total) by mouth every evening. 90 tablet 3    [DISCONTINUED] anastrozole (ARIMIDEX) 1 mg Tab Take 2 mg by mouth every 7 days.      [DISCONTINUED] cetirizine (ZYRTEC) 10 MG tablet Take 1 tablet (10 mg total) by mouth once daily. 90 tablet 1    [DISCONTINUED] DULoxetine (CYMBALTA) 60 MG capsule Take 60 mg by mouth once daily.      [DISCONTINUED] ferrous sulfate (FEOSOL) 325 mg (65 mg iron) Tab tablet Take 325 mg by mouth daily with breakfast.      [DISCONTINUED] hydrOXYchloroQUINE (PLAQUENIL) 200 mg tablet Take 200 mg  by mouth 2 (two) times a day.      [DISCONTINUED] leflunomide (ARAVA) 20 MG Tab Take 1 tablet (20 mg total) by mouth once daily. 90 tablet 1    [DISCONTINUED] levothyroxine (SYNTHROID) 137 MCG Tab tablet Take 1 tablet (137 mcg total) by mouth before breakfast. 90 tablet 1    [DISCONTINUED] lisinopriL (PRINIVIL,ZESTRIL) 20 MG tablet Take 1 tablet (20 mg total) by mouth once daily. 90 tablet 1    [DISCONTINUED] loratadine (CLARITIN) 10 mg tablet Take 1 tablet (10 mg total) by mouth once daily. 90 tablet 1    [DISCONTINUED] methocarbamoL (ROBAXIN) 500 MG Tab Take 1 tablet (500 mg total) by mouth 3 (three) times daily as needed (muscle spasm). 90 tablet 1    [DISCONTINUED] montelukast (SINGULAIR) 10 mg tablet Take 1 tablet (10 mg total) by mouth every evening. 90 tablet 3    [DISCONTINUED] omeprazole (PRILOSEC) 40 MG capsule Take 1 capsule (40 mg total) by mouth once daily. 90 capsule 1    [DISCONTINUED] pravastatin (PRAVACHOL) 40 MG tablet Take 1 tablet (40 mg total) by mouth once daily. 90 tablet 1    [DISCONTINUED] predniSONE (DELTASONE) 5 MG tablet Take 5-10 mg by mouth daily as needed.      [DISCONTINUED] pregabalin (LYRICA) 225 MG Cap Take 1 capsule (225 mg total) by mouth 3 (three) times daily. 270 capsule 3    [DISCONTINUED] promethazine (PHENERGAN) 25 MG tablet Take 25 mg by mouth 2 (two) times daily as needed for Nausea. 2 times daily prn headache no more then 2 days in a week      [DISCONTINUED] silodosin (RAPAFLO) 8 mg Cap capsule Take 8 mg by mouth once daily.      [DISCONTINUED] sulfaSALAzine (AZULFIDINE) 500 mg Tab Take 500 mg by mouth 2 (two) times daily.         Allergies  Review of patient's allergies indicates:  No Known Allergies    Physical Examination     Vitals:    04/17/23 1114   BP: 134/88   Pulse: 99   Resp: 18   Temp: 97.5 °F (36.4 °C)     Physical Exam  Constitutional:       General: He is not in acute distress.  HENT:      Head: Normocephalic.      Nose: Nose normal.      Mouth/Throat:       Mouth: Mucous membranes are moist.   Eyes:      Extraocular Movements: Extraocular movements intact.   Pulmonary:      Effort: Pulmonary effort is normal. No respiratory distress.   Abdominal:      General: Bowel sounds are normal.      Palpations: Abdomen is soft.   Musculoskeletal:         General: Signs of injury (fall at home about 3 days ago) present. Normal range of motion.      Cervical back: Neck supple.   Skin:     General: Skin is warm.   Neurological:      Mental Status: He is alert and oriented to person, place, and time.   Psychiatric:         Behavior: Behavior normal.         Imaging / Labs     No visits with results within 1 Day(s) from this visit.   Latest known visit with results is:   Lab Visit on 04/11/2023   Component Date Value Ref Range Status    Sodium 04/11/2023 139  136 - 145 mmol/L Final    Potassium 04/11/2023 4.4  3.5 - 5.1 mmol/L Final    Chloride 04/11/2023 109 (H)  98 - 107 mmol/L Final    CO2 04/11/2023 28  21 - 32 mmol/L Final    Anion Gap 04/11/2023 6 (L)  7 - 16 mmol/L Final    Glucose 04/11/2023 91  74 - 106 mg/dL Final    BUN 04/11/2023 16  7 - 18 mg/dL Final    Creatinine 04/11/2023 1.55 (H)  0.70 - 1.30 mg/dL Final    BUN/Creatinine Ratio 04/11/2023 10  6 - 20 Final    Calcium 04/11/2023 9.0  8.5 - 10.1 mg/dL Final    Total Protein 04/11/2023 7.7  6.4 - 8.2 g/dL Final    Albumin 04/11/2023 3.6  3.5 - 5.0 g/dL Final    Globulin 04/11/2023 4.1 (H)  2.0 - 4.0 g/dL Final    A/G Ratio 04/11/2023 0.9   Final    Bilirubin, Total 04/11/2023 0.4  >0.0 - 1.2 mg/dL Final    Alk Phos 04/11/2023 99  45 - 115 U/L Final    ALT 04/11/2023 35  16 - 61 U/L Final    AST 04/11/2023 31  15 - 37 U/L Final    eGFR 04/11/2023 49 (L)  >=60 mL/min/1.73m² Final    WBC 04/11/2023 8.33  4.50 - 11.00 K/uL Final    RBC 04/11/2023 4.38 (L)  4.60 - 6.20 M/uL Final    Hemoglobin 04/11/2023 12.9 (L)  13.5 - 18.0 g/dL Final    Hematocrit 04/11/2023 41.8  40.0 - 54.0 % Final    MCV 04/11/2023 95.4  80.0 - 96.0 fL  Final    MCH 04/11/2023 29.5  27.0 - 31.0 pg Final    MCHC 04/11/2023 30.9 (L)  32.0 - 36.0 g/dL Final    RDW 04/11/2023 13.6  11.5 - 14.5 % Final    Platelet Count 04/11/2023 357  150 - 400 K/uL Final    MPV 04/11/2023 10.6  9.4 - 12.4 fL Final    Neutrophils % 04/11/2023 75.9 (H)  53.0 - 65.0 % Final    Lymphocytes % 04/11/2023 14.2 (L)  27.0 - 41.0 % Final    Monocytes % 04/11/2023 7.1 (H)  2.0 - 6.0 % Final    Eosinophils % 04/11/2023 1.2  1.0 - 4.0 % Final    Basophils % 04/11/2023 1.1 (H)  0.0 - 1.0 % Final    Immature Granulocytes % 04/11/2023 0.5 (H)  0.0 - 0.4 % Final    nRBC, Auto 04/11/2023 0.0  <=0.0 % Final    Neutrophils, Abs 04/11/2023 6.33  1.80 - 7.70 K/uL Final    Lymphocytes, Absolute 04/11/2023 1.18  1.00 - 4.80 K/uL Final    Monocytes, Absolute 04/11/2023 0.59  0.00 - 0.80 K/uL Final    Eosinophils, Absolute 04/11/2023 0.10  0.00 - 0.50 K/uL Final    Basophils, Absolute 04/11/2023 0.09  0.00 - 0.20 K/uL Final    Immature Granulocytes, Absolute 04/11/2023 0.04  0.00 - 0.04 K/uL Final    nRBC, Absolute 04/11/2023 0.00  <=0.00 x10e3/uL Final    Diff Type 04/11/2023 Auto   Final     NM Hepatobiliary (HIDA) W Pharm and EF When Performed  Narrative: EXAMINATION:  NM HEPATOBILIARY(HIDA) WITH PHARM AND EF WHEN PERFORMED    CLINICAL HISTORY:  RUQ abdominal pain, US nondiagnostic;.  Unspecified abdominal pain    COMPARISON:  No previous similar    TECHNIQUE:  Following the IV administration of 5 mCi technetium 99m Choletec, images were obtained over the abdomen for 60 minutes.    Following routine dynamic imaging for 60 minutes, a gallbladder ejection fraction was calculated. Counts were measured over the gallbladder for 60 minutes after the ingestion of boost plus drink.    FINDINGS:  There is prompt homogeneous uptake of the radiotracer by the liver. There is visualization of the gallbladder at 8 minutes. There is visualization of duodenal activity at 26 minutes.    The gallbladder ejection fraction  measures 90 %, which is within normal limits.  Impression: Normal study, to include gallbladder ejection fraction of 90%    Electronically signed by: Yaw Bah  Date:    04/26/2023  Time:    12:01      Assessment and Plan (including Health Maintenance)      Problem List  Smart Sets  Document Outside HM   :    Health Maintenance Due   Topic Date Due    TETANUS VACCINE  Never done       Problem List Items Addressed This Visit          Neuro    Chronic pain    Current Assessment & Plan     He is followed by pain treatment           Degeneration of lumbar intervertebral disc    Relevant Medications    predniSONE (DELTASONE) 5 MG tablet    sulfaSALAzine (AZULFIDINE) 500 mg Tab    Neuralgia and neuritis    Relevant Medications    DULoxetine (CYMBALTA) 60 MG capsule    pregabalin (LYRICA) 225 MG Cap       ENT    Allergic rhinitis    Relevant Medications    montelukast (SINGULAIR) 10 mg tablet       Cardiac/Vascular    Primary hypertension    Current Assessment & Plan     Refill medications  Labs next visit    Follow up 3 mo           Relevant Medications    ferrous sulfate (FEOSOL) 325 mg (65 mg iron) Tab tablet    lisinopriL (PRINIVIL,ZESTRIL) 20 MG tablet    Mixed hyperlipidemia    Current Assessment & Plan     Refill meds today  Will get labs 3 mo           Relevant Medications    pravastatin (PRAVACHOL) 40 MG tablet       Renal/    Urinary retention    Relevant Medications    silodosin (RAPAFLO) 8 mg Cap capsule       Endocrine    Multiple thyroid nodules    Relevant Medications    anastrozole (ARIMIDEX) 1 mg Tab    hydrOXYchloroQUINE (PLAQUENIL) 200 mg tablet    leflunomide (ARAVA) 20 MG Tab    levothyroxine (SYNTHROID) 137 MCG Tab tablet    promethazine (PHENERGAN) 25 MG tablet       GI    Gastroesophageal reflux disease without esophagitis    Relevant Medications    omeprazole (PRILOSEC) 40 MG capsule       Orthopedic    Right hip pain    Relevant Medications    predniSONE (DELTASONE) 5 MG tablet    Fall -  Primary    Current Assessment & Plan     X-rays today  Depomedrol, decadron IM today  Continue to follow up with pain treament            Relevant Orders    X-Ray Hip 2 or 3 views Right (with Pelvis when performed) (Completed)    X-Ray Ribs 2 View Right (Completed)     Other Visit Diagnoses       Bilateral foot pain        Relevant Medications    amitriptyline (ELAVIL) 25 MG tablet            Health Maintenance Topics with due status: Not Due       Topic Last Completion Date    Colorectal Cancer Screening 05/12/2022    Lipid Panel 11/15/2022    Hemoglobin A1c (Prediabetes) 12/07/2022       Future Appointments   Date Time Provider Department Center   5/9/2023  8:45 AM ROBBIE Nunez [facility]          Signature:  ROBBIE Li [facility] [address] [place] [address]  [phone]    Date of encounter: 4/17/23

## 2023-04-20 ENCOUNTER — TELEPHONE (OUTPATIENT)
Dept: GASTROENTEROLOGY | Facility: CLINIC | Age: 68
End: 2023-04-20
Payer: MEDICARE

## 2023-04-20 NOTE — TELEPHONE ENCOUNTER
Results called to patient. Verbalized understanding.          ----- Message from ROBBIE Nunez sent at 4/20/2023 10:36 AM CDT -----  MRI shows benign hemangioma. No other acute findings.

## 2023-04-20 NOTE — TELEPHONE ENCOUNTER
Attempted to call results. Left message.                ----- Message from ROBBIE Nunez sent at 4/20/2023 10:36 AM CDT -----  MRI shows benign hemangioma. No other acute findings.

## 2023-04-24 DIAGNOSIS — M79.10 MUSCLE PAIN: Primary | ICD-10-CM

## 2023-04-24 RX ORDER — TIZANIDINE 4 MG/1
4 TABLET ORAL EVERY 6 HOURS PRN
Qty: 30 TABLET | Refills: 0 | Status: SHIPPED | OUTPATIENT
Start: 2023-04-24 | End: 2023-05-04

## 2023-04-26 ENCOUNTER — HOSPITAL ENCOUNTER (OUTPATIENT)
Dept: RADIOLOGY | Facility: HOSPITAL | Age: 68
Discharge: HOME | End: 2023-04-26
Attending: NURSE PRACTITIONER
Payer: MEDICARE

## 2023-04-26 ENCOUNTER — TELEPHONE (OUTPATIENT)
Dept: GASTROENTEROLOGY | Facility: CLINIC | Age: 68
End: 2023-04-26
Payer: MEDICARE

## 2023-04-26 DIAGNOSIS — R10.9 ABDOMINAL PAIN, UNSPECIFIED ABDOMINAL LOCATION: ICD-10-CM

## 2023-04-26 DIAGNOSIS — K82.8 SLUDGE IN GALLBLADDER: ICD-10-CM

## 2023-04-26 PROCEDURE — 78227 NM HEPATOBILIARY(HIDA) WITH PHARM AND EF WHEN PERFORMED: ICD-10-PCS | Mod: 26,,, | Performed by: RADIOLOGY

## 2023-04-26 PROCEDURE — 78227 HEPATOBIL SYST IMAGE W/DRUG: CPT | Mod: TC

## 2023-04-26 PROCEDURE — 78227 HEPATOBIL SYST IMAGE W/DRUG: CPT | Mod: 26,,, | Performed by: RADIOLOGY

## 2023-04-26 NOTE — TELEPHONE ENCOUNTER
Results called to patient. Verbalized understanding.          ----- Message from ROBBIE Nunez sent at 4/26/2023 12:10 PM CDT -----  Gallbladder function is normal.   [FreeTextEntry1] : This 10-year-old female is here for evaluation of a 2-week history of left anterior hip and thigh pain without history of trauma.  The patient has had no systemic symptomatology until 2 days ago when she did have a 100.5 degree temperature.  She has had significant difficulty with weightbearing and she has been using crutches.  The patient has been eating normally and has had no systemic symptomatology.  There has been no history of tick or insect bite.  Of interest is that the entire family has recently had COVID.  This child has not been tested for COVID.  It is of interest that there is a strong family history of rheumatoid arthritis.

## 2023-04-30 ENCOUNTER — EXTERNAL CHRONIC CARE MANAGEMENT (OUTPATIENT)
Dept: FAMILY MEDICINE | Facility: CLINIC | Age: 68
End: 2023-04-30
Payer: MEDICARE

## 2023-04-30 PROCEDURE — G0511 CCM/BHI BY RHC/FQHC 20MIN MO: HCPCS | Mod: ,,, | Performed by: NURSE PRACTITIONER

## 2023-04-30 PROCEDURE — G0511 PR CHRONIC CARE MGMT, RHC OR FQHC ONLY, 20 MINS OR MORE: ICD-10-PCS | Mod: ,,, | Performed by: NURSE PRACTITIONER

## 2023-05-04 RX ORDER — LORATADINE 10 MG/1
TABLET ORAL
Qty: 90 TABLET | Refills: 1 | Status: SHIPPED | OUTPATIENT
Start: 2023-05-04 | End: 2023-12-06

## 2023-05-09 ENCOUNTER — TELEPHONE (OUTPATIENT)
Dept: GASTROENTEROLOGY | Facility: CLINIC | Age: 68
End: 2023-05-09
Payer: MEDICARE

## 2023-05-09 ENCOUNTER — HOSPITAL ENCOUNTER (OUTPATIENT)
Dept: RADIOLOGY | Facility: HOSPITAL | Age: 68
Discharge: HOME | End: 2023-05-09
Attending: NURSE PRACTITIONER
Payer: MEDICARE

## 2023-05-09 ENCOUNTER — OFFICE VISIT (OUTPATIENT)
Dept: GASTROENTEROLOGY | Facility: CLINIC | Age: 68
End: 2023-05-09
Payer: MEDICARE

## 2023-05-09 VITALS
SYSTOLIC BLOOD PRESSURE: 122 MMHG | DIASTOLIC BLOOD PRESSURE: 74 MMHG | HEART RATE: 97 BPM | HEIGHT: 74 IN | OXYGEN SATURATION: 93 % | BODY MASS INDEX: 30.44 KG/M2 | WEIGHT: 237.19 LBS

## 2023-05-09 DIAGNOSIS — R10.9 ABDOMINAL PAIN, UNSPECIFIED ABDOMINAL LOCATION: ICD-10-CM

## 2023-05-09 DIAGNOSIS — D18.03 LIVER HEMANGIOMA: Primary | ICD-10-CM

## 2023-05-09 DIAGNOSIS — K59.00 CONSTIPATION, UNSPECIFIED CONSTIPATION TYPE: ICD-10-CM

## 2023-05-09 PROCEDURE — 74018 XR KUB: ICD-10-PCS | Mod: 26,,, | Performed by: RADIOLOGY

## 2023-05-09 PROCEDURE — 74018 RADEX ABDOMEN 1 VIEW: CPT | Mod: 26,,, | Performed by: RADIOLOGY

## 2023-05-09 PROCEDURE — 99214 OFFICE O/P EST MOD 30 MIN: CPT | Mod: S$PBB,,, | Performed by: NURSE PRACTITIONER

## 2023-05-09 PROCEDURE — 99214 PR OFFICE/OUTPT VISIT, EST, LEVL IV, 30-39 MIN: ICD-10-PCS | Mod: S$PBB,,, | Performed by: NURSE PRACTITIONER

## 2023-05-09 PROCEDURE — 99215 OFFICE O/P EST HI 40 MIN: CPT | Mod: PBBFAC | Performed by: NURSE PRACTITIONER

## 2023-05-09 PROCEDURE — 74018 RADEX ABDOMEN 1 VIEW: CPT | Mod: TC

## 2023-05-09 NOTE — PROGRESS NOTES
Niranjan Whittaker Jr. is a 68 y.o. male here for No chief complaint on file.        PCP: Latha Alarcon  Referring Provider: No referring provider defined for this encounter.     HPI:  Presents for follow-up after MRI abdomen.  MRI abdomen 4/20, report reviewed, 2.4 cm hemangioma in the liver noted.  No other acute findings.  Report was reviewed with the patient.  He did also have a HIDA scan which was normal.  Last EGD 12/05/2022, minimal gastritis was noted.  He does report abdominal bloating and constipation today.  He reports that he is previously failed lactulose syrup and also MiraLax powder.  He is also failed increase fiber supplements.  Last colonoscopy was 05/12/2022, tubular adenoma was removed.  States that he is only having a bowel movement approximately 2 times per week.  Denies hematochezia or melena.        ROS:  Review of Systems   Constitutional:  Negative for activity change, appetite change, fatigue, fever and unexpected weight change.   HENT:  Negative for trouble swallowing.    Respiratory:  Negative for cough.    Cardiovascular:  Negative for chest pain.   Gastrointestinal:  Positive for abdominal pain and constipation. Negative for abdominal distention, blood in stool, change in bowel habit, diarrhea, nausea, vomiting, reflux and change in bowel habit.   Musculoskeletal:  Negative for gait problem.   Integumentary:  Negative for color change.   Psychiatric/Behavioral:  Negative for sleep disturbance. The patient is not nervous/anxious.         PMHX:  has a past medical history of Adenomatous polyp of transverse colon (5/12/2022), Diverticula of colon (5/12/2022), FH: total knee replacement, GERD (gastroesophageal reflux disease), Headache, History of colon polyps (5/12/2022), Hyperlipidemia, Hypertension, Neuropathy, Screening for colon cancer (5/12/2022), and Thyroid disease.    PSHX:  has a past surgical history that includes Back surgery and Thyroidectomy (N/A, 8/27/2021).    PFHX:  family history includes Heart disease in his mother; Hypertension in his father, mother, and sister.    PSlHX:  reports that he has never smoked. He has never used smokeless tobacco. He reports that he does not currently use alcohol. He reports current drug use. Drugs: Hydrocodone and Oxycodone.        Review of patient's allergies indicates:  No Known Allergies    Medication List with Changes/Refills   New Medications    LINACLOTIDE (LINZESS) 72 MCG CAP CAPSULE    Take 1 capsule (72 mcg total) by mouth before breakfast.   Current Medications    AMITRIPTYLINE (ELAVIL) 25 MG TABLET    Take 1 tablet (25 mg total) by mouth every evening.    ANASTROZOLE (ARIMIDEX) 1 MG TAB    Take 2 tablets (2 mg total) by mouth every 7 days.    DULOXETINE (CYMBALTA) 60 MG CAPSULE    Take 1 capsule (60 mg total) by mouth once daily.    FERROUS SULFATE (FEOSOL) 325 MG (65 MG IRON) TAB TABLET    Take 1 tablet (325 mg total) by mouth daily with breakfast.    HYDROCODONE-ACETAMINOPHEN (NORCO)  MG PER TABLET    Take 1 tablet by mouth every 4 (four) hours as needed for Pain.    HYDROXYCHLOROQUINE (PLAQUENIL) 200 MG TABLET    Take 1 tablet (200 mg total) by mouth 2 (two) times a day.    LEFLUNOMIDE (ARAVA) 20 MG TAB    Take 1 tablet (20 mg total) by mouth once daily.    LEVOTHYROXINE (SYNTHROID) 137 MCG TAB TABLET    Take 1 tablet (137 mcg total) by mouth before breakfast.    LISINOPRIL (PRINIVIL,ZESTRIL) 20 MG TABLET    Take 1 tablet (20 mg total) by mouth once daily.    LORATADINE (CLARITIN) 10 MG TABLET    TAKE 1 TABLET BY MOUTH DAILY    MONTELUKAST (SINGULAIR) 10 MG TABLET    Take 1 tablet (10 mg total) by mouth every evening.    OMEPRAZOLE (PRILOSEC) 40 MG CAPSULE    Take 1 capsule (40 mg total) by mouth once daily.    OXYCONTIN 30 MG TR12 12 HR TABLET    Take 30 mg by mouth 2 (two) times daily as needed.    PRAVASTATIN (PRAVACHOL) 40 MG TABLET    Take 1 tablet (40 mg total) by mouth once daily.    PREDNISONE (DELTASONE) 5 MG TABLET  "   Take 1-2 tablets (5-10 mg total) by mouth daily as needed (As needed).    PREGABALIN (LYRICA) 225 MG CAP    Take 1 capsule (225 mg total) by mouth 3 (three) times daily.    PROMETHAZINE (PHENERGAN) 25 MG TABLET    Take 1 tablet (25 mg total) by mouth 2 (two) times daily as needed for Nausea. 2 times daily prn headache no more then 2 days in a week    SILODOSIN (RAPAFLO) 8 MG CAP CAPSULE    Take 1 capsule (8 mg total) by mouth once daily.    SULFASALAZINE (AZULFIDINE) 500 MG TAB    Take 1 tablet (500 mg total) by mouth 2 (two) times daily.        Objective Findings:  Vital Signs:  /74   Pulse 97   Ht 6' 2" (1.88 m)   Wt 107.6 kg (237 lb 3.2 oz)   SpO2 (!) 93%   BMI 30.45 kg/m²  Body mass index is 30.45 kg/m².    Physical Exam:  Physical Exam  Vitals and nursing note reviewed.   Constitutional:       General: He is not in acute distress.     Appearance: Normal appearance.   HENT:      Mouth/Throat:      Mouth: Mucous membranes are moist.   Cardiovascular:      Rate and Rhythm: Normal rate.   Pulmonary:      Effort: Pulmonary effort is normal.      Breath sounds: No wheezing, rhonchi or rales.   Abdominal:      General: Bowel sounds are normal. There is no distension.      Palpations: Abdomen is soft. There is no mass.      Tenderness: There is no abdominal tenderness. There is no guarding.   Skin:     General: Skin is warm and dry.      Coloration: Skin is not jaundiced or pale.   Neurological:      Mental Status: He is alert and oriented to person, place, and time.   Psychiatric:         Mood and Affect: Mood normal.        Labs:  Lab Results   Component Value Date    WBC 8.33 04/11/2023    HGB 12.9 (L) 04/11/2023    HCT 41.8 04/11/2023    MCV 95.4 04/11/2023    RDW 13.6 04/11/2023     04/11/2023    LYMPH 14.2 (L) 04/11/2023    LYMPH 1.18 04/11/2023    MONO 7.1 (H) 04/11/2023    EOS 0.10 04/11/2023    BASO 0.09 04/11/2023     Lab Results   Component Value Date     04/11/2023    K 4.4 " 04/11/2023     (H) 04/11/2023    CO2 28 04/11/2023    GLU 91 04/11/2023    BUN 16 04/11/2023    CREATININE 1.55 (H) 04/11/2023    CALCIUM 9.0 04/11/2023    PROT 7.7 04/11/2023    ALBUMIN 3.6 04/11/2023    BILITOT 0.4 04/11/2023    ALKPHOS 99 04/11/2023    AST 31 04/11/2023    ALT 35 04/11/2023         Imaging: X-Ray Ribs 2 View Right    Result Date: 4/17/2023  EXAMINATION: XR RIBS 2 VIEW RIGHT CLINICAL HISTORY: Unspecified fall, initial encounter TECHNIQUE: Right ribs, 2 oblique views COMPARISON: None. FINDINGS: No rib fractures are seen.  The right lung is well expanded, with no evidence of an effusion or pneumothorax.  Mild degenerative changes are seen in the midthoracic spine.     No fractures are seen on the two view study. Place of service: [facility] Electronically signed by: Aislinn Harry Date:    04/17/2023 Time:    12:12    X-Ray Hip 2 or 3 views Right (with Pelvis when performed)    Result Date: 4/17/2023  EXAMINATION: XR HIP WITH PELVIS WHEN PERFORMED, 2 OR 3  VIEWS RIGHT CLINICAL HISTORY: Unspecified fall, initial encounter TECHNIQUE: Right hip with AP pelvis, three views: COMPARISON: None. FINDINGS: No fractures are seen in the right hip or elsewhere in the bony pelvis.  SI joints are normal.  Degenerative changes are partially visualized in the lower lumbar spine.     No evidence of a fracture. Place of service: [facility] Electronically signed by: Aislinn Harry Date:    04/17/2023 Time:    12:13    MRI Abdomen W WO Contrast    Result Date: 4/20/2023  EXAMINATION: MRI ABDOMEN W WO CONTRAST CLINICAL HISTORY: Liver disease, unspecifiedSolid liver lesion. Radiologist recommends the MRI for clarification and diagnosis; COMPARISON: Abdominal ultrasound December 22, 2022 TECHNIQUE: Multiplanar multisequence magnetic resonance imaging of the abdomen was performed before and after the administration of 20 cc MultiHance intravenous contrast.  MRCP performed and 3D reformats  provided. FINDINGS: T2 hyperintense lesion is demonstrated within the right lobe of liver measuring up to 2.4 cm in axial dimension which demonstrates peripheral nodular enhancement suggestive of hemangioma.  Visualized gallbladder grossly unremarkable.  Visualized pancreas appears unremarkable.  No abnormal intrahepatic or extrahepatic ductal dilatation.  Spleen grossly unremarkable.  Bilateral adrenal glands grossly unremarkable.  Tiny subcentimeter simple appearing cyst within the inferior pole of the right kidney.  No evidence of hydronephrosis.  No evidence of gastrointestinal obstruction.  Scattered skeletal degenerative change.  Study mildly motion limited.     No acute findings.  2.4 cm hemangioma within the right hepatic lobe.  Other/detailed findings as above. Point of Service: [facility] Electronically signed by: Bradford Kaur Date:    04/20/2023 Time:    10:09    NM Hepatobiliary (HIDA) W Pharm and EF When Performed    Result Date: 4/26/2023  EXAMINATION: NM HEPATOBILIARY(HIDA) WITH PHARM AND EF WHEN PERFORMED CLINICAL HISTORY: RUQ abdominal pain, US nondiagnostic;.  Unspecified abdominal pain COMPARISON: No previous similar TECHNIQUE: Following the IV administration of 5 mCi technetium 99m Choletec, images were obtained over the abdomen for 60 minutes. Following routine dynamic imaging for 60 minutes, a gallbladder ejection fraction was calculated. Counts were measured over the gallbladder for 60 minutes after the ingestion of boost plus drink. FINDINGS: There is prompt homogeneous uptake of the radiotracer by the liver. There is visualization of the gallbladder at 8 minutes. There is visualization of duodenal activity at 26 minutes. The gallbladder ejection fraction measures 90 %, which is within normal limits.     Normal study, to include gallbladder ejection fraction of 90% Electronically signed by: Yaw Bah Date:    04/26/2023 Time:    12:01        Assessment:  Niranjan Diaz  Panfilo KenneyLauryn is a 68 y.o. male here with:  1. Constipation, unspecified constipation type    2. Abdominal pain, unspecified abdominal location          Recommendations:  1. Consider repeat liver ultrasound in 6 months  2. KUB today  3. Linzess 72 mcg daily  4. Increase water intake to 64 ounces daily    Follow up in about 6 weeks (around 6/20/2023).      Order summary:  Orders Placed This Encounter    X-Ray KUB    linaCLOtide (LINZESS) 72 mcg Cap capsule       Thank you for allowing me to participate in the care of Niranjan Whittaker .      Natividad Mims, [facility]

## 2023-05-09 NOTE — TELEPHONE ENCOUNTER
Results and recommendation called to patient. Verbalized understanding.                ----- Message from ROBBIE Nunez sent at 5/9/2023  3:53 PM CDT -----  He is constipated. Needs MOM today. I prescribed Linzess

## 2023-05-16 DIAGNOSIS — E04.2 MULTIPLE THYROID NODULES: ICD-10-CM

## 2023-05-16 DIAGNOSIS — I10 PRIMARY HYPERTENSION: ICD-10-CM

## 2023-05-16 DIAGNOSIS — J30.9 ALLERGIC RHINITIS, UNSPECIFIED SEASONALITY, UNSPECIFIED TRIGGER: ICD-10-CM

## 2023-05-22 RX ORDER — LISINOPRIL 20 MG/1
20 TABLET ORAL DAILY
Qty: 30 TABLET | Refills: 0 | Status: SHIPPED | OUTPATIENT
Start: 2023-05-22 | End: 2023-06-30 | Stop reason: SDUPTHER

## 2023-05-22 RX ORDER — LEVOTHYROXINE SODIUM 137 UG/1
TABLET ORAL
Qty: 90 TABLET | Refills: 1 | Status: SHIPPED | OUTPATIENT
Start: 2023-05-22 | End: 2023-06-30 | Stop reason: SDUPTHER

## 2023-05-22 RX ORDER — MONTELUKAST SODIUM 10 MG/1
10 TABLET ORAL NIGHTLY
Qty: 30 TABLET | Refills: 0 | Status: SHIPPED | OUTPATIENT
Start: 2023-05-22 | End: 2023-12-06 | Stop reason: SDUPTHER

## 2023-05-31 ENCOUNTER — EXTERNAL CHRONIC CARE MANAGEMENT (OUTPATIENT)
Dept: FAMILY MEDICINE | Facility: CLINIC | Age: 68
End: 2023-05-31
Payer: MEDICARE

## 2023-05-31 PROCEDURE — G0511 PR CHRONIC CARE MGMT, RHC OR FQHC ONLY, 20 MINS OR MORE: ICD-10-PCS | Mod: ,,, | Performed by: NURSE PRACTITIONER

## 2023-05-31 PROCEDURE — G0511 CCM/BHI BY RHC/FQHC 20MIN MO: HCPCS | Mod: ,,, | Performed by: NURSE PRACTITIONER

## 2023-06-01 ENCOUNTER — CLINICAL SUPPORT (OUTPATIENT)
Dept: RESPIRATORY THERAPY | Facility: HOSPITAL | Age: 68
End: 2023-06-01
Attending: STUDENT IN AN ORGANIZED HEALTH CARE EDUCATION/TRAINING PROGRAM
Payer: MEDICARE

## 2023-06-01 ENCOUNTER — OFFICE VISIT (OUTPATIENT)
Dept: FAMILY MEDICINE | Facility: CLINIC | Age: 68
End: 2023-06-01
Payer: MEDICARE

## 2023-06-01 VITALS
TEMPERATURE: 97 F | DIASTOLIC BLOOD PRESSURE: 70 MMHG | WEIGHT: 230.38 LBS | HEIGHT: 74 IN | RESPIRATION RATE: 18 BRPM | OXYGEN SATURATION: 95 % | BODY MASS INDEX: 29.57 KG/M2 | HEART RATE: 97 BPM | SYSTOLIC BLOOD PRESSURE: 120 MMHG

## 2023-06-01 DIAGNOSIS — R06.09 DOE (DYSPNEA ON EXERTION): ICD-10-CM

## 2023-06-01 DIAGNOSIS — R60.0 BILATERAL LOWER EXTREMITY EDEMA: Primary | ICD-10-CM

## 2023-06-01 DIAGNOSIS — R60.0 BILATERAL LOWER EXTREMITY EDEMA: ICD-10-CM

## 2023-06-01 DIAGNOSIS — N18.32 CHRONIC KIDNEY DISEASE, STAGE 3B: ICD-10-CM

## 2023-06-01 DIAGNOSIS — R07.9 CHEST PAIN, UNSPECIFIED TYPE: ICD-10-CM

## 2023-06-01 DIAGNOSIS — G89.4 CHRONIC PAIN SYNDROME: ICD-10-CM

## 2023-06-01 PROBLEM — G47.39 TREATMENT-EMERGENT CENTRAL SLEEP APNEA: Status: ACTIVE | Noted: 2023-06-01

## 2023-06-01 LAB
ALBUMIN SERPL BCP-MCNC: 3.5 G/DL (ref 3.5–5)
ALBUMIN/GLOB SERPL: 1 {RATIO}
ALP SERPL-CCNC: 110 U/L (ref 45–115)
ALT SERPL W P-5'-P-CCNC: 56 U/L (ref 16–61)
ANION GAP SERPL CALCULATED.3IONS-SCNC: 8 MMOL/L (ref 7–16)
AST SERPL W P-5'-P-CCNC: 39 U/L (ref 15–37)
BILIRUB SERPL-MCNC: 0.3 MG/DL (ref ?–1.2)
BUN SERPL-MCNC: 23 MG/DL (ref 7–18)
BUN/CREAT SERPL: 13 (ref 6–20)
CALCIUM SERPL-MCNC: 9.2 MG/DL (ref 8.5–10.1)
CHLORIDE SERPL-SCNC: 108 MMOL/L (ref 98–107)
CO2 SERPL-SCNC: 29 MMOL/L (ref 21–32)
CREAT SERPL-MCNC: 1.73 MG/DL (ref 0.7–1.3)
EGFR (NO RACE VARIABLE) (RUSH/TITUS): 43 ML/MIN/1.73M2
GLOBULIN SER-MCNC: 3.6 G/DL (ref 2–4)
GLUCOSE SERPL-MCNC: 104 MG/DL (ref 74–106)
NT-PROBNP SERPL-MCNC: 14 PG/ML (ref 1–125)
POTASSIUM SERPL-SCNC: 4.7 MMOL/L (ref 3.5–5.1)
PROT SERPL-MCNC: 7.1 G/DL (ref 6.4–8.2)
SODIUM SERPL-SCNC: 140 MMOL/L (ref 136–145)
TSH SERPL DL<=0.005 MIU/L-ACNC: 3.99 UIU/ML (ref 0.36–3.74)

## 2023-06-01 PROCEDURE — 83880 NT-PRO NATRIURETIC PEPTIDE: ICD-10-PCS | Mod: ,,, | Performed by: CLINICAL MEDICAL LABORATORY

## 2023-06-01 PROCEDURE — 84443 TSH: ICD-10-PCS | Mod: ,,, | Performed by: CLINICAL MEDICAL LABORATORY

## 2023-06-01 PROCEDURE — 93005 ELECTROCARDIOGRAM TRACING: CPT

## 2023-06-01 PROCEDURE — 99214 PR OFFICE/OUTPT VISIT, EST, LEVL IV, 30-39 MIN: ICD-10-PCS | Mod: ,,, | Performed by: STUDENT IN AN ORGANIZED HEALTH CARE EDUCATION/TRAINING PROGRAM

## 2023-06-01 PROCEDURE — 83880 ASSAY OF NATRIURETIC PEPTIDE: CPT | Mod: ,,, | Performed by: CLINICAL MEDICAL LABORATORY

## 2023-06-01 PROCEDURE — 80053 COMPREHEN METABOLIC PANEL: CPT | Mod: ,,, | Performed by: CLINICAL MEDICAL LABORATORY

## 2023-06-01 PROCEDURE — 80053 COMPREHENSIVE METABOLIC PANEL: ICD-10-PCS | Mod: ,,, | Performed by: CLINICAL MEDICAL LABORATORY

## 2023-06-01 PROCEDURE — 93010 ELECTROCARDIOGRAM REPORT: CPT | Mod: ,,, | Performed by: INTERNAL MEDICINE

## 2023-06-01 PROCEDURE — 93010 EKG 12-LEAD: ICD-10-PCS | Mod: ,,, | Performed by: INTERNAL MEDICINE

## 2023-06-01 PROCEDURE — 99214 OFFICE O/P EST MOD 30 MIN: CPT | Mod: ,,, | Performed by: STUDENT IN AN ORGANIZED HEALTH CARE EDUCATION/TRAINING PROGRAM

## 2023-06-01 PROCEDURE — 84443 ASSAY THYROID STIM HORMONE: CPT | Mod: ,,, | Performed by: CLINICAL MEDICAL LABORATORY

## 2023-06-01 RX ORDER — FLUTICASONE PROPIONATE 50 MCG
1 SPRAY, SUSPENSION (ML) NASAL
COMMUNITY
Start: 2022-12-22 | End: 2023-06-30 | Stop reason: SDUPTHER

## 2023-06-01 RX ORDER — TIZANIDINE 4 MG/1
4 TABLET ORAL DAILY
Qty: 90 TABLET | Refills: 0 | Status: SHIPPED | OUTPATIENT
Start: 2023-06-01 | End: 2023-08-14

## 2023-06-01 RX ORDER — CETIRIZINE HYDROCHLORIDE 10 MG/1
10 TABLET ORAL
COMMUNITY
Start: 2023-04-17

## 2023-06-01 RX ORDER — METHOCARBAMOL 500 MG/1
500 TABLET, FILM COATED ORAL 2 TIMES DAILY
Qty: 180 TABLET | Refills: 0 | Status: SHIPPED | OUTPATIENT
Start: 2023-06-01 | End: 2023-11-22

## 2023-06-01 RX ORDER — METHOCARBAMOL 500 MG/1
TABLET, FILM COATED ORAL
COMMUNITY
End: 2023-06-01 | Stop reason: SDUPTHER

## 2023-06-01 RX ORDER — TAMSULOSIN HYDROCHLORIDE 0.4 MG/1
CAPSULE ORAL
COMMUNITY
End: 2023-08-03

## 2023-06-01 NOTE — PROGRESS NOTES
"Progress Note      Foot Swelling (Pt stated he saw a foot Dr and he stated he couldn't find anything/Pt stated both foot been swelling for mths/Pt stated it hurts to stand on them/Pt stated he never had a Xray)      SUBJECTIVE:     Niranjan Whittaker Jr. is a 68 y.o. male who presents to clinic today for recurrent lower extremity edema/pain. Patient notes onset of LE edema last March. Patient had bilateral venous duplex and dopplers/ABIs performed in April 2022. Both were normal. Patient has had persistent symptoms since that time. Patient notes the swelling occurs almost daily. It does not necessarily improve at night or worsen with prolonged standing. Patient has some SOB with exertion. He has occasional left sided chest pain he describes as a "grabbing" pain. He notes onset of that pain before Sim. He is pain free today.  He denies orthopnea. Patient notes that when he has swelling he has associated pain. He has minimal pain today as the swelling is not severe. He has a history of HLD. His mother had a heart attack in her late 60s. He smokes cigars in the past (4-5/month).     Patient is also requesting a refill on his robaxin and zanaflex which he reports he has been on for some time. It is for pain. He reports no adverse side effects with these medications.     OBJECTIVE:     Vital Signs   /70 (BP Location: Right arm, Patient Position: Sitting, BP Method: Medium (Manual))   Pulse 97   Temp 97.1 °F (36.2 °C) (Temporal)   Resp 18   Ht 6' 2" (1.88 m)   Wt 104.5 kg (230 lb 6.4 oz)   SpO2 95%   BMI 29.58 kg/m²     Physical Exam  Constitutional:       Appearance: Normal appearance. He is not toxic-appearing or diaphoretic.   HENT:      Head: Normocephalic and atraumatic.   Eyes:      Extraocular Movements: Extraocular movements intact.      Pupils: Pupils are equal, round, and reactive to light.   Cardiovascular:      Rate and Rhythm: Normal rate and regular rhythm.      Heart sounds: No murmur " heard.    No friction rub. No gallop.   Pulmonary:      Effort: No respiratory distress.      Breath sounds: No wheezing, rhonchi or rales.   Abdominal:      Palpations: Abdomen is soft.      Tenderness: There is no abdominal tenderness.   Musculoskeletal:      Comments: Left and Right LE with minimal swelling. Left ankle with trace edema.    Skin:     General: Skin is warm and dry.      Capillary Refill: Capillary refill takes less than 2 seconds.   Neurological:      General: No focal deficit present.      Mental Status: He is alert.       ASSESSMENT/PLAN:     1. Bilateral lower extremity edema  Patient with intermittent BLE edema. Patient's edema in minimal today. Will obtain CMP, BNP, TSH, and EKG today. Patient will likely benefit from ECHO. Will order pending BNP results.     -     Comprehensive Metabolic Panel; Future; Expected date: 06/01/2023  -     NT-Pro Natriuretic Peptide; Future; Expected date: 06/01/2023  -     TSH; Future; Expected date: 06/01/2023  -     EKG 12-lead; Future    2. KRUEGER (dyspnea on exertion)  Patient is having KRUEGER and intermittent CP. He is pain free today. Will obtain EKG and refer to Cardiology for further evaluation. Patient has family history of CAD. He has smoked in past. He has a history of HTN and HLD.   -     EKG 12-lead; Future  -     Ambulatory referral/consult to Cardiology; Future; Expected date: 06/08/2023    3. Chest pain, unspecified type  -     Ambulatory referral/consult to Cardiology; Future; Expected date: 06/08/2023    4.Chronic Pain Syndrome  Patient has chronic pain. He is requesting refills for Robaxin and Zanaflex. Will provide refills today. Patient has high risk medication use. He notes he is tolerating the medications without difficulty or adverse side effects. Will continue to monitor patient. He would likely benefit from discontinuation/tapering of some of his medications. Will continue to discuss.     Other orders  -     methocarbamoL (ROBAXIN) 500 MG Tab;  Take 1 tablet (500 mg total) by mouth 2 (two) times a day.  Dispense: 180 tablet; Refill: 0  -     tiZANidine (ZANAFLEX) 4 MG tablet; Take 1 tablet (4 mg total) by mouth once daily.  Dispense: 90 tablet; Refill: 0        Follow up in about 4 weeks (around 6/29/2023) for Recheck swelling .      CRISTI ALVA

## 2023-06-20 ENCOUNTER — OFFICE VISIT (OUTPATIENT)
Dept: GASTROENTEROLOGY | Facility: CLINIC | Age: 68
End: 2023-06-20
Payer: MEDICARE

## 2023-06-20 VITALS
DIASTOLIC BLOOD PRESSURE: 83 MMHG | BODY MASS INDEX: 30.42 KG/M2 | WEIGHT: 237 LBS | SYSTOLIC BLOOD PRESSURE: 136 MMHG | HEIGHT: 74 IN

## 2023-06-20 DIAGNOSIS — K59.00 CONSTIPATION, UNSPECIFIED CONSTIPATION TYPE: Primary | ICD-10-CM

## 2023-06-20 DIAGNOSIS — D18.00 HEMANGIOMA, UNSPECIFIED SITE: ICD-10-CM

## 2023-06-20 PROCEDURE — 99214 PR OFFICE/OUTPT VISIT, EST, LEVL IV, 30-39 MIN: ICD-10-PCS | Mod: S$PBB,,, | Performed by: NURSE PRACTITIONER

## 2023-06-20 PROCEDURE — 99214 OFFICE O/P EST MOD 30 MIN: CPT | Mod: S$PBB,,, | Performed by: NURSE PRACTITIONER

## 2023-06-20 PROCEDURE — 99215 OFFICE O/P EST HI 40 MIN: CPT | Mod: PBBFAC | Performed by: NURSE PRACTITIONER

## 2023-06-20 NOTE — PROGRESS NOTES
Niranjan Whittaker Jr. is a 68 y.o. male here for No chief complaint on file.        PCP: CRISTI ALVA  Referring Provider: No referring provider defined for this encounter.     HPI:  Presents for follow-up due to constipation.  Patient was unable to afford Linzess for constipation.  He is continuing to take MiraLax powder.  I did caution him against over taking magnesium products due to chronic kidney disease.  Discussed increasing MiraLax powder as needed for constipation.  We also discussed adding kiwi fruit 2 per day.  No change in appetite.  No weight loss.  No hematochezia or melena.  Last colonoscopy was 5/12/22, report reviewed, tubular adenoma removed.Recommendation to repeat in 5 years. We did discuss that he may take occasional Dulcolax for a rescue laxative.  He denies any hematochezia or melena.  He did have an MRI on 04/11/2023 that did show a liver hemangioma.  Recommendation to repeat liver ultrasound in 6 months.  We will get that scheduled today.  HIDA scan on 04/11 was normal.  Abdominal ultrasound in December of 2022 did show gallbladder sludge.        ROS:  Review of Systems   Constitutional:  Negative for activity change, appetite change, fatigue, fever and unexpected weight change.   HENT:  Negative for trouble swallowing.    Cardiovascular:  Negative for chest pain.   Gastrointestinal:  Positive for constipation. Negative for abdominal distention, abdominal pain, blood in stool, change in bowel habit, diarrhea, nausea, vomiting, reflux and change in bowel habit.   Musculoskeletal:  Negative for gait problem.   Integumentary:  Negative for color change.   Psychiatric/Behavioral:  Negative for sleep disturbance. The patient is not nervous/anxious.         PMHX:  has a past medical history of Adenomatous polyp of transverse colon (5/12/2022), Diverticula of colon (5/12/2022), FH: total knee replacement, GERD (gastroesophageal reflux disease), Headache, History of colon polyps (5/12/2022),  Hyperlipidemia, Hypertension, Neuropathy, Screening for colon cancer (5/12/2022), and Thyroid disease.    PSHX:  has a past surgical history that includes Back surgery and Thyroidectomy (N/A, 8/27/2021).    PFHX: family history includes Heart disease in his mother; Hypertension in his father, mother, and sister.    PSlHX:  reports that he has never smoked. He has never used smokeless tobacco. He reports that he does not currently use alcohol. He reports current drug use. Drugs: Hydrocodone and Oxycodone.        Review of patient's allergies indicates:  No Known Allergies    Medication List with Changes/Refills   Current Medications    AMITRIPTYLINE (ELAVIL) 25 MG TABLET    Take 1 tablet (25 mg total) by mouth every evening.    ANASTROZOLE (ARIMIDEX) 1 MG TAB    Take 2 tablets (2 mg total) by mouth every 7 days.    CETIRIZINE (ZYRTEC) 10 MG TABLET    Take 10 mg by mouth.    DULOXETINE (CYMBALTA) 60 MG CAPSULE    Take 1 capsule (60 mg total) by mouth once daily.    FERROUS SULFATE (FEOSOL) 325 MG (65 MG IRON) TAB TABLET    Take 1 tablet (325 mg total) by mouth daily with breakfast.    FLUTICASONE PROPIONATE (FLONASE) 50 MCG/ACTUATION NASAL SPRAY    1 spray by Nasal route.    HYDROCODONE-ACETAMINOPHEN (NORCO)  MG PER TABLET    Take 1 tablet by mouth every 4 (four) hours as needed for Pain.    HYDROXYCHLOROQUINE (PLAQUENIL) 200 MG TABLET    Take 1 tablet (200 mg total) by mouth 2 (two) times a day.    LEFLUNOMIDE (ARAVA) 20 MG TAB    Take 1 tablet (20 mg total) by mouth once daily.    LEVOTHYROXINE (SYNTHROID) 137 MCG TAB TABLET    TAKE 1 TABLET(137 MCG) BY MOUTH BEFORE BREAKFAST    LINACLOTIDE (LINZESS) 72 MCG CAP CAPSULE    Take 1 capsule (72 mcg total) by mouth before breakfast.    LISINOPRIL (PRINIVIL,ZESTRIL) 20 MG TABLET    Take 1 tablet (20 mg total) by mouth once daily.    LORATADINE (CLARITIN) 10 MG TABLET    TAKE 1 TABLET BY MOUTH DAILY    METHOCARBAMOL (ROBAXIN) 500 MG TAB    Take 1 tablet (500 mg  "total) by mouth 2 (two) times a day.    MONTELUKAST (SINGULAIR) 10 MG TABLET    Take 1 tablet (10 mg total) by mouth every evening.    OMEPRAZOLE (PRILOSEC) 40 MG CAPSULE    Take 1 capsule (40 mg total) by mouth once daily.    OXYCONTIN 30 MG TR12 12 HR TABLET    Take 30 mg by mouth 2 (two) times daily as needed.    PRAVASTATIN (PRAVACHOL) 40 MG TABLET    Take 1 tablet (40 mg total) by mouth once daily.    PREDNISONE (DELTASONE) 5 MG TABLET    Take 1-2 tablets (5-10 mg total) by mouth daily as needed (As needed).    PREGABALIN (LYRICA) 225 MG CAP    Take 1 capsule (225 mg total) by mouth 3 (three) times daily.    PROMETHAZINE (PHENERGAN) 25 MG TABLET    Take 1 tablet (25 mg total) by mouth 2 (two) times daily as needed for Nausea. 2 times daily prn headache no more then 2 days in a week    SILODOSIN (RAPAFLO) 8 MG CAP CAPSULE    Take 1 capsule (8 mg total) by mouth once daily.    SULFASALAZINE (AZULFIDINE) 500 MG TAB    Take 1 tablet (500 mg total) by mouth 2 (two) times daily.    TAMSULOSIN (FLOMAX) 0.4 MG CAP        TIZANIDINE (ZANAFLEX) 4 MG TABLET    Take 1 tablet (4 mg total) by mouth once daily.        Objective Findings:  Vital Signs:  /83   Ht 6' 2" (1.88 m)   Wt 107.5 kg (237 lb)   BMI 30.43 kg/m²  Body mass index is 30.43 kg/m².    Physical Exam:  Physical Exam  Vitals and nursing note reviewed.   Constitutional:       General: He is not in acute distress.     Appearance: Normal appearance.   HENT:      Mouth/Throat:      Mouth: Mucous membranes are moist.   Cardiovascular:      Rate and Rhythm: Normal rate.   Pulmonary:      Effort: Pulmonary effort is normal.      Breath sounds: No wheezing, rhonchi or rales.   Abdominal:      General: Bowel sounds are normal. There is no distension.      Palpations: Abdomen is soft. There is no mass.      Tenderness: There is no abdominal tenderness. There is no guarding.   Skin:     General: Skin is warm and dry.      Coloration: Skin is not jaundiced or pale. "   Neurological:      Mental Status: He is alert and oriented to person, place, and time.   Psychiatric:         Mood and Affect: Mood normal.        Labs:  Lab Results   Component Value Date    WBC 8.33 04/11/2023    HGB 12.9 (L) 04/11/2023    HCT 41.8 04/11/2023    MCV 95.4 04/11/2023    RDW 13.6 04/11/2023     04/11/2023    LYMPH 14.2 (L) 04/11/2023    LYMPH 1.18 04/11/2023    MONO 7.1 (H) 04/11/2023    EOS 0.10 04/11/2023    BASO 0.09 04/11/2023     Lab Results   Component Value Date     06/01/2023    K 4.7 06/01/2023     (H) 06/01/2023    CO2 29 06/01/2023     06/01/2023    BUN 23 (H) 06/01/2023    CREATININE 1.73 (H) 06/01/2023    CALCIUM 9.2 06/01/2023    PROT 7.1 06/01/2023    ALBUMIN 3.5 06/01/2023    BILITOT 0.3 06/01/2023    ALKPHOS 110 06/01/2023    AST 39 (H) 06/01/2023    ALT 56 06/01/2023         Imaging: No results found.      Assessment:  Niranjan Whittaker Jr. is a 68 y.o. male here with:  1. Constipation, unspecified constipation type    2. Hemangioma, unspecified site          Recommendations:  1. Miralax 17 grams in twice daily in 8 ounces of liquid.  To kiwi fruit per day.  May take Dulcolax as an occasional rescue laxative.  Increase water intake  2. Abdominal ultrasound in October due to liver hemangioma    Follow up in about 4 months (around 10/20/2023).      Order summary:  Orders Placed This Encounter    US Abdomen Limited       Thank you for allowing me to participate in the care of Niranjan Whittaker Jr..      JJ Menjivar

## 2023-06-20 NOTE — PATIENT INSTRUCTIONS
Do not eat or drink after midnight prior to the ultrasound    Kiwi fruit 2 per day. Continue Miralax powder

## 2023-06-30 ENCOUNTER — APPOINTMENT (OUTPATIENT)
Dept: RADIOLOGY | Facility: CLINIC | Age: 68
End: 2023-06-30
Attending: STUDENT IN AN ORGANIZED HEALTH CARE EDUCATION/TRAINING PROGRAM
Payer: MEDICARE

## 2023-06-30 ENCOUNTER — OFFICE VISIT (OUTPATIENT)
Dept: FAMILY MEDICINE | Facility: CLINIC | Age: 68
End: 2023-06-30
Payer: MEDICARE

## 2023-06-30 VITALS
OXYGEN SATURATION: 95 % | SYSTOLIC BLOOD PRESSURE: 114 MMHG | BODY MASS INDEX: 30.29 KG/M2 | HEART RATE: 109 BPM | HEIGHT: 74 IN | DIASTOLIC BLOOD PRESSURE: 72 MMHG | RESPIRATION RATE: 18 BRPM | WEIGHT: 236 LBS | TEMPERATURE: 98 F

## 2023-06-30 DIAGNOSIS — I10 PRIMARY HYPERTENSION: ICD-10-CM

## 2023-06-30 DIAGNOSIS — N18.32 CHRONIC KIDNEY DISEASE, STAGE 3B: ICD-10-CM

## 2023-06-30 DIAGNOSIS — E89.0 POSTOPERATIVE HYPOTHYROIDISM: ICD-10-CM

## 2023-06-30 DIAGNOSIS — Z87.891 EX-SMOKER: ICD-10-CM

## 2023-06-30 DIAGNOSIS — M79.89 FOOT SWELLING: Primary | ICD-10-CM

## 2023-06-30 DIAGNOSIS — M51.36 DEGENERATION OF LUMBAR INTERVERTEBRAL DISC: ICD-10-CM

## 2023-06-30 DIAGNOSIS — E78.2 MIXED HYPERLIPIDEMIA: ICD-10-CM

## 2023-06-30 DIAGNOSIS — R06.09 DOE (DYSPNEA ON EXERTION): ICD-10-CM

## 2023-06-30 DIAGNOSIS — N28.1 SIMPLE RENAL CYST: ICD-10-CM

## 2023-06-30 PROBLEM — E04.2 MULTIPLE THYROID NODULES: Status: RESOLVED | Noted: 2021-08-27 | Resolved: 2023-06-30

## 2023-06-30 PROBLEM — G43.009 MIGRAINE WITHOUT AURA AND WITHOUT STATUS MIGRAINOSUS, NOT INTRACTABLE: Status: ACTIVE | Noted: 2021-03-18

## 2023-06-30 PROBLEM — Z12.11 SCREENING FOR COLON CANCER: Status: RESOLVED | Noted: 2022-05-12 | Resolved: 2023-06-30

## 2023-06-30 PROBLEM — M47.816 LUMBAR SPONDYLOSIS: Status: RESOLVED | Noted: 2021-11-11 | Resolved: 2023-06-30

## 2023-06-30 PROBLEM — R51.9 HEADACHE: Status: RESOLVED | Noted: 2022-04-19 | Resolved: 2023-06-30
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
## 2023-06-30 PROCEDURE — 99214 OFFICE O/P EST MOD 30 MIN: CPT | Mod: ,,, | Performed by: STUDENT IN AN ORGANIZED HEALTH CARE EDUCATION/TRAINING PROGRAM

## 2023-06-30 PROCEDURE — 71046 X-RAY EXAM CHEST 2 VIEWS: CPT | Mod: TC,RHCUB,FY | Performed by: STUDENT IN AN ORGANIZED HEALTH CARE EDUCATION/TRAINING PROGRAM

## 2023-06-30 PROCEDURE — 99214 PR OFFICE/OUTPT VISIT, EST, LEVL IV, 30-39 MIN: ICD-10-PCS | Mod: ,,, | Performed by: STUDENT IN AN ORGANIZED HEALTH CARE EDUCATION/TRAINING PROGRAM

## 2023-06-30 RX ORDER — FUROSEMIDE 20 MG/1
20 TABLET ORAL DAILY
Qty: 30 TABLET | Refills: 0 | Status: ON HOLD | OUTPATIENT
Start: 2023-06-30 | End: 2023-07-27 | Stop reason: HOSPADM

## 2023-06-30 RX ORDER — PRAVASTATIN SODIUM 40 MG/1
40 TABLET ORAL DAILY
Qty: 90 TABLET | Refills: 1 | Status: SHIPPED | OUTPATIENT
Start: 2023-06-30 | End: 2023-12-06 | Stop reason: SDUPTHER

## 2023-06-30 RX ORDER — LISINOPRIL 20 MG/1
20 TABLET ORAL DAILY
Qty: 90 TABLET | Refills: 1 | Status: SHIPPED | OUTPATIENT
Start: 2023-06-30 | End: 2023-12-06 | Stop reason: SDUPTHER

## 2023-06-30 RX ORDER — FLUTICASONE PROPIONATE 50 MCG
1 SPRAY, SUSPENSION (ML) NASAL DAILY
Qty: 15.8 ML | Refills: 11 | Status: SHIPPED | OUTPATIENT
Start: 2023-06-30

## 2023-06-30 RX ORDER — FERROUS SULFATE 325(65) MG
325 TABLET ORAL
Qty: 90 TABLET | Refills: 1 | Status: SHIPPED | OUTPATIENT
Start: 2023-06-30 | End: 2023-12-06 | Stop reason: SDUPTHER

## 2023-06-30 RX ORDER — LEVOTHYROXINE SODIUM 137 UG/1
137 TABLET ORAL
Qty: 90 TABLET | Refills: 1 | Status: SHIPPED | OUTPATIENT
Start: 2023-06-30 | End: 2023-12-06 | Stop reason: SDUPTHER

## 2023-06-30 NOTE — PROGRESS NOTES
Progress Note     CRISTI ALVA MD   [address]  [place] [address]     PATIENT NAME: Niranjan Whittaker Jr.  : 1955  DATE: 23  MRN: [id number]      Billing Provider: CRISTI ALVA MD  Level of Service: CA OFFICE/OUTPT VISIT, EST, LEVL IV, 30-39 MIN  Patient PCP Information       Provider PCP Type    CRISTI ALVA MD General                Foot Swelling (Bilateral foot swelling/Pt stated no changes since last visit/Pt stated it hurts when he walk/Pt stated he has gain a lot of weight/Pt stated he has an appt with cardiology mid august)      SUBJECTIVE:     Niranjan Whittaker Jr. is a 68 y.o.male who presents to clinic for Foot Swelling (Bilateral foot swelling/Pt stated no changes since last visit/Pt stated it hurts when he walk/Pt stated he has gain a lot of weight/Pt stated he has an appt with cardiology mid august)    Patient presents to clinic today for follow up for bilateral foot pain/swelling.  Patient with persistent history of swelling in feet and lower extremities. Patient notes onset of LE edema last March. Patient had bilateral venous duplex and dopplers/ABIs performed in 2022. Both were normal. Patient has had persistent symptoms since that time. Patient notes the swelling occurs almost daily.   It does not necessarily improve at night or worsen with prolonged standing.  Most recent workup largely unremarkable. He does have associated KRUEGER and intermittent left-sided chest pain.  Patient has follow up with Cardiology in mid July.  Of note, patient does have a history of seronegative rheumatoid arthritis for which he is followed by Rheumatology.    Of note, patient does have renal insufficiency with CKD stage 3.  Patient had MRI earlier this year which showed a right renal cyst that was simple appearing in nature.  Patient without any other abnormalities noted on MRI in regards to his kidneys.    Patient is also requesting a Rollator for  balance.  Patient currently walks with a cane.  However, due to his dyspnea on exertion and pain in his feet and ankles with ambulation he is requesting prescription.    Started smoking at age 21. Started smoking cigarettes 1-2 weekly at that time.. Started smoking pipe 1-2 time per weekend after a few years.  Cigars 1-2 times per week for a few more years until he finally stopped 2-3 years ago..  He denies any cough or wheezing.  Of note, patient has had an abdominal ultrasound in 2022 which did not show any concern for abdominal aortic aneurysm.        Past Medical History:  has a past medical history of Adenomatous polyp of transverse colon (5/12/2022), Diverticula of colon (5/12/2022), Fall (4/17/2023), FH: total knee replacement, GERD (gastroesophageal reflux disease), Headache, History of colon polyps (5/12/2022), Hyperlipidemia, Hypertension, Multiple thyroid nodules (8/27/2021), Neuropathy, Screening for colon cancer (5/12/2022), and Thyroid disease.   Past Surgical History:  has a past surgical history that includes Back surgery and Thyroidectomy (N/A, 8/27/2021).  Family History: family history includes Heart disease in his mother; Hypertension in his father, mother, and sister.  Social History:  reports that he has never smoked. He has never used smokeless tobacco. He reports that he does not currently use alcohol. He reports current drug use. Drugs: Hydrocodone and Oxycodone.  Allergies: Review of patient's allergies indicates:  No Known Allergies      Current Outpatient Medications:     amitriptyline (ELAVIL) 25 MG tablet, Take 1 tablet (25 mg total) by mouth every evening., Disp: 90 tablet, Rfl: 3    anastrozole (ARIMIDEX) 1 mg Tab, Take 2 tablets (2 mg total) by mouth every 7 days., Disp: 180 tablet, Rfl: 1    cetirizine (ZYRTEC) 10 MG tablet, Take 10 mg by mouth., Disp: , Rfl:     DULoxetine (CYMBALTA) 60 MG capsule, Take 1 capsule (60 mg total) by mouth once daily., Disp: 90 capsule, Rfl: 1     HYDROcodone-acetaminophen (NORCO)  mg per tablet, Take 1 tablet by mouth every 4 (four) hours as needed for Pain., Disp: 15 tablet, Rfl: 0    hydrOXYchloroQUINE (PLAQUENIL) 200 mg tablet, Take 1 tablet (200 mg total) by mouth 2 (two) times a day., Disp: 180 tablet, Rfl: 1    leflunomide (ARAVA) 20 MG Tab, Take 1 tablet (20 mg total) by mouth once daily., Disp: 90 tablet, Rfl: 1    linaCLOtide (LINZESS) 72 mcg Cap capsule, Take 1 capsule (72 mcg total) by mouth before breakfast., Disp: 30 capsule, Rfl: 5    loratadine (CLARITIN) 10 mg tablet, TAKE 1 TABLET BY MOUTH DAILY, Disp: 90 tablet, Rfl: 1    methocarbamoL (ROBAXIN) 500 MG Tab, Take 1 tablet (500 mg total) by mouth 2 (two) times a day., Disp: 180 tablet, Rfl: 0    montelukast (SINGULAIR) 10 mg tablet, Take 1 tablet (10 mg total) by mouth every evening., Disp: 30 tablet, Rfl: 0    omeprazole (PRILOSEC) 40 MG capsule, Take 1 capsule (40 mg total) by mouth once daily., Disp: 90 capsule, Rfl: 1    OXYCONTIN 30 mg TR12 12 hr tablet, Take 30 mg by mouth 2 (two) times daily as needed., Disp: , Rfl:     predniSONE (DELTASONE) 5 MG tablet, Take 1-2 tablets (5-10 mg total) by mouth daily as needed (As needed)., Disp: 90 tablet, Rfl: 1    pregabalin (LYRICA) 225 MG Cap, Take 1 capsule (225 mg total) by mouth 3 (three) times daily., Disp: 270 capsule, Rfl: 3    promethazine (PHENERGAN) 25 MG tablet, Take 1 tablet (25 mg total) by mouth 2 (two) times daily as needed for Nausea. 2 times daily prn headache no more then 2 days in a week, Disp: 90 tablet, Rfl: 1    silodosin (RAPAFLO) 8 mg Cap capsule, Take 1 capsule (8 mg total) by mouth once daily., Disp: 90 capsule, Rfl: 1    sulfaSALAzine (AZULFIDINE) 500 mg Tab, Take 1 tablet (500 mg total) by mouth 2 (two) times daily., Disp: 180 tablet, Rfl: 1    tamsulosin (FLOMAX) 0.4 mg Cap, , Disp: , Rfl:     tiZANidine (ZANAFLEX) 4 MG tablet, Take 1 tablet (4 mg total) by mouth once daily., Disp: 90 tablet, Rfl: 0    ferrous  "sulfate (FEOSOL) 325 mg (65 mg iron) Tab tablet, Take 1 tablet (325 mg total) by mouth daily with breakfast., Disp: 90 tablet, Rfl: 1    fluticasone propionate (FLONASE) 50 mcg/actuation nasal spray, 1 spray (50 mcg total) by Each Nostril route once daily., Disp: 15.8 mL, Rfl: 11    furosemide (LASIX) 20 MG tablet, Take 1 tablet (20 mg total) by mouth once daily., Disp: 30 tablet, Rfl: 0    levothyroxine (SYNTHROID) 137 MCG Tab tablet, Take 1 tablet (137 mcg total) by mouth before breakfast., Disp: 90 tablet, Rfl: 1    lisinopriL (PRINIVIL,ZESTRIL) 20 MG tablet, Take 1 tablet (20 mg total) by mouth once daily., Disp: 90 tablet, Rfl: 1    pravastatin (PRAVACHOL) 40 MG tablet, Take 1 tablet (40 mg total) by mouth once daily., Disp: 90 tablet, Rfl: 1   OBJECTIVE:     Vital Signs   /72 (BP Location: Left arm, Patient Position: Sitting, BP Method: Large (Manual))   Pulse 109   Temp 97.6 °F (36.4 °C) (Temporal)   Resp 18   Ht 6' 2" (1.88 m)   Wt 107 kg (236 lb)   SpO2 95%   BMI 30.30 kg/m²     Physical Exam  Constitutional:       General: He is not in acute distress.     Appearance: Normal appearance. He is not ill-appearing.   HENT:      Head: Normocephalic and atraumatic.   Eyes:      Extraocular Movements: Extraocular movements intact.      Pupils: Pupils are equal, round, and reactive to light.   Cardiovascular:      Rate and Rhythm: Normal rate and regular rhythm.      Heart sounds: No murmur heard.    No friction rub. No gallop.   Pulmonary:      Effort: Pulmonary effort is normal. No respiratory distress.      Breath sounds: Normal breath sounds. No wheezing, rhonchi or rales.   Abdominal:      Palpations: Abdomen is soft.      Tenderness: There is no abdominal tenderness. There is no guarding or rebound.   Musculoskeletal:         General: Normal range of motion.      Comments: Mild tenderness to palpation over bilateral ankles.  Mild ankle/foot edema.  Majority of swelling is located in bilateral " ankles.  No associated warmth or erythema.   Skin:     General: Skin is warm and dry.      Capillary Refill: Capillary refill takes less than 2 seconds.   Neurological:      General: No focal deficit present.      Mental Status: He is alert.   Psychiatric:         Mood and Affect: Mood normal.         Behavior: Behavior normal.       ASSESSMENT/PLAN:     1. Foot swelling  Patient with persistent swelling and pain. Patient has history of RA and is followed by Rheumatology. It is possible a component of his pain/swelling is 2/2 his RA. Patient has had venous and arterial studies on his lower extremities which were normal. Patient has associated KRUEGER. His most recent BNP was normal. Will start low dose lasix and monitor for improvement. Counseled patient on side effects and importance of close monitoring of his renal function. Patient to only take lasix PRN. Will have patient FU in two weeks for recheck.   -     furosemide (LASIX) 20 MG tablet; Take 1 tablet (20 mg total) by mouth once daily.  Dispense: 30 tablet; Refill: 0    2. KRUEGER (dyspnea on exertion)  Patient with KRUEGER and intermittent CP. CBC showed mild anemia which is unlikely to be the cause of his KRUEGER. BNP was normal. EKG on 6/1 showed tachycardia. Patient is currently symptom free. Patient has been referred to Cardiology given his symptoms and risk factors.  Patient has history of tobacco use but only smoked 1-2 times per week. CXR ordered today for further evaluation.     Will order patient a Rolator with seat per his request. He currently uses a cane but need a Rolator for balance.   -     X-Ray Chest PA And Lateral; Future; Expected date: 06/30/2023  -     WALKER FOR HOME USE    3. Primary hypertension  BP currently well-controlled. Medication refill provided.   -     lisinopriL (PRINIVIL,ZESTRIL) 20 MG tablet; Take 1 tablet (20 mg total) by mouth once daily.  Dispense: 90 tablet; Refill: 1      4. Chronic kidney disease, stage 3b    5. Simple renal  cyst  Overview:  Patient with subcentimeter simple appearing renal cyst on MRI in April 2023.    Patient with CKD and history of renal cyst. Renal cyst was simple in appearance. Patient's CKD is stable. Will continue to monitor.     6. Mixed hyperlipidemia  -     pravastatin (PRAVACHOL) 40 MG tablet; Take 1 tablet (40 mg total) by mouth once daily.  Dispense: 90 tablet; Refill: 1    7. Postoperative hypothyroidism  Overview:  Patient is status post thyroidectomy secondary to thyroid nodules.      Orders:  -     levothyroxine (SYNTHROID) 137 MCG Tab tablet; Take 1 tablet (137 mcg total) by mouth before breakfast.  Dispense: 90 tablet; Refill: 1    8. Degeneration of lumbar intervertebral disc  -     WALKER FOR HOME USE    9. Ex-smoker  Overview:  Started smoking at age 21. Started smoking cigarettes 1-2 weekly at that time.. Started smoking pipe 1-2 time per weekend after a few years.  Cigars 1-2 times per week for a few more years until he finally stopped in 2020. He Patient had an abdominal ultrasound in 2022 which did not show any concern for abdominal aortic aneurysm.      Other orders  -     fluticasone propionate (FLONASE) 50 mcg/actuation nasal spray; 1 spray (50 mcg total) by Each Nostril route once daily.  Dispense: 15.8 mL; Refill: 11        Follow up in about 2 weeks (around 7/14/2023) for Recheck swelling .      CRISTI ALVA MD  06/30/2023

## 2023-07-18 ENCOUNTER — OFFICE VISIT (OUTPATIENT)
Dept: CARDIOLOGY | Facility: CLINIC | Age: 68
End: 2023-07-18
Payer: MEDICARE

## 2023-07-18 VITALS
WEIGHT: 231 LBS | OXYGEN SATURATION: 93 % | HEIGHT: 74 IN | DIASTOLIC BLOOD PRESSURE: 72 MMHG | BODY MASS INDEX: 29.65 KG/M2 | HEART RATE: 109 BPM | SYSTOLIC BLOOD PRESSURE: 102 MMHG

## 2023-07-18 DIAGNOSIS — I10 PRIMARY HYPERTENSION: ICD-10-CM

## 2023-07-18 DIAGNOSIS — R07.9 CHEST PAIN, UNSPECIFIED TYPE: ICD-10-CM

## 2023-07-18 DIAGNOSIS — I73.9 CLAUDICATION: ICD-10-CM

## 2023-07-18 DIAGNOSIS — R94.31 ABNORMAL ELECTROCARDIOGRAM (ECG) (EKG): Primary | ICD-10-CM

## 2023-07-18 DIAGNOSIS — G47.33 OBSTRUCTIVE SLEEP APNEA: ICD-10-CM

## 2023-07-18 DIAGNOSIS — K21.9 GASTROESOPHAGEAL REFLUX DISEASE, UNSPECIFIED WHETHER ESOPHAGITIS PRESENT: ICD-10-CM

## 2023-07-18 DIAGNOSIS — E78.2 MIXED HYPERLIPIDEMIA: ICD-10-CM

## 2023-07-18 DIAGNOSIS — R06.09 DOE (DYSPNEA ON EXERTION): ICD-10-CM

## 2023-07-18 PROCEDURE — 93246 EXT ECG>7D<15D RECORDING: CPT | Performed by: INTERNAL MEDICINE

## 2023-07-18 PROCEDURE — 93005 ELECTROCARDIOGRAM TRACING: CPT | Mod: PBBFAC | Performed by: INTERNAL MEDICINE

## 2023-07-18 PROCEDURE — 93010 ELECTROCARDIOGRAM REPORT: CPT | Mod: S$PBB,,, | Performed by: INTERNAL MEDICINE

## 2023-07-18 PROCEDURE — 99205 OFFICE O/P NEW HI 60 MIN: CPT | Mod: S$PBB,,, | Performed by: INTERNAL MEDICINE

## 2023-07-18 PROCEDURE — 93010 EKG 12-LEAD: ICD-10-PCS | Mod: S$PBB,,, | Performed by: INTERNAL MEDICINE

## 2023-07-18 PROCEDURE — 99215 OFFICE O/P EST HI 40 MIN: CPT | Mod: PBBFAC | Performed by: INTERNAL MEDICINE

## 2023-07-18 PROCEDURE — 99205 PR OFFICE/OUTPT VISIT, NEW, LEVL V, 60-74 MIN: ICD-10-PCS | Mod: S$PBB,,, | Performed by: INTERNAL MEDICINE

## 2023-07-18 RX ORDER — METOLAZONE 5 MG/1
5 TABLET ORAL DAILY
Qty: 3 TABLET | Refills: 0 | Status: SHIPPED | OUTPATIENT
Start: 2023-07-18 | End: 2023-08-01

## 2023-07-18 NOTE — PROGRESS NOTES
Cardiology Clinic Note:    PCP: CRISTI ALVA MD    REFERRING PHYSICIAN: CRISTI ALVA MD    CHIEF COMPLAINT:   Chief Complaint   Patient presents with    Consult     For KRUEGER and CP    Shortness of Breath     With ambulation    Edema     Bilateral lower extremity causing pain on bottom of feet with walking and can prevent walking sometimes per pt    Dizziness     With bending over    Chest Pain     occasionally        HISTORY OF PRESENT ILLNESS:  Niranjan Whittaker Jr. is a 68 y.o. male who presents for evaluation of bilateral lower extremity edema, occurs daily, worsens throughout the day, improves but does not resolve overnight.  HE is not active, limited by cervical and lumbar disc disease.  He walks with cane, however lumbar pain limits some activity, notes shortness of breath limits activity as much as back pain.   Pt denies chest pain, pressure, however admits to palpitations with exertion, resolves within two minutes with rest.  He has gained approximately 20 lbs over last three months.  He denies previous cardiac evaluation.       Review of Systems:  Review of Systems   Constitutional: Negative for diaphoresis, malaise/fatigue, night sweats and weight gain.   HENT:  Positive for sore throat. Negative for congestion, ear pain, hearing loss and nosebleeds.         Thyroidectomy due to abnormal poly   Eyes:  Positive for blurred vision and double vision. Negative for pain, photophobia and visual disturbance.   Cardiovascular:  Positive for dyspnea on exertion, leg swelling, near-syncope and palpitations. Negative for chest pain, claudication, irregular heartbeat, orthopnea and syncope.   Respiratory:  Positive for snoring. Negative for cough, shortness of breath, sleep disturbances due to breathing and wheezing.         On CPAP nightly   Endocrine: Negative for cold intolerance, heat intolerance, polydipsia, polyphagia and polyuria.   Hematologic/Lymphatic: Negative for bleeding problem. Does not  bruise/bleed easily.        Hx FE def anemia, resolved    Skin:  Negative for dry skin, flushing, itching, rash and skin cancer.   Musculoskeletal:  Positive for arthritis, back pain, joint pain, muscle cramps, muscle weakness, myalgias, neck pain and stiffness. Negative for falls.   Gastrointestinal:  Positive for constipation and heartburn. Negative for abdominal pain, change in bowel habit, diarrhea, dysphagia, nausea and vomiting.   Genitourinary:  Negative for bladder incontinence, dysuria, flank pain, frequency and nocturia.        Enlarged prostate   Neurological:  Positive for dizziness and weakness. Negative for focal weakness, headaches, light-headedness, loss of balance, numbness, paresthesias and seizures.   Psychiatric/Behavioral:  Positive for depression. Negative for memory loss and substance abuse. The patient is not nervous/anxious.    Allergic/Immunologic: Negative for environmental allergies.        PAST MEDICAL HISTORY:  Past Medical History:   Diagnosis Date    Adenomatous polyp of transverse colon 5/12/2022    Diverticula of colon 5/12/2022    Fall 4/17/2023    FH: total knee replacement     GERD (gastroesophageal reflux disease)     Headache     History of colon polyps 5/12/2022    Hyperlipidemia     Hypertension     Multiple thyroid nodules 8/27/2021    Neuropathy     Screening for colon cancer 5/12/2022    Thyroid disease        PAST SURGICAL HISTORY:  Past Surgical History:   Procedure Laterality Date    BACK SURGERY      THYROIDECTOMY N/A 08/27/2021    Procedure: THYROIDECTOMY;  Surgeon: Manish Valadez MD;  Location: [facility];  Service: General;  Laterality: N/A;    TOTAL KNEE ARTHROPLASTY Right        SOCIAL HISTORY:  Social History     Socioeconomic History    Marital status:    Occupational History    Occupation: Disbaled   Tobacco Use    Smoking status: Never    Smokeless tobacco: Never   Substance and Sexual Activity    Alcohol use: Not Currently     Drug use: Yes     Types: Hydrocodone, Oxycodone    Sexual activity: Yes       FAMILY HISTORY:  Family History   Problem Relation Age of Onset    Heart disease Mother     Hypertension Mother     Hypertension Father     Hypertension Sister        ALLERGIES:  Allergies as of 07/18/2023    (No Known Allergies)         MEDICATIONS:  Current Outpatient Medications on File Prior to Visit   Medication Sig Dispense Refill    amitriptyline (ELAVIL) 25 MG tablet Take 1 tablet (25 mg total) by mouth every evening. 90 tablet 3    anastrozole (ARIMIDEX) 1 mg Tab Take 2 tablets (2 mg total) by mouth every 7 days. 180 tablet 1    cetirizine (ZYRTEC) 10 MG tablet Take 10 mg by mouth.      DULoxetine (CYMBALTA) 60 MG capsule Take 1 capsule (60 mg total) by mouth once daily. 90 capsule 1    ergocalciferol, vitamin D2, (VITAMIN D ORAL) Take by mouth.      ferrous sulfate (FEOSOL) 325 mg (65 mg iron) Tab tablet Take 1 tablet (325 mg total) by mouth daily with breakfast. 90 tablet 1    fluticasone propionate (FLONASE) 50 mcg/actuation nasal spray 1 spray (50 mcg total) by Each Nostril route once daily. (Patient taking differently: 1 spray by Each Nostril route once daily. PRN) 15.8 mL 11    furosemide (LASIX) 20 MG tablet Take 1 tablet (20 mg total) by mouth once daily. 30 tablet 0    HYDROcodone-acetaminophen (NORCO)  mg per tablet Take 1 tablet by mouth every 4 (four) hours as needed for Pain. 15 tablet 0    hydrOXYchloroQUINE (PLAQUENIL) 200 mg tablet Take 1 tablet (200 mg total) by mouth 2 (two) times a day. 180 tablet 1    leflunomide (ARAVA) 20 MG Tab Take 1 tablet (20 mg total) by mouth once daily. 90 tablet 1    levothyroxine (SYNTHROID) 137 MCG Tab tablet Take 1 tablet (137 mcg total) by mouth before breakfast. 90 tablet 1    lisinopriL (PRINIVIL,ZESTRIL) 20 MG tablet Take 1 tablet (20 mg total) by mouth once daily. 90 tablet 1    loratadine (CLARITIN) 10 mg tablet TAKE 1 TABLET BY MOUTH DAILY 90 tablet  "1    methocarbamoL (ROBAXIN) 500 MG Tab Take 1 tablet (500 mg total) by mouth 2 (two) times a day. 180 tablet 0    montelukast (SINGULAIR) 10 mg tablet Take 1 tablet (10 mg total) by mouth every evening. 30 tablet 0    omeprazole (PRILOSEC) 40 MG capsule Take 1 capsule (40 mg total) by mouth once daily. 90 capsule 1    OXYCONTIN 30 mg TR12 12 hr tablet Take 30 mg by mouth 2 (two) times daily as needed.      polyethylene glycol 3350 (MIRALAX ORAL) Take by mouth.      pravastatin (PRAVACHOL) 40 MG tablet Take 1 tablet (40 mg total) by mouth once daily. 90 tablet 1    predniSONE (DELTASONE) 5 MG tablet Take 1-2 tablets (5-10 mg total) by mouth daily as needed (As needed). 90 tablet 1    pregabalin (LYRICA) 225 MG Cap Take 1 capsule (225 mg total) by mouth 3 (three) times daily. 270 capsule 3    promethazine (PHENERGAN) 25 MG tablet Take 1 tablet (25 mg total) by mouth 2 (two) times daily as needed for Nausea. 2 times daily prn headache no more then 2 days in a week 90 tablet 1    silodosin (RAPAFLO) 8 mg Cap capsule Take 1 capsule (8 mg total) by mouth once daily. 90 capsule 1    sulfaSALAzine (AZULFIDINE) 500 mg Tab Take 1 tablet (500 mg total) by mouth 2 (two) times daily. 180 tablet 1    tiZANidine (ZANAFLEX) 4 MG tablet Take 1 tablet (4 mg total) by mouth once daily. 90 tablet 0    linaCLOtide (LINZESS) 72 mcg Cap capsule Take 1 capsule (72 mcg total) by mouth before breakfast. (Patient not taking: Reported on 7/18/2023) 30 capsule 5    tamsulosin (FLOMAX) 0.4 mg Cap        No current facility-administered medications on file prior to visit.          PHYSICAL EXAM:  Blood pressure 102/72, pulse 109, height 6' 2" (1.88 m), weight 104.8 kg (231 lb), SpO2 (!) 93 %.  Wt Readings from Last 3 Encounters:   07/18/23 104.8 kg (231 lb)   06/30/23 107 kg (236 lb)   06/20/23 107.5 kg (237 lb)      Body mass index is 29.66 kg/m².    Physical Exam  Vitals and nursing note reviewed.   Constitutional:       " Appearance: Normal appearance. He is obese.   HENT:      Head: Normocephalic and atraumatic.      Right Ear: External ear normal.      Left Ear: External ear normal.   Eyes:      General: No scleral icterus.        Right eye: No discharge.         Left eye: No discharge.      Extraocular Movements: Extraocular movements intact.      Conjunctiva/sclera: Conjunctivae normal.      Pupils: Pupils are equal, round, and reactive to light.   Cardiovascular:      Rate and Rhythm: Normal rate and regular rhythm.      Pulses: Normal pulses.      Heart sounds: Normal heart sounds. No murmur heard.    No friction rub. No gallop.   Pulmonary:      Effort: Pulmonary effort is normal.      Breath sounds: Normal breath sounds. No wheezing, rhonchi or rales.   Chest:      Chest wall: No tenderness.   Abdominal:      General: Abdomen is flat. Bowel sounds are normal. There is no distension.      Palpations: Abdomen is soft.      Tenderness: There is no abdominal tenderness. There is no guarding or rebound.   Musculoskeletal:         General: No swelling or tenderness. Normal range of motion.      Cervical back: Normal range of motion and neck supple.   Skin:     General: Skin is warm and dry.      Findings: No erythema or rash.   Neurological:      General: No focal deficit present.      Mental Status: He is alert and oriented to person, place, and time.      Cranial Nerves: No cranial nerve deficit.      Motor: No weakness.      Gait: Gait normal.   Psychiatric:         Mood and Affect: Mood normal.         Behavior: Behavior normal.         Thought Content: Thought content normal.         Judgment: Judgment normal.        LABS REVIEWED:  Lab Results   Component Value Date    WBC 8.33 04/11/2023    RBC 4.38 (L) 04/11/2023    HGB 12.9 (L) 04/11/2023    HCT 41.8 04/11/2023    MCV 95.4 04/11/2023    MCH 29.5 04/11/2023    MCHC 30.9 (L) 04/11/2023    RDW 13.6 04/11/2023     04/11/2023    MPV 10.6 04/11/2023    NRBC 0.0 04/11/2023      Lab Results   Component Value Date     06/01/2023    K 4.7 06/01/2023     (H) 06/01/2023    CO2 29 06/01/2023    BUN 23 (H) 06/01/2023     Lab Results   Component Value Date    AST 39 (H) 06/01/2023    ALT 56 06/01/2023     Lab Results   Component Value Date     06/01/2023    HGBA1C 5.0 12/07/2022     Lab Results   Component Value Date    CHOL 195 11/15/2022    HDL 58 11/15/2022    TRIG 172 (H) 11/15/2022    CHOLHDL 3.4 11/15/2022       CARDIAC STUDIES REVIEWED:    OTHER IMAGING STUDIES REVIEWED:        ASSESSMENT:   KRUEGER (dyspnea on exertion)  -     Ambulatory referral/consult to Cardiology    Chest pain, unspecified type  -     Ambulatory referral/consult to Cardiology  -     EKG 12-lead; Future          PLAN:  1. Lower extremity swelling, unclear etiology, slightly improved with addition of lasx over last week, will order echo to evaluate for LV function, lexiscan cariolyte to eval fo ischemic substrate.  2. Morbid obesity with 20 lb in last 3 months.  3. Hypertension: well controlled on current meds  4. ZULLY: on CPAP nightly, has not followed up with sleep medicine, will need referral for reevaluation.   5. Anemia, on FE replacement.   6. Chronic low back pain secondary to advanced disc disease.  7. GERD: on Prilosec  8. Chronic Low back pain, on narcotic analgesia.  9. Hypothyroid: post surgical, on oral replacement.   10. Claudication, will order bilateral lower ext edema, has had vascular eval , will obtain records to review.     Will order echo to eval LV function, pulmonary hypertension  Order Lexiscan cardiolyte stress imaging to eval for ischemic substrate  Order Zio to eval for arrhythmia  Add Zaroxyln 5 mg q d. X 3

## 2023-07-25 ENCOUNTER — HOSPITAL ENCOUNTER (INPATIENT)
Facility: HOSPITAL | Age: 68
LOS: 2 days | Discharge: HOME | DRG: 683 | End: 2023-07-27
Attending: EMERGENCY MEDICINE | Admitting: FAMILY MEDICINE
Payer: MEDICARE

## 2023-07-25 ENCOUNTER — OFFICE VISIT (OUTPATIENT)
Dept: FAMILY MEDICINE | Facility: CLINIC | Age: 68
End: 2023-07-25
Payer: MEDICARE

## 2023-07-25 VITALS
RESPIRATION RATE: 18 BRPM | BODY MASS INDEX: 29.52 KG/M2 | WEIGHT: 230 LBS | HEIGHT: 74 IN | OXYGEN SATURATION: 95 % | SYSTOLIC BLOOD PRESSURE: 100 MMHG | TEMPERATURE: 98 F | HEART RATE: 95 BPM | DIASTOLIC BLOOD PRESSURE: 65 MMHG

## 2023-07-25 DIAGNOSIS — E86.0 DEHYDRATION WITH HYPONATREMIA: ICD-10-CM

## 2023-07-25 DIAGNOSIS — R55 RECURRENT SYNCOPE: ICD-10-CM

## 2023-07-25 DIAGNOSIS — R41.82 ALTERED MENTAL STATUS, UNSPECIFIED ALTERED MENTAL STATUS TYPE: Primary | ICD-10-CM

## 2023-07-25 DIAGNOSIS — R79.89 ELEVATED D-DIMER: ICD-10-CM

## 2023-07-25 DIAGNOSIS — N17.9 AKI (ACUTE KIDNEY INJURY): Primary | ICD-10-CM

## 2023-07-25 DIAGNOSIS — E87.1 DEHYDRATION WITH HYPONATREMIA: ICD-10-CM

## 2023-07-25 DIAGNOSIS — R06.09 DOE (DYSPNEA ON EXERTION): ICD-10-CM

## 2023-07-25 DIAGNOSIS — R53.1 GENERALIZED WEAKNESS: ICD-10-CM

## 2023-07-25 DIAGNOSIS — R55 SYNCOPE, UNSPECIFIED SYNCOPE TYPE: ICD-10-CM

## 2023-07-25 DIAGNOSIS — R55 SYNCOPE: ICD-10-CM

## 2023-07-25 DIAGNOSIS — E86.0 DEHYDRATION: ICD-10-CM

## 2023-07-25 LAB
ALBUMIN SERPL BCP-MCNC: 3.6 G/DL (ref 3.5–5)
ALBUMIN/GLOB SERPL: 1.1 {RATIO}
ALP SERPL-CCNC: 90 U/L (ref 45–115)
ALT SERPL W P-5'-P-CCNC: 37 U/L (ref 16–61)
ANION GAP SERPL CALCULATED.3IONS-SCNC: 12 MMOL/L (ref 7–16)
APTT PPP: 29.4 SECONDS (ref 25.2–37.3)
AST SERPL W P-5'-P-CCNC: 28 U/L (ref 15–37)
BASOPHILS # BLD AUTO: 0.03 K/UL (ref 0–0.2)
BASOPHILS NFR BLD AUTO: 0.5 % (ref 0–1)
BILIRUB SERPL-MCNC: 0.4 MG/DL (ref ?–1.2)
BILIRUB UR QL STRIP: NEGATIVE
BUN SERPL-MCNC: 44 MG/DL (ref 7–18)
BUN/CREAT SERPL: 15 (ref 6–20)
CALCIUM SERPL-MCNC: 8.6 MG/DL (ref 8.5–10.1)
CHLORIDE SERPL-SCNC: 97 MMOL/L (ref 98–107)
CLARITY UR: CLEAR
CO2 SERPL-SCNC: 29 MMOL/L (ref 21–32)
COLOR UR: YELLOW
CREAT SERPL-MCNC: 2.87 MG/DL (ref 0.7–1.3)
D DIMER PPP FEU-MCNC: 1.33 ΜG/ML (ref 0–0.47)
DIFFERENTIAL METHOD BLD: ABNORMAL
EGFR (NO RACE VARIABLE) (RUSH/TITUS): 23 ML/MIN/1.73M2
EOSINOPHIL # BLD AUTO: 0.06 K/UL (ref 0–0.5)
EOSINOPHIL NFR BLD AUTO: 0.9 % (ref 1–4)
ERYTHROCYTE [DISTWIDTH] IN BLOOD BY AUTOMATED COUNT: 13.7 % (ref 11.5–14.5)
FLUAV AG UPPER RESP QL IA.RAPID: NEGATIVE
FLUBV AG UPPER RESP QL IA.RAPID: NEGATIVE
GLOBULIN SER-MCNC: 3.3 G/DL (ref 2–4)
GLUCOSE SERPL-MCNC: 132 MG/DL (ref 74–106)
GLUCOSE UR STRIP-MCNC: NORMAL MG/DL
HCT VFR BLD AUTO: 39.1 % (ref 40–54)
HGB BLD-MCNC: 12.3 G/DL (ref 13.5–18)
IMM GRANULOCYTES # BLD AUTO: 0.07 K/UL (ref 0–0.04)
IMM GRANULOCYTES NFR BLD: 1.1 % (ref 0–0.4)
INR BLD: 0.94
KETONES UR STRIP-SCNC: NEGATIVE MG/DL
LEUKOCYTE ESTERASE UR QL STRIP: NEGATIVE
LYMPHOCYTES # BLD AUTO: 1.01 K/UL (ref 1–4.8)
LYMPHOCYTES NFR BLD AUTO: 15.3 % (ref 27–41)
MAGNESIUM SERPL-MCNC: 2.4 MG/DL (ref 1.7–2.3)
MCH RBC QN AUTO: 29.5 PG (ref 27–31)
MCHC RBC AUTO-ENTMCNC: 31.5 G/DL (ref 32–36)
MCV RBC AUTO: 93.8 FL (ref 80–96)
MONOCYTES # BLD AUTO: 0.5 K/UL (ref 0–0.8)
MONOCYTES NFR BLD AUTO: 7.6 % (ref 2–6)
MPC BLD CALC-MCNC: 10.3 FL (ref 9.4–12.4)
NEUTROPHILS # BLD AUTO: 4.94 K/UL (ref 1.8–7.7)
NEUTROPHILS NFR BLD AUTO: 74.6 % (ref 53–65)
NITRITE UR QL STRIP: NEGATIVE
NRBC # BLD AUTO: 0 X10E3/UL
NRBC, AUTO (.00): 0 %
NT-PROBNP SERPL-MCNC: 18 PG/ML (ref 1–125)
PH UR STRIP: 5.5 PH UNITS
PLATELET # BLD AUTO: 311 K/UL (ref 150–400)
POTASSIUM SERPL-SCNC: 5.2 MMOL/L (ref 3.5–5.1)
PROT SERPL-MCNC: 6.9 G/DL (ref 6.4–8.2)
PROT UR QL STRIP: NEGATIVE
PROTHROMBIN TIME: 12.5 SECONDS (ref 11.7–14.7)
RBC # BLD AUTO: 4.17 M/UL (ref 4.6–6.2)
RBC # UR STRIP: NEGATIVE /UL
SARS-COV+SARS-COV-2 AG RESP QL IA.RAPID: NEGATIVE
SODIUM SERPL-SCNC: 133 MMOL/L (ref 136–145)
SP GR UR STRIP: 1.01
TROPONIN I SERPL DL<=0.01 NG/ML-MCNC: 4.7 PG/ML
TSH SERPL DL<=0.005 MIU/L-ACNC: 3.37 UIU/ML (ref 0.36–3.74)
UROBILINOGEN UR STRIP-ACNC: NORMAL MG/DL
WBC # BLD AUTO: 6.61 K/UL (ref 4.5–11)

## 2023-07-25 PROCEDURE — 99285 EMERGENCY DEPT VISIT HI MDM: CPT | Mod: ,,, | Performed by: EMERGENCY MEDICINE

## 2023-07-25 PROCEDURE — 99285 EMERGENCY DEPT VISIT HI MDM: CPT | Mod: 25

## 2023-07-25 PROCEDURE — 99222 PR INITIAL HOSPITAL CARE,LEVL II: ICD-10-PCS | Mod: ,,, | Performed by: HOSPITALIST

## 2023-07-25 PROCEDURE — 85379 FIBRIN DEGRADATION QUANT: CPT | Performed by: EMERGENCY MEDICINE

## 2023-07-25 PROCEDURE — 93010 EKG 12-LEAD: ICD-10-PCS | Mod: 76,,, | Performed by: HOSPITALIST

## 2023-07-25 PROCEDURE — 25000003 PHARM REV CODE 250: Performed by: HOSPITALIST

## 2023-07-25 PROCEDURE — 85610 PROTHROMBIN TIME: CPT | Performed by: EMERGENCY MEDICINE

## 2023-07-25 PROCEDURE — 11000001 HC ACUTE MED/SURG PRIVATE ROOM

## 2023-07-25 PROCEDURE — 87428 SARSCOV & INF VIR A&B AG IA: CPT | Performed by: EMERGENCY MEDICINE

## 2023-07-25 PROCEDURE — 81003 URINALYSIS AUTO W/O SCOPE: CPT | Performed by: EMERGENCY MEDICINE

## 2023-07-25 PROCEDURE — 84484 ASSAY OF TROPONIN QUANT: CPT | Performed by: EMERGENCY MEDICINE

## 2023-07-25 PROCEDURE — 99215 OFFICE O/P EST HI 40 MIN: CPT | Mod: ,,, | Performed by: STUDENT IN AN ORGANIZED HEALTH CARE EDUCATION/TRAINING PROGRAM

## 2023-07-25 PROCEDURE — 99222 1ST HOSP IP/OBS MODERATE 55: CPT | Mod: ,,, | Performed by: HOSPITALIST

## 2023-07-25 PROCEDURE — 99215 PR OFFICE/OUTPT VISIT, EST, LEVL V, 40-54 MIN: ICD-10-PCS | Mod: ,,, | Performed by: STUDENT IN AN ORGANIZED HEALTH CARE EDUCATION/TRAINING PROGRAM

## 2023-07-25 PROCEDURE — 85730 THROMBOPLASTIN TIME PARTIAL: CPT | Performed by: EMERGENCY MEDICINE

## 2023-07-25 PROCEDURE — 99285 PR EMERGENCY DEPT VISIT,LEVEL V: ICD-10-PCS | Mod: ,,, | Performed by: EMERGENCY MEDICINE

## 2023-07-25 PROCEDURE — 83880 ASSAY OF NATRIURETIC PEPTIDE: CPT | Performed by: EMERGENCY MEDICINE

## 2023-07-25 PROCEDURE — 80053 COMPREHEN METABOLIC PANEL: CPT | Performed by: EMERGENCY MEDICINE

## 2023-07-25 PROCEDURE — 93010 ELECTROCARDIOGRAM REPORT: CPT | Mod: ,,, | Performed by: HOSPITALIST

## 2023-07-25 PROCEDURE — 83735 ASSAY OF MAGNESIUM: CPT | Performed by: EMERGENCY MEDICINE

## 2023-07-25 PROCEDURE — 85025 COMPLETE CBC W/AUTO DIFF WBC: CPT | Performed by: EMERGENCY MEDICINE

## 2023-07-25 PROCEDURE — 93005 ELECTROCARDIOGRAM TRACING: CPT

## 2023-07-25 PROCEDURE — 84443 ASSAY THYROID STIM HORMONE: CPT | Performed by: EMERGENCY MEDICINE

## 2023-07-25 RX ORDER — SIMETHICONE 80 MG
1 TABLET,CHEWABLE ORAL 3 TIMES DAILY PRN
Status: DISCONTINUED | OUTPATIENT
Start: 2023-07-25 | End: 2023-07-27 | Stop reason: HOSPADM

## 2023-07-25 RX ORDER — ACETAMINOPHEN 500 MG
1000 TABLET ORAL EVERY 6 HOURS PRN
Status: DISCONTINUED | OUTPATIENT
Start: 2023-07-25 | End: 2023-07-27 | Stop reason: HOSPADM

## 2023-07-25 RX ORDER — ONDANSETRON 2 MG/ML
8 INJECTION INTRAMUSCULAR; INTRAVENOUS EVERY 6 HOURS PRN
Status: DISCONTINUED | OUTPATIENT
Start: 2023-07-25 | End: 2023-07-27 | Stop reason: HOSPADM

## 2023-07-25 RX ORDER — BISACODYL 5 MG
10 TABLET, DELAYED RELEASE (ENTERIC COATED) ORAL DAILY PRN
Status: DISCONTINUED | OUTPATIENT
Start: 2023-07-25 | End: 2023-07-27 | Stop reason: HOSPADM

## 2023-07-25 RX ORDER — SODIUM CHLORIDE 9 MG/ML
INJECTION, SOLUTION INTRAVENOUS CONTINUOUS
Status: DISCONTINUED | OUTPATIENT
Start: 2023-07-25 | End: 2023-07-27 | Stop reason: HOSPADM

## 2023-07-25 RX ORDER — GUAIFENESIN/DEXTROMETHORPHAN 100-10MG/5
10 SYRUP ORAL EVERY 6 HOURS PRN
Status: DISCONTINUED | OUTPATIENT
Start: 2023-07-25 | End: 2023-07-27 | Stop reason: HOSPADM

## 2023-07-25 RX ORDER — TRAZODONE HYDROCHLORIDE 50 MG/1
50 TABLET ORAL NIGHTLY PRN
Status: DISCONTINUED | OUTPATIENT
Start: 2023-07-25 | End: 2023-07-27 | Stop reason: HOSPADM

## 2023-07-25 RX ADMIN — SODIUM CHLORIDE: 9 INJECTION, SOLUTION INTRAVENOUS at 09:07

## 2023-07-25 NOTE — PROGRESS NOTES
Progress Note     CRISTI ALVA MD   [address]  [place] [address]     PATIENT NAME: Niranjan Whittaker Jr.  : 1955  DATE: 23  MRN: [id number]      Billing Provider: CRISTI ALVA MD  Level of Service: [facility] OFFICE/OUTPT VISIT, EST, LEVL V, 40-54 MIN  Patient PCP Information       Provider PCP Type    CRISTI ALVA MD General                Fatigue, Shortness of Breath, Anorexia (Pt stated symptoms started X4days/Pt stated it got worse on yesterday/Pt stated he passed out twice on yesterday./Pt stated he only wants to lay in bed/Pt stated he wearing a heart monitor from  office/Pt stated he been drinking plenty of water/), Constipation (Last bowel movement was saturday), and Memory Loss      SUBJECTIVE:     Niranjan Whittaker Jr. is a 68 y.o.male who presents to clinic for Fatigue, Shortness of Breath, Anorexia (Pt stated symptoms started X4days/Pt stated it got worse on yesterday/Pt stated he passed out twice on yesterday./Pt stated he only wants to lay in bed/Pt stated he wearing a heart monitor from  office/Pt stated he been drinking plenty of water/), Constipation (Last bowel movement was saturday), and Memory Loss    Patient presents to clinic today for altered mental status and recurrent syncope.  Patient has felt unwell for the past 4 days.  He has been having recurrent syncope.  He initially had loss of consciousness a few days ago.  He notes that he was drying off from a shower when he passed out.  He subsequently tried to go to the kitchen for something to drink and passed out again.  He notes that he is had total of 3-4 episodes of syncope over the past few days.  He has been disoriented and seems confused per his wife.  He believes he did hit his head during one of his falls.  He notes that he has significant shortness of breath during these episodes as well.  He notes that he is unable to breathe and feels like he is suffocating.  He  is having dyspnea on exertion as well.  He and his wife note that it was much worse than usual.  He is having associated dizziness and HA. His wife agrees he is confused. She notes the confusion started prior to his episodes of syncope and has since worsened.     Of note, patient was recently prescribed metolazone by Cardiology. He has had improvement in his LE edema. No notes he tolerated the medication without difficulty. He has a ZIO monitor in place. He has a stress test ordered as well as an ECHO.     Past Medical History:  has a past medical history of Adenomatous polyp of transverse colon (5/12/2022), Diverticula of colon (5/12/2022), Fall (4/17/2023), FH: total knee replacement, GERD (gastroesophageal reflux disease), Headache, History of colon polyps (5/12/2022), Hyperlipidemia, Hypertension, Multiple thyroid nodules (8/27/2021), Neuropathy, Screening for colon cancer (5/12/2022), and Thyroid disease.   Past Surgical History:  has a past surgical history that includes Back surgery; Thyroidectomy (N/A, 08/27/2021); and Total knee arthroplasty (Right).  Family History: family history includes Heart disease in his mother; Hypertension in his father, mother, and sister.  Social History:  reports that he has never smoked. He has never used smokeless tobacco. He reports that he does not currently use alcohol. He reports current drug use. Drugs: Hydrocodone and Oxycodone.  Allergies: Review of patient's allergies indicates:  No Known Allergies      Current Outpatient Medications:     amitriptyline (ELAVIL) 25 MG tablet, Take 1 tablet (25 mg total) by mouth every evening., Disp: 90 tablet, Rfl: 3    anastrozole (ARIMIDEX) 1 mg Tab, Take 2 tablets (2 mg total) by mouth every 7 days., Disp: 180 tablet, Rfl: 1    cetirizine (ZYRTEC) 10 MG tablet, Take 10 mg by mouth., Disp: , Rfl:     DULoxetine (CYMBALTA) 60 MG capsule, Take 1 capsule (60 mg total) by mouth once daily., Disp: 90 capsule, Rfl: 1    ergocalciferol,  vitamin D2, (VITAMIN D ORAL), Take by mouth., Disp: , Rfl:     ferrous sulfate (FEOSOL) 325 mg (65 mg iron) Tab tablet, Take 1 tablet (325 mg total) by mouth daily with breakfast., Disp: 90 tablet, Rfl: 1    fluticasone propionate (FLONASE) 50 mcg/actuation nasal spray, 1 spray (50 mcg total) by Each Nostril route once daily. (Patient taking differently: 1 spray by Each Nostril route once daily. PRN), Disp: 15.8 mL, Rfl: 11    HYDROcodone-acetaminophen (NORCO)  mg per tablet, Take 1 tablet by mouth every 4 (four) hours as needed for Pain., Disp: 15 tablet, Rfl: 0    hydrOXYchloroQUINE (PLAQUENIL) 200 mg tablet, Take 1 tablet (200 mg total) by mouth 2 (two) times a day., Disp: 180 tablet, Rfl: 1    leflunomide (ARAVA) 20 MG Tab, Take 1 tablet (20 mg total) by mouth once daily., Disp: 90 tablet, Rfl: 1    levothyroxine (SYNTHROID) 137 MCG Tab tablet, Take 1 tablet (137 mcg total) by mouth before breakfast., Disp: 90 tablet, Rfl: 1    linaCLOtide (LINZESS) 72 mcg Cap capsule, Take 1 capsule (72 mcg total) by mouth before breakfast., Disp: 30 capsule, Rfl: 5    lisinopriL (PRINIVIL,ZESTRIL) 20 MG tablet, Take 1 tablet (20 mg total) by mouth once daily., Disp: 90 tablet, Rfl: 1    loratadine (CLARITIN) 10 mg tablet, TAKE 1 TABLET BY MOUTH DAILY, Disp: 90 tablet, Rfl: 1    methocarbamoL (ROBAXIN) 500 MG Tab, Take 1 tablet (500 mg total) by mouth 2 (two) times a day., Disp: 180 tablet, Rfl: 0    metOLazone (ZAROXOLYN) 5 MG tablet, Take 1 tablet (5 mg total) by mouth once daily., Disp: 3 tablet, Rfl: 0    montelukast (SINGULAIR) 10 mg tablet, Take 1 tablet (10 mg total) by mouth every evening., Disp: 30 tablet, Rfl: 0    omeprazole (PRILOSEC) 40 MG capsule, Take 1 capsule (40 mg total) by mouth once daily., Disp: 90 capsule, Rfl: 1    OXYCONTIN 30 mg TR12 12 hr tablet, Take 30 mg by mouth 2 (two) times daily as needed., Disp: , Rfl:     polyethylene glycol 3350 (MIRALAX ORAL), Take by mouth., Disp: , Rfl:      "pravastatin (PRAVACHOL) 40 MG tablet, Take 1 tablet (40 mg total) by mouth once daily., Disp: 90 tablet, Rfl: 1    predniSONE (DELTASONE) 5 MG tablet, Take 1-2 tablets (5-10 mg total) by mouth daily as needed (As needed)., Disp: 90 tablet, Rfl: 1    pregabalin (LYRICA) 225 MG Cap, Take 1 capsule (225 mg total) by mouth 3 (three) times daily., Disp: 270 capsule, Rfl: 3    promethazine (PHENERGAN) 25 MG tablet, Take 1 tablet (25 mg total) by mouth 2 (two) times daily as needed for Nausea. 2 times daily prn headache no more then 2 days in a week, Disp: 90 tablet, Rfl: 1    silodosin (RAPAFLO) 8 mg Cap capsule, Take 1 capsule (8 mg total) by mouth once daily., Disp: 90 capsule, Rfl: 1    sulfaSALAzine (AZULFIDINE) 500 mg Tab, Take 1 tablet (500 mg total) by mouth 2 (two) times daily., Disp: 180 tablet, Rfl: 1    tamsulosin (FLOMAX) 0.4 mg Cap, , Disp: , Rfl:     tiZANidine (ZANAFLEX) 4 MG tablet, Take 1 tablet (4 mg total) by mouth once daily., Disp: 90 tablet, Rfl: 0    furosemide (LASIX) 20 MG tablet, Take 1 tablet (20 mg total) by mouth once daily. (Patient not taking: Reported on 7/25/2023), Disp: 30 tablet, Rfl: 0   OBJECTIVE:     Vital Signs   /65 (BP Location: Right arm, Patient Position: Sitting, BP Method: X-Large (Manual))   Pulse 95   Temp 97.9 °F (36.6 °C) (Temporal)   Resp 18   Ht 6' 2" (1.88 m)   Wt 104.3 kg (230 lb)   SpO2 95%   BMI 29.53 kg/m²     Physical Exam  Constitutional:       General: He is not in acute distress.     Appearance: Normal appearance. He is ill-appearing.   HENT:      Head: Normocephalic and atraumatic.   Eyes:      Extraocular Movements: Extraocular movements intact.      Pupils: Pupils are equal, round, and reactive to light.   Cardiovascular:      Rate and Rhythm: Normal rate and regular rhythm.      Heart sounds: No murmur heard.    No friction rub. No gallop.   Pulmonary:      Effort: Pulmonary effort is normal. No respiratory distress.      Breath sounds: Normal " breath sounds. No wheezing, rhonchi or rales.   Abdominal:      Palpations: Abdomen is soft.      Tenderness: There is no abdominal tenderness. There is no guarding or rebound.   Musculoskeletal:         General: Normal range of motion.   Skin:     General: Skin is warm and dry.      Capillary Refill: Capillary refill takes less than 2 seconds.   Neurological:      General: No focal deficit present.      Mental Status: He is alert.      Comments: Patient with strength and sensation intact.  Patient does have baseline weakness in left lower extremity which is present.  Patient is awake alert and oriented x3.  Cranial nerves are grossly intact.  Patient with unsteady gait worse than baseline.    Psychiatric:         Mood and Affect: Mood normal.         Behavior: Behavior normal.       ASSESSMENT/PLAN:     1. Altered mental status, unspecified altered mental status type    2. Recurrent syncope    3. KRUEGER (dyspnea on exertion)    Patient presents to clinic today with altered mental status at home, recurrent syncope and KRUEGER.  Patient with loss of consciousness, confusion, dizziness, and headache.  Patient has known history of vascular disease.  Must consider stroke given symptoms. He is having worsening KRUEGER. He recently was evaluated by Cardiology and stress test and ECHO have been ordered. He is currently free of CP. Of note, patient did recently receive metolazone which may have resulted in electrolyte disturbances versus dehydration which could be contributing to the recurrent syncope as well.  Given patient's symptoms and recurrent episodes of syncope, we will have patient present to the emergency department for further evaluation and treatment.  Offered to call ambulance but patient's wife declines and notes she will drive him. Discussed patient's arrival with ED.     Follow up for Patient to present to ED for further evaluation..      CRISTI ALVA MD  07/25/2023

## 2023-07-25 NOTE — ED PROVIDER NOTES
Encounter Date: 7/25/2023    SCRIBE #1 NOTE: I, Lorelei Canas, am scribing for, and in the presence of,  Yandel Devine MD. I have scribed the entire note.     History     Chief Complaint   Patient presents with    Dizziness     The patient is a 68 y.o. male that presents to the emergency department due to dizziness. The patient explains that yesterday July 24 he had two syncopal episodes. He explains that he has gradually been getting weaker, dizzier, as well as constipated. The patient states that Dr. Matthews recently but him on a cardiac recorder and he has a follow up on August 8, 2023. He denies any cough, fever, nausea, vomiting, or blood in stool. The patient does have a medical hx of HBP and does take medication for it. No other symptoms or medical hx were reported.     The history is provided by the patient and the spouse. No  was used.   Review of patient's allergies indicates:  No Known Allergies  Past Medical History:   Diagnosis Date    Adenomatous polyp of transverse colon 5/12/2022    Diverticula of colon 5/12/2022    Fall 4/17/2023    FH: total knee replacement     GERD (gastroesophageal reflux disease)     Headache     History of colon polyps 5/12/2022    Hyperlipidemia     Hypertension     Multiple thyroid nodules 8/27/2021    Neuropathy     Screening for colon cancer 5/12/2022    Thyroid disease      Past Surgical History:   Procedure Laterality Date    BACK SURGERY      THYROIDECTOMY N/A 08/27/2021    Procedure: THYROIDECTOMY;  Surgeon: Manish Valadez MD;  Location: [facility];  Service: General;  Laterality: N/A;    TOTAL KNEE ARTHROPLASTY Right      Family History   Problem Relation Age of Onset    Heart disease Mother     Hypertension Mother     Hypertension Father     Hypertension Sister      Social History     Tobacco Use    Smoking status: Never    Smokeless tobacco: Never   Substance Use Topics    Alcohol use: Not Currently    Drug use: Yes     Types:  Hydrocodone, Oxycodone     Review of Systems   Constitutional: Negative.    HENT: Negative.     Eyes: Negative.    Respiratory: Negative.     Cardiovascular: Negative.    Gastrointestinal:  Positive for constipation. Negative for blood in stool, nausea and vomiting.   Endocrine: Negative.    Genitourinary: Negative.    Musculoskeletal: Negative.    Skin: Negative.    Allergic/Immunologic: Negative.    Neurological:  Positive for dizziness, syncope and weakness.   Hematological: Negative.    Psychiatric/Behavioral: Negative.     All other systems reviewed and are negative.    Physical Exam     Initial Vitals [07/25/23 1642]   BP Pulse Resp Temp SpO2   124/70 100 17 -- (!) 93 %      MAP       --         Physical Exam    Constitutional: He appears well-developed and well-nourished.   Eyes: Conjunctivae and EOM are normal. Pupils are equal, round, and reactive to light.   Neck: Neck supple.   Normal range of motion.  Cardiovascular:  Normal rate, regular rhythm, normal heart sounds and intact distal pulses.           Pulmonary/Chest: Breath sounds normal.   Abdominal: Abdomen is soft. Bowel sounds are normal.   Musculoskeletal:         General: Normal range of motion.      Cervical back: Normal range of motion and neck supple.     Neurological: He is alert and oriented to person, place, and time. He has normal strength and normal reflexes.   Skin: Skin is warm and dry.   Psychiatric: He has a normal mood and affect. His behavior is normal. Judgment and thought content normal.       ED Course   Procedures  Labs Reviewed   COMPREHENSIVE METABOLIC PANEL - Abnormal; Notable for the following components:       Result Value    Sodium 133 (*)     Potassium 5.2 (*)     Chloride 97 (*)     Glucose 132 (*)     BUN 44 (*)     Creatinine 2.87 (*)     eGFR 23 (*)     All other components within normal limits   MAGNESIUM - Abnormal; Notable for the following components:    Magnesium 2.4 (*)     All other components within normal  limits   D DIMER, QUANTITATIVE - Abnormal; Notable for the following components:    D-Dimer 1.33 (*)     All other components within normal limits   CBC WITH DIFFERENTIAL - Abnormal; Notable for the following components:    RBC 4.17 (*)     Hemoglobin 12.3 (*)     Hematocrit 39.1 (*)     MCHC 31.5 (*)     Neutrophils % 74.6 (*)     Lymphocytes % 15.3 (*)     Monocytes % 7.6 (*)     Eosinophils % 0.9 (*)     Immature Granulocytes % 1.1 (*)     Immature Granulocytes, Absolute 0.07 (*)     All other components within normal limits   APTT - Normal   TROPONIN I - Normal   NT-PRO NATRIURETIC PEPTIDE - Normal   PROTIME-INR - Normal   SARS-COV2 (COVID) W/ FLU ANTIGEN - Normal    Narrative:     Negative SARS-CoV results should not be used as the sole basis for treatment or patient management decisions; negative results should be considered in the context of a patient's recent exposures, history and the presene of clinical signs and symptoms consistent with COVID-19.  Negative results should be treated as presumptive and confirmed by molecular assay, if necessary for patient management.   CBC W/ AUTO DIFFERENTIAL    Narrative:     The following orders were created for panel order CBC auto differential.  Procedure                               Abnormality         Status                     ---------                               -----------         ------                     CBC with Differential[[id number]]        Abnormal            Final result                 Please view results for these tests on the individual orders.   TSH   URINALYSIS, REFLEX TO URINE CULTURE          Imaging Results              CT Head Without Contrast (Final result)  Result time 07/25/23 18:44:43      Final result by Yaw Bah MD (07/25/23 18:44:43)                   Impression:      No acute intracranial process      Electronically signed by: Yaw Bah  Date:    07/25/2023  Time:    18:44               Narrative:    EXAMINATION:  CT  head without contrast    CLINICAL HISTORY:  Mental status change, unknown cause;    TECHNIQUE:  Transaxial CT sections were obtained through the brain without contrast.    The CT examination was performed using one or more of the following dose reduction techniques: Automated exposure control, adjustment of the mA and kV according to patient's size, use of acute or iterative reconstruction techniques.    COMPARISON:  CT head June 4, 2019    FINDINGS:  The ventricles are midline in position without evidence of hydrocephalus. There is no mass or area of parenchymal hemorrhage. There is no gross CT evidence of acute cortical stroke. There is no extra-axial hematoma. The partially visualized sinuses are generally clear. There is no obvious skull fracture.                                       X-Ray Chest 1 View (Final result)  Result time 07/25/23 18:41:09      Final result by Yaw Bah MD (07/25/23 18:41:09)                   Impression:      No definite acute process.  Shallow breath      Electronically signed by: Yaw Bah  Date:    07/25/2023  Time:    18:41               Narrative:    EXAMINATION:  XR CHEST 1 VIEW    CLINICAL HISTORY:  .  Weakness    COMPARISON:  June 30, 2023    TECHNIQUE:  AP chest    FINDINGS:  Cardiac silhouette is not enlarged.  There is no mediastinal mass.  There is no pulmonary vascular engorgement.    Lungs are generally clear for shallow breath.  There is no gross pleural effusion.    Osseous structures are unchanged                                       Medications - No data to display             Attending Attestation:           Physician Attestation for Scribe:  Physician Attestation Statement for Scribe #1: I, Yandel Devine MD, reviewed documentation, as scribed by Lorelei Canas in my presence, and it is both accurate and complete.           ED Course as of 07/25/23 1927   Tue Jul 25, 2023   1852 Medical decision-making:  Differential diagnosis includes syncope,  anemia, arrhythmia, generalized weakness, pulmonary embolism, STEMI, NSTEMI, UTI.  All labs and imaging ordered and interpreted by me. [BB]   1918 Flu is negative, COVID is negative, troponin is normal, proBNP is normal.  D-dimer is elevated 1.33.  CBC is normal.  Magnesium level is slightly high. [BB]   1919 CMP shows hyperkalemia with potassium of 5.2, hyponatremia with sodium of 133.  Creatinine is elevated at 2.87 which is about 1 point higher than patient's baseline creatinine when compared to review of outside medical record.   [BB]   1920 CT brain shows no acute findings.  Chest x-ray by my interpretation shows no acute disease. [BB]   1926 I made decision to admit patient based on diagnosis of syncope, dehydration, acute kidney injury.  I discussed case with internal medicine hospitalist on-call who agrees with admission. [BB]      ED Course User Index  [BB] Manish Vernon MD                   Clinical Impression:   Final diagnoses:  [R55] Syncope  [R53.1] Generalized weakness  [N17.9] LEONA (acute kidney injury) (Primary)  [R79.89] Elevated d-dimer  [E86.0] Dehydration               Manish Vernon MD  07/25/23 1927

## 2023-07-25 NOTE — ED TRIAGE NOTES
Pt presents to ED with c/o dizziness, pt reports two syncopal episodes yesterday. Pt wearing cardiac recorder per Dr Matthews, follow up appt 08/04/2023.

## 2023-07-26 PROBLEM — E87.1 DEHYDRATION WITH HYPONATREMIA: Status: ACTIVE | Noted: 2023-07-26

## 2023-07-26 PROBLEM — R55 SYNCOPE: Status: ACTIVE | Noted: 2023-07-26

## 2023-07-26 PROBLEM — N17.9 AKI (ACUTE KIDNEY INJURY): Status: ACTIVE | Noted: 2023-07-26

## 2023-07-26 PROBLEM — E86.0 DEHYDRATION WITH HYPONATREMIA: Status: ACTIVE | Noted: 2023-07-26

## 2023-07-26 PROBLEM — E87.5 HYPERKALEMIA: Status: ACTIVE | Noted: 2023-07-26

## 2023-07-26 LAB
ANION GAP SERPL CALCULATED.3IONS-SCNC: 10 MMOL/L (ref 7–16)
AORTIC ROOT ANNULUS: 2.82 CM
AORTIC VALVE CUSP SEPERATION: 2.27 CM
AV INDEX (PROSTH): 1.06
AV MEAN GRADIENT: 2 MMHG
AV PEAK GRADIENT: 4 MMHG
AV VALVE AREA: 3.73 CM2
AV VELOCITY RATIO: 0.87
BSA FOR ECHO PROCEDURE: 2.34 M2
BUN SERPL-MCNC: 35 MG/DL (ref 7–18)
BUN/CREAT SERPL: 17 (ref 6–20)
CALCIUM SERPL-MCNC: 8 MG/DL (ref 8.5–10.1)
CHLORIDE SERPL-SCNC: 101 MMOL/L (ref 98–107)
CO2 SERPL-SCNC: 31 MMOL/L (ref 21–32)
CREAT SERPL-MCNC: 2.09 MG/DL (ref 0.7–1.3)
CV ECHO LV RWT: 0.48 CM
DOP CALC AO PEAK VEL: 0.94 M/S
DOP CALC AO VTI: 15.6 CM
DOP CALC LVOT AREA: 3.5 CM2
DOP CALC LVOT DIAMETER: 2.12 CM
DOP CALC LVOT PEAK VEL: 0.82 M/S
DOP CALC LVOT STROKE VOLUME: 58.21 CM3
DOP CALCLVOT PEAK VEL VTI: 16.5 CM
E WAVE DECELERATION TIME: 206.04 MSEC
E/A RATIO: 0.88
E/E' RATIO: 6.74 M/S
ECHO EF ESTIMATED: 50 %
ECHO LV POSTERIOR WALL: 1.13 CM (ref 0.6–1.1)
EGFR (NO RACE VARIABLE) (RUSH/TITUS): 34 ML/MIN/1.73M2
EJECTION FRACTION: 55 %
FRACTIONAL SHORTENING: 16 % (ref 28–44)
GLUCOSE SERPL-MCNC: 110 MG/DL (ref 74–106)
INTERVENTRICULAR SEPTUM: 0.82 CM (ref 0.6–1.1)
IVC DIAMETER: 1.41 CM
IVC OSTIUM: 1.4 CM
LEFT ATRIUM SIZE: 3.4 CM
LEFT INTERNAL DIMENSION IN SYSTOLE: 3.97 CM (ref 2.1–4)
LEFT VENTRICLE DIASTOLIC VOLUME INDEX: 44.6 ML/M2
LEFT VENTRICLE DIASTOLIC VOLUME: 103.03 ML
LEFT VENTRICLE MASS INDEX: 69 G/M2
LEFT VENTRICLE SYSTOLIC VOLUME INDEX: 29.8 ML/M2
LEFT VENTRICLE SYSTOLIC VOLUME: 68.87 ML
LEFT VENTRICULAR INTERNAL DIMENSION IN DIASTOLE: 4.71 CM (ref 3.5–6)
LEFT VENTRICULAR MASS: 159.45 G
LV LATERAL E/E' RATIO: 5.82 M/S
LV SEPTAL E/E' RATIO: 8 M/S
LVOT MG: 1.39 MMHG
LVOT MV: 0.56 CM/S
MV PEAK A VEL: 0.73 M/S
MV PEAK E VEL: 0.64 M/S
POTASSIUM SERPL-SCNC: 4.6 MMOL/L (ref 3.5–5.1)
PV PEAK VELOCITY: 0.78 CM/S
RA MAJOR: 4.42 CM
RA PRESSURE: 3 MMHG
RIGHT VENTRICULAR END-DIASTOLIC DIMENSION: 4.05 CM
SODIUM SERPL-SCNC: 137 MMOL/L (ref 136–145)
TDI LATERAL: 0.11 M/S
TDI SEPTAL: 0.08 M/S
TDI: 0.1 M/S
TRICUSPID ANNULAR PLANE SYSTOLIC EXCURSION: 2.48 CM
TROPONIN I SERPL DL<=0.01 NG/ML-MCNC: 4.6 PG/ML

## 2023-07-26 PROCEDURE — 25000003 PHARM REV CODE 250: Performed by: HOSPITALIST

## 2023-07-26 PROCEDURE — 80048 BASIC METABOLIC PNL TOTAL CA: CPT

## 2023-07-26 PROCEDURE — 11000001 HC ACUTE MED/SURG PRIVATE ROOM

## 2023-07-26 PROCEDURE — 84484 ASSAY OF TROPONIN QUANT: CPT

## 2023-07-26 PROCEDURE — 25000003 PHARM REV CODE 250

## 2023-07-26 RX ORDER — LANOLIN ALCOHOL/MO/W.PET/CERES
1 CREAM (GRAM) TOPICAL DAILY
Status: DISCONTINUED | OUTPATIENT
Start: 2023-07-26 | End: 2023-07-27 | Stop reason: HOSPADM

## 2023-07-26 RX ORDER — PRAVASTATIN SODIUM 40 MG/1
40 TABLET ORAL DAILY
Status: DISCONTINUED | OUTPATIENT
Start: 2023-07-26 | End: 2023-07-27 | Stop reason: HOSPADM

## 2023-07-26 RX ORDER — OXYCODONE HCL 10 MG/1
30 TABLET, FILM COATED, EXTENDED RELEASE ORAL 2 TIMES DAILY PRN
Status: DISCONTINUED | OUTPATIENT
Start: 2023-07-26 | End: 2023-07-27 | Stop reason: HOSPADM

## 2023-07-26 RX ORDER — DULOXETIN HYDROCHLORIDE 30 MG/1
60 CAPSULE, DELAYED RELEASE ORAL DAILY
Status: DISCONTINUED | OUTPATIENT
Start: 2023-07-26 | End: 2023-07-27 | Stop reason: HOSPADM

## 2023-07-26 RX ORDER — AMITRIPTYLINE HYDROCHLORIDE 25 MG/1
25 TABLET, FILM COATED ORAL NIGHTLY
Status: DISCONTINUED | OUTPATIENT
Start: 2023-07-26 | End: 2023-07-27 | Stop reason: HOSPADM

## 2023-07-26 RX ORDER — LEFLUNOMIDE 20 MG/1
20 TABLET ORAL DAILY
Status: DISCONTINUED | OUTPATIENT
Start: 2023-07-26 | End: 2023-07-27 | Stop reason: HOSPADM

## 2023-07-26 RX ORDER — TAMSULOSIN HYDROCHLORIDE 0.4 MG/1
0.4 CAPSULE ORAL DAILY
Status: DISCONTINUED | OUTPATIENT
Start: 2023-07-26 | End: 2023-07-27 | Stop reason: HOSPADM

## 2023-07-26 RX ORDER — CHOLECALCIFEROL (VITAMIN D3) 10 MCG
1200 TABLET ORAL DAILY
Status: DISCONTINUED | OUTPATIENT
Start: 2023-07-26 | End: 2023-07-27 | Stop reason: HOSPADM

## 2023-07-26 RX ORDER — LISINOPRIL 20 MG/1
20 TABLET ORAL DAILY
Status: DISCONTINUED | OUTPATIENT
Start: 2023-07-26 | End: 2023-07-26

## 2023-07-26 RX ORDER — HYDROXYCHLOROQUINE SULFATE 200 MG/1
200 TABLET, FILM COATED ORAL 2 TIMES DAILY
Status: DISCONTINUED | OUTPATIENT
Start: 2023-07-26 | End: 2023-07-27 | Stop reason: HOSPADM

## 2023-07-26 RX ORDER — HYDROCODONE BITARTRATE AND ACETAMINOPHEN 10; 325 MG/1; MG/1
1 TABLET ORAL 2 TIMES DAILY PRN
Status: DISCONTINUED | OUTPATIENT
Start: 2023-07-26 | End: 2023-07-27 | Stop reason: HOSPADM

## 2023-07-26 RX ORDER — PREGABALIN 75 MG/1
225 CAPSULE ORAL 3 TIMES DAILY
Status: DISCONTINUED | OUTPATIENT
Start: 2023-07-26 | End: 2023-07-27 | Stop reason: HOSPADM

## 2023-07-26 RX ORDER — PANTOPRAZOLE SODIUM 40 MG/1
40 TABLET, DELAYED RELEASE ORAL DAILY
Status: DISCONTINUED | OUTPATIENT
Start: 2023-07-26 | End: 2023-07-26

## 2023-07-26 RX ORDER — SULFASALAZINE 500 MG/1
500 TABLET ORAL 2 TIMES DAILY
Status: DISCONTINUED | OUTPATIENT
Start: 2023-07-26 | End: 2023-07-27 | Stop reason: HOSPADM

## 2023-07-26 RX ADMIN — AMITRIPTYLINE HYDROCHLORIDE 25 MG: 25 TABLET, FILM COATED ORAL at 09:07

## 2023-07-26 RX ADMIN — SODIUM CHLORIDE: 9 INJECTION, SOLUTION INTRAVENOUS at 04:07

## 2023-07-26 RX ADMIN — SULFASALAZINE 500 MG: 500 TABLET ORAL at 09:07

## 2023-07-26 RX ADMIN — ACETAMINOPHEN 1000 MG: 500 TABLET ORAL at 02:07

## 2023-07-26 RX ADMIN — HYDROCODONE BITARTRATE AND ACETAMINOPHEN 1 TABLET: 10; 325 TABLET ORAL at 09:07

## 2023-07-26 RX ADMIN — PREGABALIN 225 MG: 75 CAPSULE ORAL at 09:07

## 2023-07-26 RX ADMIN — PREGABALIN 225 MG: 75 CAPSULE ORAL at 03:07

## 2023-07-26 RX ADMIN — SODIUM CHLORIDE: 9 INJECTION, SOLUTION INTRAVENOUS at 12:07

## 2023-07-26 RX ADMIN — ACETAMINOPHEN 1000 MG: 500 TABLET ORAL at 04:07

## 2023-07-26 RX ADMIN — SODIUM ZIRCONIUM CYCLOSILICATE 5 G: 5 POWDER, FOR SUSPENSION ORAL at 09:07

## 2023-07-26 RX ADMIN — DULOXETIN HYDROCHLORIDE 60 MG: 30 CAPSULE, DELAYED RELEASE ORAL at 09:07

## 2023-07-26 RX ADMIN — OXYCODONE HYDROCHLORIDE 30 MG: 10 TABLET, FILM COATED, EXTENDED RELEASE ORAL at 01:07

## 2023-07-26 RX ADMIN — SODIUM CHLORIDE: 9 INJECTION, SOLUTION INTRAVENOUS at 09:07

## 2023-07-26 RX ADMIN — HYDROXYCHLOROQUINE SULFATE 200 MG: 200 TABLET, FILM COATED ORAL at 09:07

## 2023-07-26 RX ADMIN — LEVOTHYROXINE SODIUM 137 MCG: 0.11 TABLET ORAL at 06:07

## 2023-07-26 RX ADMIN — Medication 1200 UNITS: at 09:07

## 2023-07-26 RX ADMIN — SODIUM ZIRCONIUM CYCLOSILICATE 5 G: 5 POWDER, FOR SUSPENSION ORAL at 04:07

## 2023-07-26 RX ADMIN — PRAVASTATIN SODIUM 40 MG: 40 TABLET ORAL at 09:07

## 2023-07-26 RX ADMIN — TAMSULOSIN HYDROCHLORIDE 0.4 MG: 0.4 CAPSULE ORAL at 09:07

## 2023-07-26 RX ADMIN — FERROUS SULFATE TAB 325 MG (65 MG ELEMENTAL FE) 1 EACH: 325 (65 FE) TAB at 09:07

## 2023-07-26 NOTE — SUBJECTIVE & OBJECTIVE
Past Medical History:   Diagnosis Date    History of colon polyps 05/12/2022    History of GI diverticular bleed     Hyperlipidemia     Hypertension     Liver hemangioma 05/09/2023    Neuropathy     Personal history of gastric ulcer     Seronegative rheumatoid arthritis 09/22/2021    Goes to [facility] for [facility]umatology  Last Assessment & Plan:  Formatting of this note might be different from the original. SNRA; Mod Active; Pt now off MTX and on Arava; However, pt continues to have persistent activity wrists; Intolerant to Enbrel; Poor response to Xeljanz; Unable to afford Humira in the past; Intolerant to chronic Nsaid use due to sig Gerd; Cont to be followed by local Pain man    Thyroid disease        Past Surgical History:   Procedure Laterality Date    BACK SURGERY      THYROIDECTOMY N/A 08/27/2021    Procedure: THYROIDECTOMY;  Surgeon: Manish Valadez MD;  Location: [facility];  Service: General;  Laterality: N/A;    TOTAL KNEE ARTHROPLASTY Right        Review of patient's allergies indicates:  No Known Allergies    No current facility-administered medications on file prior to encounter.     Current Outpatient Medications on File Prior to Encounter   Medication Sig    amitriptyline (ELAVIL) 25 MG tablet Take 1 tablet (25 mg total) by mouth every evening.    anastrozole (ARIMIDEX) 1 mg Tab Take 2 tablets (2 mg total) by mouth every 7 days.    cetirizine (ZYRTEC) 10 MG tablet Take 10 mg by mouth.    DULoxetine (CYMBALTA) 60 MG capsule Take 1 capsule (60 mg total) by mouth once daily.    ergocalciferol, vitamin D2, (VITAMIN D ORAL) Take by mouth.    ferrous sulfate (FEOSOL) 325 mg (65 mg iron) Tab tablet Take 1 tablet (325 mg total) by mouth daily with breakfast.    fluticasone propionate (FLONASE) 50 mcg/actuation nasal spray 1 spray (50 mcg total) by Each Nostril route once daily. (Patient taking differently: 1 spray by Each Nostril route once daily. PRN)    furosemide (LASIX) 20 MG tablet Take 1 tablet (20 mg  total) by mouth once daily. (Patient not taking: Reported on 7/25/2023)    HYDROcodone-acetaminophen (NORCO)  mg per tablet Take 1 tablet by mouth every 4 (four) hours as needed for Pain.    hydrOXYchloroQUINE (PLAQUENIL) 200 mg tablet Take 1 tablet (200 mg total) by mouth 2 (two) times a day.    leflunomide (ARAVA) 20 MG Tab Take 1 tablet (20 mg total) by mouth once daily.    levothyroxine (SYNTHROID) 137 MCG Tab tablet Take 1 tablet (137 mcg total) by mouth before breakfast.    linaCLOtide (LINZESS) 72 mcg Cap capsule Take 1 capsule (72 mcg total) by mouth before breakfast.    lisinopriL (PRINIVIL,ZESTRIL) 20 MG tablet Take 1 tablet (20 mg total) by mouth once daily.    loratadine (CLARITIN) 10 mg tablet TAKE 1 TABLET BY MOUTH DAILY    methocarbamoL (ROBAXIN) 500 MG Tab Take 1 tablet (500 mg total) by mouth 2 (two) times a day.    metOLazone (ZAROXOLYN) 5 MG tablet Take 1 tablet (5 mg total) by mouth once daily.    montelukast (SINGULAIR) 10 mg tablet Take 1 tablet (10 mg total) by mouth every evening.    omeprazole (PRILOSEC) 40 MG capsule Take 1 capsule (40 mg total) by mouth once daily.    OXYCONTIN 30 mg TR12 12 hr tablet Take 30 mg by mouth 2 (two) times daily as needed.    polyethylene glycol 3350 (MIRALAX ORAL) Take by mouth.    pravastatin (PRAVACHOL) 40 MG tablet Take 1 tablet (40 mg total) by mouth once daily.    predniSONE (DELTASONE) 5 MG tablet Take 1-2 tablets (5-10 mg total) by mouth daily as needed (As needed).    pregabalin (LYRICA) 225 MG Cap Take 1 capsule (225 mg total) by mouth 3 (three) times daily.    promethazine (PHENERGAN) 25 MG tablet Take 1 tablet (25 mg total) by mouth 2 (two) times daily as needed for Nausea. 2 times daily prn headache no more then 2 days in a week    silodosin (RAPAFLO) 8 mg Cap capsule Take 1 capsule (8 mg total) by mouth once daily.    sulfaSALAzine (AZULFIDINE) 500 mg Tab Take 1 tablet (500 mg total) by mouth 2 (two) times daily.    tamsulosin (FLOMAX) 0.4  mg Cap     tiZANidine (ZANAFLEX) 4 MG tablet Take 1 tablet (4 mg total) by mouth once daily.     Family History       Problem Relation (Age of Onset)    Heart disease Mother    Hypertension Mother, Father, Sister          Tobacco Use    Smoking status: Never    Smokeless tobacco: Never   Substance and Sexual Activity    Alcohol use: Not Currently    Drug use: Yes     Types: Hydrocodone, Oxycodone    Sexual activity: Yes     Review of Systems   Constitutional:  Negative for chills, diaphoresis and fever.   HENT:  Negative for trouble swallowing.    Eyes:  Negative for discharge, itching and visual disturbance.   Respiratory:  Negative for cough, chest tightness and shortness of breath.    Cardiovascular:  Negative for chest pain, palpitations and leg swelling.   Gastrointestinal:  Negative for abdominal pain, diarrhea, nausea and vomiting.   Genitourinary:  Negative for decreased urine volume.   Musculoskeletal:  Negative for neck pain and neck stiffness.   Neurological:  Positive for syncope, weakness, light-headedness and headaches. Negative for tremors, seizures and speech difficulty.   Hematological:  Negative for adenopathy.   Psychiatric/Behavioral:  Negative for agitation, behavioral problems and confusion.    Objective:     Vital Signs (Most Recent):  Pulse: 81 (07/26/23 0300)  Resp: 12 (07/26/23 0132)  BP: (!) 106/59 (07/26/23 0300)  SpO2: (!) 92 % (07/26/23 0300) Vital Signs (24h Range):  Temp:  [97.9 °F (36.6 °C)] 97.9 °F (36.6 °C)  Pulse:  [] 81  Resp:  [12-18] 12  SpO2:  [92 %-95 %] 92 %  BP: ()/(55-75) 106/59     Weight: 104.8 kg (231 lb)  Body mass index is 29.66 kg/m².     Physical Exam  Vitals and nursing note reviewed.   Constitutional:       General: He is not in acute distress.     Appearance: Normal appearance. He is not ill-appearing.   HENT:      Head: Normocephalic and atraumatic.      Right Ear: External ear normal.      Left Ear: External ear normal.      Nose: Nose normal.       Mouth/Throat:      Mouth: Mucous membranes are dry.      Pharynx: No oropharyngeal exudate or posterior oropharyngeal erythema.   Eyes:      General:         Right eye: No discharge.         Left eye: No discharge.      Conjunctiva/sclera: Conjunctivae normal.   Cardiovascular:      Rate and Rhythm: Normal rate and regular rhythm.      Pulses: Normal pulses.      Heart sounds: Normal heart sounds.   Pulmonary:      Breath sounds: Normal breath sounds. No wheezing or rhonchi.   Abdominal:      Palpations: Abdomen is soft.      Tenderness: There is no abdominal tenderness.      Hernia: No hernia is present.   Musculoskeletal:         General: No deformity.      Cervical back: Neck supple.      Right lower leg: No edema.      Left lower leg: No edema.   Skin:     General: Skin is warm.      Coloration: Skin is not jaundiced.   Neurological:      General: No focal deficit present.      Mental Status: He is alert and oriented to person, place, and time.   Psychiatric:         Mood and Affect: Mood normal.         Behavior: Behavior normal.              Significant Labs: All pertinent labs within the past 24 hours have been reviewed.  Recent Lab Results         07/25/23  2108   07/25/23  1825   07/25/23  1820        Influenza B, Molecular   Negative         Albumin/Globulin Ratio     1.1       Albumin     3.6       Alkaline Phosphatase     90       ALT     37       Anion Gap     12       Appearance, UA Clear           aPTT     29.4       AST     28       Baso #     0.03       Basophil %     0.5       Bilirubin (UA) Negative           BILIRUBIN TOTAL     0.4       BUN     44       BUN/CREAT RATIO     15       Calcium     8.6       Chloride     97       CO2     29       Color, UA Yellow           COVID-19 Ag   Negative         Creatinine     2.87       D-Dimer     1.33       Differential Type     Auto       eGFR     23       Eos #     0.06       Eosinophil %     0.9       Globulin, Total     3.3       Glucose     132        Glucose, UA Normal           Hematocrit     39.1       Hemoglobin     12.3       Immature Grans (Abs)     0.07       Immature Granulocytes     1.1       Influenza A   Negative         INR     0.94       Ketones, UA Negative           Leukocytes, UA Negative           Lymph #     1.01       Lymph %     15.3       Magnesium     2.4       MCH     29.5       MCHC     31.5       MCV     93.8       Mono #     0.50       Mono %     7.6       MPV     10.3       Neutrophils, Abs     4.94       Neutrophils Relative     74.6       NITRITE UA Negative           nRBC     0.0       NT-proBNP     18       NUCLEATED RBC ABSOLUTE     0.00       Occult Blood UA Negative           pH, UA 5.5           Platelets     311       Potassium     5.2       PROTEIN TOTAL     6.9       Protein, UA Negative           Protime     12.5       RBC     4.17       RDW     13.7       Sodium     133       Specific [place], UA 1.015           Troponin I High Sensitivity     4.7       TSH     3.370       UROBILINOGEN UA Normal           WBC     6.61               Significant Imaging: I have reviewed all pertinent imaging results/findings within the past 24 hours.  I have reviewed and interpreted all pertinent imaging results/findings within the past 24 hours.

## 2023-07-26 NOTE — PLAN OF CARE
[facility] - Short Stay Unit  Initial Discharge Assessment       Primary Care Provider: CRISTI ALVA MD    Admission Diagnosis: Dehydration [E86.0]  Syncope [R55]  Elevated d-dimer [R79.89]  LEONA (acute kidney injury) [N17.9]  Generalized weakness [R53.1]    Admission Date: 7/25/2023  Expected Discharge Date:     Transition of Care Barriers: None    Payor: MEDICARE / Plan: MEDICARE PART A & B / Product Type: Government /     Extended Emergency Contact Information  Primary Emergency Contact: Holly Whittaker  Mobile Phone: [phone]  Relation: Spouse  Preferred language: English   needed? No    Discharge Plan A: Home with family  Discharge Plan B: Home with family      [id number] - [place], [place] - [address] [facility] [address]  [address]  [place] [address]  Phone: [phone] Fax: [phone]    [facility] [address] [place], [place] - [address]  [address] [facility] [address]  Phone: [phone] Fax: [phone]      Initial Assessment (most recent)       Adult Discharge Assessment - 07/26/23 1110          Discharge Assessment    Assessment Type Discharge Planning Assessment     Source of Information patient     People in Home spouse     Do you expect to return to your current living situation? Yes     Do you have help at home or someone to help you manage your care at home? Yes     Who are your caregiver(s) and their phone number(s)? Holly Whittaker (spouse) [phone]     Prior to hospitilization cognitive status: Alert/Oriented     Current cognitive status: Alert/Oriented     Home Accessibility stairs to enter home     Number of Stairs, Main Entrance two     Stair Railings, Main Entrance none     Home Layout Able to live on 1st floor     Equipment Currently Used at Home cane, straight;walker, rolling     Readmission within 30 days? No     Patient currently being followed by outpatient case management? No     Do you currently have  service(s) that help you manage your care at home? No     Do you take prescription medications? Yes     Do you have prescription coverage? Yes     Do you have any problems affording any of your prescribed medications? No     Is the patient taking medications as prescribed? yes     How do you get to doctors appointments? car, drives self;family or friend will provide     Are you on dialysis? No     Do you take coumadin? No     Discharge Plan A Home with family     Discharge Plan B Home with family     DME Needed Upon Discharge  none     Discharge Plan discussed with: Patient     Transition of Care Barriers None        Physical Activity    On average, how many days per week do you engage in moderate to strenuous exercise (like a brisk walk)? 0 days     On average, how many minutes do you engage in exercise at this level? 0 min        Financial Resource Strain    How hard is it for you to pay for the very basics like food, housing, medical care, and heating? Not hard at all        Housing Stability    In the last 12 months, was there a time when you were not able to pay the mortgage or rent on time? No     In the last 12 months, how many places have you lived? 1     In the last 12 months, was there a time when you did not have a steady place to sleep or slept in a shelter (including now)? No        Transportation Needs    In the past 12 months, has lack of transportation kept you from medical appointments or from getting medications? No     In the past 12 months, has lack of transportation kept you from meetings, work, or from getting things needed for daily living? No        Food Insecurity    Within the past 12 months, you worried that your food would run out before you got the money to buy more. Never true     Within the past 12 months, the food you bought just didn't last and you didn't have money to get more. Never true        Stress    Do you feel stress - tense, restless, nervous, or anxious, or unable to sleep  at night because your mind is troubled all the time - these days? Only a little        Social Connections    In a typical week, how many times do you talk on the phone with family, friends, or neighbors? More than three times a week     How often do you get together with friends or relatives? More than three times a week     How often do you attend Episcopal or Bahai services? Never     Do you belong to any clubs or organizations such as Episcopal groups, unions, fraternal or athletic groups, or school groups? No     How often do you attend meetings of the clubs or organizations you belong to? Never     Are you , , , , never , or living with a partner?         Alcohol Use    Q1: How often do you have a drink containing alcohol? Never     Q2: How many drinks containing alcohol do you have on a typical day when you are drinking? Patient does not drink     Q3: How often do you have six or more drinks on one occasion? Never        OTHER    Name(s) of People in Home Holly Whittaker (spouse) [phone]                   Patient lives at home with his wife. He has a walker and cane for home use. No HH pta. Plans to return home at discharge. SDOH complete. SS following for discharge needs as arise.

## 2023-07-26 NOTE — ASSESSMENT & PLAN NOTE
- Na 133  - Continue IV hydration  - Will check Lab in AM    7/26: Labs pending  - Continue 0.9% NS at 150 cc/hr  - Follow with AM labs

## 2023-07-26 NOTE — ASSESSMENT & PLAN NOTE
7/26: Likely secondary to dehydration and acute renal injury. Carotid dopplers 50-69% narrowing of the bilateral internal carotid arteries. Echo positive normal sized LV with low normal systolic function, est EF 50%, nl diastolic func., afib not observed, RV mildly dilated, normal mitral/tricuspid valves. Orthostatics normal.  - Continue IVF   - Telemetry

## 2023-07-26 NOTE — ASSESSMENT & PLAN NOTE
- Blood pressure currently controlled  - Holding home Lisinopril due to renal injury  - Continue to monitor blood pressure and adjust medications as indicated

## 2023-07-26 NOTE — ASSESSMENT & PLAN NOTE
7/26:   - K 5.2, that should improve with hydration  - Lokelma 5 mg TID for three dose  - Will check lab in AM

## 2023-07-26 NOTE — PROGRESS NOTES
[facility] - Short Stay [facility] Medicine  Progress Note    Patient Name: Niranjan Whittaker Jr.  MRN: [id number]  Patient Class: IP- Inpatient   Admission Date: 7/25/2023  Length of Stay: 1 days  Attending Physician: Shabnam Johns DO  Primary Care Provider: CRISTI ALVA MD        Subjective:     Principal Problem:LEONA (acute kidney injury)        HPI:  Patient is a 67 yo male who presents to the hospital with a complaint of dizziness and weakness that started a few days ago but has worsened over the past 24 hours. Patient stated that yesterday he was drying her body after taking a shower and bent down to dry his feet. All of sudden he started feeling dizzy and blacked out. Later that day, he had another episode of syncope when he went to his fridge to get something to drink. He denies any associated chest pain, palpitations, nausea, vision changes, but stated he lost consciousness for a few seconds during these tow episodes but is unsure whether he hit his head. He denies any previous history of syncopes. He lives in [place] and was home by himself when that happened and had to wait on his wife before he came in to seek medical attention. Patient was recently referred to cardiology for evaluation of dyspnea on exertion and intermittent chest pain. He was recently seen by Dr. Matthews who placed a cardiac recorder with a follow up on August 8, 2023.     In the ED, the patient was afebrile and vital signs were stable. Ensuring work up was significant for BUN/CR 44/2.87 and GFR 23 with a baseline BUN/CR 23/1.73 and GFR 43 about one month ago. K 5.2, Na 133 and D dimer of 1.33.  CT head and chest xray were negative for acute findings. Patient was admitted to the Family Medicine Service under the direct supervision of Dr. Shabnam Johns DO, for further evaluation and management.              Overview/Hospital Course:  No notes on file    Interval History: Overnight patient denied any dizziness, weakness,  or syncopal symptom.     Review of Systems   Constitutional:  Negative for chills, diaphoresis and fever.   HENT:  Negative for trouble swallowing.    Eyes:  Negative for discharge, itching and visual disturbance.   Respiratory:  Negative for cough, chest tightness and shortness of breath.    Cardiovascular:  Negative for chest pain, palpitations and leg swelling.   Gastrointestinal:  Negative for abdominal pain, diarrhea, nausea and vomiting.   Genitourinary:  Negative for decreased urine volume.   Musculoskeletal:  Negative for neck pain and neck stiffness.   Neurological:  Negative for tremors, seizures, syncope, speech difficulty, weakness, light-headedness and headaches.   Hematological:  Negative for adenopathy.   Psychiatric/Behavioral:  Negative for agitation, behavioral problems and confusion.    All other systems reviewed and are negative.  Objective:     Vital Signs (Most Recent):  Temp: 98.3 °F (36.8 °C) (07/26/23 1229)  Pulse: 73 (07/26/23 1229)  Resp: 16 (07/26/23 1229)  BP: 127/67 (07/26/23 1229)  SpO2: (!) 94 % (07/26/23 1229) Vital Signs (24h Range):  Temp:  [97.8 °F (36.6 °C)-98.4 °F (36.9 °C)] 98.3 °F (36.8 °C)  Pulse:  [] 73  Resp:  [12-20] 16  SpO2:  [92 %-96 %] 94 %  BP: ()/(54-75) 127/67     Weight: 104.8 kg (231 lb)  Body mass index is 29.66 kg/m².  No intake or output data in the 24 hours ending 07/26/23 1303      Physical Exam  Vitals and nursing note reviewed.   Constitutional:       General: He is not in acute distress.     Appearance: Normal appearance. He is not ill-appearing.   HENT:      Head: Normocephalic and atraumatic.      Right Ear: External ear normal.      Left Ear: External ear normal.      Nose: Nose normal.      Mouth/Throat:      Mouth: Mucous membranes are dry.      Pharynx: No oropharyngeal exudate or posterior oropharyngeal erythema.   Eyes:      General:         Right eye: No discharge.         Left eye: No discharge.      Conjunctiva/sclera: Conjunctivae  normal.   Cardiovascular:      Rate and Rhythm: Normal rate and regular rhythm.      Pulses: Normal pulses.      Heart sounds: Normal heart sounds. No murmur heard.    No friction rub. No gallop.   Pulmonary:      Breath sounds: Normal breath sounds. No wheezing, rhonchi or rales.   Abdominal:      Palpations: Abdomen is soft.      Tenderness: There is no abdominal tenderness.      Hernia: No hernia is present.   Musculoskeletal:         General: No deformity.      Cervical back: Neck supple.      Right lower leg: No edema.      Left lower leg: No edema.   Skin:     General: Skin is warm.      Coloration: Skin is not jaundiced.   Neurological:      General: No focal deficit present.      Mental Status: He is alert and oriented to person, place, and time.   Psychiatric:         Mood and Affect: Mood normal.         Behavior: Behavior normal.           Significant Labs: All pertinent labs within the past 24 hours have been reviewed.    Significant Imaging: I have reviewed all pertinent imaging results/findings within the past 24 hours.      Assessment/Plan:      * LEONA (acute kidney injury)  Patient with acute kidney injury/acute renal failure likely due to pre-renal azotemia due to dehydration LEONA is currently improving. Baseline creatinine 1.73 - Labs reviewed- Renal function/electrolytes with Estimated Creatinine Clearance: 32.2 mL/min (A) (based on SCr of 2.87 mg/dL (H)). according to latest data. Monitor urine output and serial BMP and adjust therapy as needed. Avoid nephrotoxins and renally dose meds for GFR listed above.    7/26: Base line Cr 2.87 (7/25) increased over baseline likely due to dehydration. Patient is responding well to IVF. Likely pre-renal azotemia Will continue to monitor with am labs.   - Continue IVF @ 150 ml/hr  - Monitor urine output  - Renally dose medications    Dehydration with hyponatremia  - Na 133  - Continue IV hydration  - Will check Lab in AM    7/26: Labs pending  - Continue 0.9%  NS at 150 cc/hr  - Follow with AM labs        Syncope  7/26: Likely secondary to dehydration and acute renal injury. Carotid dopplers 50-69% narrowing of the bilateral internal carotid arteries. Echo positive normal sized LV with low normal systolic function, est EF 50%, nl diastolic func., afib not observed, RV mildly dilated, normal mitral/tricuspid valves. Orthostatics normal.  - Continue IVF   - Telemetry       Chronic pain  7/26:  - Continue his home pain medications for chronic back pain      Primary hypertension  7/26:  - Blood pressure currently controlled  - Holding home Lisinopril due to renal injury  - Monitor blood pressure and adjust medications as indicated      Seronegative rheumatoid arthritis  7/26:  - Continue his home medications      Mixed hyperlipidemia  7/26:  - Continue home statin      Hyperkalemia  7/26:   - K 5.2, that should improve with hydration  - Lokelma 5 mg TID for three dose  - Will check lab in AM        VTE Risk Mitigation (From admission, onward)         Ordered     Place sequential compression device  Until discontinued         07/26/23 0406                Discharge Planning   SAUMYA:      Code Status: Full Code   Is the patient medically ready for discharge?:     Reason for patient still in hospital (select all that apply): Treatment  Discharge Plan A: Home with family                  Shira Monteiro MD  Department of Hospital Medicine   [facility] - Short Stay Unit

## 2023-07-26 NOTE — SUBJECTIVE & OBJECTIVE
Interval History: Overnight patient denied any dizziness, weakness, or syncopal symptom.     Review of Systems   Constitutional:  Negative for chills, diaphoresis and fever.   HENT:  Negative for trouble swallowing.    Eyes:  Negative for discharge, itching and visual disturbance.   Respiratory:  Negative for cough, chest tightness and shortness of breath.    Cardiovascular:  Negative for chest pain, palpitations and leg swelling.   Gastrointestinal:  Negative for abdominal pain, diarrhea, nausea and vomiting.   Genitourinary:  Negative for decreased urine volume.   Musculoskeletal:  Negative for neck pain and neck stiffness.   Neurological:  Negative for tremors, seizures, syncope, speech difficulty, weakness, light-headedness and headaches.   Hematological:  Negative for adenopathy.   Psychiatric/Behavioral:  Negative for agitation, behavioral problems and confusion.    All other systems reviewed and are negative.  Objective:     Vital Signs (Most Recent):  Temp: 98.3 °F (36.8 °C) (07/26/23 1229)  Pulse: 73 (07/26/23 1229)  Resp: 16 (07/26/23 1229)  BP: 127/67 (07/26/23 1229)  SpO2: (!) 94 % (07/26/23 1229) Vital Signs (24h Range):  Temp:  [97.8 °F (36.6 °C)-98.4 °F (36.9 °C)] 98.3 °F (36.8 °C)  Pulse:  [] 73  Resp:  [12-20] 16  SpO2:  [92 %-96 %] 94 %  BP: ()/(54-75) 127/67     Weight: 104.8 kg (231 lb)  Body mass index is 29.66 kg/m².  No intake or output data in the 24 hours ending 07/26/23 1303      Physical Exam  Vitals and nursing note reviewed.   Constitutional:       General: He is not in acute distress.     Appearance: Normal appearance. He is not ill-appearing.   HENT:      Head: Normocephalic and atraumatic.      Right Ear: External ear normal.      Left Ear: External ear normal.      Nose: Nose normal.      Mouth/Throat:      Mouth: Mucous membranes are dry.      Pharynx: No oropharyngeal exudate or posterior oropharyngeal erythema.   Eyes:      General:         Right eye: No discharge.          Left eye: No discharge.      Conjunctiva/sclera: Conjunctivae normal.   Cardiovascular:      Rate and Rhythm: Normal rate and regular rhythm.      Pulses: Normal pulses.      Heart sounds: Normal heart sounds. No murmur heard.    No friction rub. No gallop.   Pulmonary:      Breath sounds: Normal breath sounds. No wheezing, rhonchi or rales.   Abdominal:      Palpations: Abdomen is soft.      Tenderness: There is no abdominal tenderness.      Hernia: No hernia is present.   Musculoskeletal:         General: No deformity.      Cervical back: Neck supple.      Right lower leg: No edema.      Left lower leg: No edema.   Skin:     General: Skin is warm.      Coloration: Skin is not jaundiced.   Neurological:      General: No focal deficit present.      Mental Status: He is alert and oriented to person, place, and time.   Psychiatric:         Mood and Affect: Mood normal.         Behavior: Behavior normal.           Significant Labs: All pertinent labs within the past 24 hours have been reviewed.    Significant Imaging: I have reviewed all pertinent imaging results/findings within the past 24 hours.

## 2023-07-26 NOTE — HPI
Patient is a 67 yo male who presents to the hospital with a complaint of dizziness and weakness that started a few days ago but has worsened over the past 24 hours. Patient stated that yesterday he was drying her body after taking a shower and bent down to dry his feet. All of sudden he started feeling dizzy and blacked out. Later that day, he had another episode of syncope when he went to his fridge to get something to drink. He denies any associated chest pain, palpitations, nausea, vision changes, but stated he lost consciousness for a few seconds during these tow episodes but is unsure whether he hit his head. He denies any previous history of syncopes. He lives in [place] and was home by himself when that happened and had to wait on his wife before he came in to seek medical attention. Patient was recently referred to cardiology for evaluation of dyspnea on exertion and intermittent chest pain. He was recently seen by Dr. Matthews who placed a cardiac recorder with a follow up on August 8, 2023.     In the ED, the patient was afebrile and vital signs were stable. Ensuring work up was significant for BUN/CR 44/2.87 and GFR 23 with a baseline BUN/CR 23/1.73 and GFR 43 about one month ago. K 5.2, Na 133 and D dimer of 1.33.  CT head and chest xray were negative for acute findings. Patient was admitted to the Family Medicine Service under the direct supervision of Dr. Shabnam Johns DO, for further evaluation and management.

## 2023-07-26 NOTE — ASSESSMENT & PLAN NOTE
- Likely secondary to dehydration and acute renal injury  - Pending Carotid doppler  - Pending Echo  - Pending orthostatic vitals

## 2023-07-26 NOTE — PLAN OF CARE
Problem: Adult Inpatient Plan of Care  Goal: Absence of Hospital-Acquired Illness or Injury  Outcome: Ongoing, Progressing     Problem: Adult Inpatient Plan of Care  Goal: Optimal Comfort and Wellbeing  Outcome: Ongoing, Progressing     Problem: Adult Inpatient Plan of Care  Goal: Readiness for Transition of Care  Outcome: Ongoing, Progressing     Problem: Fluid and Electrolyte Imbalance (Acute Kidney Injury/Impairment)  Goal: Fluid and Electrolyte Balance  Outcome: Ongoing, Progressing

## 2023-07-26 NOTE — ASSESSMENT & PLAN NOTE
- K 5.2, that should improve with hydration  - Lokelma 5 mg TID for three dose  - Will check lab in AM

## 2023-07-26 NOTE — H&P
[facility] - Emergency Department  Hospital Medicine  History & Physical    Patient Name: Niranjan Whittaker Jr.  MRN: [id number]  Patient Class: IP- Inpatient  Admission Date: 7/25/2023  Attending Physician: Shabnam Johns DO   Primary Care Provider: CRISTI ALVA MD         Patient information was obtained from patient, past medical records and ER records.     Subjective:     Principal Problem:LEONA (acute kidney injury)    Chief Complaint:   Chief Complaint   Patient presents with    Dizziness    Loss of Consciousness        HPI: Patient is a 67 yo male who presents to the hospital with a complaint of dizziness and weakness that started a few days ago but has worsened over the past 24 hours. Patient stated that yesterday he was drying her body after taking a shower and bent down to dry his feet. All of sudden he started feeling dizzy and blacked out. Later that day, he had another episode of syncope when he went to his fridge to get something to drink. He denies any associated chest pain, palpitations, nausea, vision changes, but stated he lost consciousness for a few seconds during these tow episodes but is unsure whether he hit his head. He denies any previous history of syncopes. He lives in [place] and was home by himself when that happened and had to wait on his wife before he came in to seek medical attention. Patient was recently referred to cardiology for evaluation of dyspnea on exertion and intermittent chest pain. He was recently seen by Dr. Matthews who placed a cardiac recorder with a follow up on August 8, 2023.     In the ED, the patient was afebrile and vital signs were stable. Ensuring work up was significant for BUN/CR 44/2.87 and GFR 23 with a baseline BUN/CR 23/1.73 and GFR 43 about one month ago. K 5.2, Na 133 and D dimer of 1.33.  CT head and chest xray were negative for acute findings. Patient was admitted to the Family Medicine Service under the direct supervision of Dr. Haque  DO Andreas, for further evaluation and management.              Past Medical History:   Diagnosis Date    History of colon polyps 05/12/2022    History of GI diverticular bleed     Hyperlipidemia     Hypertension     Liver hemangioma 05/09/2023    Neuropathy     Personal history of gastric ulcer     Seronegative rheumatoid arthritis 09/22/2021    Goes to [facility] for Rheumatology  Last Assessment & Plan:  Formatting of this note might be different from the original. SNRA; Mod Active; Pt now off MTX and on Arava; However, pt continues to have persistent activity wrists; Intolerant to Enbrel; Poor response to Xeljanz; Unable to afford Humira in the past; Intolerant to chronic Nsaid use due to sig Gerd; Cont to be followed by local Pain man    Thyroid disease        Past Surgical History:   Procedure Laterality Date    BACK SURGERY      THYROIDECTOMY N/A 08/27/2021    Procedure: THYROIDECTOMY;  Surgeon: Manish Valadez MD;  Location: [facility];  Service: General;  Laterality: N/A;    TOTAL KNEE ARTHROPLASTY Right        Review of patient's allergies indicates:  No Known Allergies    No current facility-administered medications on file prior to encounter.     Current Outpatient Medications on File Prior to Encounter   Medication Sig    amitriptyline (ELAVIL) 25 MG tablet Take 1 tablet (25 mg total) by mouth every evening.    anastrozole (ARIMIDEX) 1 mg Tab Take 2 tablets (2 mg total) by mouth every 7 days.    cetirizine (ZYRTEC) 10 MG tablet Take 10 mg by mouth.    DULoxetine (CYMBALTA) 60 MG capsule Take 1 capsule (60 mg total) by mouth once daily.    ergocalciferol, vitamin D2, (VITAMIN D ORAL) Take by mouth.    ferrous sulfate (FEOSOL) 325 mg (65 mg iron) Tab tablet Take 1 tablet (325 mg total) by mouth daily with breakfast.    fluticasone propionate (FLONASE) 50 mcg/actuation nasal spray 1 spray (50 mcg total) by Each Nostril route once daily. (Patient taking differently: 1 spray by Each  Nostril route once daily. PRN)    furosemide (LASIX) 20 MG tablet Take 1 tablet (20 mg total) by mouth once daily. (Patient not taking: Reported on 7/25/2023)    HYDROcodone-acetaminophen (NORCO)  mg per tablet Take 1 tablet by mouth every 4 (four) hours as needed for Pain.    hydrOXYchloroQUINE (PLAQUENIL) 200 mg tablet Take 1 tablet (200 mg total) by mouth 2 (two) times a day.    leflunomide (ARAVA) 20 MG Tab Take 1 tablet (20 mg total) by mouth once daily.    levothyroxine (SYNTHROID) 137 MCG Tab tablet Take 1 tablet (137 mcg total) by mouth before breakfast.    linaCLOtide (LINZESS) 72 mcg Cap capsule Take 1 capsule (72 mcg total) by mouth before breakfast.    lisinopriL (PRINIVIL,ZESTRIL) 20 MG tablet Take 1 tablet (20 mg total) by mouth once daily.    loratadine (CLARITIN) 10 mg tablet TAKE 1 TABLET BY MOUTH DAILY    methocarbamoL (ROBAXIN) 500 MG Tab Take 1 tablet (500 mg total) by mouth 2 (two) times a day.    metOLazone (ZAROXOLYN) 5 MG tablet Take 1 tablet (5 mg total) by mouth once daily.    montelukast (SINGULAIR) 10 mg tablet Take 1 tablet (10 mg total) by mouth every evening.    omeprazole (PRILOSEC) 40 MG capsule Take 1 capsule (40 mg total) by mouth once daily.    OXYCONTIN 30 mg TR12 12 hr tablet Take 30 mg by mouth 2 (two) times daily as needed.    polyethylene glycol 3350 (MIRALAX ORAL) Take by mouth.    pravastatin (PRAVACHOL) 40 MG tablet Take 1 tablet (40 mg total) by mouth once daily.    predniSONE (DELTASONE) 5 MG tablet Take 1-2 tablets (5-10 mg total) by mouth daily as needed (As needed).    pregabalin (LYRICA) 225 MG Cap Take 1 capsule (225 mg total) by mouth 3 (three) times daily.    promethazine (PHENERGAN) 25 MG tablet Take 1 tablet (25 mg total) by mouth 2 (two) times daily as needed for Nausea. 2 times daily prn headache no more then 2 days in a week    silodosin (RAPAFLO) 8 mg Cap capsule Take 1 capsule (8 mg total) by mouth once daily.    sulfaSALAzine  (AZULFIDINE) 500 mg Tab Take 1 tablet (500 mg total) by mouth 2 (two) times daily.    tamsulosin (FLOMAX) 0.4 mg Cap     tiZANidine (ZANAFLEX) 4 MG tablet Take 1 tablet (4 mg total) by mouth once daily.     Family History       Problem Relation (Age of Onset)    Heart disease Mother    Hypertension Mother, Father, Sister          Tobacco Use    Smoking status: Never    Smokeless tobacco: Never   Substance and Sexual Activity    Alcohol use: Not Currently    Drug use: Yes     Types: Hydrocodone, Oxycodone    Sexual activity: Yes     Review of Systems   Constitutional:  Negative for chills, diaphoresis and fever.   HENT:  Negative for trouble swallowing.    Eyes:  Negative for discharge, itching and visual disturbance.   Respiratory:  Negative for cough, chest tightness and shortness of breath.    Cardiovascular:  Negative for chest pain, palpitations and leg swelling.   Gastrointestinal:  Negative for abdominal pain, diarrhea, nausea and vomiting.   Genitourinary:  Negative for decreased urine volume.   Musculoskeletal:  Negative for neck pain and neck stiffness.   Neurological:  Positive for syncope, weakness, light-headedness and headaches. Negative for tremors, seizures and speech difficulty.   Hematological:  Negative for adenopathy.   Psychiatric/Behavioral:  Negative for agitation, behavioral problems and confusion.    Objective:     Vital Signs (Most Recent):  Pulse: 81 (07/26/23 0300)  Resp: 12 (07/26/23 0132)  BP: (!) 106/59 (07/26/23 0300)  SpO2: (!) 92 % (07/26/23 0300) Vital Signs (24h Range):  Temp:  [97.9 °F (36.6 °C)] 97.9 °F (36.6 °C)  Pulse:  [] 81  Resp:  [12-18] 12  SpO2:  [92 %-95 %] 92 %  BP: ()/(55-75) 106/59     Weight: 104.8 kg (231 lb)  Body mass index is 29.66 kg/m².     Physical Exam  Vitals and nursing note reviewed.   Constitutional:       General: He is not in acute distress.     Appearance: Normal appearance. He is not ill-appearing.   HENT:      Head: Normocephalic  and atraumatic.      Right Ear: External ear normal.      Left Ear: External ear normal.      Nose: Nose normal.      Mouth/Throat:      Mouth: Mucous membranes are dry.      Pharynx: No oropharyngeal exudate or posterior oropharyngeal erythema.   Eyes:      General:         Right eye: No discharge.         Left eye: No discharge.      Conjunctiva/sclera: Conjunctivae normal.   Cardiovascular:      Rate and Rhythm: Normal rate and regular rhythm.      Pulses: Normal pulses.      Heart sounds: Normal heart sounds.   Pulmonary:      Breath sounds: Normal breath sounds. No wheezing or rhonchi.   Abdominal:      Palpations: Abdomen is soft.      Tenderness: There is no abdominal tenderness.      Hernia: No hernia is present.   Musculoskeletal:         General: No deformity.      Cervical back: Neck supple.      Right lower leg: No edema.      Left lower leg: No edema.   Skin:     General: Skin is warm.      Coloration: Skin is not jaundiced.   Neurological:      General: No focal deficit present.      Mental Status: He is alert and oriented to person, place, and time.   Psychiatric:         Mood and Affect: Mood normal.         Behavior: Behavior normal.              Significant Labs: All pertinent labs within the past 24 hours have been reviewed.  Recent Lab Results         07/25/23  2108   07/25/23  1825   07/25/23  1820        Influenza B, Molecular   Negative         Albumin/Globulin Ratio     1.1       Albumin     3.6       Alkaline Phosphatase     90       ALT     37       Anion Gap     12       Appearance, UA Clear           aPTT     29.4       AST     28       Baso #     0.03       Basophil %     0.5       Bilirubin (UA) Negative           BILIRUBIN TOTAL     0.4       BUN     44       BUN/CREAT RATIO     15       Calcium     8.6       Chloride     97       CO2     29       Color, UA Yellow           COVID-19 Ag   Negative         Creatinine     2.87       D-Dimer     1.33       Differential Type     Auto        eGFR     23       Eos #     0.06       Eosinophil %     0.9       Globulin, Total     3.3       Glucose     132       Glucose, UA Normal           Hematocrit     39.1       Hemoglobin     12.3       Immature Grans (Abs)     0.07       Immature Granulocytes     1.1       Influenza A   Negative         INR     0.94       Ketones, UA Negative           Leukocytes, UA Negative           Lymph #     1.01       Lymph %     15.3       Magnesium     2.4       MCH     29.5       MCHC     31.5       MCV     93.8       Mono #     0.50       Mono %     7.6       MPV     10.3       Neutrophils, Abs     4.94       Neutrophils Relative     74.6       NITRITE UA Negative           nRBC     0.0       NT-proBNP     18       NUCLEATED RBC ABSOLUTE     0.00       Occult Blood UA Negative           pH, UA 5.5           Platelets     311       Potassium     5.2       PROTEIN TOTAL     6.9       Protein, UA Negative           Protime     12.5       RBC     4.17       RDW     13.7       Sodium     133       Specific [place], UA 1.015           Troponin I High Sensitivity     4.7       TSH     3.370       UROBILINOGEN UA Normal           WBC     6.61               Significant Imaging: I have reviewed all pertinent imaging results/findings within the past 24 hours.  I have reviewed and interpreted all pertinent imaging results/findings within the past 24 hours.    Assessment/Plan:     * LEONA (acute kidney injury)  Patient with acute kidney injury/acute renal failure likely due to pre-renal azotemia due to dehydration LEONA is currently improving. Baseline creatinine 1.73 - Labs reviewed- Renal function/electrolytes with Estimated Creatinine Clearance: 32.2 mL/min (A) (based on SCr of 2.87 mg/dL (H)). according to latest data. Monitor urine output and serial BMP and adjust therapy as needed. Avoid nephrotoxins and renally dose meds for GFR listed above.    Dehydration with hyponatremia  - Na 133  - Continue IV hydration  - Will check Lab in  AM      Syncope  - Likely secondary to dehydration and acute renal injury  - Pending Carotid doppler  - Pending Echo  - Pending orthostatic vitals      Primary hypertension  - Blood pressure currently controlled  - Holding home Lisinopril due to renal injury  - Continue to monitor blood pressure and adjust medications as indicated      Hyperkalemia  - K 5.2, that should improve with hydration  - Lokelma 5 mg TID for three dose  - Will check lab in AM      Seronegative rheumatoid arthritis  Continue his home medications      Chronic pain  Continue his home pain medications for chronic back pain      Mixed hyperlipidemia  Continue home statin      VTE Risk Mitigation (From admission, onward)         Ordered     Place sequential compression device  Until discontinued         07/26/23 2826                           Ketan Machuca MD  Department of Hospital Medicine  [facility] - Emergency Department

## 2023-07-26 NOTE — ED NOTES
Rec'vd report from RAINA Mcarthur.  Pt lying in bed with HOB slightly elevated.  States that he just feels weak and tired.  States he has not been able to urinate but he will try to do so at this time.  Family at bedside, call light left in reach and instructed to call for any needs.

## 2023-07-26 NOTE — ASSESSMENT & PLAN NOTE
Patient with acute kidney injury/acute renal failure likely due to pre-renal azotemia due to dehydration LEONA is currently improving. Baseline creatinine 1.73 - Labs reviewed- Renal function/electrolytes with Estimated Creatinine Clearance: 32.2 mL/min (A) (based on SCr of 2.87 mg/dL (H)). according to latest data. Monitor urine output and serial BMP and adjust therapy as needed. Avoid nephrotoxins and renally dose meds for GFR listed above.    7/26: Base line Cr 2.87 (7/25) increased over baseline likely due to dehydration. Patient is responding well to IVF. Likely pre-renal azotemia Will continue to monitor with am labs.   - Continue IVF @ 150 ml/hr  - Monitor urine output  - Renally dose medications

## 2023-07-26 NOTE — HOSPITAL COURSE
The patient is a 68 year old male that presented to [facility] with dizziness and frequent falls at home. Numerous tests were performed.  US kidney - No hydronephrosis. US bilateral Carotid - 50-69% narrowing of the bilateral internal carotid arteries. US LE doppler veinous - No evidence of deep venous thrombosis in either lower extremity. CT Head w/o contrast - No acute intracranial process. CXR - No definite acute process.  Shallow breath.  The patient admits to using a diuretic at home that a cardiologist gave him that likely cause dehydration. On admission the patient had a LEONA which was corrected with IVF resuscitation. The patient felt better. Troponin x2 was negative and ProBNP was within normal limits. EKG showed normal sinus rhythm and ECHO was normal.  The patient has received maximum benefit of hospitalization and will be discharged home.

## 2023-07-26 NOTE — ASSESSMENT & PLAN NOTE
Patient with acute kidney injury/acute renal failure likely due to pre-renal azotemia due to dehydration LEONA is currently improving. Baseline creatinine 1.73 - Labs reviewed- Renal function/electrolytes with Estimated Creatinine Clearance: 32.2 mL/min (A) (based on SCr of 2.87 mg/dL (H)). according to latest data. Monitor urine output and serial BMP and adjust therapy as needed. Avoid nephrotoxins and renally dose meds for GFR listed above.

## 2023-07-26 NOTE — NURSING
Nurses Note -- 4 Eyes      7/26/2023   11:18 AM      Skin assessed during: Q Shift Change      [x] No Altered Skin Integrity Present    [x]Prevention Measures Documented      [] Yes- Altered Skin Integrity Present or Discovered   [] LDA Added if Not in Epic (Describe Wound)   [] New Altered Skin Integrity was Present on Admit and Documented in LDA   [] Wound Image Taken    Wound Care Consulted? No    Attending Nurse:  Sean Snyder RN     Second RN/Staff Member:  NOEL Batista RN

## 2023-07-26 NOTE — ASSESSMENT & PLAN NOTE
7/26:  - Blood pressure currently controlled  - Holding home Lisinopril due to renal injury  - Monitor blood pressure and adjust medications as indicated

## 2023-07-27 VITALS
WEIGHT: 231 LBS | DIASTOLIC BLOOD PRESSURE: 69 MMHG | RESPIRATION RATE: 18 BRPM | TEMPERATURE: 98 F | SYSTOLIC BLOOD PRESSURE: 131 MMHG | OXYGEN SATURATION: 95 % | BODY MASS INDEX: 29.65 KG/M2 | HEIGHT: 74 IN | HEART RATE: 90 BPM

## 2023-07-27 LAB
ANION GAP SERPL CALCULATED.3IONS-SCNC: 8 MMOL/L (ref 7–16)
BASOPHILS # BLD AUTO: 0.08 K/UL (ref 0–0.2)
BASOPHILS NFR BLD AUTO: 1.4 % (ref 0–1)
BUN SERPL-MCNC: 31 MG/DL (ref 7–18)
BUN/CREAT SERPL: 16 (ref 6–20)
CALCIUM SERPL-MCNC: 8.1 MG/DL (ref 8.5–10.1)
CHLORIDE SERPL-SCNC: 102 MMOL/L (ref 98–107)
CO2 SERPL-SCNC: 31 MMOL/L (ref 21–32)
CREAT SERPL-MCNC: 2 MG/DL (ref 0.7–1.3)
DIFFERENTIAL METHOD BLD: ABNORMAL
EGFR (NO RACE VARIABLE) (RUSH/TITUS): 36 ML/MIN/1.73M2
EOSINOPHIL # BLD AUTO: 0.24 K/UL (ref 0–0.5)
EOSINOPHIL NFR BLD AUTO: 4.1 % (ref 1–4)
ERYTHROCYTE [DISTWIDTH] IN BLOOD BY AUTOMATED COUNT: 13.6 % (ref 11.5–14.5)
GLUCOSE SERPL-MCNC: 104 MG/DL (ref 74–106)
HCT VFR BLD AUTO: 34.4 % (ref 40–54)
HGB BLD-MCNC: 11 G/DL (ref 13.5–18)
IMM GRANULOCYTES # BLD AUTO: 0.03 K/UL (ref 0–0.04)
IMM GRANULOCYTES NFR BLD: 0.5 % (ref 0–0.4)
LYMPHOCYTES # BLD AUTO: 2.59 K/UL (ref 1–4.8)
LYMPHOCYTES NFR BLD AUTO: 44.2 % (ref 27–41)
MCH RBC QN AUTO: 30.1 PG (ref 27–31)
MCHC RBC AUTO-ENTMCNC: 32 G/DL (ref 32–36)
MCV RBC AUTO: 94 FL (ref 80–96)
MONOCYTES # BLD AUTO: 0.58 K/UL (ref 0–0.8)
MONOCYTES NFR BLD AUTO: 9.9 % (ref 2–6)
MPC BLD CALC-MCNC: 10.1 FL (ref 9.4–12.4)
NEUTROPHILS # BLD AUTO: 2.34 K/UL (ref 1.8–7.7)
NEUTROPHILS NFR BLD AUTO: 39.9 % (ref 53–65)
NRBC # BLD AUTO: 0 X10E3/UL
NRBC, AUTO (.00): 0 %
PLATELET # BLD AUTO: 280 K/UL (ref 150–400)
POTASSIUM SERPL-SCNC: 4.7 MMOL/L (ref 3.5–5.1)
RBC # BLD AUTO: 3.66 M/UL (ref 4.6–6.2)
SODIUM SERPL-SCNC: 136 MMOL/L (ref 136–145)
WBC # BLD AUTO: 5.86 K/UL (ref 4.5–11)

## 2023-07-27 PROCEDURE — 25000003 PHARM REV CODE 250

## 2023-07-27 PROCEDURE — 80048 BASIC METABOLIC PNL TOTAL CA: CPT

## 2023-07-27 PROCEDURE — 99239 HOSP IP/OBS DSCHRG MGMT >30: CPT | Mod: GC,,, | Performed by: FAMILY MEDICINE

## 2023-07-27 PROCEDURE — 25000003 PHARM REV CODE 250: Performed by: HOSPITALIST

## 2023-07-27 PROCEDURE — 85025 COMPLETE CBC W/AUTO DIFF WBC: CPT

## 2023-07-27 PROCEDURE — 99239 PR HOSPITAL DISCHARGE DAY,>30 MIN: ICD-10-PCS | Mod: GC,,, | Performed by: FAMILY MEDICINE

## 2023-07-27 RX ADMIN — DULOXETIN HYDROCHLORIDE 60 MG: 30 CAPSULE, DELAYED RELEASE ORAL at 09:07

## 2023-07-27 RX ADMIN — PRAVASTATIN SODIUM 40 MG: 40 TABLET ORAL at 09:07

## 2023-07-27 RX ADMIN — SODIUM CHLORIDE: 9 INJECTION, SOLUTION INTRAVENOUS at 06:07

## 2023-07-27 RX ADMIN — SULFASALAZINE 500 MG: 500 TABLET ORAL at 09:07

## 2023-07-27 RX ADMIN — PREGABALIN 225 MG: 75 CAPSULE ORAL at 09:07

## 2023-07-27 RX ADMIN — LEVOTHYROXINE SODIUM 137 MCG: 0.11 TABLET ORAL at 06:07

## 2023-07-27 RX ADMIN — TAMSULOSIN HYDROCHLORIDE 0.4 MG: 0.4 CAPSULE ORAL at 09:07

## 2023-07-27 RX ADMIN — Medication 1200 UNITS: at 09:07

## 2023-07-27 RX ADMIN — LEFLUNOMIDE 20 MG: 20 TABLET ORAL at 09:07

## 2023-07-27 RX ADMIN — HYDROXYCHLOROQUINE SULFATE 200 MG: 200 TABLET, FILM COATED ORAL at 09:07

## 2023-07-27 RX ADMIN — FERROUS SULFATE TAB 325 MG (65 MG ELEMENTAL FE) 1 EACH: 325 (65 FE) TAB at 09:07

## 2023-07-27 RX ADMIN — SODIUM CHLORIDE: 9 INJECTION, SOLUTION INTRAVENOUS at 12:07

## 2023-07-27 NOTE — NURSING
0730 Received patient lying in bed, awake alert and oriented x4. No acute distress noted. Denies pain. Assessment complete at this time. Discussed plan of care. Instructed pt to call with needs. Left pt resting in bed, call bell in reach, safety measures in place.      0922 meds given, see MAR. Safety measures in place.    1118 pt resting in bed, ice water given. Denies other needs. Safety measures in place    1313 pt resting in bed, unchanged, denies needs. Call bell in reach.     1342 Discharge instructions reviewed. IV removed. Discharge packet given to patient. All questions answered. Left pt in room awaiting transport for discharge.

## 2023-07-27 NOTE — ASSESSMENT & PLAN NOTE
Patient with acute kidney injury/acute renal failure likely due to pre-renal azotemia due to dehydration LEONA is currently improving. Baseline creatinine 1.73 - Labs reviewed- Renal function/electrolytes with Estimated Creatinine Clearance: 46.2 mL/min (A) (based on SCr of 2 mg/dL (H)). according to latest data. Monitor urine output and serial BMP and adjust therapy as needed. Avoid nephrotoxins and renally dose meds for GFR listed above.    7/26: Base line Cr 2.87 (7/25) increased over baseline likely due to dehydration. Patient is responding well to IVF. Likely pre-renal azotemia Will continue to monitor with am labs.   - Continue IVF @ 150 ml/hr  - Monitor urine output  - Renally dose medications  Resolved    Follow up with PCP   13-Jan-2019 04:42

## 2023-07-27 NOTE — DISCHARGE INSTRUCTIONS
*Notify your healthcare provider if you experience any of the following: temperature >100.4  * Notify your healthcare provider if you experience any of the following: redness, tenderness.    *Notify your healthcare provider if you experience any of the following: difficulty breathing or     increased cough.  *Notify your physician if you experience any persistent nausea, vomiting, or diarrhea or headache  *Notify your physician if you experience any of the following:severe uncontrolled pain;worsening rash, increased confusion or weakness; dizziness, lightheadedness or visual disturbances

## 2023-07-27 NOTE — ASSESSMENT & PLAN NOTE
- Na 133  - Continue IV hydration  - Will check Lab in AM    7/26: Labs pending  - Continue 0.9% NS at 150 cc/hr  - Follow with AM labs    Follow up with PCP

## 2023-07-27 NOTE — ASSESSMENT & PLAN NOTE
7/26:  - Blood pressure currently controlled  - Holding home Lisinopril due to renal injury  - Monitor blood pressure and adjust medications as indicated    Follow up with PCP

## 2023-07-27 NOTE — DISCHARGE SUMMARY
Care Management/Social Work Discharge Arrangements: [facility] - [phone] will contact you to set up your first visit for Nursing, Physical Therapy, Occupational Therapy, and Speech Therapy services. Please call them with any questions. If you do not receive a phone call from the agency within 24 to 48 hours, please contact them directly.     Please contact the dietician at the [facility] to schedule an appointment for nutrition services at [phone].   [facility] - Short Stay Unit  Hospital Medicine  Discharge Summary      Patient Name: Niranjan Whittaker Jr.  MRN: [id number]  EVER: [id number]  Patient Class: IP- Inpatient  Admission Date: 7/25/2023  Hospital Length of Stay: 2 days  Discharge Date and Time:  07/27/2023 5:40 PM  Attending Physician: No att. providers found   Discharging Provider: Cyril Cardoza MD  Primary Care Provider: CRISTI ALVA MD    Primary Care Team: Networked reference to record PCT     HPI:   Patient is a 67 yo male who presents to the hospital with a complaint of dizziness and weakness that started a few days ago but has worsened over the past 24 hours. Patient stated that yesterday he was drying her body after taking a shower and bent down to dry his feet. All of sudden he started feeling dizzy and blacked out. Later that day, he had another episode of syncope when he went to his fridge to get something to drink. He denies any associated chest pain, palpitations, nausea, vision changes, but stated he lost consciousness for a few seconds during these tow episodes but is unsure whether he hit his head. He denies any previous history of syncopes. He lives in [place] and was home by himself when that happened and had to wait on his wife before he came in to seek medical attention. Patient was recently referred to cardiology for evaluation of dyspnea on exertion and intermittent chest pain. He was recently seen by Dr. aMtthews who placed a cardiac recorder with a follow up on August 8, 2023.     In the ED, the patient was afebrile and vital signs were stable. Ensuring work up was significant for BUN/CR 44/2.87 and GFR 23 with a baseline BUN/CR 23/1.73 and GFR 43 about one month ago. K 5.2, Na 133 and D dimer of 1.33.  CT head and chest xray were negative for acute findings. Patient was admitted to the Family Medicine Service under the direct supervision of Dr. Shabnam Johns DO, for further evaluation and management.              * No  surgery found *      Hospital Course:   The patient is a 68 year old male that presented to [facility] with dizziness and frequent falls at home. Numerous tests were performed.  US kidney - No hydronephrosis. US bilateral Carotid - 50-69% narrowing of the bilateral internal carotid arteries. US LE doppler veinous - No evidence of deep venous thrombosis in either lower extremity. CT Head w/o contrast - No acute intracranial process. CXR - No definite acute process.  Shallow breath.  The patient admits to using a diuretic at home that a cardiologist gave him that likely cause dehydration. On admission the patient had a LEONA which was corrected with IVF resuscitation. The patient felt better. Troponin x2 was negative and ProBNP was within normal limits. EKG showed normal sinus rhythm and ECHO was normal.  The patient has received maximum benefit of hospitalization and will be discharged home.        Goals of Care Treatment Preferences:  Code Status: Full Code      Consults:     Neuro  Chronic pain  7/26:  - Continue his home pain medications for chronic back pain    Follow up with PCP    Cardiac/Vascular  Mixed hyperlipidemia  7/26:  - Continue home statin    Follow up with PCP    Primary hypertension  7/26:  - Blood pressure currently controlled  - Holding home Lisinopril due to renal injury  - Monitor blood pressure and adjust medications as indicated    Follow up with PCP    Renal/  * LEONA (acute kidney injury)  Patient with acute kidney injury/acute renal failure likely due to pre-renal azotemia due to dehydration LEONA is currently improving. Baseline creatinine 1.73 - Labs reviewed- Renal function/electrolytes with Estimated Creatinine Clearance: 46.2 mL/min (A) (based on SCr of 2 mg/dL (H)). according to latest data. Monitor urine output and serial BMP and adjust therapy as needed. Avoid nephrotoxins and renally dose meds for GFR listed above.    7/26: Base line Cr 2.87 (7/25) increased over baseline likely due  to dehydration. Patient is responding well to IVF. Likely pre-renal azotemia Will continue to monitor with am labs.   - Continue IVF @ 150 ml/hr  - Monitor urine output  - Renally dose medications  Resolved    Follow up with PCP    Hyperkalemia  7/26:   - K 5.2, that should improve with hydration  - Lokelma 5 mg TID for three dose  - Will check lab in AM    Follow up with PCP    Dehydration with hyponatremia  - Na 133  - Continue IV hydration  - Will check Lab in AM    7/26: Labs pending  - Continue 0.9% NS at 150 cc/hr  - Follow with AM labs    Follow up with PCP    Immunology/Multi System  Seronegative rheumatoid arthritis  7/26:  - Continue his home medications    Follow up with Rheumatology     Other  Syncope  7/26: Likely secondary to dehydration and acute renal injury. Carotid dopplers 50-69% narrowing of the bilateral internal carotid arteries. Echo positive normal sized LV with low normal systolic function, est EF 50%, nl diastolic func., afib not observed, RV mildly dilated, normal mitral/tricuspid valves. Orthostatics normal.  - Continue IVF   - Telemetry     Follow up with PCP      Final Active Diagnoses:    Diagnosis Date Noted POA    PRINCIPAL PROBLEM:  LEONA (acute kidney injury) [N17.9] 07/26/2023 Yes    Seronegative rheumatoid arthritis [M06.00] 09/22/2021 Yes    Dehydration with hyponatremia [E86.0, E87.1] 07/26/2023 Yes    Primary hypertension [I10] 11/11/2021 Yes    Chronic pain [G89.29] 11/11/2021 Yes    Mixed hyperlipidemia [E78.2] 04/17/2023 Yes    Hyperkalemia [E87.5] 07/26/2023 Yes    Syncope [R55] 07/26/2023 Yes      Problems Resolved During this Admission:       Discharged Condition: stable    Disposition: Home or Self Care    Follow Up:   Follow-up Information     CRISTI ALVA MD Follow up.    Specialty: Family Medicine  Contact information:  [address] [place] [address] [address] [place] [address] [phone]             Yoni Matthews, DO Follow up.    Specialties: Interventional Cardiology,  Cardiology  Contact information:  [address] [place] [id number]  [phone]                       Patient Instructions:      Notify your health care provider if you experience any of the following:  increased confusion or weakness     Notify your health care provider if you experience any of the following:  persistent dizziness, light-headedness, or visual disturbances     Notify your health care provider if you experience any of the following:  difficulty breathing or increased cough     Notify your health care provider if you experience any of the following:  severe uncontrolled pain     Notify your health care provider if you experience any of the following:  persistent nausea and vomiting or diarrhea     Notify your health care provider if you experience any of the following:  temperature >100.4     Notify your health care provider if you experience any of the following:  severe persistent headache     Activity as tolerated       Significant Diagnostic Studies: Labs: All labs within the past 24 hours have been reviewed  Radiology: X-Ray: CXR: X-Ray Chest 1 View (CXR):   Results for orders placed or performed during the hospital encounter of 07/25/23   X-Ray Chest 1 View    Narrative    EXAMINATION:  XR CHEST 1 VIEW    CLINICAL HISTORY:  .  Weakness    COMPARISON:  June 30, 2023    TECHNIQUE:  AP chest    FINDINGS:  Cardiac silhouette is not enlarged.  There is no mediastinal mass.  There is no pulmonary vascular engorgement.    Lungs are generally clear for shallow breath.  There is no gross pleural effusion.    Osseous structures are unchanged      Impression    No definite acute process.  Shallow breath      Electronically signed by: Yaw Bah  Date:    07/25/2023  Time:    18:41    and X-Ray Chest PA and Lateral (CXR): No results found for this visit on 07/25/23.  CT scan: CT ABDOMEN PELVIS WITHOUT CONTRAST: No results found for this visit on 07/25/23.  Cardiac Graphics: Echocardiogram: 2D echo  with color flow doppler: No results found for this or any previous visit.    Pending Diagnostic Studies:     None         Medications:  Reconciled Home Medications:      Medication List      CONTINUE taking these medications    amitriptyline 25 MG tablet  Commonly known as: ELAVIL  Take 1 tablet (25 mg total) by mouth every evening.     anastrozole 1 mg Tab  Commonly known as: ARIMIDEX  Take 2 tablets (2 mg total) by mouth every 7 days.     cetirizine 10 MG tablet  Commonly known as: ZYRTEC  Take 10 mg by mouth.     DULoxetine 60 MG capsule  Commonly known as: CYMBALTA  Take 1 capsule (60 mg total) by mouth once daily.     ferrous sulfate 325 mg (65 mg iron) Tab tablet  Commonly known as: FEOSOL  Take 1 tablet (325 mg total) by mouth daily with breakfast.     fluticasone propionate 50 mcg/actuation nasal spray  Commonly known as: FLONASE  1 spray (50 mcg total) by Each Nostril route once daily.     HYDROcodone-acetaminophen  mg per tablet  Commonly known as: NORCO  Take 1 tablet by mouth every 4 (four) hours as needed for Pain.     hydrOXYchloroQUINE 200 mg tablet  Commonly known as: PLAQUENIL  Take 1 tablet (200 mg total) by mouth 2 (two) times a day.     leflunomide 20 MG Tab  Commonly known as: ARAVA  Take 1 tablet (20 mg total) by mouth once daily.     levothyroxine 137 MCG Tab tablet  Commonly known as: SYNTHROID  Take 1 tablet (137 mcg total) by mouth before breakfast.     linaCLOtide 72 mcg Cap capsule  Commonly known as: LINZESS  Take 1 capsule (72 mcg total) by mouth before breakfast.     lisinopriL 20 MG tablet  Commonly known as: PRINIVIL,ZESTRIL  Take 1 tablet (20 mg total) by mouth once daily.     loratadine 10 mg tablet  Commonly known as: CLARITIN  TAKE 1 TABLET BY MOUTH DAILY     methocarbamoL 500 MG Tab  Commonly known as: ROBAXIN  Take 1 tablet (500 mg total) by mouth 2 (two) times a day.     metOLazone 5 MG tablet  Commonly known as: ZAROXOLYN  Take 1 tablet (5 mg total) by mouth once  daily.     MIRALAX ORAL  Take by mouth.     montelukast 10 mg tablet  Commonly known as: SINGULAIR  Take 1 tablet (10 mg total) by mouth every evening.     omeprazole 40 MG capsule  Commonly known as: PRILOSEC  Take 1 capsule (40 mg total) by mouth once daily.     OxyCONTIN 30 mg Tr12 12 hr tablet  Generic drug: oxyCODONE  Take 30 mg by mouth 2 (two) times daily as needed.     pravastatin 40 MG tablet  Commonly known as: PRAVACHOL  Take 1 tablet (40 mg total) by mouth once daily.     predniSONE 5 MG tablet  Commonly known as: DELTASONE  Take 1-2 tablets (5-10 mg total) by mouth daily as needed (As needed).     pregabalin 225 MG Cap  Commonly known as: LYRICA  Take 1 capsule (225 mg total) by mouth 3 (three) times daily.     promethazine 25 MG tablet  Commonly known as: PHENERGAN  Take 1 tablet (25 mg total) by mouth 2 (two) times daily as needed for Nausea. 2 times daily prn headache no more then 2 days in a week     silodosin 8 mg Cap capsule  Commonly known as: RAPAFLO  Take 1 capsule (8 mg total) by mouth once daily.     sulfaSALAzine 500 mg Tab  Commonly known as: AZULFIDINE  Take 1 tablet (500 mg total) by mouth 2 (two) times daily.     tamsulosin 0.4 mg Cap  Commonly known as: FLOMAX     tiZANidine 4 MG tablet  Commonly known as: ZANAFLEX  Take 1 tablet (4 mg total) by mouth once daily.     VITAMIN D ORAL  Take by mouth.        STOP taking these medications    furosemide 20 MG tablet  Commonly known as: LASIX            Indwelling Lines/Drains at time of discharge:   Lines/Drains/Airways     None                 Time spent on the discharge of patient: 45 minutes         Cyril Cardoza MD  Department of Hospital Medicine  [facility] - Short Stay Unit

## 2023-07-27 NOTE — ASSESSMENT & PLAN NOTE
7/26: Likely secondary to dehydration and acute renal injury. Carotid dopplers 50-69% narrowing of the bilateral internal carotid arteries. Echo positive normal sized LV with low normal systolic function, est EF 50%, nl diastolic func., afib not observed, RV mildly dilated, normal mitral/tricuspid valves. Orthostatics normal.  - Continue IVF   - Telemetry     Follow up with PCP

## 2023-07-28 ENCOUNTER — PATIENT OUTREACH (OUTPATIENT)
Dept: ADMINISTRATIVE | Facility: CLINIC | Age: 68
End: 2023-07-28

## 2023-07-28 NOTE — PROGRESS NOTES
C3 nurse attempted to contact Niranjan Whittaker Jr.  for a TCC post hospital discharge follow up call. No answer. Left voicemail with callback information. The patient has a scheduled HOSFU appointment with CRISTI ALVA MD  on 08/01/2023 @ 1020.

## 2023-07-31 NOTE — PLAN OF CARE
[facility] - Short Stay Unit  Discharge Final Note    Primary Care Provider: CRISTI ALVA MD    Expected Discharge Date: 7/27/2023    Final Discharge Note (most recent)       Final Note - 07/31/23 0918          Final Note    Assessment Type Final Discharge Note     Anticipated Discharge Disposition Home or Self Care        Post-Acute Status    Discharge Delays None known at this time                   Patient discharged home on 7/27/23.    Important Message from Medicare  Important Message from Medicare regarding Discharge Appeal Rights: Given to patient/caregiver, Explained to patient/caregiver, Signed/date by patient/caregiver     Date IMM was signed: 07/26/23  Time IMM was signed: 1112    Contact Info       CRISTI ALVA MD   Specialty: Family Medicine   Relationship: PCP - General    [address] [place] [address] [address] [place] [address]   Phone: [phone]       Next Steps: Follow up    Yoni Matthews DO   Specialty: Interventional Cardiology, Cardiology    [address] [place] [address] [place] [address]   Phone: [phone]       Next Steps: Follow up

## 2023-08-01 ENCOUNTER — OFFICE VISIT (OUTPATIENT)
Dept: FAMILY MEDICINE | Facility: CLINIC | Age: 68
End: 2023-08-01
Payer: MEDICARE

## 2023-08-01 VITALS
BODY MASS INDEX: 29.9 KG/M2 | OXYGEN SATURATION: 95 % | SYSTOLIC BLOOD PRESSURE: 134 MMHG | DIASTOLIC BLOOD PRESSURE: 80 MMHG | WEIGHT: 233 LBS | HEART RATE: 94 BPM | RESPIRATION RATE: 18 BRPM | TEMPERATURE: 98 F | HEIGHT: 74 IN

## 2023-08-01 DIAGNOSIS — Z76.89 ENCOUNTER FOR SUPPORT AND COORDINATION OF TRANSITION OF CARE: Primary | ICD-10-CM

## 2023-08-01 DIAGNOSIS — N18.32 STAGE 3B CHRONIC KIDNEY DISEASE: ICD-10-CM

## 2023-08-01 PROBLEM — N17.9 AKI (ACUTE KIDNEY INJURY): Status: RESOLVED | Noted: 2023-07-26 | Resolved: 2023-08-01

## 2023-08-01 PROBLEM — E86.0 DEHYDRATION WITH HYPONATREMIA: Status: RESOLVED | Noted: 2023-07-26 | Resolved: 2023-08-01

## 2023-08-01 PROBLEM — E87.1 DEHYDRATION WITH HYPONATREMIA: Status: RESOLVED | Noted: 2023-07-26 | Resolved: 2023-08-01

## 2023-08-01 PROBLEM — G47.33 OBSTRUCTIVE SLEEP APNEA: Status: ACTIVE | Noted: 2023-08-01

## 2023-08-01 PROBLEM — E87.5 HYPERKALEMIA: Status: RESOLVED | Noted: 2023-07-26 | Resolved: 2023-08-01

## 2023-08-01 LAB
ANION GAP SERPL CALCULATED.3IONS-SCNC: 10 MMOL/L (ref 7–16)
BUN SERPL-MCNC: 22 MG/DL (ref 7–18)
BUN/CREAT SERPL: 12 (ref 6–20)
CALCIUM SERPL-MCNC: 8.8 MG/DL (ref 8.5–10.1)
CHLORIDE SERPL-SCNC: 107 MMOL/L (ref 98–107)
CO2 SERPL-SCNC: 28 MMOL/L (ref 21–32)
CREAT SERPL-MCNC: 1.88 MG/DL (ref 0.7–1.3)
EGFR (NO RACE VARIABLE) (RUSH/TITUS): 39 ML/MIN/1.73M2
GLUCOSE SERPL-MCNC: 91 MG/DL (ref 74–106)
POTASSIUM SERPL-SCNC: 4.2 MMOL/L (ref 3.5–5.1)
SODIUM SERPL-SCNC: 141 MMOL/L (ref 136–145)

## 2023-08-01 PROCEDURE — 99495 TCM SERVICES (MODERATE COMPLEXITY): ICD-10-PCS | Mod: ,,, | Performed by: STUDENT IN AN ORGANIZED HEALTH CARE EDUCATION/TRAINING PROGRAM

## 2023-08-01 PROCEDURE — 99495 TRANSJ CARE MGMT MOD F2F 14D: CPT | Mod: ,,, | Performed by: STUDENT IN AN ORGANIZED HEALTH CARE EDUCATION/TRAINING PROGRAM

## 2023-08-01 PROCEDURE — 80048 BASIC METABOLIC PANEL: ICD-10-PCS | Mod: ,,, | Performed by: CLINICAL MEDICAL LABORATORY

## 2023-08-01 PROCEDURE — 80048 BASIC METABOLIC PNL TOTAL CA: CPT | Mod: ,,, | Performed by: CLINICAL MEDICAL LABORATORY

## 2023-08-01 NOTE — PROGRESS NOTES
Progress Note     CRISTI ALVA MD   [address]  [place] [address]     PATIENT NAME: Niranjan Whittaker Jr.  : 1955  DATE: 23  MRN: [id number]      Billing Provider: CRISTI ALVA MD  Level of Service:   Patient PCP Information       Provider PCP Type    CRISTI ALVA MD [facility] Follow Up (Pt had elevated BUN and Creatine and decreased GFR. /Hospital staff recommended following up with PCP. /Pt states he does not see nephrology. /States  Was monitoring his kidney function in clinic. /Pt has seen cardiology and has a temporary cardiac monitor. States he was instructed to wear monitor for 2 weeks. /States tomorrow will be his 2 weeks. /Pt is fasting. ), Fatigue (States he has been building strength back up since hospital stay. ), and Loss of Consciousness (Pt had 2 syncopal episodes on  and reported to the ER on . /Pt denies any dizziness or subsequent syncopal episodes since discharge. )      SUBJECTIVE:     Niranjan Whittaker Jr. is a 68 y.o.male who presents to clinic for Hospital Follow Up (Pt had elevated BUN and Creatine and decreased GFR. /Hospital staff recommended following up with PCP. /Pt states he does not see nephrology. /States  Was monitoring his kidney function in clinic. /Pt has seen cardiology and has a temporary cardiac monitor. States he was instructed to wear monitor for 2 weeks. /States tomorrow will be his 2 weeks. /Pt is fasting. ), Fatigue (States he has been building strength back up since hospital stay. ), and Loss of Consciousness (Pt had 2 syncopal episodes on  and reported to the ER on . /Pt denies any dizziness or subsequent syncopal episodes since discharge. )    Patient is status post hospitalization for LEONA secondary to dehydration.  Patient is doing well at this time.  He has not had any further syncopal episodes or dizziness.  He is tolerating p.o. fluids without difficulty.  Patient  is to return his cardiac monitor tomorrow.  Patient has appointment on Friday to get his stress test and echo.  Patient is amenable to having a BMP performed.    Transitional Care Note    Family and/or Caretaker present at visit?  No.  Diagnostic tests reviewed/disposition: I have reviewed all completed as well as pending diagnostic tests at the time of discharge.  Disease/illness education: Discussed possible causes of dehydration and prevention as well as his CKD.   Home health/community services discussion/referrals: Patient does not have home health established from hospital visit.  They do not need home health.  If needed, we will set up home health for the patient.   Establishment or re-establishment of referral orders for community resources: No other necessary community resources.   Discussion with other health care providers: No discussion with other health care providers necessary.     Discharge medications reviewed and reconciled:  Medications reconciled.    US kidney - No hydronephrosis. US bilateral Carotid - 50-69% narrowing of the bilateral internal carotid arteries. US LE doppler veinous - No evidence of deep venous thrombosis in either lower extremity. CT Head w/o contrast - No acute intracranial process. CXR - No definite acute process.  Shallow breath.        Past Medical History:  has a past medical history of History of colon polyps (05/12/2022), History of GI diverticular bleed, Hyperlipidemia, Hypertension, Liver hemangioma (05/09/2023), Neuropathy, Personal history of gastric ulcer, Seronegative rheumatoid arthritis (09/22/2021), and Thyroid disease.   Past Surgical History:  has a past surgical history that includes Back surgery; Thyroidectomy (N/A, 08/27/2021); and Total knee arthroplasty (Right).  Family History: family history includes Heart disease in his mother; Hypertension in his father, mother, and sister.  Social History:  reports that he quit smoking about 3 years ago. His smoking  use included cigars. He has never used smokeless tobacco. He reports that he does not currently use alcohol. He reports current drug use. Drugs: Hydrocodone and Oxycodone.  Allergies: Review of patient's allergies indicates:  No Known Allergies      Current Outpatient Medications:     amitriptyline (ELAVIL) 25 MG tablet, Take 1 tablet (25 mg total) by mouth every evening., Disp: 90 tablet, Rfl: 3    anastrozole (ARIMIDEX) 1 mg Tab, Take 2 tablets (2 mg total) by mouth every 7 days., Disp: 180 tablet, Rfl: 1    cetirizine (ZYRTEC) 10 MG tablet, Take 10 mg by mouth., Disp: , Rfl:     DULoxetine (CYMBALTA) 60 MG capsule, Take 1 capsule (60 mg total) by mouth once daily., Disp: 90 capsule, Rfl: 1    ergocalciferol, vitamin D2, (VITAMIN D ORAL), Take by mouth., Disp: , Rfl:     ferrous sulfate (FEOSOL) 325 mg (65 mg iron) Tab tablet, Take 1 tablet (325 mg total) by mouth daily with breakfast., Disp: 90 tablet, Rfl: 1    fluticasone propionate (FLONASE) 50 mcg/actuation nasal spray, 1 spray (50 mcg total) by Each Nostril route once daily. (Patient taking differently: 1 spray by Each Nostril route once daily. PRN), Disp: 15.8 mL, Rfl: 11    HYDROcodone-acetaminophen (NORCO)  mg per tablet, Take 1 tablet by mouth every 4 (four) hours as needed for Pain., Disp: 15 tablet, Rfl: 0    hydrOXYchloroQUINE (PLAQUENIL) 200 mg tablet, Take 1 tablet (200 mg total) by mouth 2 (two) times a day., Disp: 180 tablet, Rfl: 1    leflunomide (ARAVA) 20 MG Tab, Take 1 tablet (20 mg total) by mouth once daily., Disp: 90 tablet, Rfl: 1    levothyroxine (SYNTHROID) 137 MCG Tab tablet, Take 1 tablet (137 mcg total) by mouth before breakfast., Disp: 90 tablet, Rfl: 1    linaCLOtide (LINZESS) 72 mcg Cap capsule, Take 1 capsule (72 mcg total) by mouth before breakfast., Disp: 30 capsule, Rfl: 5    lisinopriL (PRINIVIL,ZESTRIL) 20 MG tablet, Take 1 tablet (20 mg total) by mouth once daily., Disp: 90 tablet, Rfl: 1    loratadine (CLARITIN) 10 mg  "tablet, TAKE 1 TABLET BY MOUTH DAILY, Disp: 90 tablet, Rfl: 1    methocarbamoL (ROBAXIN) 500 MG Tab, Take 1 tablet (500 mg total) by mouth 2 (two) times a day., Disp: 180 tablet, Rfl: 0    montelukast (SINGULAIR) 10 mg tablet, Take 1 tablet (10 mg total) by mouth every evening., Disp: 30 tablet, Rfl: 0    omeprazole (PRILOSEC) 40 MG capsule, Take 1 capsule (40 mg total) by mouth once daily., Disp: 90 capsule, Rfl: 1    OXYCONTIN 30 mg TR12 12 hr tablet, Take 30 mg by mouth 2 (two) times daily as needed., Disp: , Rfl:     pravastatin (PRAVACHOL) 40 MG tablet, Take 1 tablet (40 mg total) by mouth once daily., Disp: 90 tablet, Rfl: 1    predniSONE (DELTASONE) 5 MG tablet, Take 1-2 tablets (5-10 mg total) by mouth daily as needed (As needed)., Disp: 90 tablet, Rfl: 1    pregabalin (LYRICA) 225 MG Cap, Take 1 capsule (225 mg total) by mouth 3 (three) times daily., Disp: 270 capsule, Rfl: 3    promethazine (PHENERGAN) 25 MG tablet, Take 1 tablet (25 mg total) by mouth 2 (two) times daily as needed for Nausea. 2 times daily prn headache no more then 2 days in a week, Disp: 90 tablet, Rfl: 1    silodosin (RAPAFLO) 8 mg Cap capsule, Take 1 capsule (8 mg total) by mouth once daily., Disp: 90 capsule, Rfl: 1    sulfaSALAzine (AZULFIDINE) 500 mg Tab, Take 1 tablet (500 mg total) by mouth 2 (two) times daily., Disp: 180 tablet, Rfl: 1    tamsulosin (FLOMAX) 0.4 mg Cap, , Disp: , Rfl:     tiZANidine (ZANAFLEX) 4 MG tablet, Take 1 tablet (4 mg total) by mouth once daily., Disp: 90 tablet, Rfl: 0    polyethylene glycol 3350 (MIRALAX ORAL), Take by mouth., Disp: , Rfl:    OBJECTIVE:     Vital Signs   /80 (BP Location: Left arm, Patient Position: Sitting, BP Method: Large (Manual))   Pulse 94   Temp 97.5 °F (36.4 °C) (Temporal)   Resp 18   Ht 6' 2" (1.88 m)   Wt 105.7 kg (233 lb)   SpO2 95%   BMI 29.92 kg/m²     Physical Exam  Constitutional:       General: He is not in acute distress.     Appearance: Normal appearance. " He is not ill-appearing.   HENT:      Head: Normocephalic and atraumatic.   Eyes:      Extraocular Movements: Extraocular movements intact.      Pupils: Pupils are equal, round, and reactive to light.   Cardiovascular:      Rate and Rhythm: Normal rate and regular rhythm.      Heart sounds: No murmur heard.     No friction rub. No gallop.   Pulmonary:      Effort: Pulmonary effort is normal. No respiratory distress.      Breath sounds: Normal breath sounds. No wheezing, rhonchi or rales.   Abdominal:      Palpations: Abdomen is soft.      Tenderness: There is no abdominal tenderness. There is no guarding or rebound.   Musculoskeletal:         General: Normal range of motion.   Skin:     General: Skin is warm and dry.      Capillary Refill: Capillary refill takes less than 2 seconds.   Neurological:      General: No focal deficit present.      Mental Status: He is alert.   Psychiatric:         Mood and Affect: Mood normal.         Behavior: Behavior normal.         ASSESSMENT/PLAN:     1. Encounter for support and coordination of transition of care    Patient doing well this time.  Discussed hospital course and imaging that was obtained during his hospitalization. Patient with significantly elevated creatinine during his hospitalization.  Will obtain BMP to ensure it is trending down.  Patient to follow-up in 1 month for recheck    2. Stage 3b chronic kidney disease  -     Basic Metabolic Panel; Future; Expected date: 08/01/2023    Patient with normal renal ultrasound during hospitalization.  Patient with elevated creatinine during hospital stay thought to be secondary to dehydration.  Will obtain repeat BMP for monitoring.  Pending results, patient may need nephrology referral in the future.      Follow up in about 4 weeks (around 8/29/2023) for Recheck CKD.      CRISTI ALVA MD  08/01/2023

## 2023-08-03 PROBLEM — I73.9 CLAUDICATION: Status: ACTIVE | Noted: 2023-08-03

## 2023-08-03 PROBLEM — R07.9 CHEST PAIN: Status: ACTIVE | Noted: 2023-08-03

## 2023-08-03 PROBLEM — R06.09 DOE (DYSPNEA ON EXERTION): Status: ACTIVE | Noted: 2023-08-03

## 2023-08-03 PROBLEM — R94.31 ABNORMAL ELECTROCARDIOGRAM (ECG) (EKG): Status: ACTIVE | Noted: 2023-08-03

## 2023-08-04 ENCOUNTER — HOSPITAL ENCOUNTER (OUTPATIENT)
Dept: CARDIOLOGY | Facility: HOSPITAL | Age: 68
Discharge: HOME | End: 2023-08-04
Attending: INTERNAL MEDICINE
Payer: MEDICARE

## 2023-08-04 ENCOUNTER — HOSPITAL ENCOUNTER (OUTPATIENT)
Dept: RADIOLOGY | Facility: HOSPITAL | Age: 68
Discharge: HOME | End: 2023-08-04
Attending: INTERNAL MEDICINE
Payer: MEDICARE

## 2023-08-04 VITALS — BODY MASS INDEX: 29.9 KG/M2 | WEIGHT: 233 LBS | HEIGHT: 74 IN

## 2023-08-04 VITALS
SYSTOLIC BLOOD PRESSURE: 110 MMHG | HEIGHT: 74 IN | BODY MASS INDEX: 29.92 KG/M2 | HEART RATE: 82 BPM | DIASTOLIC BLOOD PRESSURE: 76 MMHG

## 2023-08-04 DIAGNOSIS — R94.31 ABNORMAL ELECTROCARDIOGRAM (ECG) (EKG): ICD-10-CM

## 2023-08-04 LAB
AORTIC ROOT ANNULUS: 2.72 CM
AV INDEX (PROSTH): 0.62
AV MEAN GRADIENT: 4 MMHG
AV PEAK GRADIENT: 8 MMHG
AV VALVE AREA BY VELOCITY RATIO: 1.82 CM²
AV VALVE AREA: 2.03 CM²
AV VELOCITY RATIO: 0.56
BSA FOR ECHO PROCEDURE: 2.35 M2
CV ECHO LV RWT: 0.26 CM
DOP CALC AO PEAK VEL: 1.4 M/S
DOP CALC AO VTI: 28.2 CM
DOP CALC LVOT AREA: 3.3 CM2
DOP CALC LVOT DIAMETER: 2.04 CM
DOP CALC LVOT PEAK VEL: 0.78 M/S
DOP CALC LVOT STROKE VOLUME: 57.17 CM3
DOP CALCLVOT PEAK VEL VTI: 17.5 CM
E WAVE DECELERATION TIME: 278.17 MSEC
E/A RATIO: 0.8
E/E' RATIO: 6.95 M/S
ECHO LV POSTERIOR WALL: 0.67 CM (ref 0.6–1.1)
EJECTION FRACTION: 46 %
FRACTIONAL SHORTENING: 15 % (ref 28–44)
GLOBAL LONGITUIDAL STRAIN: 46 %
INTERVENTRICULAR SEPTUM: 0.91 CM (ref 0.6–1.1)
IVC DIAMETER: 2.1 CM
LEFT ATRIUM SIZE: 3.9 CM
LEFT INTERNAL DIMENSION IN SYSTOLE: 4.42 CM (ref 2.1–4)
LEFT VENTRICLE DIASTOLIC VOLUME INDEX: 55.88 ML/M2
LEFT VENTRICLE DIASTOLIC VOLUME: 129.64 ML
LEFT VENTRICLE MASS INDEX: 62 G/M2
LEFT VENTRICLE SYSTOLIC VOLUME INDEX: 38.2 ML/M2
LEFT VENTRICLE SYSTOLIC VOLUME: 88.65 ML
LEFT VENTRICULAR INTERNAL DIMENSION IN DIASTOLE: 5.2 CM (ref 3.5–6)
LEFT VENTRICULAR MASS: 142.92 G
LV LATERAL E/E' RATIO: 6.64 M/S
LV SEPTAL E/E' RATIO: 7.3 M/S
LVOT MG: 1.5 MMHG
LVOT MV: 0.57 CM/S
MV PEAK A VEL: 0.91 M/S
MV PEAK E VEL: 0.73 M/S
PISA TR MAX VEL: 2.86 M/S
PV PEAK GRADIENT: 5 MMHG
PV PEAK VELOCITY: 1.15 M/S
RIGHT ATRIAL AREA: 14 CM2
RIGHT VENTRICLE DIASTOLIC BASEL DIMENSION: 2.7 CM
TDI LATERAL: 0.11 M/S
TDI SEPTAL: 0.1 M/S
TDI: 0.11 M/S
TR MAX PG: 33 MMHG
TRICUSPID ANNULAR PLANE SYSTOLIC EXCURSION: 1.78 CM
Z-SCORE OF LEFT VENTRICULAR DIMENSION IN END DIASTOLE: -5.77
Z-SCORE OF LEFT VENTRICULAR DIMENSION IN END SYSTOLE: -1.84

## 2023-08-04 PROCEDURE — 63600175 PHARM REV CODE 636 W HCPCS: Performed by: INTERNAL MEDICINE

## 2023-08-04 PROCEDURE — 93306 TTE W/DOPPLER COMPLETE: CPT

## 2023-08-04 PROCEDURE — 93016 CV STRESS TEST SUPVJ ONLY: CPT | Mod: ,,, | Performed by: NURSE PRACTITIONER

## 2023-08-04 PROCEDURE — 93017 CV STRESS TEST TRACING ONLY: CPT

## 2023-08-04 PROCEDURE — 93306 TTE W/DOPPLER COMPLETE: CPT | Mod: 26,,, | Performed by: INTERNAL MEDICINE

## 2023-08-04 PROCEDURE — 93306 ECHO (CUPID ONLY): ICD-10-PCS | Mod: 26,,, | Performed by: INTERNAL MEDICINE

## 2023-08-04 PROCEDURE — 93018 NUCLEAR STRESS TEST (CUPID ONLY): ICD-10-PCS | Mod: ,,, | Performed by: INTERNAL MEDICINE

## 2023-08-04 PROCEDURE — 93016 NUCLEAR STRESS TEST (CUPID ONLY): ICD-10-PCS | Mod: ,,, | Performed by: NURSE PRACTITIONER

## 2023-08-04 PROCEDURE — 78452 HT MUSCLE IMAGE SPECT MULT: CPT | Mod: TC

## 2023-08-04 PROCEDURE — 78452 NM MYOCARDIAL PERFUSION SPECT MULTI PHARM: ICD-10-PCS | Mod: 26,,, | Performed by: STUDENT IN AN ORGANIZED HEALTH CARE EDUCATION/TRAINING PROGRAM

## 2023-08-04 PROCEDURE — 93018 CV STRESS TEST I&R ONLY: CPT | Mod: ,,, | Performed by: INTERNAL MEDICINE

## 2023-08-04 PROCEDURE — 78452 HT MUSCLE IMAGE SPECT MULT: CPT | Mod: 26,,, | Performed by: STUDENT IN AN ORGANIZED HEALTH CARE EDUCATION/TRAINING PROGRAM

## 2023-08-04 PROCEDURE — A9500 TC99M SESTAMIBI: HCPCS

## 2023-08-04 RX ORDER — REGADENOSON 0.08 MG/ML
0.4 INJECTION, SOLUTION INTRAVENOUS ONCE
Status: COMPLETED | OUTPATIENT
Start: 2023-08-04 | End: 2023-08-04

## 2023-08-04 RX ADMIN — REGADENOSON 0.4 MG: 0.08 INJECTION, SOLUTION INTRAVENOUS at 09:08

## 2023-08-05 LAB
CV STRESS BASE HR: 82 BPM
DIASTOLIC BLOOD PRESSURE: 76 MMHG
OHS CV CPX 1 MINUTE RECOVERY HEART RATE: 85 BPM
OHS CV CPX 85 PERCENT MAX PREDICTED HEART RATE MALE: 130
OHS CV CPX MAX PREDICTED HEART RATE: 153
OHS CV CPX PATIENT IS FEMALE: 0
OHS CV CPX PATIENT IS MALE: 1
OHS CV CPX PEAK DIASTOLIC BLOOD PRESSURE: 66 MMHG
OHS CV CPX PEAK HEAR RATE: 98 BPM
OHS CV CPX PEAK RATE PRESSURE PRODUCT: NORMAL
OHS CV CPX PEAK SYSTOLIC BLOOD PRESSURE: 113 MMHG
OHS CV CPX PERCENT MAX PREDICTED HEART RATE ACHIEVED: 64
OHS CV CPX RATE PRESSURE PRODUCT PRESENTING: 9020
SYSTOLIC BLOOD PRESSURE: 110 MMHG

## 2023-08-14 ENCOUNTER — OFFICE VISIT (OUTPATIENT)
Dept: CARDIOLOGY | Facility: CLINIC | Age: 68
End: 2023-08-14
Payer: MEDICARE

## 2023-08-14 VITALS
HEART RATE: 96 BPM | SYSTOLIC BLOOD PRESSURE: 110 MMHG | HEIGHT: 74 IN | WEIGHT: 233 LBS | DIASTOLIC BLOOD PRESSURE: 70 MMHG | OXYGEN SATURATION: 93 % | BODY MASS INDEX: 29.9 KG/M2

## 2023-08-14 DIAGNOSIS — R06.09 DOE (DYSPNEA ON EXERTION): ICD-10-CM

## 2023-08-14 DIAGNOSIS — G47.33 OBSTRUCTIVE SLEEP APNEA: ICD-10-CM

## 2023-08-14 DIAGNOSIS — R94.31 ABNORMAL ELECTROCARDIOGRAM (ECG) (EKG): Primary | ICD-10-CM

## 2023-08-14 DIAGNOSIS — R07.2 PRECORDIAL PAIN: ICD-10-CM

## 2023-08-14 DIAGNOSIS — I73.9 CLAUDICATION: ICD-10-CM

## 2023-08-14 DIAGNOSIS — I10 PRIMARY HYPERTENSION: ICD-10-CM

## 2023-08-14 PROCEDURE — 99215 OFFICE O/P EST HI 40 MIN: CPT | Mod: PBBFAC | Performed by: INTERNAL MEDICINE

## 2023-08-14 PROCEDURE — 99214 PR OFFICE/OUTPT VISIT, EST, LEVL IV, 30-39 MIN: ICD-10-PCS | Mod: S$PBB,,, | Performed by: INTERNAL MEDICINE

## 2023-08-14 PROCEDURE — 99214 OFFICE O/P EST MOD 30 MIN: CPT | Mod: S$PBB,,, | Performed by: INTERNAL MEDICINE

## 2023-08-14 RX ORDER — FUROSEMIDE 20 MG/1
20 TABLET ORAL DAILY
Qty: 30 TABLET | Refills: 5 | Status: SHIPPED | OUTPATIENT
Start: 2023-08-14 | End: 2024-01-22

## 2023-08-14 NOTE — PROGRESS NOTES
Cardiology Clinic Note:    PCP: Antonietta Sal MD    REFERRING PHYSICIAN: Antonietta Sal MD    CHIEF COMPLAINT:   Chief Complaint   Patient presents with    Follow-up     Test results    Shortness of Breath     With ambulation    Edema     Bilateral hand and foot/wrists        HISTORY OF PRESENT ILLNESS:  Niranjan Whittaker Jr. is a 68 y.o. male who presents for evaluation of bilateral lower extremity edema, occurs daily, worsens throughout the day, had resolved on lasix, ran out, swelling has returned.   HE is not active, limited by cervical and lumbar disc disease.  He walks with cane, however lumbar pain limits some activity, notes shortness of breath limits activity as much as back pain.   Pt denies chest pain, pressure, however admits to palpitations with exertion, resolves within two minutes with rest.  He is using CPAP for several hours a night, however takes mask off after two to three hours, does not resume, is taking frequent naps during the day without CPAP.     Review of Systems:  Review of Systems   Constitutional: Negative for diaphoresis, malaise/fatigue, night sweats and weight gain.   HENT:  Positive for sore throat. Negative for congestion, ear pain, hearing loss and nosebleeds.         Thyroidectomy due to abnormal poly   Eyes:  Positive for blurred vision and double vision. Negative for pain, photophobia and visual disturbance.   Cardiovascular:  Positive for dyspnea on exertion, leg swelling, near-syncope and palpitations. Negative for chest pain, claudication, irregular heartbeat, orthopnea and syncope.   Respiratory:  Positive for snoring. Negative for cough, shortness of breath, sleep disturbances due to breathing and wheezing.         On CPAP nightly   Endocrine: Negative for cold intolerance, heat intolerance, polydipsia, polyphagia and polyuria.   Hematologic/Lymphatic: Negative for bleeding problem. Does not bruise/bleed easily.        Hx FE def anemia, resolved    Skin:  Negative  for dry skin, flushing, itching, rash and skin cancer.   Musculoskeletal:  Positive for arthritis, back pain, joint pain, muscle cramps, muscle weakness, myalgias, neck pain and stiffness. Negative for falls.   Gastrointestinal:  Positive for constipation and heartburn. Negative for abdominal pain, change in bowel habit, diarrhea, dysphagia, nausea and vomiting.   Genitourinary:  Negative for bladder incontinence, dysuria, flank pain, frequency and nocturia.        Enlarged prostate   Neurological:  Positive for dizziness and weakness. Negative for focal weakness, headaches, light-headedness, loss of balance, numbness, paresthesias and seizures.   Psychiatric/Behavioral:  Positive for depression. Negative for memory loss and substance abuse. The patient is not nervous/anxious.    Allergic/Immunologic: Negative for environmental allergies.          PAST MEDICAL HISTORY:  Past Medical History:   Diagnosis Date    History of colon polyps 05/12/2022    History of GI diverticular bleed     Hyperlipidemia     Hypertension     Liver hemangioma 05/09/2023    Neuropathy     Personal history of gastric ulcer     Seronegative rheumatoid arthritis 09/22/2021    Goes to [facility] for Rheumatology  Last Assessment & Plan:  Formatting of this note might be different from the original. SNRA; Mod Active; Pt now off MTX and on Arava; However, pt continues to have persistent activity wrists; Intolerant to Enbrel; Poor response to Xeljanz; Unable to afford Humira in the past; Intolerant to chronic Nsaid use due to sig Gerd; Cont to be followed by local Pain man    Thyroid disease        PAST SURGICAL HISTORY:  Past Surgical History:   Procedure Laterality Date    BACK SURGERY      THYROIDECTOMY N/A 08/27/2021    Procedure: THYROIDECTOMY;  Surgeon: Manish Valadez MD;  Location: [facility];  Service: General;  Laterality: N/A;    TOTAL KNEE ARTHROPLASTY Right        SOCIAL HISTORY:  Social History     Socioeconomic History     Marital status:    Occupational History    Occupation: Disbaled   Tobacco Use    Smoking status: Former     Current packs/day: 0.00     Types: Cigars     Quit date: 2020     Years since quitting: 3.6    Smokeless tobacco: Never   Substance and Sexual Activity    Alcohol use: Not Currently    Drug use: Yes     Types: Hydrocodone, Oxycodone    Sexual activity: Yes     Social Determinants of Health     Financial Resource Strain: Low Risk  (7/26/2023)    Overall Financial Resource Strain (CARDIA)     Difficulty of Paying Living Expenses: Not hard at all   Food Insecurity: No Food Insecurity (7/26/2023)    Hunger Vital Sign     Worried About Running Out of Food in the Last Year: Never true     Ran Out of Food in the Last Year: Never true   Transportation Needs: No Transportation Needs (7/26/2023)    PRAPARE - Transportation     Lack of Transportation (Medical): No     Lack of Transportation (Non-Medical): No   Physical Activity: Inactive (7/26/2023)    Exercise Vital Sign     Days of Exercise per Week: 0 days     Minutes of Exercise per Session: 0 min   Stress: No Stress Concern Present (7/26/2023)    [employer] [place] [employer] - Occupational Stress Questionnaire     Feeling of Stress : Only a little   Social Connections: Moderately Isolated (7/26/2023)    Social Connection and Isolation Panel [NHANES]     Frequency of Communication with Friends and Family: More than three times a week     Frequency of Social Gatherings with Friends and Family: More than three times a week     Attends Latter day Services: Never     Active Member of Clubs or Organizations: No     Attends Club or Organization Meetings: Never     Marital Status:    Housing Stability: Low Risk  (7/26/2023)    Housing Stability Vital Sign     Unable to Pay for Housing in the Last Year: No     Number of Places Lived in the Last Year: 1     Unstable Housing in the Last Year: No       FAMILY HISTORY:  Family History   Problem Relation  Age of Onset    Heart disease Mother     Hypertension Mother     Hypertension Father     Hypertension Sister        ALLERGIES:  Allergies as of 08/14/2023    (No Known Allergies)         MEDICATIONS:  Current Outpatient Medications on File Prior to Visit   Medication Sig Dispense Refill    amitriptyline (ELAVIL) 25 MG tablet Take 1 tablet (25 mg total) by mouth every evening. 90 tablet 3    anastrozole (ARIMIDEX) 1 mg Tab Take 2 tablets (2 mg total) by mouth every 7 days. 180 tablet 1    cetirizine (ZYRTEC) 10 MG tablet Take 10 mg by mouth.      DULoxetine (CYMBALTA) 60 MG capsule Take 1 capsule (60 mg total) by mouth once daily. 90 capsule 1    ergocalciferol, vitamin D2, (VITAMIN D ORAL) Take by mouth.      ferrous sulfate (FEOSOL) 325 mg (65 mg iron) Tab tablet Take 1 tablet (325 mg total) by mouth daily with breakfast. 90 tablet 1    fluticasone propionate (FLONASE) 50 mcg/actuation nasal spray 1 spray (50 mcg total) by Each Nostril route once daily. (Patient taking differently: 1 spray by Each Nostril route once daily. PRN) 15.8 mL 11    HYDROcodone-acetaminophen (NORCO)  mg per tablet Take 1 tablet by mouth every 4 (four) hours as needed for Pain. 15 tablet 0    hydrOXYchloroQUINE (PLAQUENIL) 200 mg tablet Take 1 tablet (200 mg total) by mouth 2 (two) times a day. 180 tablet 1    leflunomide (ARAVA) 20 MG Tab Take 1 tablet (20 mg total) by mouth once daily. 90 tablet 1    levothyroxine (SYNTHROID) 137 MCG Tab tablet Take 1 tablet (137 mcg total) by mouth before breakfast. 90 tablet 1    lisinopriL (PRINIVIL,ZESTRIL) 20 MG tablet Take 1 tablet (20 mg total) by mouth once daily. 90 tablet 1    loratadine (CLARITIN) 10 mg tablet TAKE 1 TABLET BY MOUTH DAILY 90 tablet 1    methocarbamoL (ROBAXIN) 500 MG Tab Take 1 tablet (500 mg total) by mouth 2 (two) times a day. (Patient taking differently: Take 500 mg by mouth 3 (three) times daily.) 180 tablet 0    montelukast (SINGULAIR) 10 mg tablet Take 1 tablet (10  "mg total) by mouth every evening. 30 tablet 0    omeprazole (PRILOSEC) 40 MG capsule Take 1 capsule (40 mg total) by mouth once daily. 90 capsule 1    OXYCONTIN 30 mg TR12 12 hr tablet Take 30 mg by mouth 2 (two) times daily as needed.      polyethylene glycol 3350 (MIRALAX ORAL) Take by mouth.      pravastatin (PRAVACHOL) 40 MG tablet Take 1 tablet (40 mg total) by mouth once daily. 90 tablet 1    predniSONE (DELTASONE) 5 MG tablet Take 1-2 tablets (5-10 mg total) by mouth daily as needed (As needed). 90 tablet 1    pregabalin (LYRICA) 225 MG Cap Take 1 capsule (225 mg total) by mouth 3 (three) times daily. 270 capsule 3    promethazine (PHENERGAN) 25 MG tablet Take 1 tablet (25 mg total) by mouth 2 (two) times daily as needed for Nausea. 2 times daily prn headache no more then 2 days in a week 90 tablet 1    silodosin (RAPAFLO) 8 mg Cap capsule Take 1 capsule (8 mg total) by mouth once daily. 90 capsule 1    sulfaSALAzine (AZULFIDINE) 500 mg Tab Take 1 tablet (500 mg total) by mouth 2 (two) times daily. 180 tablet 1    tiZANidine (ZANAFLEX) 4 MG tablet Take 1 tablet (4 mg total) by mouth once daily. (Patient not taking: Reported on 8/14/2023) 90 tablet 0     No current facility-administered medications on file prior to visit.          PHYSICAL EXAM:  Blood pressure 110/70, pulse 96, height 6' 2" (1.88 m), weight 105.7 kg (233 lb), SpO2 (!) 93 %.  Wt Readings from Last 3 Encounters:   08/14/23 105.7 kg (233 lb)   08/04/23 105.7 kg (233 lb)   08/01/23 105.7 kg (233 lb)      Body mass index is 29.92 kg/m².    Physical Exam  Vitals and nursing note reviewed.   Constitutional:       Appearance: Normal appearance. He is obese.   HENT:      Head: Normocephalic and atraumatic.      Right Ear: External ear normal.      Left Ear: External ear normal.   Eyes:      General: No scleral icterus.        Right eye: No discharge.         Left eye: No discharge.      Extraocular Movements: Extraocular movements intact.      " Conjunctiva/sclera: Conjunctivae normal.      Pupils: Pupils are equal, round, and reactive to light.   Cardiovascular:      Rate and Rhythm: Normal rate and regular rhythm.      Pulses: Normal pulses.      Heart sounds: Normal heart sounds. No murmur heard.     No friction rub. No gallop.   Pulmonary:      Effort: Pulmonary effort is normal.      Breath sounds: Normal breath sounds. No wheezing, rhonchi or rales.   Chest:      Chest wall: No tenderness.   Abdominal:      General: Abdomen is flat. Bowel sounds are normal. There is no distension.      Palpations: Abdomen is soft.      Tenderness: There is no abdominal tenderness. There is no guarding or rebound.   Musculoskeletal:         General: No swelling or tenderness. Normal range of motion.      Cervical back: Normal range of motion and neck supple.   Skin:     General: Skin is warm and dry.      Findings: No erythema or rash.   Neurological:      General: No focal deficit present.      Mental Status: He is alert and oriented to person, place, and time.      Cranial Nerves: No cranial nerve deficit.      Motor: No weakness.      Gait: Gait normal.   Psychiatric:         Mood and Affect: Mood normal.         Behavior: Behavior normal.         Thought Content: Thought content normal.         Judgment: Judgment normal.          LABS REVIEWED:  Lab Results   Component Value Date    WBC 5.86 07/27/2023    RBC 3.66 (L) 07/27/2023    HGB 11.0 (L) 07/27/2023    HCT 34.4 (L) 07/27/2023    MCV 94.0 07/27/2023    MCH 30.1 07/27/2023    MCHC 32.0 07/27/2023    RDW 13.6 07/27/2023     07/27/2023    MPV 10.1 07/27/2023    NRBC 0.0 07/27/2023    INR 0.94 07/25/2023     Lab Results   Component Value Date     08/01/2023    K 4.2 08/01/2023     08/01/2023    CO2 28 08/01/2023    BUN 22 (H) 08/01/2023    MG 2.4 (H) 07/25/2023     Lab Results   Component Value Date    AST 28 07/25/2023    ALT 37 07/25/2023     Lab Results   Component Value Date    GLU 91  08/01/2023    HGBA1C 5.0 12/07/2022     Lab Results   Component Value Date    CHOL 195 11/15/2022    HDL 58 11/15/2022    TRIG 172 (H) 11/15/2022    CHOLHDL 3.4 11/15/2022       CARDIAC STUDIES REVIEWED:    OTHER IMAGING STUDIES REVIEWED:        ASSESSMENT:   There are no diagnoses linked to this encounter.        PLAN:  1. Lower extremity swelling, improved with addition of lasx over last week, has reoccurred off lasix, will resume 20 mg q d., lexiscan did not show ischemic substrate, echo revealed mild LV systolic impairment with EF 46%,   2. Morbid obesity with 20 lb in last 3 months, discussed lifestyle changes..  3. Hypertension: well controlled on current meds  4. ZULLY: on CPAP nightly, however only for part of night and not during the day, will use CPAP anytime he tries to sleep   5. Anemia, on FE replacement.   6. Chronic low back pain secondary to advanced disc disease.  7. GERD: on Prilosec  8. Chronic Low back pain, on narcotic analgesia.  9. Hypothyroid: post surgical, on oral replacement.   10. Claudication, will order bilateral lower ext edema, has had vascular eval , will obtain records to review.        Katelynn results pending  Resume lasix 20 mg q d.  Follow up pending results of Zio

## 2023-08-15 PROCEDURE — 93248 PR EXT ECG, CONT, > 7 DAYS <= 15 DAYS, REVIEW W/INT: ICD-10-PCS | Mod: ,,, | Performed by: INTERNAL MEDICINE

## 2023-08-15 PROCEDURE — 93248 EXT ECG>7D<15D REV&INTERPJ: CPT | Mod: ,,, | Performed by: INTERNAL MEDICINE

## 2023-10-17 ENCOUNTER — OFFICE VISIT (OUTPATIENT)
Dept: GASTROENTEROLOGY | Facility: CLINIC | Age: 68
End: 2023-10-17
Payer: MEDICARE

## 2023-10-17 ENCOUNTER — HOSPITAL ENCOUNTER (OUTPATIENT)
Dept: RADIOLOGY | Facility: HOSPITAL | Age: 68
Discharge: HOME | End: 2023-10-17
Attending: NURSE PRACTITIONER
Payer: MEDICARE

## 2023-10-17 VITALS
HEIGHT: 74 IN | WEIGHT: 230 LBS | BODY MASS INDEX: 29.52 KG/M2 | DIASTOLIC BLOOD PRESSURE: 73 MMHG | HEART RATE: 120 BPM | SYSTOLIC BLOOD PRESSURE: 115 MMHG

## 2023-10-17 DIAGNOSIS — K59.00 CONSTIPATION, UNSPECIFIED CONSTIPATION TYPE: ICD-10-CM

## 2023-10-17 DIAGNOSIS — D18.03 LIVER HEMANGIOMA: Primary | ICD-10-CM

## 2023-10-17 DIAGNOSIS — D64.9 ANEMIA, UNSPECIFIED TYPE: ICD-10-CM

## 2023-10-17 DIAGNOSIS — D18.00 HEMANGIOMA, UNSPECIFIED SITE: ICD-10-CM

## 2023-10-17 PROCEDURE — 76705 ECHO EXAM OF ABDOMEN: CPT | Mod: 26,,, | Performed by: RADIOLOGY

## 2023-10-17 PROCEDURE — 99214 PR OFFICE/OUTPT VISIT, EST, LEVL IV, 30-39 MIN: ICD-10-PCS | Mod: S$PBB,,, | Performed by: NURSE PRACTITIONER

## 2023-10-17 PROCEDURE — 76705 US ABDOMEN LIMITED: ICD-10-PCS | Mod: 26,,, | Performed by: RADIOLOGY

## 2023-10-17 PROCEDURE — 99215 OFFICE O/P EST HI 40 MIN: CPT | Mod: PBBFAC,25 | Performed by: NURSE PRACTITIONER

## 2023-10-17 PROCEDURE — 76705 ECHO EXAM OF ABDOMEN: CPT | Mod: TC

## 2023-10-17 PROCEDURE — 99214 OFFICE O/P EST MOD 30 MIN: CPT | Mod: S$PBB,,, | Performed by: NURSE PRACTITIONER

## 2023-10-17 NOTE — PROGRESS NOTES
Niranjan Whittaker Jr. is a 68 y.o. male here for Follow-up        PCP: Antonietta Sal  Referring Provider: No referring provider defined for this encounter.     HPI:  Presents for follow up after liver ultrasound. Liver hemangioma is unchanged from previous imaging. He did have an MRI on 04/11/2023 that did show a liver hemangioma.  He has chronic constipation.  He is currently taking MiraLax powder.  States that his constipation has improved.  No hematochezia or melena.  Last colonoscopy was 05/12/2022, tubular adenoma.  HIDA scan on 4 1123 was normal.  Renal ultrasound on 07/26/2023, no hydronephrosis.  He does have chronic anemia.  He is taking oral iron daily. HGB 11.0 and 34.4 in July.    Follow-up  Associated symptoms include arthralgias. Pertinent negatives include no abdominal pain, change in bowel habit, chest pain, coughing, fatigue, fever, myalgias, nausea or vomiting.         ROS:  Review of Systems   Constitutional:  Negative for activity change, appetite change, fatigue, fever and unexpected weight change.   HENT:  Negative for trouble swallowing.    Respiratory:  Negative for cough.    Cardiovascular:  Negative for chest pain.   Gastrointestinal:  Positive for constipation. Negative for abdominal distention, abdominal pain, blood in stool, change in bowel habit, diarrhea, nausea, vomiting and reflux.   Musculoskeletal:  Positive for arthralgias and gait problem. Negative for myalgias.   Integumentary:  Negative for color change.   Psychiatric/Behavioral:  Negative for sleep disturbance. The patient is not nervous/anxious.           PMHX:  has a past medical history of History of colon polyps (05/12/2022), History of GI diverticular bleed, Hyperlipidemia, Hypertension, Liver hemangioma (05/09/2023), Neuropathy, Personal history of gastric ulcer, Seronegative rheumatoid arthritis (09/22/2021), and Thyroid disease.    PSHX:  has a past surgical history that includes Back surgery; Thyroidectomy  (N/A, 08/27/2021); and Total knee arthroplasty (Right).    PFHX: family history includes Heart disease in his mother; Hypertension in his father, mother, and sister.    PSlHX:  reports that he quit smoking about 3 years ago. His smoking use included cigars. He has never used smokeless tobacco. He reports that he does not currently use alcohol. He reports current drug use. Drugs: Hydrocodone and Oxycodone.        Review of patient's allergies indicates:  No Known Allergies    Medication List with Changes/Refills   Current Medications    AMITRIPTYLINE (ELAVIL) 25 MG TABLET    Take 1 tablet (25 mg total) by mouth every evening.    ANASTROZOLE (ARIMIDEX) 1 MG TAB    Take 2 tablets (2 mg total) by mouth every 7 days.    CETIRIZINE (ZYRTEC) 10 MG TABLET    Take 10 mg by mouth.    DULOXETINE (CYMBALTA) 60 MG CAPSULE    Take 1 capsule (60 mg total) by mouth once daily.    ERGOCALCIFEROL, VITAMIN D2, (VITAMIN D ORAL)    Take by mouth.    FERROUS SULFATE (FEOSOL) 325 MG (65 MG IRON) TAB TABLET    Take 1 tablet (325 mg total) by mouth daily with breakfast.    FLUTICASONE PROPIONATE (FLONASE) 50 MCG/ACTUATION NASAL SPRAY    1 spray (50 mcg total) by Each Nostril route once daily.    FUROSEMIDE (LASIX) 20 MG TABLET    Take 1 tablet (20 mg total) by mouth once daily.    HYDROCODONE-ACETAMINOPHEN (NORCO)  MG PER TABLET    Take 1 tablet by mouth every 4 (four) hours as needed for Pain.    HYDROXYCHLOROQUINE (PLAQUENIL) 200 MG TABLET    Take 1 tablet (200 mg total) by mouth 2 (two) times a day.    LEFLUNOMIDE (ARAVA) 20 MG TAB    Take 1 tablet (20 mg total) by mouth once daily.    LEVOTHYROXINE (SYNTHROID) 137 MCG TAB TABLET    Take 1 tablet (137 mcg total) by mouth before breakfast.    LISINOPRIL (PRINIVIL,ZESTRIL) 20 MG TABLET    Take 1 tablet (20 mg total) by mouth once daily.    LORATADINE (CLARITIN) 10 MG TABLET    TAKE 1 TABLET BY MOUTH DAILY    METHOCARBAMOL (ROBAXIN) 500 MG TAB    Take 1 tablet (500 mg total) by mouth  "2 (two) times a day.    MONTELUKAST (SINGULAIR) 10 MG TABLET    Take 1 tablet (10 mg total) by mouth every evening.    OMEPRAZOLE (PRILOSEC) 40 MG CAPSULE    Take 1 capsule (40 mg total) by mouth once daily.    OXYCONTIN 30 MG TR12 12 HR TABLET    Take 30 mg by mouth 2 (two) times daily as needed.    POLYETHYLENE GLYCOL 3350 (MIRALAX ORAL)    Take by mouth.    PRAVASTATIN (PRAVACHOL) 40 MG TABLET    Take 1 tablet (40 mg total) by mouth once daily.    PREDNISONE (DELTASONE) 5 MG TABLET    Take 1-2 tablets (5-10 mg total) by mouth daily as needed (As needed).    PREGABALIN (LYRICA) 225 MG CAP    Take 1 capsule (225 mg total) by mouth 3 (three) times daily.    PROMETHAZINE (PHENERGAN) 25 MG TABLET    Take 1 tablet (25 mg total) by mouth 2 (two) times daily as needed for Nausea. 2 times daily prn headache no more then 2 days in a week    SILODOSIN (RAPAFLO) 8 MG CAP CAPSULE    Take 1 capsule (8 mg total) by mouth once daily.    SULFASALAZINE (AZULFIDINE) 500 MG TAB    Take 1 tablet (500 mg total) by mouth 2 (two) times daily.        Objective Findings:  Vital Signs:  /73   Pulse (!) 120   Ht 6' 2" (1.88 m)   Wt 104.3 kg (230 lb)   BMI 29.53 kg/m²  Body mass index is 29.53 kg/m².    Physical Exam:  Physical Exam  Vitals and nursing note reviewed.   Constitutional:       General: He is not in acute distress.     Appearance: Normal appearance.   HENT:      Mouth/Throat:      Mouth: Mucous membranes are moist.   Cardiovascular:      Rate and Rhythm: Normal rate.   Pulmonary:      Effort: Pulmonary effort is normal.      Breath sounds: No wheezing, rhonchi or rales.   Abdominal:      General: Bowel sounds are normal. There is no distension.      Palpations: Abdomen is soft. There is no mass.      Tenderness: There is no abdominal tenderness. There is no guarding.      Hernia: No hernia is present.   Skin:     General: Skin is warm and dry.      Coloration: Skin is not jaundiced or pale.   Neurological:      Mental " Status: He is alert and oriented to person, place, and time.      Gait: Gait abnormal.   Psychiatric:         Mood and Affect: Mood normal.          Labs:  Lab Results   Component Value Date    WBC 8.44 10/17/2023    HGB 12.0 (L) 10/17/2023    HCT 38.3 (L) 10/17/2023    MCV 95.5 10/17/2023    RDW 14.1 10/17/2023     10/17/2023    LYMPH 13.0 (L) 10/17/2023    LYMPH 1.10 10/17/2023    MONO 6.9 (H) 10/17/2023    EOS 0.02 10/17/2023    BASO 0.04 10/17/2023     Lab Results   Component Value Date     08/01/2023    K 4.2 08/01/2023     08/01/2023    CO2 28 08/01/2023    GLU 91 08/01/2023    BUN 22 (H) 08/01/2023    CREATININE 1.88 (H) 08/01/2023    CALCIUM 8.8 08/01/2023    PROT 6.9 07/25/2023    ALBUMIN 3.6 07/25/2023    BILITOT 0.4 07/25/2023    ALKPHOS 90 07/25/2023    AST 28 07/25/2023    ALT 37 07/25/2023         Imaging: US Abdomen Limited    Result Date: 10/17/2023  EXAMINATION: US ABDOMEN LIMITED CLINICAL HISTORY: Hemangioma unspecified site TECHNIQUE: Grayscale and color Doppler imaging performed. COMPARISON: None. FINDINGS: The liver is normal in size with a focal area of increased echogenicity 2.5 x 2.6 x 2.8 cm similar to previous.  The gallbladder is fluid-filled without evidence of stones or sludge. The gallbladder wall thickness is 2.0 mm . The common bile duct measures 2.2 mm. The visualized portion of the pancreas appear within normal limits The right kidney is normal in size and echogenicity and measures 10.5 cm . No free fluid or free air seen.     Hepatic hemangioma similar to previous.  No other evidence of abnormality demonstrated. Ultrasound images stored and captured. Electronically signed by: Terry Domingo Date:    10/17/2023 Time:    09:03        Assessment:  Niranjan Diaz Panfilo Gaines is a 68 y.o. male here with:  1. Liver hemangioma    2. Constipation, unspecified constipation type    3. Anemia, unspecified type          Recommendations:  1. Consider repeat ultrasound in  six-month  2. Continue Miralax powder  3. CBC, Iron studies    Follow up in about 6 months (around 4/17/2024).      Order summary:  Orders Placed This Encounter    CBC Auto Differential    Iron and TIBC    Ferritin       Thank you for allowing me to participate in the care of Niranjan Whittaker Jr..      [facility]

## 2023-10-18 ENCOUNTER — TELEPHONE (OUTPATIENT)
Dept: GASTROENTEROLOGY | Facility: CLINIC | Age: 68
End: 2023-10-18
Payer: MEDICARE

## 2023-10-18 NOTE — TELEPHONE ENCOUNTER
Results called to patient. Verbalized understanding.            ----- Message from ROBBIE Nunez sent at 10/18/2023 12:30 PM CDT -----  Minimal anemia. Total iron is decreased but iron saturation is normal.

## 2023-11-02 NOTE — ASSESSMENT & PLAN NOTE
7/26:   - K 5.2, that should improve with hydration  - Lokelma 5 mg TID for three dose  - Will check lab in AM    Follow up with PCP   no

## 2023-11-22 RX ORDER — METHOCARBAMOL 500 MG/1
500 TABLET, FILM COATED ORAL 2 TIMES DAILY
Qty: 180 TABLET | Refills: 0 | Status: SHIPPED | OUTPATIENT
Start: 2023-11-22 | End: 2023-12-06 | Stop reason: SDUPTHER

## 2023-12-06 ENCOUNTER — OFFICE VISIT (OUTPATIENT)
Dept: FAMILY MEDICINE | Facility: CLINIC | Age: 68
End: 2023-12-06
Payer: MEDICARE

## 2023-12-06 VITALS
WEIGHT: 231 LBS | RESPIRATION RATE: 18 BRPM | BODY MASS INDEX: 29.65 KG/M2 | OXYGEN SATURATION: 96 % | TEMPERATURE: 98 F | DIASTOLIC BLOOD PRESSURE: 83 MMHG | HEIGHT: 74 IN | SYSTOLIC BLOOD PRESSURE: 132 MMHG | HEART RATE: 61 BPM

## 2023-12-06 DIAGNOSIS — I10 PRIMARY HYPERTENSION: Primary | ICD-10-CM

## 2023-12-06 DIAGNOSIS — R73.03 PREDIABETES: ICD-10-CM

## 2023-12-06 DIAGNOSIS — E89.0 POSTOPERATIVE HYPOTHYROIDISM: ICD-10-CM

## 2023-12-06 DIAGNOSIS — M79.672 BILATERAL FOOT PAIN: ICD-10-CM

## 2023-12-06 DIAGNOSIS — K21.9 GASTROESOPHAGEAL REFLUX DISEASE WITHOUT ESOPHAGITIS: ICD-10-CM

## 2023-12-06 DIAGNOSIS — E78.2 MIXED HYPERLIPIDEMIA: ICD-10-CM

## 2023-12-06 DIAGNOSIS — G89.4 CHRONIC PAIN SYNDROME: ICD-10-CM

## 2023-12-06 DIAGNOSIS — E55.9 VITAMIN D DEFICIENCY: ICD-10-CM

## 2023-12-06 DIAGNOSIS — J30.9 ALLERGIC RHINITIS, UNSPECIFIED SEASONALITY, UNSPECIFIED TRIGGER: ICD-10-CM

## 2023-12-06 DIAGNOSIS — Z23 ENCOUNTER FOR IMMUNIZATION: ICD-10-CM

## 2023-12-06 DIAGNOSIS — M79.671 BILATERAL FOOT PAIN: ICD-10-CM

## 2023-12-06 DIAGNOSIS — N18.32 CHRONIC KIDNEY DISEASE, STAGE 3B: ICD-10-CM

## 2023-12-06 LAB
25(OH)D3 SERPL-MCNC: 49.3 NG/ML
ALBUMIN SERPL BCP-MCNC: 3.5 G/DL (ref 3.5–5)
ALBUMIN/GLOB SERPL: 0.9 {RATIO}
ALP SERPL-CCNC: 72 U/L (ref 45–115)
ALT SERPL W P-5'-P-CCNC: 34 U/L (ref 16–61)
ANION GAP SERPL CALCULATED.3IONS-SCNC: 9 MMOL/L (ref 7–16)
AST SERPL W P-5'-P-CCNC: 26 U/L (ref 15–37)
BILIRUB SERPL-MCNC: 0.5 MG/DL (ref ?–1.2)
BUN SERPL-MCNC: 17 MG/DL (ref 7–18)
BUN/CREAT SERPL: 9 (ref 6–20)
CALCIUM SERPL-MCNC: 9 MG/DL (ref 8.5–10.1)
CHLORIDE SERPL-SCNC: 107 MMOL/L (ref 98–107)
CHOLEST SERPL-MCNC: 199 MG/DL (ref 0–200)
CHOLEST/HDLC SERPL: 3.9 {RATIO}
CO2 SERPL-SCNC: 30 MMOL/L (ref 21–32)
CREAT SERPL-MCNC: 1.8 MG/DL (ref 0.7–1.3)
EGFR (NO RACE VARIABLE) (RUSH/TITUS): 41 ML/MIN/1.73M2
EST. AVERAGE GLUCOSE BLD GHB EST-MCNC: 100 MG/DL
GLOBULIN SER-MCNC: 3.8 G/DL (ref 2–4)
GLUCOSE SERPL-MCNC: 89 MG/DL (ref 74–106)
HBA1C MFR BLD HPLC: 5.1 % (ref 4.5–6.6)
HDLC SERPL-MCNC: 51 MG/DL (ref 40–60)
LDLC SERPL CALC-MCNC: 104 MG/DL
LDLC/HDLC SERPL: 2 {RATIO}
NONHDLC SERPL-MCNC: 148 MG/DL
POTASSIUM SERPL-SCNC: 4.3 MMOL/L (ref 3.5–5.1)
PROT SERPL-MCNC: 7.3 G/DL (ref 6.4–8.2)
SODIUM SERPL-SCNC: 142 MMOL/L (ref 136–145)
TRIGL SERPL-MCNC: 219 MG/DL (ref 35–150)
TSH SERPL DL<=0.005 MIU/L-ACNC: 3.59 UIU/ML (ref 0.36–3.74)
VLDLC SERPL-MCNC: 44 MG/DL

## 2023-12-06 PROCEDURE — 84443 ASSAY THYROID STIM HORMONE: CPT | Mod: ,,, | Performed by: CLINICAL MEDICAL LABORATORY

## 2023-12-06 PROCEDURE — 99214 PR OFFICE/OUTPT VISIT, EST, LEVL IV, 30-39 MIN: ICD-10-PCS | Mod: ,,, | Performed by: STUDENT IN AN ORGANIZED HEALTH CARE EDUCATION/TRAINING PROGRAM

## 2023-12-06 PROCEDURE — 83036 HEMOGLOBIN A1C: ICD-10-PCS | Mod: ,,, | Performed by: CLINICAL MEDICAL LABORATORY

## 2023-12-06 PROCEDURE — G0008 FLU VACCINE - QUADRIVALENT - ADJUVANTED: ICD-10-PCS | Mod: ,,, | Performed by: STUDENT IN AN ORGANIZED HEALTH CARE EDUCATION/TRAINING PROGRAM

## 2023-12-06 PROCEDURE — 82306 VITAMIN D 25 HYDROXY: CPT | Mod: ,,, | Performed by: CLINICAL MEDICAL LABORATORY

## 2023-12-06 PROCEDURE — 80061 LIPID PANEL: CPT | Mod: ,,, | Performed by: CLINICAL MEDICAL LABORATORY

## 2023-12-06 PROCEDURE — 99214 OFFICE O/P EST MOD 30 MIN: CPT | Mod: ,,, | Performed by: STUDENT IN AN ORGANIZED HEALTH CARE EDUCATION/TRAINING PROGRAM

## 2023-12-06 PROCEDURE — 90694 VACC AIIV4 NO PRSRV 0.5ML IM: CPT | Mod: ,,, | Performed by: STUDENT IN AN ORGANIZED HEALTH CARE EDUCATION/TRAINING PROGRAM

## 2023-12-06 PROCEDURE — 80061 LIPID PANEL: ICD-10-PCS | Mod: ,,, | Performed by: CLINICAL MEDICAL LABORATORY

## 2023-12-06 PROCEDURE — 82306 VITAMIN D: ICD-10-PCS | Mod: ,,, | Performed by: CLINICAL MEDICAL LABORATORY

## 2023-12-06 PROCEDURE — 90694 FLU VACCINE - QUADRIVALENT - ADJUVANTED: ICD-10-PCS | Mod: ,,, | Performed by: STUDENT IN AN ORGANIZED HEALTH CARE EDUCATION/TRAINING PROGRAM

## 2023-12-06 PROCEDURE — 83036 HEMOGLOBIN GLYCOSYLATED A1C: CPT | Mod: ,,, | Performed by: CLINICAL MEDICAL LABORATORY

## 2023-12-06 PROCEDURE — 80053 COMPREHENSIVE METABOLIC PANEL: ICD-10-PCS | Mod: ,,, | Performed by: CLINICAL MEDICAL LABORATORY

## 2023-12-06 PROCEDURE — G0008 ADMIN INFLUENZA VIRUS VAC: HCPCS | Mod: ,,, | Performed by: STUDENT IN AN ORGANIZED HEALTH CARE EDUCATION/TRAINING PROGRAM

## 2023-12-06 PROCEDURE — 84443 TSH: ICD-10-PCS | Mod: ,,, | Performed by: CLINICAL MEDICAL LABORATORY

## 2023-12-06 PROCEDURE — 80053 COMPREHEN METABOLIC PANEL: CPT | Mod: ,,, | Performed by: CLINICAL MEDICAL LABORATORY

## 2023-12-06 RX ORDER — ANASTROZOLE 1 MG/1
2 TABLET ORAL
Qty: 180 TABLET | Refills: 1 | Status: CANCELLED | OUTPATIENT
Start: 2023-12-06

## 2023-12-06 RX ORDER — OMEPRAZOLE 40 MG/1
40 CAPSULE, DELAYED RELEASE ORAL DAILY
Qty: 90 CAPSULE | Refills: 1 | Status: SHIPPED | OUTPATIENT
Start: 2023-12-06

## 2023-12-06 RX ORDER — AMITRIPTYLINE HYDROCHLORIDE 25 MG/1
25 TABLET, FILM COATED ORAL NIGHTLY
Qty: 90 TABLET | Refills: 1 | Status: SHIPPED | OUTPATIENT
Start: 2023-12-06 | End: 2024-12-05

## 2023-12-06 RX ORDER — LISINOPRIL 20 MG/1
20 TABLET ORAL DAILY
Qty: 90 TABLET | Refills: 1 | Status: SHIPPED | OUTPATIENT
Start: 2023-12-06 | End: 2023-12-15

## 2023-12-06 RX ORDER — DULOXETIN HYDROCHLORIDE 60 MG/1
60 CAPSULE, DELAYED RELEASE ORAL DAILY
Qty: 90 CAPSULE | Refills: 1 | Status: SHIPPED | OUTPATIENT
Start: 2023-12-06

## 2023-12-06 RX ORDER — PRAVASTATIN SODIUM 40 MG/1
40 TABLET ORAL DAILY
Qty: 90 TABLET | Refills: 1 | Status: SHIPPED | OUTPATIENT
Start: 2023-12-06

## 2023-12-06 RX ORDER — FERROUS SULFATE 325(65) MG
325 TABLET ORAL
Qty: 90 TABLET | Refills: 1 | Status: SHIPPED | OUTPATIENT
Start: 2023-12-06

## 2023-12-06 RX ORDER — METHOCARBAMOL 500 MG/1
500 TABLET, FILM COATED ORAL 2 TIMES DAILY
Qty: 180 TABLET | Refills: 0 | Status: SHIPPED | OUTPATIENT
Start: 2023-12-06

## 2023-12-06 RX ORDER — MONTELUKAST SODIUM 10 MG/1
10 TABLET ORAL NIGHTLY
Qty: 30 TABLET | Refills: 0 | Status: SHIPPED | OUTPATIENT
Start: 2023-12-06

## 2023-12-06 RX ORDER — LEVOTHYROXINE SODIUM 137 UG/1
137 TABLET ORAL
Qty: 90 TABLET | Refills: 1 | Status: SHIPPED | OUTPATIENT
Start: 2023-12-06

## 2023-12-06 NOTE — PROGRESS NOTES
Progress Note     CRISTI ALVA MD   [address]  [place] [address]     PATIENT NAME: Niranjan Whittaker Jr.  : 1955  DATE: 23  MRN: [id number]      Billing Provider: CRISTI ALVA MD  Level of Service:   Patient PCP Information       Provider PCP Type    CRISTI ALVA MD General                Back Pain (Chronic back pain /Pt requesting to get a shot while in clinic /), Flu Vaccine (Pt requesting to get it while in clinic today), Medication Refill, and Health Maintenance (Pt wants to get all vaccines that are due today while in clinic )      SUBJECTIVE:     Niranjan Whittaker Jr. is a 68 y.o.male who presents to clinic for Back Pain (Chronic back pain /Pt requesting to get a shot while in clinic /), Flu Vaccine (Pt requesting to get it while in clinic today), Medication Refill, and Health Maintenance (Pt wants to get all vaccines that are due today while in clinic )    Patient has rheumatoid arthritis in his followed by Dr. Alarcon with Rheumatology.  He was seronegative rheumatoid arthritis he is currently on leflunomide, Plaquenil, sulfasalazine, and prednisone as needed.  Patient's leflunomide decreased to 3 times weekly due to kidney function.  Patient was also supposed to start Cimzia.  Patient received Kenalog injection at his visit on 2023.    Patient has also been evaluated by Cardiology secondary to palpitations and abnormal EKG.  Patient's stress test did not show ischemic component.  His echo did show ejection fraction of 46% with normal diastolic function. Still having lower extremity swelling. Lasix does help. Keeping lower extremities elevated. Pain occurs with swelling.  No swelling on exam today.  Patient has longstanding history of bilateral foot pain.  He does have history of RA.  He has a history of low back pain.  Patient was previously prescribed Elavil for his foot pain.  He was not believe that it offers any relief.  He would be  interested in weaning this medication.    Patient is followed by pain management for chronic low back pain secondary to significant disc disease.  Patient has had 6 low back surgeries.  Did sign up for medical TH[facility].  Has footdrop on left secondary to his lumbar spine surgeries.    Patient is also followed by GI secondary to liver hemangioma which has been unchanged.  He also has chronic constipation and uses MiraLax.  Last colonoscopy was 05/12/2022 and showed tubular adenoma.  He has chronic anemia and takes daily iron.    Has a history of thyroid nodules.  Is status post thyroidectomy.  No malignancy was evident on pathology.  Takes Synthroid.  No longer sees endocrinology as he was released by them.    Sees Urology. They prescribe anastrazole for testosterone. Sees. Dr. Horton at [place].  Is requesting a refill but instructed the patient would need to have Urology provide this prescription.    Taking calritin and zyrtec for allergies. Will cut back to just one.     Does not have a nephrologist.  Would be amenable to having nephrology eval pending blood work results.    Of note, patient has previous documentation of prediabetes in chart.  Has not does not recall ever being diagnosed with prediabetes but does get steroids quite frequently.  He is amenable to having hemoglobin A1c obtained.    All other pertinent review of systems negative. Please see HPI for details.     Past Medical History:  has a past medical history of History of colon polyps (05/12/2022), History of GI diverticular bleed, Hyperlipidemia, Hypertension, Liver hemangioma (05/09/2023), Neuropathy, Personal history of gastric ulcer, Seronegative rheumatoid arthritis (09/22/2021), and Thyroid disease.   Past Surgical History:  has a past surgical history that includes Back surgery; Thyroidectomy (N/A, 08/27/2021); and Total knee arthroplasty (Right).  Family History: family history includes Heart disease in his mother; Hypertension in his father,  mother, and sister.  Social History:  reports that he quit smoking about 3 years ago. His smoking use included cigars. He has never used smokeless tobacco. He reports that he does not currently use alcohol. He reports current drug use. Drugs: Hydrocodone and Oxycodone.  Allergies: Review of patient's allergies indicates:  No Known Allergies      Current Outpatient Medications:     anastrozole (ARIMIDEX) 1 mg Tab, Take 2 tablets (2 mg total) by mouth every 7 days., Disp: 180 tablet, Rfl: 1    cetirizine (ZYRTEC) 10 MG tablet, Take 10 mg by mouth., Disp: , Rfl:     ergocalciferol, vitamin D2, (VITAMIN D ORAL), Take by mouth., Disp: , Rfl:     fluticasone propionate (FLONASE) 50 mcg/actuation nasal spray, 1 spray (50 mcg total) by Each Nostril route once daily. (Patient taking differently: 1 spray by Each Nostril route once daily. PRN), Disp: 15.8 mL, Rfl: 11    furosemide (LASIX) 20 MG tablet, Take 1 tablet (20 mg total) by mouth once daily., Disp: 30 tablet, Rfl: 5    HYDROcodone-acetaminophen (NORCO)  mg per tablet, Take 1 tablet by mouth every 4 (four) hours as needed for Pain., Disp: 15 tablet, Rfl: 0    hydrOXYchloroQUINE (PLAQUENIL) 200 mg tablet, Take 1 tablet (200 mg total) by mouth 2 (two) times a day., Disp: 180 tablet, Rfl: 1    leflunomide (ARAVA) 20 MG Tab, Take 1 tablet (20 mg total) by mouth once daily., Disp: 90 tablet, Rfl: 1    OXYCONTIN 30 mg TR12 12 hr tablet, Take 30 mg by mouth 2 (two) times daily as needed., Disp: , Rfl:     polyethylene glycol 3350 (MIRALAX ORAL), Take by mouth., Disp: , Rfl:     predniSONE (DELTASONE) 5 MG tablet, Take 1-2 tablets (5-10 mg total) by mouth daily as needed (As needed)., Disp: 90 tablet, Rfl: 1    pregabalin (LYRICA) 225 MG Cap, Take 1 capsule (225 mg total) by mouth 3 (three) times daily., Disp: 270 capsule, Rfl: 3    promethazine (PHENERGAN) 25 MG tablet, Take 1 tablet (25 mg total) by mouth 2 (two) times daily as needed for Nausea. 2 times daily prn  "headache no more then 2 days in a week, Disp: 90 tablet, Rfl: 1    silodosin (RAPAFLO) 8 mg Cap capsule, Take 1 capsule (8 mg total) by mouth once daily., Disp: 90 capsule, Rfl: 1    sulfaSALAzine (AZULFIDINE) 500 mg Tab, Take 1 tablet (500 mg total) by mouth 2 (two) times daily., Disp: 180 tablet, Rfl: 1    amitriptyline (ELAVIL) 25 MG tablet, Take 1 tablet (25 mg total) by mouth every evening., Disp: 90 tablet, Rfl: 1    DULoxetine (CYMBALTA) 60 MG capsule, Take 1 capsule (60 mg total) by mouth once daily., Disp: 90 capsule, Rfl: 1    ferrous sulfate (FEOSOL) 325 mg (65 mg iron) Tab tablet, Take 1 tablet (325 mg total) by mouth daily with breakfast., Disp: 90 tablet, Rfl: 1    levothyroxine (SYNTHROID) 137 MCG Tab tablet, Take 1 tablet (137 mcg total) by mouth before breakfast., Disp: 90 tablet, Rfl: 1    lisinopriL (PRINIVIL,ZESTRIL) 20 MG tablet, Take 1 tablet (20 mg total) by mouth once daily., Disp: 90 tablet, Rfl: 1    methocarbamoL (ROBAXIN) 500 MG Tab, Take 1 tablet (500 mg total) by mouth 2 (two) times daily., Disp: 180 tablet, Rfl: 0    montelukast (SINGULAIR) 10 mg tablet, Take 1 tablet (10 mg total) by mouth every evening., Disp: 30 tablet, Rfl: 0    omeprazole (PRILOSEC) 40 MG capsule, Take 1 capsule (40 mg total) by mouth once daily., Disp: 90 capsule, Rfl: 1    pravastatin (PRAVACHOL) 40 MG tablet, Take 1 tablet (40 mg total) by mouth once daily., Disp: 90 tablet, Rfl: 1   OBJECTIVE:     Vital Signs   /83 (BP Location: Right arm, Patient Position: Sitting, BP Method: Large (Manual))   Pulse 61   Temp 98 °F (36.7 °C) (Temporal)   Resp 18   Ht 6' 2" (1.88 m)   Wt 104.8 kg (231 lb)   SpO2 96%   BMI 29.66 kg/m²     Physical Exam  Constitutional:       General: He is not in acute distress.     Appearance: Normal appearance. He is not ill-appearing.   HENT:      Head: Normocephalic and atraumatic.   Eyes:      Extraocular Movements: Extraocular movements intact.      Pupils: Pupils are equal, " round, and reactive to light.   Cardiovascular:      Rate and Rhythm: Normal rate and regular rhythm.      Heart sounds: No murmur heard.     No friction rub. No gallop.   Pulmonary:      Effort: Pulmonary effort is normal. No respiratory distress.      Breath sounds: Normal breath sounds. No wheezing, rhonchi or rales.   Abdominal:      Palpations: Abdomen is soft.      Tenderness: There is no abdominal tenderness. There is no guarding or rebound.   Musculoskeletal:         General: Normal range of motion.   Skin:     General: Skin is warm and dry.      Capillary Refill: Capillary refill takes less than 2 seconds.   Neurological:      General: No focal deficit present.      Mental Status: He is alert.   Psychiatric:         Mood and Affect: Mood normal.         Behavior: Behavior normal.         ASSESSMENT/PLAN:     1. Primary hypertension  -     Lipid Panel; Future; Expected date: 12/06/2023  -     lisinopriL (PRINIVIL,ZESTRIL) 20 MG tablet; Take 1 tablet (20 mg total) by mouth once daily.  Dispense: 90 tablet; Refill: 1  -     Comprehensive Metabolic Panel; Future; Expected date: 12/06/2023    Blood pressure currently well-controlled.  Continue lisinopril and Lasix.  Refill for lisinopril provided.  We will obtain routine blood work.    2. Mixed hyperlipidemia  -     pravastatin (PRAVACHOL) 40 MG tablet; Take 1 tablet (40 mg total) by mouth once daily.  Dispense: 90 tablet; Refill: 1    Patient due for lipid panel.  We will obtain.  We will continue pravastatin.  Refill provided.    3. Prediabetes  -     Hemoglobin A1C; Future; Expected date: 12/06/2023    Patient with history of prediabetes per chart review.  We will obtain hemoglobin A1c.  We will start medication if indicated.    4. Allergic rhinitis, unspecified seasonality, unspecified trigger  -     montelukast (SINGULAIR) 10 mg tablet; Take 1 tablet (10 mg total) by mouth every evening.  Dispense: 30 tablet; Refill: 0    Patient with allergic rhinitis.   Continue Singulair.  We will provide refill.  Patient has been Claritin and Zyrtec when his medication list.  Patient believes that he only takes Zyrtec.  Patient will ensure that he is not taking Claritin.  Claritin removed patient's medication list.    5. Postoperative hypothyroidism  Overview:  Patient is status post thyroidectomy secondary to thyroid nodules.      Orders:  -     levothyroxine (SYNTHROID) 137 MCG Tab tablet; Take 1 tablet (137 mcg total) by mouth before breakfast.  Dispense: 90 tablet; Refill: 1  -     TSH; Future; Expected date: 12/06/2023    Continue Synthroid.  Refill provided.  We will obtain TSH.    6. Gastroesophageal reflux disease without esophagitis  -     omeprazole (PRILOSEC) 40 MG capsule; Take 1 capsule (40 mg total) by mouth once daily.  Dispense: 90 capsule; Refill: 1    Continue Prilosec.  Refill provided.    7. Chronic kidney disease, stage 3b  -     ferrous sulfate (FEOSOL) 325 mg (65 mg iron) Tab tablet; Take 1 tablet (325 mg total) by mouth daily with breakfast.  Dispense: 90 tablet; Refill: 1    Patient with a history of CKD and mixed anemia of current disease/iron-deficiency anemia.  Patient currently followed by GI.  Continue iron.  Obtaining creatinine.  Pending results, patient may benefit from nephrology referral.  Patient has had renal ultrasound which was unremarkable.    8. Chronic pain syndrome  -     methocarbamoL (ROBAXIN) 500 MG Tab; Take 1 tablet (500 mg total) by mouth 2 (two) times daily.  Dispense: 180 tablet; Refill: 0    Patient tolerating Robaxin.  Continue.  Refill provided.    9. Bilateral foot pain  -     amitriptyline (ELAVIL) 25 MG tablet; Take 1 tablet (25 mg total) by mouth every evening.  Dispense: 90 tablet; Refill: 1  -     DULoxetine (CYMBALTA) 60 MG capsule; Take 1 capsule (60 mg total) by mouth once daily.  Dispense: 90 capsule; Refill: 1    Patient with bilateral foot pain.  Patient has had arterial studies which were unremarkable.  Patient  does have history of significant degenerative disc disease and has had 6 back surgeries in the past it was likely contributing to symptoms.  Patient does not believe Elavil as helping with symptoms.  Patient given instructions to wean.  If medication is in fact more effective and he believes, he may continue the medication.    10. Vitamin D deficiency  -     Vitamin D; Future; Expected date: 12/06/2023    Patient with a history of vitamin-D deficiency.  We will obtain vitamin-D level.    11. Encounter for immunization  -     Influenza (FLUAD) - Quadrivalent (Adjuvanted) *Preferred* (65+) (PF)        Follow up in about 6 months (around 6/6/2024) for Recheck.      CRISTI ALVA MD  12/06/2023    Due to voice recognition software, sound alike and misspelled words may be contained in the documentation.

## 2023-12-07 NOTE — PROGRESS NOTES
Please let patient know his cholesterol is currently well-controlled.  His liver enzymes and electrolytes are normal.  His kidney function has improved.  His creatinine has trended down to 1.8.  We will need to continue to monitor this level.  Patient's hemoglobin A1c is well-controlled at 5.1.  Patient's TSH is also normal.  Patient's vitamin-D level is normal as well.

## 2023-12-09 DIAGNOSIS — Z71.89 COMPLEX CARE COORDINATION: ICD-10-CM

## 2023-12-15 ENCOUNTER — OFFICE VISIT (OUTPATIENT)
Dept: CARDIOLOGY | Facility: CLINIC | Age: 68
End: 2023-12-15
Payer: MEDICARE

## 2023-12-15 VITALS
HEIGHT: 74 IN | BODY MASS INDEX: 28.75 KG/M2 | DIASTOLIC BLOOD PRESSURE: 60 MMHG | WEIGHT: 224 LBS | OXYGEN SATURATION: 93 % | SYSTOLIC BLOOD PRESSURE: 98 MMHG | HEART RATE: 108 BPM

## 2023-12-15 DIAGNOSIS — I10 PRIMARY HYPERTENSION: Primary | ICD-10-CM

## 2023-12-15 PROCEDURE — 99214 OFFICE O/P EST MOD 30 MIN: CPT | Mod: S$PBB,,, | Performed by: INTERNAL MEDICINE

## 2023-12-15 PROCEDURE — 93010 EKG 12-LEAD: ICD-10-PCS | Mod: S$PBB,,, | Performed by: INTERNAL MEDICINE

## 2023-12-15 PROCEDURE — 99215 OFFICE O/P EST HI 40 MIN: CPT | Mod: PBBFAC | Performed by: INTERNAL MEDICINE

## 2023-12-15 PROCEDURE — 93010 ELECTROCARDIOGRAM REPORT: CPT | Mod: S$PBB,,, | Performed by: INTERNAL MEDICINE

## 2023-12-15 PROCEDURE — 93005 ELECTROCARDIOGRAM TRACING: CPT | Mod: PBBFAC | Performed by: INTERNAL MEDICINE

## 2023-12-15 PROCEDURE — 99214 PR OFFICE/OUTPT VISIT, EST, LEVL IV, 30-39 MIN: ICD-10-PCS | Mod: S$PBB,,, | Performed by: INTERNAL MEDICINE

## 2023-12-16 RX ORDER — LISINOPRIL 10 MG/1
10 TABLET ORAL DAILY
Qty: 30 TABLET | Refills: 5 | Status: SHIPPED | OUTPATIENT
Start: 2023-12-16 | End: 2024-03-21

## 2023-12-16 NOTE — PROGRESS NOTES
Cardiology Clinic Note:    PCP: Antonietta Sal MD    REFERRING PHYSICIAN: Antonietta Sal MD    CHIEF COMPLAINT:   No chief complaint on file.       HISTORY OF PRESENT ILLNESS:  Niranjan Whittaker Jr. is a 68 y.o. male who presents for evaluation of bilateral lower extremity edema, notes lower extremity edema has resolved on daily lasix.     He is not active, activity is severly limited by cervical and lumbar disc pain.  He notes shortness of breath is provoked by severe pain when he is ambulatory.Pt denies chest pain, pressure, notes palpitations with exertion have resolved.  He is using CPAP for longer periods of time, does replace mask he takes it off at night. He has new complaint of occasional dizziness when changing from sitting to standing, feels week for several seconds, occasionally has to sit back down for symptoms to resolve.                                        Review of Systems:  Review of Systems   Constitutional: Negative for diaphoresis, malaise/fatigue, night sweats and weight gain.   HENT:  Positive for sore throat. Negative for congestion, ear pain, hearing loss and nosebleeds.         Thyroidectomy due to abnormal poly   Eyes:  Positive for blurred vision and double vision. Negative for pain, photophobia and visual disturbance.   Cardiovascular:  Positive for dyspnea on exertion, leg swelling, near-syncope and palpitations. Negative for chest pain, claudication, irregular heartbeat, orthopnea and syncope.   Respiratory:  Positive for snoring. Negative for cough, shortness of breath, sleep disturbances due to breathing and wheezing.         On CPAP nightly   Endocrine: Negative for cold intolerance, heat intolerance, polydipsia, polyphagia and polyuria.   Hematologic/Lymphatic: Negative for bleeding problem. Does not bruise/bleed easily.        Hx FE def anemia, resolved    Skin:  Negative for dry skin, flushing, itching, rash and skin cancer.   Musculoskeletal:  Positive for arthritis, back  pain, joint pain, muscle cramps, muscle weakness, myalgias, neck pain and stiffness. Negative for falls.   Gastrointestinal:  Positive for constipation and heartburn. Negative for abdominal pain, change in bowel habit, diarrhea, dysphagia, nausea and vomiting.   Genitourinary:  Negative for bladder incontinence, dysuria, flank pain, frequency and nocturia.        Enlarged prostate   Neurological:  Positive for dizziness and weakness. Negative for focal weakness, headaches, light-headedness, loss of balance, numbness, paresthesias and seizures.   Psychiatric/Behavioral:  Positive for depression. Negative for memory loss and substance abuse. The patient is not nervous/anxious.    Allergic/Immunologic: Negative for environmental allergies.          PAST MEDICAL HISTORY:  Past Medical History:   Diagnosis Date    History of colon polyps 05/12/2022    History of GI diverticular bleed     Hyperlipidemia     Hypertension     Liver hemangioma 05/09/2023    Neuropathy     Personal history of gastric ulcer     Seronegative rheumatoid arthritis 09/22/2021    Goes to [facility] for Rheumatology  Last Assessment & Plan:  Formatting of this note might be different from the original. SNRA; Mod Active; Pt now off MTX and on Arava; However, pt continues to have persistent activity wrists; Intolerant to Enbrel; Poor response to Xeljanz; Unable to afford Humira in the past; Intolerant to chronic Nsaid use due to sig Gerd; Cont to be followed by local Pain man    Thyroid disease        PAST SURGICAL HISTORY:  Past Surgical History:   Procedure Laterality Date    BACK SURGERY      THYROIDECTOMY N/A 08/27/2021    Procedure: THYROIDECTOMY;  Surgeon: Manish Valadez MD;  Location: [facility];  Service: General;  Laterality: N/A;    TOTAL KNEE ARTHROPLASTY Right        SOCIAL HISTORY:  Social History     Socioeconomic History    Marital status:    Occupational History    Occupation: Disbaled   Tobacco Use    Smoking status: Former      Types: Cigars     Quit date: 2020     Years since quitting: 3.9    Smokeless tobacco: Never   Substance and Sexual Activity    Alcohol use: Not Currently     Comment: beer wine liqour occasionally    Drug use: Yes     Types: Hydrocodone, Oxycodone    Sexual activity: Yes     Partners: Female     Social Determinants of Health     Financial Resource Strain: Low Risk  (7/26/2023)    Overall Financial Resource Strain (CARDIA)     Difficulty of Paying Living Expenses: Not hard at all   Food Insecurity: No Food Insecurity (7/26/2023)    Hunger Vital Sign     Worried About Running Out of Food in the Last Year: Never true     Ran Out of Food in the Last Year: Never true   Transportation Needs: No Transportation Needs (7/26/2023)    PRAPARE - Transportation     Lack of Transportation (Medical): No     Lack of Transportation (Non-Medical): No   Physical Activity: Inactive (7/26/2023)    Exercise Vital Sign     Days of Exercise per Week: 0 days     Minutes of Exercise per Session: 0 min   Stress: No Stress Concern Present (7/26/2023)    [employer] [place] [employer] - Occupational Stress Questionnaire     Feeling of Stress : Only a little   Social Connections: Moderately Isolated (7/26/2023)    Social Connection and Isolation Panel [NHANES]     Frequency of Communication with Friends and Family: More than three times a week     Frequency of Social Gatherings with Friends and Family: More than three times a week     Attends Oriental orthodox Services: Never     Active Member of Clubs or Organizations: No     Attends Club or Organization Meetings: Never     Marital Status:    Housing Stability: Low Risk  (7/26/2023)    Housing Stability Vital Sign     Unable to Pay for Housing in the Last Year: No     Number of Places Lived in the Last Year: 1     Unstable Housing in the Last Year: No       FAMILY HISTORY:  Family History   Problem Relation Age of Onset    Heart disease Mother     Hypertension Mother      Hypertension Father     Hypertension Sister        ALLERGIES:  Allergies as of 12/15/2023    (No Known Allergies)         MEDICATIONS:  Current Outpatient Medications on File Prior to Visit   Medication Sig Dispense Refill    amitriptyline (ELAVIL) 25 MG tablet Take 1 tablet (25 mg total) by mouth every evening. 90 tablet 1    anastrozole (ARIMIDEX) 1 mg Tab Take 2 tablets (2 mg total) by mouth every 7 days. 180 tablet 1    cetirizine (ZYRTEC) 10 MG tablet Take 10 mg by mouth.      DULoxetine (CYMBALTA) 60 MG capsule Take 1 capsule (60 mg total) by mouth once daily. (Patient taking differently: Take 60 mg by mouth 2 (two) times daily.) 90 capsule 1    ergocalciferol, vitamin D2, (VITAMIN D ORAL) Take by mouth.      ferrous sulfate (FEOSOL) 325 mg (65 mg iron) Tab tablet Take 1 tablet (325 mg total) by mouth daily with breakfast. 90 tablet 1    fluticasone propionate (FLONASE) 50 mcg/actuation nasal spray 1 spray (50 mcg total) by Each Nostril route once daily. (Patient taking differently: 1 spray by Each Nostril route once daily. PRN) 15.8 mL 11    furosemide (LASIX) 20 MG tablet Take 1 tablet (20 mg total) by mouth once daily. 30 tablet 5    HYDROcodone-acetaminophen (NORCO)  mg per tablet Take 1 tablet by mouth every 4 (four) hours as needed for Pain. 15 tablet 0    hydrOXYchloroQUINE (PLAQUENIL) 200 mg tablet Take 1 tablet (200 mg total) by mouth 2 (two) times a day. 180 tablet 1    levothyroxine (SYNTHROID) 137 MCG Tab tablet Take 1 tablet (137 mcg total) by mouth before breakfast. 90 tablet 1    methocarbamoL (ROBAXIN) 500 MG Tab Take 1 tablet (500 mg total) by mouth 2 (two) times daily. (Patient taking differently: Take 500 mg by mouth 3 (three) times daily.) 180 tablet 0    montelukast (SINGULAIR) 10 mg tablet Take 1 tablet (10 mg total) by mouth every evening. 30 tablet 0    multivitamin-minerals-lutein Tab Take 1 tablet by mouth once daily.      omeprazole (PRILOSEC) 40 MG capsule Take 1 capsule (40  "mg total) by mouth once daily. 90 capsule 1    OXYCONTIN 30 mg TR12 12 hr tablet Take 30 mg by mouth 2 (two) times daily as needed.      polyethylene glycol 3350 (MIRALAX ORAL) Take by mouth.      pravastatin (PRAVACHOL) 40 MG tablet Take 1 tablet (40 mg total) by mouth once daily. 90 tablet 1    predniSONE (DELTASONE) 5 MG tablet Take 1-2 tablets (5-10 mg total) by mouth daily as needed (As needed). 90 tablet 1    pregabalin (LYRICA) 225 MG Cap Take 1 capsule (225 mg total) by mouth 3 (three) times daily. 270 capsule 3    silodosin (RAPAFLO) 8 mg Cap capsule Take 1 capsule (8 mg total) by mouth once daily. 90 capsule 1    sulfaSALAzine (AZULFIDINE) 500 mg Tab Take 1 tablet (500 mg total) by mouth 2 (two) times daily. 180 tablet 1    promethazine (PHENERGAN) 25 MG tablet Take 1 tablet (25 mg total) by mouth 2 (two) times daily as needed for Nausea. 2 times daily prn headache no more then 2 days in a week 90 tablet 1    [DISCONTINUED] leflunomide (ARAVA) 20 MG Tab Take 1 tablet (20 mg total) by mouth once daily. 90 tablet 1     No current facility-administered medications on file prior to visit.          PHYSICAL EXAM:  Blood pressure 98/60, pulse 108, height 6' 2" (1.88 m), weight 101.6 kg (224 lb), SpO2 (!) 93 %.  Wt Readings from Last 3 Encounters:   12/15/23 101.6 kg (224 lb)   12/06/23 104.8 kg (231 lb)   10/17/23 104.3 kg (230 lb)      Body mass index is 28.76 kg/m².    Physical Exam  Vitals and nursing note reviewed.   Constitutional:       Appearance: Normal appearance. He is obese.   HENT:      Head: Normocephalic and atraumatic.      Right Ear: External ear normal.      Left Ear: External ear normal.   Eyes:      General: No scleral icterus.        Right eye: No discharge.         Left eye: No discharge.      Extraocular Movements: Extraocular movements intact.      Conjunctiva/sclera: Conjunctivae normal.      Pupils: Pupils are equal, round, and reactive to light.   Cardiovascular:      Rate and Rhythm: " Normal rate and regular rhythm.      Pulses: Normal pulses.      Heart sounds: Normal heart sounds. No murmur heard.     No friction rub. No gallop.   Pulmonary:      Effort: Pulmonary effort is normal.      Breath sounds: Normal breath sounds. No wheezing, rhonchi or rales.   Chest:      Chest wall: No tenderness.   Abdominal:      General: Abdomen is flat. Bowel sounds are normal. There is no distension.      Palpations: Abdomen is soft.      Tenderness: There is no abdominal tenderness. There is no guarding or rebound.   Musculoskeletal:         General: No swelling or tenderness. Normal range of motion.      Cervical back: Normal range of motion and neck supple.   Skin:     General: Skin is warm and dry.      Findings: No erythema or rash.   Neurological:      General: No focal deficit present.      Mental Status: He is alert and oriented to person, place, and time.      Cranial Nerves: No cranial nerve deficit.      Motor: No weakness.      Gait: Gait normal.   Psychiatric:         Mood and Affect: Mood normal.         Behavior: Behavior normal.         Thought Content: Thought content normal.         Judgment: Judgment normal.          LABS REVIEWED:  Lab Results   Component Value Date    WBC 8.44 10/17/2023    RBC 4.01 (L) 10/17/2023    HGB 12.0 (L) 10/17/2023    HCT 38.3 (L) 10/17/2023    MCV 95.5 10/17/2023    MCH 29.9 10/17/2023    MCHC 31.3 (L) 10/17/2023    RDW 14.1 10/17/2023     10/17/2023    MPV 10.6 10/17/2023    NRBC 0.0 10/17/2023    INR 0.94 07/25/2023     Lab Results   Component Value Date     12/06/2023    K 4.3 12/06/2023     12/06/2023    CO2 30 12/06/2023    BUN 17 12/06/2023    MG 2.4 (H) 07/25/2023     Lab Results   Component Value Date    AST 26 12/06/2023    ALT 34 12/06/2023     Lab Results   Component Value Date    GLU 89 12/06/2023    HGBA1C 5.1 12/06/2023     Lab Results   Component Value Date    CHOL 199 12/06/2023    HDL 51 12/06/2023    TRIG 219 (H) 12/06/2023     CHOLHDL 3.9 12/06/2023       CARDIAC STUDIES REVIEWED:    OTHER IMAGING STUDIES REVIEWED:        ASSESSMENT:   Primary hypertension  -     EKG 12-lead; Future    Other orders  -     lisinopriL 10 MG tablet; Take 1 tablet (10 mg total) by mouth once daily.  Dispense: 30 tablet; Refill: 5            PLAN:  1. Lower extremity swelling, resolved with addition of lasix, lexiscan did not show ischemic substrate, echo revealed mild LV systolic impairment with EF 46%,   2. Morbid obesity, no significant weight change,  discussed lifestyle changes..  3. Hypertension: too well controlled on current meds, having some orthostatic symptoms, will decrease lisinopril from 20 to 10 mg qd.   4. ZULLY: on CPAP nightly, using more consistently  5. Anemia, on FE replacement.   6. Chronic low back pain secondary to advanced disc disease.  7. GERD: on Prilosec  8. Chronic Low back pain, on narcotic analgesia.  9. Hypothyroid: post surgical, on oral replacement                                                                            Follow up in six months, sooner if symptoms of dizziness with change in position do not improve. .

## 2024-01-22 RX ORDER — FUROSEMIDE 20 MG/1
20 TABLET ORAL
Qty: 30 TABLET | Refills: 5 | Status: SHIPPED | OUTPATIENT
Start: 2024-01-22 | End: 2024-06-07 | Stop reason: SDUPTHER

## 2024-02-20 DIAGNOSIS — R33.9 URINARY RETENTION: ICD-10-CM

## 2024-02-20 RX ORDER — SILODOSIN 8 MG/1
CAPSULE ORAL
Qty: 90 CAPSULE | Refills: 1 | Status: SHIPPED | OUTPATIENT
Start: 2024-02-20

## 2024-03-21 RX ORDER — LISINOPRIL 10 MG/1
10 TABLET ORAL
Qty: 30 TABLET | Refills: 5 | Status: SHIPPED | OUTPATIENT
Start: 2024-03-21 | End: 2024-06-07 | Stop reason: SDUPTHER

## 2024-04-15 RX ORDER — CETIRIZINE HYDROCHLORIDE 10 MG/1
10 TABLET ORAL
Qty: 90 TABLET | Refills: 0 | Status: SHIPPED | OUTPATIENT
Start: 2024-04-15

## 2024-05-03 DIAGNOSIS — G89.4 CHRONIC PAIN SYNDROME: ICD-10-CM

## 2024-05-03 RX ORDER — METHOCARBAMOL 500 MG/1
500 TABLET, FILM COATED ORAL 2 TIMES DAILY
Qty: 180 TABLET | Refills: 0 | Status: SHIPPED | OUTPATIENT
Start: 2024-05-03 | End: 2024-06-07 | Stop reason: SDUPTHER

## 2024-05-07 ENCOUNTER — OFFICE VISIT (OUTPATIENT)
Dept: GASTROENTEROLOGY | Facility: CLINIC | Age: 69
End: 2024-05-07
Payer: MEDICARE

## 2024-05-07 VITALS
SYSTOLIC BLOOD PRESSURE: 131 MMHG | DIASTOLIC BLOOD PRESSURE: 83 MMHG | HEART RATE: 107 BPM | BODY MASS INDEX: 29.26 KG/M2 | WEIGHT: 228 LBS | HEIGHT: 74 IN

## 2024-05-07 DIAGNOSIS — D18.03 LIVER HEMANGIOMA: Primary | ICD-10-CM

## 2024-05-07 PROCEDURE — 99215 OFFICE O/P EST HI 40 MIN: CPT | Mod: PBBFAC

## 2024-05-07 PROCEDURE — 99214 OFFICE O/P EST MOD 30 MIN: CPT | Mod: S$PBB,,,

## 2024-05-07 PROCEDURE — 99999 PR PBB SHADOW E&M-EST. PATIENT-LVL V: CPT | Mod: PBBFAC,,,

## 2024-06-01 DIAGNOSIS — J30.9 ALLERGIC RHINITIS, UNSPECIFIED SEASONALITY, UNSPECIFIED TRIGGER: ICD-10-CM

## 2024-06-03 RX ORDER — MONTELUKAST SODIUM 10 MG/1
10 TABLET ORAL NIGHTLY
Qty: 90 TABLET | Refills: 0 | Status: SHIPPED | OUTPATIENT
Start: 2024-06-03 | End: 2024-06-07 | Stop reason: SDUPTHER

## 2024-06-07 ENCOUNTER — OFFICE VISIT (OUTPATIENT)
Dept: FAMILY MEDICINE | Facility: CLINIC | Age: 69
End: 2024-06-07
Payer: MEDICARE

## 2024-06-07 VITALS
WEIGHT: 220.19 LBS | OXYGEN SATURATION: 96 % | HEART RATE: 92 BPM | TEMPERATURE: 97 F | RESPIRATION RATE: 19 BRPM | SYSTOLIC BLOOD PRESSURE: 128 MMHG | DIASTOLIC BLOOD PRESSURE: 82 MMHG | BODY MASS INDEX: 28.26 KG/M2 | HEIGHT: 74 IN

## 2024-06-07 DIAGNOSIS — E78.2 MIXED HYPERLIPIDEMIA: ICD-10-CM

## 2024-06-07 DIAGNOSIS — G89.4 CHRONIC PAIN SYNDROME: ICD-10-CM

## 2024-06-07 DIAGNOSIS — D64.9 ANEMIA, UNSPECIFIED TYPE: ICD-10-CM

## 2024-06-07 DIAGNOSIS — I10 PRIMARY HYPERTENSION: ICD-10-CM

## 2024-06-07 DIAGNOSIS — E89.0 POSTOPERATIVE HYPOTHYROIDISM: ICD-10-CM

## 2024-06-07 DIAGNOSIS — N18.32 STAGE 3B CHRONIC KIDNEY DISEASE: Primary | ICD-10-CM

## 2024-06-07 DIAGNOSIS — M79.672 BILATERAL FOOT PAIN: ICD-10-CM

## 2024-06-07 DIAGNOSIS — M79.671 BILATERAL FOOT PAIN: ICD-10-CM

## 2024-06-07 DIAGNOSIS — J30.9 ALLERGIC RHINITIS, UNSPECIFIED SEASONALITY, UNSPECIFIED TRIGGER: ICD-10-CM

## 2024-06-07 LAB
BASOPHILS # BLD AUTO: 0.08 K/UL (ref 0–0.2)
BASOPHILS NFR BLD AUTO: 1.7 % (ref 0–1)
CREAT UR-MCNC: 78 MG/DL (ref 39–259)
DIFFERENTIAL METHOD BLD: ABNORMAL
EOSINOPHIL # BLD AUTO: 0.19 K/UL (ref 0–0.5)
EOSINOPHIL NFR BLD AUTO: 4.1 % (ref 1–4)
ERYTHROCYTE [DISTWIDTH] IN BLOOD BY AUTOMATED COUNT: 13.4 % (ref 11.5–14.5)
HCT VFR BLD AUTO: 39.5 % (ref 40–54)
HGB BLD-MCNC: 11.9 G/DL (ref 13.5–18)
IMM GRANULOCYTES # BLD AUTO: 0.03 K/UL (ref 0–0.04)
IMM GRANULOCYTES NFR BLD: 0.7 % (ref 0–0.4)
LYMPHOCYTES # BLD AUTO: 1.54 K/UL (ref 1–4.8)
LYMPHOCYTES NFR BLD AUTO: 33.5 % (ref 27–41)
MCH RBC QN AUTO: 28.8 PG (ref 27–31)
MCHC RBC AUTO-ENTMCNC: 30.1 G/DL (ref 32–36)
MCV RBC AUTO: 95.6 FL (ref 80–96)
MICROALBUMIN UR-MCNC: <0.5 MG/DL (ref 0–2.8)
MICROALBUMIN/CREAT RATIO PNL UR: <6.4 MG/G (ref 0–30)
MONOCYTES # BLD AUTO: 0.45 K/UL (ref 0–0.8)
MONOCYTES NFR BLD AUTO: 9.8 % (ref 2–6)
MPC BLD CALC-MCNC: 11.1 FL (ref 9.4–12.4)
NEUTROPHILS # BLD AUTO: 2.31 K/UL (ref 1.8–7.7)
NEUTROPHILS NFR BLD AUTO: 50.2 % (ref 53–65)
NRBC # BLD AUTO: 0 X10E3/UL
NRBC, AUTO (.00): 0 %
PLATELET # BLD AUTO: 384 K/UL (ref 150–400)
RBC # BLD AUTO: 4.13 M/UL (ref 4.6–6.2)
TSH SERPL DL<=0.005 MIU/L-ACNC: 6.56 UIU/ML (ref 0.36–3.74)
WBC # BLD AUTO: 4.6 K/UL (ref 4.5–11)

## 2024-06-07 PROCEDURE — 99214 OFFICE O/P EST MOD 30 MIN: CPT | Mod: ,,, | Performed by: STUDENT IN AN ORGANIZED HEALTH CARE EDUCATION/TRAINING PROGRAM

## 2024-06-07 PROCEDURE — 84443 ASSAY THYROID STIM HORMONE: CPT | Mod: ,,, | Performed by: CLINICAL MEDICAL LABORATORY

## 2024-06-07 PROCEDURE — 82570 ASSAY OF URINE CREATININE: CPT | Mod: ,,, | Performed by: CLINICAL MEDICAL LABORATORY

## 2024-06-07 PROCEDURE — 85025 COMPLETE CBC W/AUTO DIFF WBC: CPT | Mod: ,,, | Performed by: CLINICAL MEDICAL LABORATORY

## 2024-06-07 PROCEDURE — 82043 UR ALBUMIN QUANTITATIVE: CPT | Mod: ,,, | Performed by: CLINICAL MEDICAL LABORATORY

## 2024-06-07 RX ORDER — METHOCARBAMOL 500 MG/1
500 TABLET, FILM COATED ORAL 2 TIMES DAILY
Qty: 180 TABLET | Refills: 0 | Status: SHIPPED | OUTPATIENT
Start: 2024-06-07

## 2024-06-07 RX ORDER — AMITRIPTYLINE HYDROCHLORIDE 25 MG/1
25 TABLET, FILM COATED ORAL NIGHTLY
Qty: 90 TABLET | Refills: 1 | Status: SHIPPED | OUTPATIENT
Start: 2024-06-07 | End: 2025-06-07

## 2024-06-07 RX ORDER — FUROSEMIDE 20 MG/1
20 TABLET ORAL DAILY
Qty: 90 TABLET | Refills: 1 | Status: SHIPPED | OUTPATIENT
Start: 2024-06-07

## 2024-06-07 RX ORDER — MONTELUKAST SODIUM 10 MG/1
10 TABLET ORAL NIGHTLY
Qty: 90 TABLET | Refills: 1 | Status: SHIPPED | OUTPATIENT
Start: 2024-06-07

## 2024-06-07 RX ORDER — PRAVASTATIN SODIUM 40 MG/1
40 TABLET ORAL DAILY
Qty: 90 TABLET | Refills: 1 | Status: SHIPPED | OUTPATIENT
Start: 2024-06-07

## 2024-06-07 RX ORDER — TADALAFIL 20 MG/1
10-20 TABLET ORAL DAILY PRN
COMMUNITY
Start: 2024-05-07

## 2024-06-07 RX ORDER — LISINOPRIL 10 MG/1
10 TABLET ORAL DAILY
Qty: 90 TABLET | Refills: 1 | Status: SHIPPED | OUTPATIENT
Start: 2024-06-07

## 2024-06-07 RX ORDER — LEVOTHYROXINE SODIUM 137 UG/1
137 TABLET ORAL
Qty: 90 TABLET | Refills: 1 | Status: SHIPPED | OUTPATIENT
Start: 2024-06-07 | End: 2024-06-10 | Stop reason: SDUPTHER

## 2024-06-07 RX ORDER — LEFLUNOMIDE 20 MG/1
20 TABLET ORAL
COMMUNITY
Start: 2024-05-06

## 2024-06-07 NOTE — PROGRESS NOTES
Progress Note     CRISTI ALVA MD   [address]  [place] [address]     PATIENT NAME: Niranjan Whittaker Jr.  : 1955  DATE: 24  MRN: [id number]      Billing Provider: CRISTI ALVA MD  Level of Service:   Patient PCP Information       Provider PCP Type    CRISTI ALVA MD General                Referral (Pt mentioned he needs a referral to see Nephrology Per Dr.James Alarcon ), Medication Refill, Follow-up (6mth f/u appt ), Hypertension, and Health Maintenance (Discussed care gaps w/pt/Pt not interested in any vaccines today )      SUBJECTIVE:     Niranjan Whittaker Jr. is a 69 y.o.male who presents to clinic for Referral (Pt mentioned he needs a referral to see Nephrology Per Dr.James Alarcon ), Medication Refill, Follow-up (6mth f/u appt ), Hypertension, and Health Maintenance (Discussed care gaps w/pt/Pt not interested in any vaccines today )      Patient presents to clinic today for medication refills.  Patient has a history of hypertension for which he takes lisinopril  and Lasix.  Patient takes Lasix secondary to his lower extremity edema.  He was prescribed this medication by Cardiology.  He notes he has been on it for the past year.  He was tolerating it without difficulty.    Patient also has a history of inflammatory arthropathy.  He was followed by Rheumatology.  He is currently on prednisone but they are trying to wean that medication.  He was on various pain medications for discomfort.  He also takes Elavil which it was offer some relief.  He takes Robaxin twice daily.    Patient also has a longstanding history of CKD.  Patient amenable to being referred to Nephrology.  Rheumatology has also requested that he see Nephrology.      He also has chronic allergies and takes Singulair.  He tolerates this medication without difficulty.    All other pertinent review of systems negative. Please see HPI for details.     Past Medical History:  has a past medical  history of History of colon polyps (05/12/2022), History of GI diverticular bleed, Hyperlipidemia, Hypertension, Liver hemangioma (05/09/2023), Neuropathy, Personal history of gastric ulcer, Seronegative rheumatoid arthritis (09/22/2021), and Thyroid disease.   Past Surgical History:  has a past surgical history that includes Back surgery; Thyroidectomy (N/A, 08/27/2021); and Total knee arthroplasty (Right).  Family History: family history includes Heart disease in his mother; Hypertension in his father, mother, and sister.  Social History:  reports that he quit smoking about 4 years ago. His smoking use included cigars. He has never used smokeless tobacco. He reports that he does not currently use alcohol. He reports current drug use. Drugs: Hydrocodone and Oxycodone.  Allergies: Review of patient's allergies indicates:  No Known Allergies      Current Outpatient Medications:     anastrozole (ARIMIDEX) 1 mg Tab, Take 2 tablets (2 mg total) by mouth every 7 days., Disp: 180 tablet, Rfl: 1    cetirizine (ZYRTEC) 10 MG tablet, TAKE 1 TABLET BY MOUTH ONCE DAILY, Disp: 90 tablet, Rfl: 0    DULoxetine (CYMBALTA) 60 MG capsule, Take 1 capsule (60 mg total) by mouth once daily. (Patient taking differently: Take 60 mg by mouth 2 (two) times daily.), Disp: 90 capsule, Rfl: 1    ergocalciferol, vitamin D2, (VITAMIN D ORAL), Take by mouth., Disp: , Rfl:     ferrous sulfate (FEOSOL) 325 mg (65 mg iron) Tab tablet, Take 1 tablet (325 mg total) by mouth daily with breakfast., Disp: 90 tablet, Rfl: 1    fluticasone propionate (FLONASE) 50 mcg/actuation nasal spray, 1 spray (50 mcg total) by Each Nostril route once daily. (Patient taking differently: 1 spray by Each Nostril route once daily. PRN), Disp: 15.8 mL, Rfl: 11    HYDROcodone-acetaminophen (NORCO)  mg per tablet, Take 1 tablet by mouth every 4 (four) hours as needed for Pain., Disp: 15 tablet, Rfl: 0    hydrOXYchloroQUINE (PLAQUENIL) 200 mg tablet, Take 1 tablet (200  mg total) by mouth 2 (two) times a day., Disp: 180 tablet, Rfl: 1    leflunomide (ARAVA) 20 MG Tab, Take 20 mg by mouth 3 (three) times a week., Disp: , Rfl:     multivitamin-minerals-lutein Tab, Take 1 tablet by mouth once daily., Disp: , Rfl:     omeprazole (PRILOSEC) 40 MG capsule, Take 1 capsule (40 mg total) by mouth once daily., Disp: 90 capsule, Rfl: 1    OXYCONTIN 30 mg TR12 12 hr tablet, Take 30 mg by mouth 2 (two) times daily as needed., Disp: , Rfl:     polyethylene glycol 3350 (MIRALAX ORAL), Take by mouth., Disp: , Rfl:     predniSONE (DELTASONE) 5 MG tablet, Take 1-2 tablets (5-10 mg total) by mouth daily as needed (As needed)., Disp: 90 tablet, Rfl: 1    pregabalin (LYRICA) 225 MG Cap, Take 1 capsule (225 mg total) by mouth 3 (three) times daily., Disp: 270 capsule, Rfl: 3    silodosin (RAPAFLO) 8 mg Cap capsule, TAKE 1 CAPSULE(8 MG) BY MOUTH EVERY DAY, Disp: 90 capsule, Rfl: 1    sulfaSALAzine (AZULFIDINE) 500 mg Tab, Take 1 tablet (500 mg total) by mouth 2 (two) times daily., Disp: 180 tablet, Rfl: 1    tadalafiL (CIALIS) 20 MG Tab, Take 10-20 mg by mouth daily as needed., Disp: , Rfl:     amitriptyline (ELAVIL) 25 MG tablet, Take 1 tablet (25 mg total) by mouth every evening., Disp: 90 tablet, Rfl: 1    furosemide (LASIX) 20 MG tablet, Take 1 tablet (20 mg total) by mouth once daily., Disp: 90 tablet, Rfl: 1    levothyroxine (SYNTHROID) 137 MCG Tab tablet, Take 1 tablet (137 mcg total) by mouth before breakfast., Disp: 90 tablet, Rfl: 1    lisinopriL 10 MG tablet, Take 1 tablet (10 mg total) by mouth once daily., Disp: 90 tablet, Rfl: 1    methocarbamoL (ROBAXIN) 500 MG Tab, Take 1 tablet (500 mg total) by mouth 2 (two) times daily., Disp: 180 tablet, Rfl: 0    montelukast (SINGULAIR) 10 mg tablet, Take 1 tablet (10 mg total) by mouth every evening., Disp: 90 tablet, Rfl: 1    pravastatin (PRAVACHOL) 40 MG tablet, Take 1 tablet (40 mg total) by mouth once daily., Disp: 90 tablet, Rfl: 1     "promethazine (PHENERGAN) 25 MG tablet, Take 1 tablet (25 mg total) by mouth 2 (two) times daily as needed for Nausea. 2 times daily prn headache no more then 2 days in a week (Patient not taking: Reported on 6/7/2024), Disp: 90 tablet, Rfl: 1   OBJECTIVE:     Vital Signs   /82   Pulse 92   Temp 97.3 °F (36.3 °C)   Resp 19   Ht 6' 2" (1.88 m)   Wt 99.9 kg (220 lb 3.2 oz)   SpO2 96%   BMI 28.27 kg/m²     Physical Exam  Constitutional:       General: He is not in acute distress.     Appearance: Normal appearance. He is not ill-appearing.   HENT:      Head: Normocephalic and atraumatic.   Eyes:      Extraocular Movements: Extraocular movements intact.      Pupils: Pupils are equal, round, and reactive to light.   Cardiovascular:      Rate and Rhythm: Normal rate and regular rhythm.      Heart sounds: No murmur heard.     No friction rub. No gallop.   Pulmonary:      Effort: Pulmonary effort is normal. No respiratory distress.      Breath sounds: Normal breath sounds. No wheezing, rhonchi or rales.   Abdominal:      Palpations: Abdomen is soft.      Tenderness: There is no abdominal tenderness. There is no guarding or rebound.   Musculoskeletal:         General: Normal range of motion.   Skin:     General: Skin is warm and dry.      Capillary Refill: Capillary refill takes less than 2 seconds.   Neurological:      General: No focal deficit present.      Mental Status: He is alert.   Psychiatric:         Mood and Affect: Mood normal.         Behavior: Behavior normal.         ASSESSMENT/PLAN:     1. Stage 3b chronic kidney disease  -     Microalbumin/Creatinine Ratio, Urine  -     Ambulatory referral/consult to Nephrology; Future; Expected date: 06/14/2024    Patient with chronic kidney disease.  We will obtain urine microalbumin creatinine ratio.  Recent creatinine elevated but stable.    2. Chronic pain syndrome  -     methocarbamoL (ROBAXIN) 500 MG Tab; Take 1 tablet (500 mg total) by mouth 2 (two) times " daily.  Dispense: 180 tablet; Refill: 0   Patient was chronic pain.  Refilling Robaxin per request.      3. Bilateral foot pain  -     amitriptyline (ELAVIL) 25 MG tablet; Take 1 tablet (25 mg total) by mouth every evening.  Dispense: 90 tablet; Refill: 1  Patient takes Elavil.  Refilling.  Patient tolerates without difficulty.    4. Allergic rhinitis, unspecified seasonality, unspecified trigger  -     montelukast (SINGULAIR) 10 mg tablet; Take 1 tablet (10 mg total) by mouth every evening.  Dispense: 90 tablet; Refill: 1    Currently well-controlled.  Singulair refilled.    5. Mixed hyperlipidemia  -     pravastatin (PRAVACHOL) 40 MG tablet; Take 1 tablet (40 mg total) by mouth once daily.  Dispense: 90 tablet; Refill: 1    Refill provided.  Patient tolerating medication without difficulty.    6. Postoperative hypothyroidism  Overview:  Patient is status post thyroidectomy secondary to thyroid nodules.      Orders:  -     levothyroxine (SYNTHROID) 137 MCG Tab tablet; Take 1 tablet (137 mcg total) by mouth before breakfast.  Dispense: 90 tablet; Refill: 1  -     TSH; Future; Expected date: 06/07/2024  TSH ordered.  Refill provided.  Patient to continue.      7. Anemia, unspecified type  -     CBC Auto Differential; Future; Expected date: 06/07/2024  Patient was longstanding history of anemia.  Patient currently taking iron.  Pending repeat CBC.    8.  Primary hypertension   Patient currently prescribed lisinopril and Lasix by Cardiology.  Blood pressure currently well-controlled.  Refills provided.    Follow up in about 3 months (around 9/7/2024) for   Recheck.      CRISTI ALVA MD  06/07/2024    Due to voice recognition software, sound alike and misspelled words may be contained in the documentation.

## 2024-06-08 DIAGNOSIS — N18.32 CHRONIC KIDNEY DISEASE, STAGE 3B: ICD-10-CM

## 2024-06-10 DIAGNOSIS — E89.0 POSTOPERATIVE HYPOTHYROIDISM: ICD-10-CM

## 2024-06-10 DIAGNOSIS — K21.9 GASTROESOPHAGEAL REFLUX DISEASE WITHOUT ESOPHAGITIS: ICD-10-CM

## 2024-06-10 RX ORDER — LEVOTHYROXINE SODIUM 137 UG/1
150 TABLET ORAL
Qty: 90 TABLET | Refills: 1 | Status: SHIPPED | OUTPATIENT
Start: 2024-06-10

## 2024-06-10 RX ORDER — OMEPRAZOLE 40 MG/1
40 CAPSULE, DELAYED RELEASE ORAL DAILY
Qty: 90 CAPSULE | Refills: 1 | Status: SHIPPED | OUTPATIENT
Start: 2024-06-10

## 2024-06-10 RX ORDER — FERROUS SULFATE TAB 325 MG (65 MG ELEMENTAL FE) 325 (65 FE) MG
TAB ORAL
Qty: 90 TABLET | Refills: 1 | Status: SHIPPED | OUTPATIENT
Start: 2024-06-10

## 2024-06-10 NOTE — TELEPHONE ENCOUNTER
----- Message from Shanthi Muse sent at 6/10/2024  2:42 PM CDT -----  Regarding: refills  Needs refills on omeprazole  called in to [facility] in [place]

## 2024-06-10 NOTE — PROGRESS NOTES
Please let patient know that his thyroid level is  mildly elevated. I am going to increase his Synthroid.  I will send that prescription to his pharmacy.  His urine does not show any abnormal protein.  His blood count is still mildly low but stable from previous.  He can continue his iron for now.  We will need to recheck his TSH in 6-8 weeks.  I will go ahead and order that lab for him so he can come in and have it drawn during a lab visit.

## 2024-06-11 ENCOUNTER — TELEPHONE (OUTPATIENT)
Dept: FAMILY MEDICINE | Facility: CLINIC | Age: 69
End: 2024-06-11
Payer: MEDICARE

## 2024-06-11 DIAGNOSIS — N18.32 STAGE 3B CHRONIC KIDNEY DISEASE: Primary | ICD-10-CM

## 2024-06-11 NOTE — TELEPHONE ENCOUNTER
Brittany Boyle Staff  Good morning,    I wanted to reach out about this referral for Mr. Whittaker for nephrology. I have called multiple times to try and schedule with the pt, but I have been unable to contact him, though I have left vmails. I tried his wife's number, and I also got a vmail. Currently, it shows the first available appointment with Dr. Eneida Da Silva is 1/7/24. Can someone reach out and see if he wants to schedule for this date? Thank you!

## 2024-06-20 ENCOUNTER — OFFICE VISIT (OUTPATIENT)
Dept: CARDIOLOGY | Facility: CLINIC | Age: 69
End: 2024-06-20
Payer: MEDICARE

## 2024-06-20 VITALS
HEART RATE: 91 BPM | RESPIRATION RATE: 16 BRPM | HEIGHT: 72 IN | SYSTOLIC BLOOD PRESSURE: 120 MMHG | BODY MASS INDEX: 29.8 KG/M2 | OXYGEN SATURATION: 93 % | DIASTOLIC BLOOD PRESSURE: 78 MMHG | WEIGHT: 220 LBS

## 2024-06-20 DIAGNOSIS — I10 PRIMARY HYPERTENSION: ICD-10-CM

## 2024-06-20 DIAGNOSIS — M51.36 DEGENERATION OF LUMBAR INTERVERTEBRAL DISC: ICD-10-CM

## 2024-06-20 DIAGNOSIS — R07.2 PRECORDIAL PAIN: ICD-10-CM

## 2024-06-20 DIAGNOSIS — G89.4 CHRONIC PAIN SYNDROME: Primary | ICD-10-CM

## 2024-06-20 DIAGNOSIS — I10 PRIMARY HYPERTENSION: Primary | ICD-10-CM

## 2024-06-20 PROCEDURE — 99214 OFFICE O/P EST MOD 30 MIN: CPT | Mod: S$PBB,,, | Performed by: INTERNAL MEDICINE

## 2024-06-20 PROCEDURE — 99999 PR PBB SHADOW E&M-EST. PATIENT-LVL V: CPT | Mod: PBBFAC,,, | Performed by: INTERNAL MEDICINE

## 2024-06-20 PROCEDURE — 99215 OFFICE O/P EST HI 40 MIN: CPT | Mod: PBBFAC | Performed by: INTERNAL MEDICINE

## 2024-06-20 NOTE — PROGRESS NOTES
Cardiology Clinic Note:    PCP: Antonietta Sal MD    REFERRING PHYSICIAN: Antonietta Sal MD    CHIEF COMPLAINT:   Chief Complaint   Patient presents with    Follow-up     6m    Shortness of Breath     C/o occasional    Edema     C/o edema lower extremities        HISTORY OF PRESENT ILLNESS:  Niranjan Whittaker Jr. is a 69 y.o. male who presents for evaluation of bilateral lower extremity edema, cramping, burning.  He  notes lower extremity edema has resolved on daily lasix.     He is not active, activity is severly limited by cervical and lumbar disc pain.   He is using CPAP for longer periods of time, does replace mask he takes it off at night. He notes  dizziness  has resolved. He reports bilat lower ext cramping, awakens from sleep, and bilateral foot burning.                                   Review of Systems:  Review of Systems   Constitutional: Negative for diaphoresis, malaise/fatigue, night sweats and weight gain.   HENT:  Positive for sore throat. Negative for congestion, ear pain, hearing loss and nosebleeds.         Thyroidectomy due to abnormal poly   Eyes:  Positive for blurred vision and double vision. Negative for pain, photophobia and visual disturbance.   Cardiovascular:  Positive for dyspnea on exertion, leg swelling, near-syncope and palpitations. Negative for chest pain, claudication, irregular heartbeat, orthopnea and syncope.   Respiratory:  Positive for snoring. Negative for cough, shortness of breath, sleep disturbances due to breathing and wheezing.         On CPAP nightly   Endocrine: Negative for cold intolerance, heat intolerance, polydipsia, polyphagia and polyuria.   Hematologic/Lymphatic: Negative for bleeding problem. Does not bruise/bleed easily.        Hx FE def anemia, resolved    Skin:  Negative for dry skin, flushing, itching, rash and skin cancer.   Musculoskeletal:  Positive for arthritis, back pain, joint pain, muscle cramps, muscle weakness, myalgias, neck pain and  stiffness. Negative for falls.   Gastrointestinal:  Positive for constipation and heartburn. Negative for abdominal pain, change in bowel habit, diarrhea, dysphagia, nausea and vomiting.   Genitourinary:  Negative for bladder incontinence, dysuria, flank pain, frequency and nocturia.        Enlarged prostate   Neurological:  Positive for dizziness and weakness. Negative for focal weakness, headaches, light-headedness, loss of balance, numbness, paresthesias and seizures.   Psychiatric/Behavioral:  Positive for depression. Negative for memory loss and substance abuse. The patient is not nervous/anxious.    Allergic/Immunologic: Negative for environmental allergies.          PAST MEDICAL HISTORY:  Past Medical History:   Diagnosis Date    History of colon polyps 2022    History of GI diverticular bleed     Hyperlipidemia     Hypertension     Liver hemangioma 2023    Neuropathy     Personal history of gastric ulcer     Seronegative rheumatoid arthritis 2021    Go[facility] to [facility] for [facility]  Last Assessment & Plan:  Formatting of this note might be different from the original. SNRA; Mod Active; Pt now off MTX and on Arava; However, pt continues to have persistent activity wrists; Intolerant to Enbrel; Poor response to Xeljanz; Unable to afford Humira in the past; Intolerant to chronic Nsaid use due to sig Gerd; Cont to be followed by local Pain man    Thyroid disease        PAST SURGICAL HISTORY:  Past Surgical History:   Procedure Laterality Date    BACK SURGERY      THYROIDECTOMY N/A 2021    Procedure: THYROIDECTOMY;  Surgeon: Manish Valadez MD;  Location: [facility], [place];  Service: General;  Laterality: N/A;    TOTAL KNEE ARTHROPLASTY Right        SOCIAL HISTORY:  Social History     Socioeconomic History    Marital status:    Occupational History    Occupation: Disbaled   Tobacco Use    Smoking status: Former     Types: Cigars     Quit date:      Years since quittin.4     Smokeless tobacco: Never   Substance and Sexual Activity    Alcohol use: Not Currently     Comment: beer wine liqour occasionally    Drug use: Yes     Types: Hydrocodone, Oxycodone    Sexual activity: Yes     Partners: Female     Social Determinants of Health     Financial Resource Strain: Low Risk  (7/26/2023)    Overall Financial Resource Strain (CARDIA)     Difficulty of Paying Living Expenses: Not hard at all   Food Insecurity: No Food Insecurity (7/26/2023)    Hunger Vital Sign     Worried About Running Out of Food in the Last Year: Never true     Ran Out of Food in the Last Year: Never true   Transportation Needs: No Transportation Needs (7/26/2023)    PRAPARE - Transportation     Lack of Transportation (Medical): No     Lack of Transportation (Non-Medical): No   Physical Activity: Inactive (7/26/2023)    Exercise Vital Sign     Days of Exercise per Week: 0 days     Minutes of Exercise per Session: 0 min   Stress: No Stress Concern Present (7/26/2023)    [employer] [place] [employer] - Occupational Stress Questionnaire     Feeling of Stress : Only a little   Housing Stability: Low Risk  (7/26/2023)    Housing Stability Vital Sign     Unable to Pay for Housing in the Last Year: No     Number of Places Lived in the Last Year: 1     Unstable Housing in the Last Year: No       FAMILY HISTORY:  Family History   Problem Relation Name Age of Onset    Heart disease Mother      Hypertension Mother      Hypertension Father      Hypertension Sister         ALLERGIES:  Allergies as of 06/20/2024    (No Known Allergies)         MEDICATIONS:  Current Outpatient Medications on File Prior to Visit   Medication Sig Dispense Refill    anastrozole (ARIMIDEX) 1 mg Tab Take 2 tablets (2 mg total) by mouth every 7 days. 180 tablet 1    cetirizine (ZYRTEC) 10 MG tablet TAKE 1 TABLET BY MOUTH ONCE DAILY 90 tablet 0    DULoxetine (CYMBALTA) 60 MG capsule Take 1 capsule (60 mg total) by mouth once daily. (Patient taking  differently: Take 60 mg by mouth 2 (two) times daily.) 90 capsule 1    ergocalciferol, vitamin D2, (VITAMIN D ORAL) Take by mouth.      FEROSUL 325 mg (65 mg iron) Tab tablet TAKE 1 TABLET BY MOUTH ONCE DAILY WITH BREAKFAST 90 tablet 1    fluticasone propionate (FLONASE) 50 mcg/actuation nasal spray 1 spray (50 mcg total) by Each Nostril route once daily. (Patient taking differently: 1 spray by Each Nostril route once daily. PRN) 15.8 mL 11    furosemide (LASIX) 20 MG tablet Take 1 tablet (20 mg total) by mouth once daily. 90 tablet 1    HYDROcodone-acetaminophen (NORCO)  mg per tablet Take 1 tablet by mouth every 4 (four) hours as needed for Pain. 15 tablet 0    hydrOXYchloroQUINE (PLAQUENIL) 200 mg tablet Take 1 tablet (200 mg total) by mouth 2 (two) times a day. 180 tablet 1    leflunomide (ARAVA) 20 MG Tab Take 20 mg by mouth 3 (three) times a week.      levothyroxine (SYNTHROID) 137 MCG Tab tablet Take 1 tablet (137 mcg total) by mouth before breakfast. 90 tablet 1    lisinopriL 10 MG tablet Take 1 tablet (10 mg total) by mouth once daily. 90 tablet 1    methocarbamoL (ROBAXIN) 500 MG Tab Take 1 tablet (500 mg total) by mouth 2 (two) times daily. 180 tablet 0    montelukast (SINGULAIR) 10 mg tablet Take 1 tablet (10 mg total) by mouth every evening. 90 tablet 1    multivitamin-minerals-lutein Tab Take 1 tablet by mouth once daily.      omeprazole (PRILOSEC) 40 MG capsule Take 1 capsule (40 mg total) by mouth once daily. 90 capsule 1    OXYCONTIN 30 mg TR12 12 hr tablet Take 30 mg by mouth 2 (two) times daily as needed.      pravastatin (PRAVACHOL) 40 MG tablet Take 1 tablet (40 mg total) by mouth once daily. 90 tablet 1    predniSONE (DELTASONE) 5 MG tablet Take 1-2 tablets (5-10 mg total) by mouth daily as needed (As needed). 90 tablet 1    promethazine (PHENERGAN) 25 MG tablet Take 1 tablet (25 mg total) by mouth 2 (two) times daily as needed for Nausea. 2 times daily prn headache no more then 2 days in  a week 90 tablet 1    silodosin (RAPAFLO) 8 mg Cap capsule TAKE 1 CAPSULE(8 MG) BY MOUTH EVERY DAY 90 capsule 1    sulfaSALAzine (AZULFIDINE) 500 mg Tab Take 1 tablet (500 mg total) by mouth 2 (two) times daily. 180 tablet 1    tadalafiL (CIALIS) 20 MG Tab Take 10-20 mg by mouth daily as needed.      amitriptyline (ELAVIL) 25 MG tablet Take 1 tablet (25 mg total) by mouth every evening. (Patient not taking: Reported on 6/20/2024) 90 tablet 1    polyethylene glycol 3350 (MIRALAX ORAL) Take by mouth. (Patient not taking: Reported on 6/20/2024)      pregabalin (LYRICA) 225 MG Cap Take 1 capsule (225 mg total) by mouth 3 (three) times daily. 270 capsule 3     No current facility-administered medications on file prior to visit.          PHYSICAL EXAM:  Blood pressure 120/78, pulse 91, resp. rate 16, height 6' (1.829 m), weight 99.8 kg (220 lb), SpO2 (!) 93%.  Wt Readings from Last 3 Encounters:   06/20/24 99.8 kg (220 lb)   06/07/24 99.9 kg (220 lb 3.2 oz)   05/07/24 103.4 kg (228 lb)      Body mass index is 29.84 kg/m².    Physical Exam  Vitals and nursing note reviewed.   Constitutional:       Appearance: Normal appearance. He is obese.   HENT:      Head: Normocephalic and atraumatic.      Right Ear: External ear normal.      Left Ear: External ear normal.   Eyes:      General: No scleral icterus.        Right eye: No discharge.         Left eye: No discharge.      Extraocular Movements: Extraocular movements intact.      Conjunctiva/sclera: Conjunctivae normal.      Pupils: Pupils are equal, round, and reactive to light.   Cardiovascular:      Rate and Rhythm: Normal rate and regular rhythm.      Pulses: Normal pulses.      Heart sounds: Normal heart sounds. No murmur heard.     No friction rub. No gallop.   Pulmonary:      Effort: Pulmonary effort is normal.      Breath sounds: Normal breath sounds. No wheezing, rhonchi or rales.   Chest:      Chest wall: No tenderness.   Abdominal:      General: Abdomen is flat.  Bowel sounds are normal. There is no distension.      Palpations: Abdomen is soft.      Tenderness: There is no abdominal tenderness. There is no guarding or rebound.   Musculoskeletal:         General: No swelling or tenderness. Normal range of motion.      Cervical back: Normal range of motion and neck supple.   Skin:     General: Skin is warm and dry.      Findings: No erythema or rash.   Neurological:      General: No focal deficit present.      Mental Status: He is alert and oriented to person, place, and time.      Cranial Nerves: No cranial nerve deficit.      Motor: No weakness.      Gait: Gait normal.   Psychiatric:         Mood and Affect: Mood normal.         Behavior: Behavior normal.         Thought Content: Thought content normal.         Judgment: Judgment normal.          LABS REVIEWED:  Lab Results   Component Value Date    WBC 4.60 06/07/2024    RBC 4.13 (L) 06/07/2024    HGB 11.9 (L) 06/07/2024    HCT 39.5 (L) 06/07/2024    MCV 95.6 06/07/2024    MCH 28.8 06/07/2024    MCHC 30.1 (L) 06/07/2024    RDW 13.4 06/07/2024     06/07/2024    MPV 11.1 06/07/2024    NRBC 0.0 06/07/2024    INR 0.94 07/25/2023     Lab Results   Component Value Date     12/06/2023    K 4.3 12/06/2023     12/06/2023    CO2 30 12/06/2023    BUN 17 12/06/2023    MG 2.4 (H) 07/25/2023     Lab Results   Component Value Date    AST 26 12/06/2023    ALT 34 12/06/2023     Lab Results   Component Value Date    GLU 89 12/06/2023    HGBA1C 5.1 12/06/2023     Lab Results   Component Value Date    CHOL 199 12/06/2023    HDL 51 12/06/2023    TRIG 219 (H) 12/06/2023    CHOLHDL 3.9 12/06/2023       CARDIAC STUDIES REVIEWED:    OTHER IMAGING STUDIES REVIEWED:        ASSESSMENT: PLAN:    1. Lower extremity swelling, resolved with addition of lasix, lexiscan did not show ischemic substrate, echo revealed mild LV systolic impairment with EF 46%,   2. Morbid obesity, no significant weight change,  discussed lifestyle  changes..  3. Hypertension: notes orthostatic symptoms improved with  decrease lisinopril from 20 to 10 mg qd.   4. ZULLY: on CPAP nightly, using more consistently  5. Anemia, on FE replacement.   6. Chronic low back pain secondary to advanced disc disease.  7. GERD: on Prilosec  8. Chronic Low back pain, on narcotic analgesia.  9. Hypothyroid: post surgical, on oral replacement                 10. Claudication: will order bilat lower ext ABIs, arterial dopplers.                11.  Bilateral (left leg worse), lower extremity neuropathy, was on gabepentin, stopped for unclear reasons, discussed resuming gabepentin.                                                Follow up in one month to review results of lower extremity dopplers.

## 2024-06-21 NOTE — PROGRESS NOTES
Gastroenterology Clinic Note    Patient ID: [id number]   Referring MD: No ref. provider found   Chief Complaint:   Chief Complaint   Patient presents with    Follow-up       History of Present Illness   Niranjan Whittaker Jr. is an 69 y.o. AAM who is referred for follow-up.  Patient previously followed in GI Clinic by ROBBIE Wisdom for liver hemangioma.  The hemangioma was found on MRI in April 2023.  Recent ultrasound imaging showed stability of the hemangioma with no change from previous.  He has no complaints on exam today.    Previous workup:  Ultrasound abdomen limited    Last colonoscopy was 05/12/2022 with 5 year recall for screening purposes.    Review of Systems   Constitutional:  Negative for weight loss.   Gastrointestinal:  Negative for abdominal pain, blood in stool, constipation, diarrhea, heartburn, melena, nausea and vomiting.       Past Medical History      Past Medical History:   Diagnosis Date    History of colon polyps 05/12/2022    History of GI diverticular bleed     Hyperlipidemia     Hypertension     Liver hemangioma 05/09/2023    Neuropathy     Personal history of gastric ulcer     Seronegative rheumatoid arthritis 09/22/2021    Goes to [facility] for Rheumatology  Last Assessment & Plan:  Formatting of this note might be different from the original. SNRA; Mod Active; Pt now off MTX and on Arava; However, pt continues to have persistent activity wrists; Intolerant to Enbrel; Poor response to Xeljanz; Unable to afford Humira in the past; Intolerant to chronic Nsaid use due to sig Gerd; Cont to be followed by local Pain man    Thyroid disease        Past Surgical History     Past Surgical History:   Procedure Laterality Date    BACK SURGERY      THYROIDECTOMY N/A 08/27/2021    Procedure: THYROIDECTOMY;  Surgeon: Manish Valadez MD;  Location: [facility];  Service: General;  Laterality: N/A;    TOTAL KNEE ARTHROPLASTY Right        Allergies   Review of patient's allergies indicates:  No  Known Allergies    Immunization History     Immunization History   Administered Date(s) Administered    COVID-19, MRNA, LN-S, PF (Pfizer) (Purple Cap) 2021, 2021, 2021, 2022    Influenza (FLUAD) - Quadrivalent - Adjuvanted - PF *Preferred* (65+) 11/15/2022, 2023    Influenza - Quadrivalent - PF *Preferred* (6 months and older) 2020, 10/15/2020, 2021    Pneumococcal Conjugate - 13 Valent 2021    Pneumococcal Polysaccharide - 23 Valent 11/15/2022       Past Family History      Family History   Problem Relation Name Age of Onset    Heart disease Mother      Hypertension Mother      Hypertension Father      Hypertension Sister         Past Social History      Social History     Socioeconomic History    Marital status:    Occupational History    Occupation: Disbaled   Tobacco Use    Smoking status: Former     Types: Cigars     Quit date:      Years since quittin.4    Smokeless tobacco: Never   Substance and Sexual Activity    Alcohol use: Not Currently     Comment: beer wine liqour occasionally    Drug use: Yes     Types: Hydrocodone, Oxycodone    Sexual activity: Yes     Partners: Female     Social Determinants of Health     Financial Resource Strain: Low Risk  (2023)    Overall Financial Resource Strain (CARDIA)     Difficulty of Paying Living Expenses: Not hard at all   Food Insecurity: No Food Insecurity (2023)    Hunger Vital Sign     Worried About Running Out of Food in the Last Year: Never true     Ran Out of Food in the Last Year: Never true   Transportation Needs: No Transportation Needs (2023)    PRAPARE - Transportation     Lack of Transportation (Medical): No     Lack of Transportation (Non-Medical): No   Physical Activity: Inactive (2023)    Exercise Vital Sign     Days of Exercise per Week: 0 days     Minutes of Exercise per Session: 0 min   Stress: No Stress Concern Present (2023)    [employer] [employer] [place] [employer] - Occupational Stress Questionnaire     Feeling of Stress : Only a little   Housing Stability: Low Risk  (7/26/2023)    Housing Stability Vital Sign     Unable to Pay for Housing in the Last Year: No     Number of Places Lived in the Last Year: 1     Unstable Housing in the Last Year: No       Current Medications     Outpatient Medications Marked as Taking for the 5/7/24 encounter (Office Visit) with Iris Edmonds FNP   Medication Sig Dispense Refill    anastrozole (ARIMIDEX) 1 mg Tab Take 2 tablets (2 mg total) by mouth every 7 days. 180 tablet 1    cetirizine (ZYRTEC) 10 MG tablet TAKE 1 TABLET BY MOUTH ONCE DAILY 90 tablet 0    DULoxetine (CYMBALTA) 60 MG capsule Take 1 capsule (60 mg total) by mouth once daily. (Patient taking differently: Take 60 mg by mouth 2 (two) times daily.) 90 capsule 1    ergocalciferol, vitamin D2, (VITAMIN D ORAL) Take by mouth.      fluticasone propionate (FLONASE) 50 mcg/actuation nasal spray 1 spray (50 mcg total) by Each Nostril route once daily. (Patient taking differently: 1 spray by Each Nostril route once daily. PRN) 15.8 mL 11    HYDROcodone-acetaminophen (NORCO)  mg per tablet Take 1 tablet by mouth every 4 (four) hours as needed for Pain. 15 tablet 0    hydrOXYchloroQUINE (PLAQUENIL) 200 mg tablet Take 1 tablet (200 mg total) by mouth 2 (two) times a day. 180 tablet 1    multivitamin-minerals-lutein Tab Take 1 tablet by mouth once daily.      OXYCONTIN 30 mg TR12 12 hr tablet Take 30 mg by mouth 2 (two) times daily as needed.      polyethylene glycol 3350 (MIRALAX ORAL) Take by mouth. (Patient not taking: Reported on 6/20/2024)      predniSONE (DELTASONE) 5 MG tablet Take 1-2 tablets (5-10 mg total) by mouth daily as needed (As needed). 90 tablet 1    promethazine (PHENERGAN) 25 MG tablet Take 1 tablet (25 mg total) by mouth 2 (two) times daily as needed for Nausea. 2 times daily prn headache no more then 2 days in a week 90 tablet 1    silodosin (RAPAFLO) 8  "mg Cap capsule TAKE 1 CAPSULE(8 MG) BY MOUTH EVERY DAY 90 capsule 1    sulfaSALAzine (AZULFIDINE) 500 mg Tab Take 1 tablet (500 mg total) by mouth 2 (two) times daily. 180 tablet 1    [DISCONTINUED] amitriptyline (ELAVIL) 25 MG tablet Take 1 tablet (25 mg total) by mouth every evening. 90 tablet 1    [DISCONTINUED] ferrous sulfate (FEOSOL) 325 mg (65 mg iron) Tab tablet Take 1 tablet (325 mg total) by mouth daily with breakfast. 90 tablet 1    [DISCONTINUED] furosemide (LASIX) 20 MG tablet TAKE 1 TABLET(20 MG) BY MOUTH EVERY DAY 30 tablet 5    [DISCONTINUED] levothyroxine (SYNTHROID) 137 MCG Tab tablet Take 1 tablet (137 mcg total) by mouth before breakfast. 90 tablet 1    [DISCONTINUED] lisinopriL 10 MG tablet TAKE 1 TABLET(10 MG) BY MOUTH EVERY DAY 30 tablet 5    [DISCONTINUED] methocarbamoL (ROBAXIN) 500 MG Tab TAKE 1 TABLET(500 MG) BY MOUTH TWICE DAILY 180 tablet 0    [DISCONTINUED] montelukast (SINGULAIR) 10 mg tablet Take 1 tablet (10 mg total) by mouth every evening. 30 tablet 0    [DISCONTINUED] omeprazole (PRILOSEC) 40 MG capsule Take 1 capsule (40 mg total) by mouth once daily. 90 capsule 1    [DISCONTINUED] pravastatin (PRAVACHOL) 40 MG tablet Take 1 tablet (40 mg total) by mouth once daily. 90 tablet 1        I have reviewed the current medications, allergies, vital signs, past medical and surgical history, family medical history, and social history for this encounter and agree with all findings.    OBJECTIVE    Physical Exam    /83   Pulse 107   Ht 6' 2" (1.88 m)   Wt 103.4 kg (228 lb)   BMI 29.27 kg/m²   GEN: Well appearing, cooperative, NAD  NECK: Supple, no LAD  CV: Normal rate  RESP: Unlabored  ABD: ND, no guarding  EXT: No clubbing, cyanosis, or edema  SKIN: Warm and dry  NEURO: AAO x4.     LABS    CBC (with or without Differential):   Lab Results   Component Value Date    WBC 4.60 06/07/2024    HGB 11.9 (L) 06/07/2024    HCT 39.5 (L) 06/07/2024    MCV 95.6 06/07/2024    MCH 28.8 " 06/07/2024    MCHC 30.1 (L) 06/07/2024    RDW 13.4 06/07/2024     06/07/2024    MPV 11.1 06/07/2024    NEUTOPHILPCT 50.2 (L) 06/07/2024    DIFFTYPE Auto 06/07/2024     BMP/CMP:   Lab Results   Component Value Date     12/06/2023    K 4.3 12/06/2023     12/06/2023    CO2 30 12/06/2023    BUN 17 12/06/2023    CREATININE 1.80 (H) 12/06/2023    GLU 89 12/06/2023    CALCIUM 9.0 12/06/2023    ALBUMIN 3.5 12/06/2023    AST 26 12/06/2023    ALT 34 12/06/2023    ALKPHOS 72 12/06/2023    MG 2.4 (H) 07/25/2023        IMAGING  Ultrasound abdomen limited 10/2023  - hepatic hemangioma similar to previous; no other evidence of abnormality demonstrated    ASSESSMENT  Niranjan Joe Panfilo Gaines is a 69 y.o. AAM with history of hypertension, hyperlipidemia, CKD, GERD, chronic constipation, and liver hemangioma who is referred for follow-up.    1. Liver hemangioma           PLAN    - return to clinic for follow-up as needed  Patient Instructions   - Over the counter Pepcid 20 mg up to 3x daily as needed for breakthrough symptoms of acid reflux.        No orders of the defined types were placed in this encounter.        The risks and benefits of my recommendations, as well as other treatment options were discussed with the patient today. All questions were answered.    30 minutes of total time spent on the encounter, which includes face to face time and non-face to face time preparing to see the patient (eg, review of tests), obtaining and/or reviewing separately obtained history, documenting clinical information in the electronic or other health record, Independently interpreting results (not separately reported) and communicating results to the patient/family/caregiver, or care coordination (not separately reported).        Iris Edmonds, FNP/ACNP  [facility]

## 2024-07-02 ENCOUNTER — PATIENT OUTREACH (OUTPATIENT)
Facility: HOSPITAL | Age: 69
End: 2024-07-02
Payer: MEDICARE

## 2024-07-02 ENCOUNTER — OFFICE VISIT (OUTPATIENT)
Dept: FAMILY MEDICINE | Facility: CLINIC | Age: 69
End: 2024-07-02
Payer: MEDICARE

## 2024-07-02 VITALS
DIASTOLIC BLOOD PRESSURE: 74 MMHG | SYSTOLIC BLOOD PRESSURE: 125 MMHG | BODY MASS INDEX: 29.82 KG/M2 | HEART RATE: 78 BPM | RESPIRATION RATE: 19 BRPM | WEIGHT: 220.19 LBS | TEMPERATURE: 98 F | OXYGEN SATURATION: 93 % | HEIGHT: 72 IN

## 2024-07-02 DIAGNOSIS — J30.9 ALLERGIC RHINITIS, UNSPECIFIED SEASONALITY, UNSPECIFIED TRIGGER: ICD-10-CM

## 2024-07-02 PROCEDURE — 99213 OFFICE O/P EST LOW 20 MIN: CPT | Mod: ,,, | Performed by: STUDENT IN AN ORGANIZED HEALTH CARE EDUCATION/TRAINING PROGRAM

## 2024-07-02 RX ORDER — MONTELUKAST SODIUM 10 MG/1
10 TABLET ORAL NIGHTLY
Qty: 90 TABLET | Refills: 1 | Status: SHIPPED | OUTPATIENT
Start: 2024-07-02

## 2024-07-02 NOTE — PROGRESS NOTES
Progress Note     CRISTI ALVA MD   [address]  [place] [address]     PATIENT NAME: Niranjan Whittaker Jr.  : 1955  DATE: 24  MRN: [id number]      Billing Provider: CRISTI ALVA MD  Level of Service: [place] OFFICE/OUTPT VISIT, EST, LEVL III, 20-29 MIN  Patient PCP Information       Provider PCP Type    CRISTI ALVA MD General                Referral (Pt mentioned he is here today to check up on his nephrology referral ), Medication Refill, and Health Maintenance (Discussed care gaps w/pt/Pt not interested in any vaccines today )      SUBJECTIVE:     Niranjan Whittaker Jr. is a 69 y.o.male who presents to clinic for Referral (Pt mentioned he is here today to check up on his nephrology referral ), Medication Refill, and Health Maintenance (Discussed care gaps w/pt/Pt not interested in any vaccines today )    Patient presents to clinic today for to check on his nephrology referral.  Patient was referred to Nephrology in .  Patient has still not received an appointment.   staff to check on referral for patient.    Patient also has a history of allergies for which he takes Zyrtec and Singulair.  He was currently taking his Flonase.  Patient is needing a refill for his Singulair.  Patient was having some clear drainage.  He notes it was quite constant.  He has been afebrile.  He denies any sinus pressure/pain.  Patient states he can start using his Flonase.    Tetanus: Due  RSV: Due  Shingles: Status post 1. Due for second. Will get.     All other pertinent review of systems negative. Please see HPI for details.     Past Medical History:  has a past medical history of History of colon polyps (2022), History of GI diverticular bleed, Hyperlipidemia, Hypertension, Liver hemangioma (2023), Neuropathy, Personal history of gastric ulcer, Seronegative rheumatoid arthritis (2021), and Thyroid disease.   Past Surgical History:  has a past  surgical history that includes Back surgery; Thyroidectomy (N/A, 08/27/2021); and Total knee arthroplasty (Right).  Family History: family history includes Heart disease in his mother; Hypertension in his father, mother, and sister.  Social History:  reports that he quit smoking about 4 years ago. His smoking use included cigars. He has never used smokeless tobacco. He reports that he does not currently use alcohol. He reports current drug use. Drugs: Hydrocodone and Oxycodone.  Allergies: Review of patient's allergies indicates:  No Known Allergies      Current Outpatient Medications:     amitriptyline (ELAVIL) 25 MG tablet, Take 1 tablet (25 mg total) by mouth every evening., Disp: 90 tablet, Rfl: 1    anastrozole (ARIMIDEX) 1 mg Tab, Take 2 tablets (2 mg total) by mouth every 7 days., Disp: 180 tablet, Rfl: 1    cetirizine (ZYRTEC) 10 MG tablet, TAKE 1 TABLET BY MOUTH ONCE DAILY, Disp: 90 tablet, Rfl: 0    DULoxetine (CYMBALTA) 60 MG capsule, Take 1 capsule (60 mg total) by mouth once daily. (Patient taking differently: Take 60 mg by mouth 2 (two) times daily.), Disp: 90 capsule, Rfl: 1    ergocalciferol, vitamin D2, (VITAMIN D ORAL), Take by mouth., Disp: , Rfl:     FEROSUL 325 mg (65 mg iron) Tab tablet, TAKE 1 TABLET BY MOUTH ONCE DAILY WITH BREAKFAST, Disp: 90 tablet, Rfl: 1    fluticasone propionate (FLONASE) 50 mcg/actuation nasal spray, 1 spray (50 mcg total) by Each Nostril route once daily. (Patient taking differently: 1 spray by Each Nostril route once daily. PRN), Disp: 15.8 mL, Rfl: 11    furosemide (LASIX) 20 MG tablet, Take 1 tablet (20 mg total) by mouth once daily., Disp: 90 tablet, Rfl: 1    HYDROcodone-acetaminophen (NORCO)  mg per tablet, Take 1 tablet by mouth every 4 (four) hours as needed for Pain., Disp: 15 tablet, Rfl: 0    hydrOXYchloroQUINE (PLAQUENIL) 200 mg tablet, Take 1 tablet (200 mg total) by mouth 2 (two) times a day., Disp: 180 tablet, Rfl: 1    leflunomide (ARAVA) 20 MG  Tab, Take 20 mg by mouth 3 (three) times a week., Disp: , Rfl:     levothyroxine (SYNTHROID) 137 MCG Tab tablet, Take 1 tablet (137 mcg total) by mouth before breakfast., Disp: 90 tablet, Rfl: 1    lisinopriL 10 MG tablet, Take 1 tablet (10 mg total) by mouth once daily., Disp: 90 tablet, Rfl: 1    methocarbamoL (ROBAXIN) 500 MG Tab, Take 1 tablet (500 mg total) by mouth 2 (two) times daily., Disp: 180 tablet, Rfl: 0    multivitamin-minerals-lutein Tab, Take 1 tablet by mouth once daily., Disp: , Rfl:     omeprazole (PRILOSEC) 40 MG capsule, Take 1 capsule (40 mg total) by mouth once daily., Disp: 90 capsule, Rfl: 1    OXYCONTIN 30 mg TR12 12 hr tablet, Take 30 mg by mouth 2 (two) times daily as needed., Disp: , Rfl:     pravastatin (PRAVACHOL) 40 MG tablet, Take 1 tablet (40 mg total) by mouth once daily., Disp: 90 tablet, Rfl: 1    pregabalin (LYRICA) 225 MG Cap, Take 1 capsule (225 mg total) by mouth 3 (three) times daily., Disp: 270 capsule, Rfl: 3    promethazine (PHENERGAN) 25 MG tablet, Take 1 tablet (25 mg total) by mouth 2 (two) times daily as needed for Nausea. 2 times daily prn headache no more then 2 days in a week, Disp: 90 tablet, Rfl: 1    silodosin (RAPAFLO) 8 mg Cap capsule, TAKE 1 CAPSULE(8 MG) BY MOUTH EVERY DAY, Disp: 90 capsule, Rfl: 1    sulfaSALAzine (AZULFIDINE) 500 mg Tab, Take 1 tablet (500 mg total) by mouth 2 (two) times daily., Disp: 180 tablet, Rfl: 1    tadalafiL (CIALIS) 20 MG Tab, Take 10-20 mg by mouth daily as needed., Disp: , Rfl:     montelukast (SINGULAIR) 10 mg tablet, Take 1 tablet (10 mg total) by mouth every evening., Disp: 90 tablet, Rfl: 1    polyethylene glycol 3350 (MIRALAX ORAL), Take by mouth. (Patient not taking: Reported on 6/20/2024), Disp: , Rfl:     predniSONE (DELTASONE) 5 MG tablet, Take 1-2 tablets (5-10 mg total) by mouth daily as needed (As needed). (Patient not taking: Reported on 7/2/2024), Disp: 90 tablet, Rfl: 1   OBJECTIVE:     Vital Signs   /74  (BP Location: Right arm, Patient Position: Sitting, BP Method: Large (Automatic))   Pulse 78   Temp 97.9 °F (36.6 °C) (Temporal)   Resp 19   Ht 6' (1.829 m)   Wt 99.9 kg (220 lb 3.2 oz)   SpO2 (!) 93%   BMI 29.86 kg/m²     Physical Exam  Constitutional:       General: He is not in acute distress.     Appearance: Normal appearance. He is not ill-appearing.   HENT:      Head: Normocephalic and atraumatic.      Right Ear: Tympanic membrane normal.      Left Ear: Tympanic membrane normal.      Nose: Congestion present. No rhinorrhea.      Mouth/Throat:      Mouth: Mucous membranes are moist.   Eyes:      Extraocular Movements: Extraocular movements intact.      Pupils: Pupils are equal, round, and reactive to light.   Cardiovascular:      Rate and Rhythm: Normal rate and regular rhythm.      Heart sounds: No murmur heard.     No friction rub. No gallop.   Pulmonary:      Effort: Pulmonary effort is normal. No respiratory distress.      Breath sounds: Normal breath sounds. No wheezing, rhonchi or rales.   Abdominal:      Palpations: Abdomen is soft.      Tenderness: There is no abdominal tenderness. There is no guarding or rebound.   Musculoskeletal:         General: Normal range of motion.   Skin:     General: Skin is warm and dry.      Capillary Refill: Capillary refill takes less than 2 seconds.   Neurological:      General: No focal deficit present.      Mental Status: He is alert.   Psychiatric:         Mood and Affect: Mood normal.         Behavior: Behavior normal.         ASSESSMENT/PLAN:     1. Allergic rhinitis, unspecified seasonality, unspecified trigger  -     montelukast (SINGULAIR) 10 mg tablet; Take 1 tablet (10 mg total) by mouth every evening.  Dispense: 90 tablet; Refill: 1    Patient with allergic rhinitis.  Patient to continue Singulair.  Refill provided.  Patient to continue Zyrtec.  Also counseled patient on importance of using his Flonase to help with symptom relief.  Patient verbalized  understanding.  If symptoms persist, can refer to ENT versus allergy/immunology.    Follow up in about 6 months (around 1/2/2025) for Recheck.      CRISTI ALVA MD  07/02/2024    Due to voice recognition software, sound alike and misspelled words may be contained in the documentation.

## 2024-07-02 NOTE — PROGRESS NOTES
07/02/2024   --Chart accessed for: Care gaps  Immunization Database (Immprint/MIXX) reviewed. Vaccinations uploaded: zoster recombinant and RSV  Health Maintenance Due   Topic Date Due    TETANUS VACCINE  Never done    COVID-19 Vaccine (5 - 2023-24 season) 09/01/2023    Shingles Vaccine (2 of 2) 04/29/2024

## 2024-07-09 DIAGNOSIS — Z71.89 COMPLEX CARE COORDINATION: ICD-10-CM

## 2024-08-02 DIAGNOSIS — G89.4 CHRONIC PAIN SYNDROME: ICD-10-CM

## 2024-08-02 RX ORDER — METHOCARBAMOL 500 MG/1
500 TABLET, FILM COATED ORAL 2 TIMES DAILY
Qty: 180 TABLET | Refills: 0 | Status: SHIPPED | OUTPATIENT
Start: 2024-08-02

## 2024-08-07 ENCOUNTER — HOSPITAL ENCOUNTER (OUTPATIENT)
Dept: RADIOLOGY | Facility: HOSPITAL | Age: 69
Discharge: HOME | End: 2024-08-07
Attending: INTERNAL MEDICINE
Payer: MEDICARE

## 2024-08-07 DIAGNOSIS — G89.4 CHRONIC PAIN SYNDROME: ICD-10-CM

## 2024-08-07 PROCEDURE — 93922 UPR/L XTREMITY ART 2 LEVELS: CPT | Mod: TC

## 2024-08-07 PROCEDURE — 93922 UPR/L XTREMITY ART 2 LEVELS: CPT | Mod: 26,,, | Performed by: RADIOLOGY

## 2024-08-07 PROCEDURE — 93925 LOWER EXTREMITY STUDY: CPT | Mod: 26,,, | Performed by: RADIOLOGY

## 2024-08-14 ENCOUNTER — OFFICE VISIT (OUTPATIENT)
Dept: CARDIOLOGY | Facility: CLINIC | Age: 69
End: 2024-08-14
Payer: MEDICARE

## 2024-08-14 VITALS
HEART RATE: 92 BPM | DIASTOLIC BLOOD PRESSURE: 82 MMHG | BODY MASS INDEX: 30.64 KG/M2 | OXYGEN SATURATION: 95 % | HEIGHT: 70 IN | RESPIRATION RATE: 16 BRPM | SYSTOLIC BLOOD PRESSURE: 130 MMHG | WEIGHT: 214 LBS

## 2024-08-14 DIAGNOSIS — E89.0 POSTOPERATIVE HYPOTHYROIDISM: ICD-10-CM

## 2024-08-14 DIAGNOSIS — R06.09 DOE (DYSPNEA ON EXERTION): ICD-10-CM

## 2024-08-14 DIAGNOSIS — R94.31 ABNORMAL ELECTROCARDIOGRAM (ECG) (EKG): ICD-10-CM

## 2024-08-14 DIAGNOSIS — R26.2 TROUBLE WALKING: Primary | ICD-10-CM

## 2024-08-14 DIAGNOSIS — I73.9 CLAUDICATION: ICD-10-CM

## 2024-08-14 DIAGNOSIS — I10 PRIMARY HYPERTENSION: ICD-10-CM

## 2024-08-14 PROCEDURE — 99214 OFFICE O/P EST MOD 30 MIN: CPT | Mod: S$PBB,,, | Performed by: INTERNAL MEDICINE

## 2024-08-14 PROCEDURE — 99215 OFFICE O/P EST HI 40 MIN: CPT | Mod: PBBFAC | Performed by: INTERNAL MEDICINE

## 2024-08-14 PROCEDURE — 99999 PR PBB SHADOW E&M-EST. PATIENT-LVL V: CPT | Mod: PBBFAC,,, | Performed by: INTERNAL MEDICINE

## 2024-08-14 RX ORDER — GABAPENTIN 100 MG/1
100 CAPSULE ORAL DAILY
Qty: 30 CAPSULE | Refills: 2 | Status: SHIPPED | OUTPATIENT
Start: 2024-08-14 | End: 2024-08-14 | Stop reason: SDUPTHER

## 2024-08-14 RX ORDER — LEVOTHYROXINE SODIUM 137 UG/1
137 TABLET ORAL
Qty: 90 TABLET | Refills: 0 | Status: SHIPPED | OUTPATIENT
Start: 2024-08-14

## 2024-08-14 NOTE — PROGRESS NOTES
Cardiology Clinic Note:    PCP: Antonietta Sal MD    REFERRING PHYSICIAN: Antonietta Sal MD    CHIEF COMPLAINT:   Chief Complaint   Patient presents with    Follow-up     Test results [facility]EL'S        HISTORY OF PRESENT ILLNESS:  Niranjan Whittaker Jr. is a 69 y.o. male who presents for evaluation of cramping, burning.  He  notes lower extremity edema has resolved on daily lasix.     He is not active, activity is severly limited by cervical and lumbar disc pain, has undergone six back surgeries. .  He continues to experience bilat leg pain, 10/10 at night, improves with activity, has improved but not resolved with addition of Lyrica. He is using CPAP, now able to tolerate all night. He notes  dizziness  has resolved. He reports bilat lower ext cramping, awakens from sleep, and bilateral foot burning.                                   Review of Systems:  Review of Systems   Constitutional: Negative for diaphoresis, malaise/fatigue, night sweats and weight gain.   HENT:  Positive for sore throat. Negative for congestion, ear pain, hearing loss and nosebleeds.         Thyroidectomy due to abnormal poly   Eyes:  Positive for blurred vision and double vision. Negative for pain, photophobia and visual disturbance.   Cardiovascular:  Positive for dyspnea on exertion, leg swelling, near-syncope and palpitations. Negative for chest pain, claudication, irregular heartbeat, orthopnea and syncope.   Respiratory:  Positive for snoring. Negative for cough, shortness of breath, sleep disturbances due to breathing and wheezing.         On CPAP nightly   Endocrine: Negative for cold intolerance, heat intolerance, polydipsia, polyphagia and polyuria.   Hematologic/Lymphatic: Negative for bleeding problem. Does not bruise/bleed easily.        Hx FE def anemia, resolved    Skin:  Negative for dry skin, flushing, itching, rash and skin cancer.   Musculoskeletal:  Positive for arthritis, back pain, joint pain, muscle cramps, muscle  weakness, myalgias, neck pain and stiffness. Negative for falls.   Gastrointestinal:  Positive for constipation and heartburn. Negative for abdominal pain, change in bowel habit, diarrhea, dysphagia, nausea and vomiting.   Genitourinary:  Negative for bladder incontinence, dysuria, flank pain, frequency and nocturia.        Enlarged prostate   Neurological:  Positive for dizziness and weakness. Negative for focal weakness, headaches, light-headedness, loss of balance, numbness, paresthesias and seizures.   Psychiatric/Behavioral:  Positive for depression. Negative for memory loss and substance abuse. The patient is not nervous/anxious.    Allergic/Immunologic: Negative for environmental allergies.          PAST MEDICAL HISTORY:  Past Medical History:   Diagnosis Date    History of colon polyps 05/12/2022    History of GI diverticular bleed     Hyperlipidemia     Hypertension     Liver hemangioma 05/09/2023    Neuropathy     Personal history of gastric ulcer     Seronegative rheumatoid arthritis 09/22/2021    Goes to [facility] for Rheumatology  Last Assessment & Plan:  Formatting of this note might be different from the original. SNRA; Mod Active; Pt now off MTX and on Arava; However, pt continues to have persistent activity wrists; Intolerant to Enbrel; Poor response to Xeljanz; Unable to afford Humira in the past; Intolerant to chronic Nsaid use due to sig Gerd; Cont to be followed by local Pain man    Thyroid disease        PAST SURGICAL HISTORY:  Past Surgical History:   Procedure Laterality Date    BACK SURGERY      THYROIDECTOMY N/A 08/27/2021    Procedure: THYROIDECTOMY;  Surgeon: Manish Valadez MD;  Location: [facility];  Service: General;  Laterality: N/A;    TOTAL KNEE ARTHROPLASTY Right        SOCIAL HISTORY:  Social History     Socioeconomic History    Marital status:    Occupational History    Occupation: Disbaled   Tobacco Use    Smoking status: Former     Types: Cigars     Quit date: 2020      Years since quittin.6    Smokeless tobacco: Never   Substance and Sexual Activity    Alcohol use: Not Currently     Comment: beer wine liqour occasionally    Drug use: Yes     Types: Hydrocodone, Oxycodone    Sexual activity: Yes     Partners: Female     Social Determinants of Health     Financial Resource Strain: Low Risk  (2023)    Overall Financial Resource Strain (CARDIA)     Difficulty of Paying Living Expenses: Not hard at all   Food Insecurity: No Food Insecurity (2023)    Hunger Vital Sign     Worried About Running Out of Food in the Last Year: Never true     Ran Out of Food in the Last Year: Never true   Transportation Needs: No Transportation Needs (2023)    PRAPARE - Transportation     Lack of Transportation (Medical): No     Lack of Transportation (Non-Medical): No   Physical Activity: Inactive (2023)    Exercise Vital Sign     Days of Exercise per Week: 0 days     Minutes of Exercise per Session: 0 min   Stress: No Stress Concern Present (2023)    [employer] - Occupational Stress Questionnaire     Feeling of Stress : Only a little   Housing Stability: Low Risk  (2023)    Housing Stability Vital Sign     Unable to Pay for Housing in the Last Year: No     Number of Places Lived in the Last Year: 1     Unstable Housing in the Last Year: No       FAMILY HISTORY:  Family History   Problem Relation Name Age of Onset    Heart disease Mother      Hypertension Mother      Hypertension Father      Hypertension Sister         ALLERGIES:  Allergies as of 2024    (No Known Allergies)         MEDICATIONS:  Current Outpatient Medications on File Prior to Visit   Medication Sig Dispense Refill    anastrozole (ARIMIDEX) 1 mg Tab Take 2 tablets (2 mg total) by mouth every 7 days. 180 tablet 1    cetirizine (ZYRTEC) 10 MG tablet TAKE 1 TABLET BY MOUTH ONCE DAILY 90 tablet 0    DULoxetine (CYMBALTA) 60 MG capsule Take 1 capsule (60 mg total) by mouth  once daily. (Patient taking differently: Take 60 mg by mouth 2 (two) times daily.) 90 capsule 1    ergocalciferol, vitamin D2, (VITAMIN D ORAL) Take by mouth.      FEROSUL 325 mg (65 mg iron) Tab tablet TAKE 1 TABLET BY MOUTH ONCE DAILY WITH BREAKFAST 90 tablet 1    fluticasone propionate (FLONASE) 50 mcg/actuation nasal spray 1 spray (50 mcg total) by Each Nostril route once daily. (Patient taking differently: 1 spray by Each Nostril route once daily. PRN) 15.8 mL 11    furosemide (LASIX) 20 MG tablet Take 1 tablet (20 mg total) by mouth once daily. 90 tablet 1    HYDROcodone-acetaminophen (NORCO)  mg per tablet Take 1 tablet by mouth every 4 (four) hours as needed for Pain. 15 tablet 0    hydrOXYchloroQUINE (PLAQUENIL) 200 mg tablet Take 1 tablet (200 mg total) by mouth 2 (two) times a day. 180 tablet 1    leflunomide (ARAVA) 20 MG Tab Take 20 mg by mouth 3 (three) times a week.      levothyroxine (SYNTHROID) 137 MCG Tab tablet Take 1 tablet (137 mcg total) by mouth before breakfast. 90 tablet 1    lisinopriL 10 MG tablet Take 1 tablet (10 mg total) by mouth once daily. 90 tablet 1    methocarbamoL (ROBAXIN) 500 MG Tab TAKE 1 TABLET(500 MG) BY MOUTH TWICE DAILY 180 tablet 0    montelukast (SINGULAIR) 10 mg tablet Take 1 tablet (10 mg total) by mouth every evening. 90 tablet 1    multivitamin-minerals-lutein Tab Take 1 tablet by mouth once daily.      omeprazole (PRILOSEC) 40 MG capsule Take 1 capsule (40 mg total) by mouth once daily. 90 capsule 1    oxyCODONE (OXYCONTIN) 30 mg TR12 12 hr tablet Take 1 tablet (30 mg total) by mouth 2 (two) times daily as needed for pain .. May cause drowsiness 60 tablet 0    pravastatin (PRAVACHOL) 40 MG tablet Take 1 tablet (40 mg total) by mouth once daily. 90 tablet 1    sulfaSALAzine (AZULFIDINE) 500 mg Tab Take 1 tablet (500 mg total) by mouth 2 (two) times daily. 180 tablet 1    amitriptyline (ELAVIL) 25 MG tablet Take 1 tablet (25 mg total) by mouth every evening.  "(Patient not taking: Reported on 8/14/2024) 90 tablet 1    OXYCONTIN 30 mg TR12 12 hr tablet Take 30 mg by mouth 2 (two) times daily as needed.      polyethylene glycol 3350 (MIRALAX ORAL) Take by mouth. (Patient not taking: Reported on 6/20/2024)      predniSONE (DELTASONE) 5 MG tablet Take 1-2 tablets (5-10 mg total) by mouth daily as needed (As needed). (Patient not taking: Reported on 7/2/2024) 90 tablet 1    pregabalin (LYRICA) 225 MG Cap Take 1 capsule (225 mg total) by mouth 3 (three) times daily. 270 capsule 3    promethazine (PHENERGAN) 25 MG tablet Take 1 tablet (25 mg total) by mouth 2 (two) times daily as needed for Nausea. 2 times daily prn headache no more then 2 days in a week (Patient not taking: Reported on 8/14/2024) 90 tablet 1    silodosin (RAPAFLO) 8 mg Cap capsule TAKE 1 CAPSULE(8 MG) BY MOUTH EVERY DAY 90 capsule 1    tadalafiL (CIALIS) 20 MG Tab Take 10-20 mg by mouth daily as needed.       No current facility-administered medications on file prior to visit.          PHYSICAL EXAM:  Blood pressure 130/82, pulse 92, resp. rate 16, height 5' 10" (1.778 m), weight 97.1 kg (214 lb), SpO2 95%.  Wt Readings from Last 3 Encounters:   08/14/24 97.1 kg (214 lb)   07/02/24 99.9 kg (220 lb 3.2 oz)   06/20/24 99.8 kg (220 lb)      Body mass index is 30.71 kg/m².    Physical Exam  Vitals and nursing note reviewed.   Constitutional:       Appearance: Normal appearance. He is obese.   HENT:      Head: Normocephalic and atraumatic.      Right Ear: External ear normal.      Left Ear: External ear normal.   Eyes:      General: No scleral icterus.        Right eye: No discharge.         Left eye: No discharge.      Extraocular Movements: Extraocular movements intact.      Conjunctiva/sclera: Conjunctivae normal.      Pupils: Pupils are equal, round, and reactive to light.   Cardiovascular:      Rate and Rhythm: Normal rate and regular rhythm.      Pulses: Normal pulses.      Heart sounds: Normal heart sounds. " No murmur heard.     No friction rub. No gallop.   Pulmonary:      Effort: Pulmonary effort is normal.      Breath sounds: Normal breath sounds. No wheezing, rhonchi or rales.   Chest:      Chest wall: No tenderness.   Abdominal:      General: Abdomen is flat. Bowel sounds are normal. There is no distension.      Palpations: Abdomen is soft.      Tenderness: There is no abdominal tenderness. There is no guarding or rebound.   Musculoskeletal:         General: No swelling or tenderness. Normal range of motion.      Cervical back: Normal range of motion and neck supple.   Skin:     General: Skin is warm and dry.      Findings: No erythema or rash.   Neurological:      General: No focal deficit present.      Mental Status: He is alert and oriented to person, place, and time.      Cranial Nerves: No cranial nerve deficit.      Motor: No weakness.      Gait: Gait normal.   Psychiatric:         Mood and Affect: Mood normal.         Behavior: Behavior normal.         Thought Content: Thought content normal.         Judgment: Judgment normal.          LABS REVIEWED:  Lab Results   Component Value Date    WBC 4.60 06/07/2024    RBC 4.13 (L) 06/07/2024    HGB 11.9 (L) 06/07/2024    HCT 39.5 (L) 06/07/2024    MCV 95.6 06/07/2024    MCH 28.8 06/07/2024    MCHC 30.1 (L) 06/07/2024    RDW 13.4 06/07/2024     06/07/2024    MPV 11.1 06/07/2024    NRBC 0.0 06/07/2024    INR 0.94 07/25/2023     Lab Results   Component Value Date     12/06/2023    K 4.3 12/06/2023     12/06/2023    CO2 30 12/06/2023    BUN 17 12/06/2023    MG 2.4 (H) 07/25/2023     Lab Results   Component Value Date    AST 26 12/06/2023    ALT 34 12/06/2023     Lab Results   Component Value Date    GLU 89 12/06/2023    HGBA1C 5.1 12/06/2023     Lab Results   Component Value Date    CHOL 199 12/06/2023    HDL 51 12/06/2023    TRIG 219 (H) 12/06/2023    CHOLHDL 3.9 12/06/2023       CARDIAC STUDIES REVIEWED:    OTHER IMAGING STUDIES  REVIEWED:        ASSESSMENT: PLAN:    1. Lower extremity swelling, resolved with addition of lasix, lexiscan did not show ischemic substrate, echo revealed mild LV systolic impairment with EF 46%,   2. Morbid obesity, no significant weight change,  discussed lifestyle changes..  3. Hypertension: notes orthostatic symptoms improved with  decrease lisinopril from 20 to 10 mg qd.   4. ZULLY: on CPAP nightly, using more consistently  5. Anemia, on FE replacement.   6. Chronic low back pain secondary to advanced disc disease.  7. GERD: on Prilosec  8. Chronic Low back pain, on narcotic analgesia.  9. Hypothyroid: post surgical, on oral replacement             10. Claudication: essentially normal  bilat lower ext ABIs, arterial dopplers, will follow up with neurology.                                                                Follow up in six months, sooner if symptoms.

## 2024-08-14 NOTE — TELEPHONE ENCOUNTER
Patient needs repeat TSH.  I will order 1 for him.  He will need to come in and have it drawn.  Refill provided.

## 2024-08-16 ENCOUNTER — TELEPHONE (OUTPATIENT)
Dept: CARDIOLOGY | Facility: CLINIC | Age: 69
End: 2024-08-16
Payer: MEDICARE

## 2024-08-16 NOTE — TELEPHONE ENCOUNTER
----- Message from Lolis Souza sent at 8/15/2024  9:02 AM CDT -----  Who Called: Elaine  [phone] [facility]  Refill or New Rx:New Rx  RX Name and Strength:Gabapentin       List of preferred pharmacies on file (remove unneeded): [unfilled]  If different Pharmacy is requested, enter Pharmacy information here including location and phone number: [facility]   Ordering Provider:      Preferred Method of Contact: Phone Call  Patient's Preferred Phone Number on File: [phone]   Best Call Back Number, if different:  Additional Information: Elaine states the pharmacy will not be able to fill prescription due to pt already being on a high dosage of pregabalin (LYRICA) 225 MG Cap, and will need to have something else sent over in replacement of the gabapentin.

## 2024-08-16 NOTE — TELEPHONE ENCOUNTER
Message left on [facility] pharmacy VM to obtain refill for neurontin from pcp or ordering physician. Unable to reach pt. At number given in computer.

## 2024-08-22 ENCOUNTER — OFFICE VISIT (OUTPATIENT)
Dept: NEUROLOGY | Facility: CLINIC | Age: 69
End: 2024-08-22
Payer: MEDICARE

## 2024-08-22 VITALS
WEIGHT: 215 LBS | DIASTOLIC BLOOD PRESSURE: 60 MMHG | HEIGHT: 74 IN | RESPIRATION RATE: 18 BRPM | OXYGEN SATURATION: 93 % | HEART RATE: 90 BPM | SYSTOLIC BLOOD PRESSURE: 100 MMHG | BODY MASS INDEX: 27.59 KG/M2

## 2024-08-22 DIAGNOSIS — M79.2 NEURALGIA AND NEURITIS: Primary | ICD-10-CM

## 2024-08-22 DIAGNOSIS — M54.9 DORSALGIA, UNSPECIFIED: ICD-10-CM

## 2024-08-22 DIAGNOSIS — G43.009 MIGRAINE WITHOUT AURA AND WITHOUT STATUS MIGRAINOSUS, NOT INTRACTABLE: ICD-10-CM

## 2024-08-22 DIAGNOSIS — R26.2 TROUBLE WALKING: ICD-10-CM

## 2024-08-22 PROCEDURE — 99214 OFFICE O/P EST MOD 30 MIN: CPT | Mod: S$PBB,,, | Performed by: PSYCHIATRY & NEUROLOGY

## 2024-08-22 PROCEDURE — 99999 PR PBB SHADOW E&M-EST. PATIENT-LVL V: CPT | Mod: PBBFAC,,, | Performed by: PSYCHIATRY & NEUROLOGY

## 2024-08-22 PROCEDURE — 99215 OFFICE O/P EST HI 40 MIN: CPT | Mod: PBBFAC | Performed by: PSYCHIATRY & NEUROLOGY

## 2024-08-22 NOTE — PROGRESS NOTES
Subjective:       Patient ID: Niranjan Whittaker Jr. is a 69 y.o. male     Chief Complaint:    Chief Complaint   Patient presents with    trouble walking     Pt. States walking not good.        Allergies:  Patient has no known allergies.    Current Medications:    Outpatient Encounter Medications as of 8/22/2024   Medication Sig Dispense Refill    amitriptyline (ELAVIL) 25 MG tablet Take 1 tablet (25 mg total) by mouth every evening. 90 tablet 1    anastrozole (ARIMIDEX) 1 mg Tab Take 2 tablets (2 mg total) by mouth every 7 days. 180 tablet 1    cetirizine (ZYRTEC) 10 MG tablet TAKE 1 TABLET BY MOUTH ONCE DAILY 90 tablet 0    DULoxetine (CYMBALTA) 60 MG capsule Take 1 capsule (60 mg total) by mouth once daily. (Patient taking differently: Take 60 mg by mouth 2 (two) times daily.) 90 capsule 1    ergocalciferol, vitamin D2, (VITAMIN D ORAL) Take by mouth.      FEROSUL 325 mg (65 mg iron) Tab tablet TAKE 1 TABLET BY MOUTH ONCE DAILY WITH BREAKFAST 90 tablet 1    fluticasone propionate (FLONASE) 50 mcg/actuation nasal spray 1 spray (50 mcg total) by Each Nostril route once daily. (Patient taking differently: 1 spray by Each Nostril route once daily. PRN) 15.8 mL 11    furosemide (LASIX) 20 MG tablet Take 1 tablet (20 mg total) by mouth once daily. 90 tablet 1    HYDROcodone-acetaminophen (NORCO)  mg per tablet Take 1 tablet by mouth every 4 (four) hours as needed for Pain. 15 tablet 0    hydrOXYchloroQUINE (PLAQUENIL) 200 mg tablet Take 1 tablet (200 mg total) by mouth 2 (two) times a day. 180 tablet 1    leflunomide (ARAVA) 20 MG Tab Take 20 mg by mouth 3 (three) times a week.      levothyroxine (SYNTHROID) 137 MCG Tab tablet TAKE 1 TABLET(137 MCG) BY MOUTH BEFORE BREAKFAST 90 tablet 0    lisinopriL 10 MG tablet Take 1 tablet (10 mg total) by mouth once daily. 90 tablet 1    methocarbamoL (ROBAXIN) 500 MG Tab TAKE 1 TABLET(500 MG) BY MOUTH TWICE DAILY 180 tablet 0    montelukast (SINGULAIR) 10 mg tablet  "Take 1 tablet (10 mg total) by mouth every evening. 90 tablet 1    multivitamin-minerals-lutein Tab Take 1 tablet by mouth once daily.      omeprazole (PRILOSEC) 40 MG capsule Take 1 capsule (40 mg total) by mouth once daily. 90 capsule 1    oxyCODONE (OXYCONTIN) 30 mg TR12 12 hr tablet Take 1 tablet (30 mg total) by mouth 2 (two) times daily as needed for pain .. May cause drowsiness 60 tablet 0    oxyCODONE (OXYCONTIN) 30 mg TR12 12 hr tablet Take 1 tablet (30 mg total) by mouth 2 (two) times daily as needed... May cause drowsiness 60 tablet 0    polyethylene glycol 3350 (MIRALAX ORAL) Take by mouth.      pravastatin (PRAVACHOL) 40 MG tablet Take 1 tablet (40 mg total) by mouth once daily. 90 tablet 1    predniSONE (DELTASONE) 5 MG tablet Take 1-2 tablets (5-10 mg total) by mouth daily as needed (As needed). 90 tablet 1    promethazine (PHENERGAN) 25 MG tablet Take 1 tablet (25 mg total) by mouth 2 (two) times daily as needed for Nausea. 2 times daily prn headache no more then 2 days in a week 90 tablet 1    silodosin (RAPAFLO) 8 mg Cap capsule TAKE 1 CAPSULE(8 MG) BY MOUTH EVERY DAY 90 capsule 1    sulfaSALAzine (AZULFIDINE) 500 mg Tab Take 1 tablet (500 mg total) by mouth 2 (two) times daily. 180 tablet 1    tadalafiL (CIALIS) 20 MG Tab Take 10-20 mg by mouth daily as needed.      pregabalin (LYRICA) 225 MG Cap Take 1 capsule (225 mg total) by mouth 3 (three) times daily. 270 capsule 3    [DISCONTINUED] OXYCONTIN 30 mg TR12 12 hr tablet Take 30 mg by mouth 2 (two) times daily as needed.       No facility-administered encounter medications on file as of 8/22/2024.       History of Present Illness  68 yo BM referred for "trouble walking "- pt has had chronic pains mainly spinal cervical and lumbar w/ 6 prev back surgeries, RA, claudication - all would certainly worsen his symptoms and make ambulation more difficult  Currently using a cane for antalgic gait  and follows Pain mgt w/ mult meds incl opioid narcotics, " Lyrica and Cymbalta   Very little to offer from a Neurological perspective other than cont PT/Rehab but will refer to [facility] for NCV/EMG as not offered at this facility   Also having cervical and neck pains and may need new MRI            Past Medical History:   Diagnosis Date    History of colon polyps 05/12/2022    History of GI diverticular bleed     Hyperlipidemia     Hypertension     Liver hemangioma 05/09/2023    Neuropathy     Personal history of gastric ulcer     Seronegative rheumatoid arthritis 09/22/2021    Goes to [facility] for Rheumatology  Last Assessment & Plan:  Formatting of this note might be different from the original. SNRA; Mod Active; Pt now off MTX and on Arava; However, pt continues to have persistent activity wrists; Intolerant to Enbrel; Poor response to Xeljanz; Unable to afford Humira in the past; Intolerant to chronic Nsaid use due to sig Gerd; Cont to be followed by local Pain man    Thyroid disease        Past Surgical History:   Procedure Laterality Date    BACK SURGERY      THYROIDECTOMY N/A 08/27/2021    Procedure: THYROIDECTOMY;  Surgeon: Manish Valadez MD;  Location: [facility];  Service: General;  Laterality: N/A;    TOTAL KNEE ARTHROPLASTY Right        Social History  Mr. Whittaker  reports that he quit smoking about 4 years ago. His smoking use included cigars. He has never used smokeless tobacco. He reports that he does not currently use alcohol. He reports current drug use. Drugs: Hydrocodone and Oxycodone.    Family History  Mr.'s Whittaker family history includes Heart disease in his mother; Hypertension in his father, mother, and sister.    Review of Systems  Review of Systems   Musculoskeletal:  Positive for back pain, joint pain, myalgias and neck pain.   Neurological:  Positive for tingling and sensory change.   Psychiatric/Behavioral:  Positive for depression.    All other systems reviewed and are negative.     Objective:   /60 (BP Location: Right arm, Patient  "Position: Sitting, BP Method: Large (Manual))   Pulse 90   Resp 18   Ht 6' 2" (1.88 m)   Wt 97.5 kg (215 lb)   SpO2 (!) 93%   BMI 27.60 kg/m²    NEUROLOGICAL EXAMINATION:     MENTAL STATUS   Oriented to person, place, and time.   Knowledge: consistent with education.     CRANIAL NERVES   Cranial nerves II through XII intact.     SENSORY EXAM        Parsethesias in LE's      GAIT AND COORDINATION     Gait  Gait: normal       Physical Exam  Vitals reviewed.   Constitutional:       Appearance: He is normal weight.   Neurological:      Mental Status: He is alert and oriented to person, place, and time. Mental status is at baseline.      Cranial Nerves: Cranial nerves 2-12 are intact.      Gait: Gait is intact.          Assessment:     Migraine without aura and without status migrainosus, not intractable    Trouble walking  -     Ambulatory referral/consult to Neurology         Primary Diagnosis and ICD10  Migraine without aura and without status migrainosus, not intractable [G43.009]    Plan:     Patient Instructions   Cont current meds  Cont Pain mgmt   Rec further PT/Rehab bu tpt not desiring   NCV/emg all ext's - [place]   MRI C and L spine   F/u 2 months       There are no discontinued medications.    Requested Prescriptions      No prescriptions requested or ordered in this encounter       "

## 2024-08-22 NOTE — PATIENT INSTRUCTIONS
Cont current meds  Cont Pain mgmt   Rec further PT/Rehab bu tpt not desiring   NCV/emg all ext's - [place]   MRI C and L spine   F/u 2 months

## 2024-09-11 ENCOUNTER — HOSPITAL ENCOUNTER (OUTPATIENT)
Dept: RADIOLOGY | Facility: HOSPITAL | Age: 69
Discharge: HOME | End: 2024-09-11
Attending: PSYCHIATRY & NEUROLOGY
Payer: MEDICARE

## 2024-09-11 DIAGNOSIS — M54.9 DORSALGIA, UNSPECIFIED: ICD-10-CM

## 2024-09-11 DIAGNOSIS — M79.2 NEURALGIA AND NEURITIS: ICD-10-CM

## 2024-09-11 DIAGNOSIS — M47.12 DEGENERATIVE ARTHRITIS OF CERVICAL SPINE WITH CORD COMPRESSION: Primary | ICD-10-CM

## 2024-09-11 PROCEDURE — 72148 MRI LUMBAR SPINE W/O DYE: CPT | Mod: TC

## 2024-09-11 PROCEDURE — 72141 MRI NECK SPINE W/O DYE: CPT | Mod: 26,,, | Performed by: RADIOLOGY

## 2024-09-11 PROCEDURE — 72148 MRI LUMBAR SPINE W/O DYE: CPT | Mod: 26,,, | Performed by: RADIOLOGY

## 2024-09-11 PROCEDURE — 72141 MRI NECK SPINE W/O DYE: CPT | Mod: TC

## 2024-10-22 ENCOUNTER — OFFICE VISIT (OUTPATIENT)
Dept: NEUROLOGY | Facility: CLINIC | Age: 69
End: 2024-10-22
Payer: MEDICARE

## 2024-10-22 VITALS
RESPIRATION RATE: 18 BRPM | SYSTOLIC BLOOD PRESSURE: 132 MMHG | WEIGHT: 216 LBS | HEIGHT: 74 IN | BODY MASS INDEX: 27.72 KG/M2 | DIASTOLIC BLOOD PRESSURE: 80 MMHG | HEART RATE: 96 BPM | OXYGEN SATURATION: 97 %

## 2024-10-22 DIAGNOSIS — M51.16 LUMBAR DISC HERNIATION WITH RADICULOPATHY: ICD-10-CM

## 2024-10-22 DIAGNOSIS — G95.20 CERVICAL SPINAL CORD COMPRESSION: Primary | ICD-10-CM

## 2024-10-22 PROCEDURE — 99999 PR PBB SHADOW E&M-EST. PATIENT-LVL IV: CPT | Mod: PBBFAC,,, | Performed by: PSYCHIATRY & NEUROLOGY

## 2024-10-22 PROCEDURE — 99214 OFFICE O/P EST MOD 30 MIN: CPT | Mod: PBBFAC | Performed by: PSYCHIATRY & NEUROLOGY

## 2024-10-22 PROCEDURE — 99214 OFFICE O/P EST MOD 30 MIN: CPT | Mod: S$PBB,,, | Performed by: PSYCHIATRY & NEUROLOGY

## 2024-10-22 NOTE — PROGRESS NOTES
Subjective:       Patient ID: Niranjan Whittaker Jr. is a 69 y.o. male     Chief Complaint:    Chief Complaint   Patient presents with    neuralgia and neuritis     Pt. States feet,legs,back,wrist, swelling also numbness and tingling.        Allergies:  Patient has no known allergies.    Current Medications:    Outpatient Encounter Medications as of 10/22/2024   Medication Sig Dispense Refill    amitriptyline (ELAVIL) 25 MG tablet Take 1 tablet (25 mg total) by mouth every evening. 90 tablet 1    anastrozole (ARIMIDEX) 1 mg Tab Take 2 tablets (2 mg total) by mouth every 7 days. 180 tablet 1    cetirizine (ZYRTEC) 10 MG tablet TAKE 1 TABLET BY MOUTH ONCE DAILY 90 tablet 0    DULoxetine (CYMBALTA) 60 MG capsule Take 1 capsule (60 mg total) by mouth once daily. (Patient taking differently: Take 60 mg by mouth 2 (two) times daily.) 90 capsule 1    ergocalciferol, vitamin D2, (VITAMIN D ORAL) Take by mouth.      FEROSUL 325 mg (65 mg iron) Tab tablet TAKE 1 TABLET BY MOUTH ONCE DAILY WITH BREAKFAST 90 tablet 1    fluticasone propionate (FLONASE) 50 mcg/actuation nasal spray 1 spray (50 mcg total) by Each Nostril route once daily. (Patient taking differently: 1 spray by Each Nostril route once daily. PRN) 15.8 mL 11    furosemide (LASIX) 20 MG tablet Take 1 tablet (20 mg total) by mouth once daily. 90 tablet 1    HYDROcodone-acetaminophen (NORCO)  mg per tablet Take 1 tablet by mouth every 4 (four) hours as needed for Pain. 15 tablet 0    hydrOXYchloroQUINE (PLAQUENIL) 200 mg tablet Take 1 tablet (200 mg total) by mouth 2 (two) times a day. 180 tablet 1    leflunomide (ARAVA) 20 MG Tab Take 20 mg by mouth 3 (three) times a week.      levothyroxine (SYNTHROID) 137 MCG Tab tablet TAKE 1 TABLET(137 MCG) BY MOUTH BEFORE BREAKFAST 90 tablet 0    lisinopriL 10 MG tablet Take 1 tablet (10 mg total) by mouth once daily. 90 tablet 1    methocarbamoL (ROBAXIN) 500 MG Tab TAKE 1 TABLET(500 MG) BY MOUTH TWICE DAILY 180  tablet 0    montelukast (SINGULAIR) 10 mg tablet Take 1 tablet (10 mg total) by mouth every evening. 90 tablet 1    multivitamin-minerals-lutein Tab Take 1 tablet by mouth once daily.      omeprazole (PRILOSEC) 40 MG capsule Take 1 capsule (40 mg total) by mouth once daily. 90 capsule 1    oxyCODONE (OXYCONTIN) 30 mg TR12 12 hr tablet Take 1 tablet (30 mg total) by mouth 2 (two) times daily as needed for pain .. May cause drowsiness 60 tablet 0    oxyCODONE (OXYCONTIN) 30 mg TR12 12 hr tablet Take 1 tablet (30 mg total) by mouth 2 (two) times daily as needed... May cause drowsiness 60 tablet 0    oxyCODONE (OXYCONTIN) 30 mg TR12 12 hr tablet Take 1 tablet (30 mg total) by mouth 2 (two) times daily as needed. 60 tablet 0    polyethylene glycol 3350 (MIRALAX ORAL) Take by mouth.      pravastatin (PRAVACHOL) 40 MG tablet Take 1 tablet (40 mg total) by mouth once daily. 90 tablet 1    promethazine (PHENERGAN) 25 MG tablet Take 1 tablet (25 mg total) by mouth 2 (two) times daily as needed for Nausea. 2 times daily prn headache no more then 2 days in a week 90 tablet 1    silodosin (RAPAFLO) 8 mg Cap capsule TAKE 1 CAPSULE(8 MG) BY MOUTH EVERY DAY 90 capsule 1    sulfaSALAzine (AZULFIDINE) 500 mg Tab Take 1 tablet (500 mg total) by mouth 2 (two) times daily. 180 tablet 1    tadalafiL (CIALIS) 20 MG Tab Take 10-20 mg by mouth daily as needed.      predniSONE (DELTASONE) 5 MG tablet Take 1-2 tablets (5-10 mg total) by mouth daily as needed (As needed). 90 tablet 1    pregabalin (LYRICA) 225 MG Cap Take 1 capsule (225 mg total) by mouth 3 (three) times daily. 270 capsule 3     No facility-administered encounter medications on file as of 10/22/2024.       History of Present Illness  70 yo BM for f/u eval of spinal imaging- pt already in Pain mgtm receiving opioid narcotcs   Remains w/ cervical and lumbar pains w/ radicular symptoms into ext's   Will refer to Spinal surgery given below findings of severe spinal stenosis w/  cord flattening C3-4 and severe lumbar DDD w/ L4-5 nerve root contact       MRI C spine showed:  1.There are degenerative and postsurgical changes discussed in detail by level above.  Since the prior MRI there is now anterior fusion of C6 and C7 with relief of previously demonstrated spinal stenosis at this level.  There is, however, now severe spinal stenosis at the C3-4 level with flattening of the cord surface but without definite cord signal abnormality to suggest edema or myelomalacia.  These changes have progressed since the prior MRI.  2. There is stable myelomalacia in the cord at the level of C5-6.  3. At the C3-4 level there is severe spinal canal and bilateral foraminal stenosis with interval progression.  4. At the C4-5 level there is marked right and moderate left foraminal stenosis without definite change.  5. At the C5-6 level there is severe left and at least moderate right foraminal stenosis without significant spinal stenosis or change.  6. At the C6-7 level there is severe left greater than right foraminal stenosis without spinal stenosis.  7. At the C7-T1 level there is moderate to severe spinal stenosis and bilateral foraminal stenosis with mild interval progression.    MRI L spine showed:    L1-L2: Progressive moderate degenerative disc height loss and desiccation with disc bulging and moderate facet arthrosis with ligamentum flavum thickening producing moderate circumferential spinal canal and subarticular recess narrowing with redundancy of the proximal cauda abad nerve roots.  Severe right and moderate left foraminal narrowing.     L2-L3: Severe degenerative disc height loss.  Disc bulging flattens the ventral thecal sac.  Decompressive laminectomy changes with decompression of the spinal canal.  Severe right and moderate left foraminal narrowing.     L3-L4: Severe degenerative disc height loss with shallow disc bulging.  Decompressive laminectomy changes with decompression of the spinal  canal.  Severe right and moderate left foraminal narrowing.     L4-L5: Severe degenerative disc height loss.  Shallow disc bulging and moderate facet arthrosis with ligamentum flavum thickening.  Superimposed left subarticular zone disc protrusion contacts and mildly displaces the left subarticular L5 nerve root contributing to overall mild to moderate narrowing of the spinal canal.  Severe left and moderate right foraminal narrowing.     L5-S1: Severe degenerative disc height loss with shallow disc bulging and moderate bilateral facet arthrosis producing mild narrowing of the spinal canal and contributing to severe bilateral foraminal narrowing.           Past Medical History:   Diagnosis Date    History of colon polyps 05/12/2022    History of GI diverticular bleed     Hyperlipidemia     Hypertension     Liver hemangioma 05/09/2023    Neuropathy     Personal history of gastric ulcer     Seronegative rheumatoid arthritis 09/22/2021    Goes to [facility] for Rheumatology  Last Assessment & Plan:  Formatting of this note might be different from the original. SNRA; Mod Active; Pt now off MTX and on Arava; However, pt continues to have persistent activity wrists; Intolerant to Enbrel; Poor response to Xeljanz; Unable to afford Humira in the past; Intolerant to chronic Nsaid use due to sig Gerd; Cont to be followed by local Pain man    Thyroid disease        Past Surgical History:   Procedure Laterality Date    BACK SURGERY      THYROIDECTOMY N/A 08/27/2021    Procedure: THYROIDECTOMY;  Surgeon: Manish Valadez MD;  Location: [facility];  Service: General;  Laterality: N/A;    TOTAL KNEE ARTHROPLASTY Right        Social History  Mr. Whittaker  reports that he quit smoking about 4 years ago. His smoking use included cigars. He has never used smokeless tobacco. He reports that he does not currently use alcohol. He reports current drug use. Drugs: Hydrocodone and Oxycodone.    Family History  Mr.'s Whittaker family history  "includes Heart disease in his mother; Hypertension in his father, mother, and sister.    Review of Systems  Review of Systems   Musculoskeletal:  Positive for back pain, joint pain and neck pain.   Neurological:  Positive for tingling, sensory change, focal weakness and weakness.   All other systems reviewed and are negative.     Objective:   /80 (BP Location: Right arm, Patient Position: Sitting)   Pulse 96   Resp 18   Ht 6' 2" (1.88 m)   Wt 98 kg (216 lb)   SpO2 97%   BMI 27.73 kg/m²    NEUROLOGICAL EXAMINATION:     MENTAL STATUS   Oriented to person, place, and time.   Level of consciousness: alert  Knowledge: consistent with education.     CRANIAL NERVES   Cranial nerves II through XII intact.     MOTOR EXAM     Strength   Strength 5/5 throughout.     REFLEXES     Reflexes   Left patellar: 3+    SENSORY EXAM        Parsethesias in distal ext's      GAIT AND COORDINATION        Severely antalgic gait         Physical Exam  Vitals reviewed.   Constitutional:       Appearance: He is normal weight.   Neurological:      Mental Status: He is alert and oriented to person, place, and time. Mental status is at baseline.      Cranial Nerves: Cranial nerves 2-12 are intact.      Motor: Motor strength is normal.     Deep Tendon Reflexes:      Reflex Scores:       Patellar reflexes are 3+ on the left side.         Assessment:     Cervical spinal cord compression    Lumbar disc herniation with radiculopathy         Primary Diagnosis and ICD10  Cervical spinal cord compression [G95.20]    Plan:     Patient Instructions   Needs Spinal surgery eval for C3-4 cord flattening and L4-5 Disc w/ L nerve root contact-   Nsgy eval pending at [facility] next month   Cont pain mgmt as indicated   F/u 3 months     There are no discontinued medications.    Requested Prescriptions      No prescriptions requested or ordered in this encounter       "

## 2024-10-22 NOTE — PATIENT INSTRUCTIONS
Needs Spinal surgery eval for C3-4 cord flattening and L4-5 Disc w/ L nerve root contact-   Nsgy eval pending at [facility] next month   Cont pain mgmt as indicated   F/u 3 months

## 2024-10-30 ENCOUNTER — TELEPHONE (OUTPATIENT)
Dept: NEUROLOGY | Facility: CLINIC | Age: 69
End: 2024-10-30
Payer: MEDICARE

## 2024-11-12 DIAGNOSIS — G89.4 CHRONIC PAIN SYNDROME: ICD-10-CM

## 2024-11-12 RX ORDER — METHOCARBAMOL 500 MG/1
500 TABLET, FILM COATED ORAL 2 TIMES DAILY
Qty: 180 TABLET | Refills: 0 | Status: SHIPPED | OUTPATIENT
Start: 2024-11-12

## 2024-11-12 NOTE — TELEPHONE ENCOUNTER
Please advise. [facility] sent over a refill request on this medication for the pt. Last filled on 8/2/24. Pt last seen in clinic on 10/22/24. He does have a follow up scheduled with you on 1/2/25.

## 2024-11-27 DIAGNOSIS — R33.9 URINARY RETENTION: ICD-10-CM

## 2024-11-27 RX ORDER — SILODOSIN 8 MG/1
CAPSULE ORAL
Qty: 90 CAPSULE | Refills: 0 | Status: SHIPPED | OUTPATIENT
Start: 2024-11-27

## 2024-11-27 RX ORDER — FUROSEMIDE 20 MG/1
TABLET ORAL
Qty: 90 TABLET | Refills: 0 | Status: SHIPPED | OUTPATIENT
Start: 2024-11-27

## 2024-12-02 RX ORDER — CETIRIZINE HYDROCHLORIDE 10 MG/1
10 TABLET ORAL
Qty: 90 TABLET | Refills: 0 | Status: SHIPPED | OUTPATIENT
Start: 2024-12-02

## 2024-12-06 ENCOUNTER — OFFICE VISIT (OUTPATIENT)
Dept: FAMILY MEDICINE | Facility: CLINIC | Age: 69
End: 2024-12-06
Payer: MEDICARE

## 2024-12-06 VITALS
DIASTOLIC BLOOD PRESSURE: 76 MMHG | BODY MASS INDEX: 27.44 KG/M2 | RESPIRATION RATE: 20 BRPM | TEMPERATURE: 97 F | OXYGEN SATURATION: 96 % | HEART RATE: 85 BPM | SYSTOLIC BLOOD PRESSURE: 124 MMHG | HEIGHT: 74 IN | WEIGHT: 213.81 LBS

## 2024-12-06 DIAGNOSIS — M51.369 DEGENERATION OF LUMBAR INTERVERTEBRAL DISC: ICD-10-CM

## 2024-12-06 DIAGNOSIS — K21.9 GASTROESOPHAGEAL REFLUX DISEASE WITHOUT ESOPHAGITIS: ICD-10-CM

## 2024-12-06 DIAGNOSIS — I10 PRIMARY HYPERTENSION: ICD-10-CM

## 2024-12-06 DIAGNOSIS — R33.9 URINARY RETENTION: ICD-10-CM

## 2024-12-06 DIAGNOSIS — Z79.899 LONG-TERM USE OF HIGH-RISK MEDICATION: ICD-10-CM

## 2024-12-06 DIAGNOSIS — E89.0 POSTOPERATIVE HYPOTHYROIDISM: Primary | ICD-10-CM

## 2024-12-06 DIAGNOSIS — E04.2 MULTIPLE THYROID NODULES: ICD-10-CM

## 2024-12-06 DIAGNOSIS — N18.32 STAGE 3B CHRONIC KIDNEY DISEASE: ICD-10-CM

## 2024-12-06 DIAGNOSIS — M79.672 BILATERAL FOOT PAIN: ICD-10-CM

## 2024-12-06 DIAGNOSIS — E78.2 MIXED HYPERLIPIDEMIA: ICD-10-CM

## 2024-12-06 DIAGNOSIS — D64.9 ANEMIA, UNSPECIFIED TYPE: ICD-10-CM

## 2024-12-06 DIAGNOSIS — G89.4 CHRONIC PAIN SYNDROME: ICD-10-CM

## 2024-12-06 DIAGNOSIS — N18.32 CHRONIC KIDNEY DISEASE, STAGE 3B: ICD-10-CM

## 2024-12-06 DIAGNOSIS — M79.671 BILATERAL FOOT PAIN: ICD-10-CM

## 2024-12-06 DIAGNOSIS — M79.2 NEURALGIA AND NEURITIS: ICD-10-CM

## 2024-12-06 DIAGNOSIS — N52.9 ERECTILE DYSFUNCTION, UNSPECIFIED ERECTILE DYSFUNCTION TYPE: ICD-10-CM

## 2024-12-06 DIAGNOSIS — J30.9 ALLERGIC RHINITIS, UNSPECIFIED SEASONALITY, UNSPECIFIED TRIGGER: ICD-10-CM

## 2024-12-06 DIAGNOSIS — E55.9 VITAMIN D DEFICIENCY: ICD-10-CM

## 2024-12-06 LAB
25(OH)D3 SERPL-MCNC: 74.5 NG/ML (ref 30–80)
ALBUMIN SERPL BCP-MCNC: 3.7 G/DL (ref 3.4–4.8)
ALBUMIN/GLOB SERPL: 1.1 {RATIO}
ALP SERPL-CCNC: 88 U/L (ref 40–150)
ALT SERPL W P-5'-P-CCNC: 23 U/L
ANION GAP SERPL CALCULATED.3IONS-SCNC: 12 MMOL/L (ref 7–16)
AST SERPL W P-5'-P-CCNC: 31 U/L (ref 5–34)
BASOPHILS # BLD AUTO: 0.06 K/UL (ref 0–0.2)
BASOPHILS NFR BLD AUTO: 1.3 % (ref 0–1)
BILIRUB SERPL-MCNC: 0.4 MG/DL
BUN SERPL-MCNC: 15 MG/DL (ref 8–26)
BUN/CREAT SERPL: 9 (ref 6–20)
CALCIUM SERPL-MCNC: 9.1 MG/DL (ref 8.8–10)
CHLORIDE SERPL-SCNC: 105 MMOL/L (ref 98–107)
CHOLEST SERPL-MCNC: 199 MG/DL
CHOLEST/HDLC SERPL: 3.6 {RATIO}
CO2 SERPL-SCNC: 28 MMOL/L (ref 23–31)
CREAT SERPL-MCNC: 1.67 MG/DL (ref 0.72–1.25)
DIFFERENTIAL METHOD BLD: ABNORMAL
EGFR (NO RACE VARIABLE) (RUSH/TITUS): 44 ML/MIN/1.73M2
EOSINOPHIL # BLD AUTO: 0.23 K/UL (ref 0–0.5)
EOSINOPHIL NFR BLD AUTO: 4.8 % (ref 1–4)
ERYTHROCYTE [DISTWIDTH] IN BLOOD BY AUTOMATED COUNT: 13.9 % (ref 11.5–14.5)
EST. AVERAGE GLUCOSE BLD GHB EST-MCNC: 91 MG/DL
GLOBULIN SER-MCNC: 3.4 G/DL (ref 2–4)
GLUCOSE SERPL-MCNC: 75 MG/DL (ref 82–115)
HBA1C MFR BLD HPLC: 4.8 %
HCT VFR BLD AUTO: 39.6 % (ref 40–54)
HDLC SERPL-MCNC: 55 MG/DL (ref 35–60)
HGB BLD-MCNC: 11.9 G/DL (ref 13.5–18)
IMM GRANULOCYTES # BLD AUTO: 0.02 K/UL (ref 0–0.04)
IMM GRANULOCYTES NFR BLD: 0.4 % (ref 0–0.4)
IRON SATN MFR SERPL: 39 % (ref 20–50)
IRON SERPL-MCNC: 83 UG/DL (ref 65–175)
LDLC SERPL CALC-MCNC: 124 MG/DL
LDLC/HDLC SERPL: 2.3 {RATIO}
LYMPHOCYTES # BLD AUTO: 1.6 K/UL (ref 1–4.8)
LYMPHOCYTES NFR BLD AUTO: 33.7 % (ref 27–41)
MCH RBC QN AUTO: 28.8 PG (ref 27–31)
MCHC RBC AUTO-ENTMCNC: 30.1 G/DL (ref 32–36)
MCV RBC AUTO: 95.9 FL (ref 80–96)
MONOCYTES # BLD AUTO: 0.38 K/UL (ref 0–0.8)
MONOCYTES NFR BLD AUTO: 8 % (ref 2–6)
MPC BLD CALC-MCNC: 10.6 FL (ref 9.4–12.4)
NEUTROPHILS # BLD AUTO: 2.46 K/UL (ref 1.8–7.7)
NEUTROPHILS NFR BLD AUTO: 51.8 % (ref 53–65)
NONHDLC SERPL-MCNC: 144 MG/DL
NRBC # BLD AUTO: 0 X10E3/UL
NRBC, AUTO (.00): 0 %
PLATELET # BLD AUTO: 331 K/UL (ref 150–400)
POTASSIUM SERPL-SCNC: 4.8 MMOL/L (ref 3.5–5.1)
PROT SERPL-MCNC: 7.1 G/DL (ref 5.8–7.6)
RBC # BLD AUTO: 4.13 M/UL (ref 4.6–6.2)
SODIUM SERPL-SCNC: 140 MMOL/L (ref 136–145)
T4 SERPL-MCNC: 12.2 ΜG/DL (ref 4.9–11.7)
TIBC SERPL-MCNC: 129 UG/DL (ref 69–240)
TIBC SERPL-MCNC: 212 UG/DL (ref 250–450)
TRANSFERRIN SERPL-MCNC: 182 MG/DL (ref 163–344)
TRIGL SERPL-MCNC: 100 MG/DL (ref 34–140)
TSH SERPL DL<=0.005 MIU/L-ACNC: 5.49 UIU/ML (ref 0.35–4.94)
VLDLC SERPL-MCNC: 20 MG/DL
WBC # BLD AUTO: 4.75 K/UL (ref 4.5–11)

## 2024-12-06 PROCEDURE — 85025 COMPLETE CBC W/AUTO DIFF WBC: CPT | Mod: ,,, | Performed by: CLINICAL MEDICAL LABORATORY

## 2024-12-06 RX ORDER — SILODOSIN 8 MG/1
8 CAPSULE ORAL DAILY
Qty: 90 CAPSULE | Refills: 0 | Status: SHIPPED | OUTPATIENT
Start: 2024-12-06

## 2024-12-06 RX ORDER — ANASTROZOLE 1 MG/1
1 TABLET ORAL
Qty: 24 TABLET | Refills: 1 | Status: SHIPPED | OUTPATIENT
Start: 2024-12-06

## 2024-12-06 RX ORDER — FERROUS SULFATE 325(65) MG
325 TABLET ORAL DAILY
Qty: 90 TABLET | Refills: 1 | Status: SHIPPED | OUTPATIENT
Start: 2024-12-06

## 2024-12-06 RX ORDER — LEVOTHYROXINE SODIUM 137 UG/1
137 TABLET ORAL
Qty: 90 TABLET | Refills: 1 | Status: SHIPPED | OUTPATIENT
Start: 2024-12-06

## 2024-12-06 RX ORDER — PREGABALIN 225 MG/1
225 CAPSULE ORAL 3 TIMES DAILY
Qty: 270 CAPSULE | Refills: 3 | Status: SHIPPED | OUTPATIENT
Start: 2024-12-06

## 2024-12-06 RX ORDER — AMITRIPTYLINE HYDROCHLORIDE 25 MG/1
25 TABLET, FILM COATED ORAL NIGHTLY
Qty: 90 TABLET | Refills: 1 | Status: SHIPPED | OUTPATIENT
Start: 2024-12-06

## 2024-12-06 RX ORDER — PREGABALIN 225 MG/1
225 CAPSULE ORAL 3 TIMES DAILY
Qty: 270 CAPSULE | Refills: 1 | Status: CANCELLED | OUTPATIENT
Start: 2024-12-06 | End: 2025-06-06

## 2024-12-06 RX ORDER — FLUTICASONE PROPIONATE 50 MCG
1 SPRAY, SUSPENSION (ML) NASAL DAILY
Qty: 15.8 ML | Refills: 11 | Status: SHIPPED | OUTPATIENT
Start: 2024-12-06

## 2024-12-06 RX ORDER — HYDROXYCHLOROQUINE SULFATE 200 MG/1
200 TABLET, FILM COATED ORAL 2 TIMES DAILY
Qty: 180 TABLET | Refills: 1 | Status: SHIPPED | OUTPATIENT
Start: 2024-12-06

## 2024-12-06 RX ORDER — PRAVASTATIN SODIUM 40 MG/1
40 TABLET ORAL DAILY
Qty: 90 TABLET | Refills: 1 | Status: SHIPPED | OUTPATIENT
Start: 2024-12-06

## 2024-12-06 RX ORDER — SULFASALAZINE 500 MG/1
500 TABLET ORAL 2 TIMES DAILY
Qty: 180 TABLET | Refills: 1 | Status: SHIPPED | OUTPATIENT
Start: 2024-12-06

## 2024-12-06 RX ORDER — OMEPRAZOLE 40 MG/1
40 CAPSULE, DELAYED RELEASE ORAL DAILY
Qty: 90 CAPSULE | Refills: 1 | Status: SHIPPED | OUTPATIENT
Start: 2024-12-06

## 2024-12-06 RX ORDER — FUROSEMIDE 20 MG/1
20 TABLET ORAL DAILY
Qty: 90 TABLET | Refills: 1 | Status: SHIPPED | OUTPATIENT
Start: 2024-12-06

## 2024-12-06 RX ORDER — LISINOPRIL 10 MG/1
10 TABLET ORAL DAILY
Qty: 90 TABLET | Refills: 1 | Status: SHIPPED | OUTPATIENT
Start: 2024-12-06

## 2024-12-06 RX ORDER — DULOXETIN HYDROCHLORIDE 60 MG/1
60 CAPSULE, DELAYED RELEASE ORAL 2 TIMES DAILY
Qty: 180 CAPSULE | Refills: 1 | Status: SHIPPED | OUTPATIENT
Start: 2024-12-06

## 2024-12-06 RX ORDER — MONTELUKAST SODIUM 10 MG/1
10 TABLET ORAL NIGHTLY
Qty: 90 TABLET | Refills: 1 | Status: SHIPPED | OUTPATIENT
Start: 2024-12-06

## 2024-12-06 RX ORDER — TADALAFIL 10 MG/1
20 TABLET ORAL DAILY PRN
Qty: 20 TABLET | Refills: 1 | Status: SHIPPED | OUTPATIENT
Start: 2024-12-06

## 2024-12-06 NOTE — ASSESSMENT & PLAN NOTE
Patient is status post thyroidectomy secondary to thyroid nodules. Lab work today and will call with results

## 2024-12-06 NOTE — ASSESSMENT & PLAN NOTE
Monitor BP daily and keep BP daily log to bring to next visit. Exercise at least 5 times a week. Keep scheduled appts. RTC sooner if BP is staying >140/90. Dash diet.    DASH- includes foods rich in K+, calcium and Magnesium.The diet limits foods high in sodium, saturated fats and added sugars. This is a flexible balanced eating plan that helps create heart healthy eating style for life. Diet is rich in vegetable, whole grains and fruits. It includes fat free or low fat dairy products, fish, poultry, nuts. Chose foods high in K+, calcium, magnesium, fiber, protein, and low in saturated fats and sodium.

## 2024-12-06 NOTE — PROGRESS NOTES
ROBBIE SEQUEIRA   [facility]  [address]  [place]  [address]      PATIENT NAME: Niranjan Whittaker Jr.  : 1955  DATE: 24  MRN: [id number]        Reason for Visit / Chief Complaint: Follow-up (6 month fu./Is fasting.), Hypertension, and Medication Refill         History of Present Illness / Problem Focused Workflow     68 y/o male presents to clinic for 6 month follow up. He denies any concerns or questions at present      Review of Systems     @Review of Systems   Constitutional:  Negative for chills, fatigue and fever.   HENT:  Negative for nasal congestion, ear discharge, ear pain, rhinorrhea, sinus pressure/congestion and sore throat.    Respiratory:  Negative for cough, chest tightness, shortness of breath, wheezing and stridor.    Cardiovascular:  Negative for palpitations and claudication.   Gastrointestinal:  Negative for abdominal pain, constipation, diarrhea, nausea, vomiting and reflux.   Genitourinary:  Negative for dysuria, flank pain, frequency, hematuria and urgency.   Musculoskeletal:  Negative for myalgias.   Neurological:  Negative for dizziness, weakness, light-headedness and headaches.   Psychiatric/Behavioral:  Negative for suicidal ideas.        Medical / Social / Family History     Past Medical History:   Diagnosis Date    History of colon polyps 2022    History of GI diverticular bleed     Hyperlipidemia     Hypertension     Liver hemangioma 2023    Neuropathy     Personal history of gastric ulcer     Seronegative rheumatoid arthritis 2021    Goes to [facility] for Rheumatology  Last Assessment & Plan:  Formatting of this note might be different from the original. SNRA; Mod Active; Pt now off MTX and on Arava; However, pt continues to have persistent activity wrists; Intolerant to Enbrel; Poor response to Xeljanz; Unable to afford Humira in the past; Intolerant to chronic Nsaid use due to sig Gerd; Cont to be followed by local Pain man     Thyroid disease        Past Surgical History:   Procedure Laterality Date    BACK SURGERY      THYROIDECTOMY N/A 08/27/2021    Procedure: THYROIDECTOMY;  Surgeon: Manish Valadez MD;  Location: [facility];  Service: General;  Laterality: N/A;    TOTAL KNEE ARTHROPLASTY Right            Medications and Allergies     Medications  Outpatient Medications Marked as Taking for the 12/6/24 encounter (Office Visit) with Juancho Abdul FNP   Medication Sig Dispense Refill    cetirizine (ZYRTEC) 10 MG tablet TAKE 1 TABLET BY MOUTH DAILY 90 tablet 0    ergocalciferol, vitamin D2, (VITAMIN D ORAL) Take by mouth.      HYDROcodone-acetaminophen (NORCO)  mg per tablet Take 1 tablet by mouth every 4 (four) hours as needed for Pain. 15 tablet 0    leflunomide (ARAVA) 20 MG Tab Take 20 mg by mouth 3 (three) times a week.      methocarbamoL (ROBAXIN) 500 MG Tab Take 1 tablet (500 mg total) by mouth 2 (two) times daily. 180 tablet 0    multivitamin-minerals-lutein Tab Take 1 tablet by mouth once daily.      oxyCODONE (OXYCONTIN) 30 mg TR12 12 hr tablet Take 1 tablet (30 mg total) by mouth 2 (two) times daily as needed. 60 tablet 0    polyethylene glycol 3350 (MIRALAX ORAL) Take by mouth.      promethazine (PHENERGAN) 25 MG tablet Take 1 tablet (25 mg total) by mouth 2 (two) times daily as needed for Nausea. 2 times daily prn headache no more then 2 days in a week 90 tablet 1    [DISCONTINUED] amitriptyline (ELAVIL) 25 MG tablet Take 1 tablet (25 mg total) by mouth every evening. 90 tablet 1    [DISCONTINUED] anastrozole (ARIMIDEX) 1 mg Tab Take 2 tablets (2 mg total) by mouth every 7 days. 180 tablet 1    [DISCONTINUED] DULoxetine (CYMBALTA) 60 MG capsule Take 1 capsule (60 mg total) by mouth once daily. (Patient taking differently: Take 60 mg by mouth 2 (two) times daily.) 90 capsule 1    [DISCONTINUED] FEROSUL 325 mg (65 mg iron) Tab tablet TAKE 1 TABLET BY MOUTH ONCE DAILY WITH BREAKFAST 90 tablet 1    [DISCONTINUED]  fluticasone propionate (FLONASE) 50 mcg/actuation nasal spray 1 spray (50 mcg total) by Each Nostril route once daily. 15.8 mL 11    [DISCONTINUED] furosemide (LASIX) 20 MG tablet TAKE 1 TABLET(20 MG) BY MOUTH DAILY 90 tablet 0    [DISCONTINUED] hydrOXYchloroQUINE (PLAQUENIL) 200 mg tablet Take 1 tablet (200 mg total) by mouth 2 (two) times a day. 180 tablet 1    [DISCONTINUED] levothyroxine (SYNTHROID) 137 MCG Tab tablet TAKE 1 TABLET(137 MCG) BY MOUTH BEFORE BREAKFAST 90 tablet 0    [DISCONTINUED] lisinopriL 10 MG tablet Take 1 tablet (10 mg total) by mouth once daily. 90 tablet 1    [DISCONTINUED] montelukast (SINGULAIR) 10 mg tablet Take 1 tablet (10 mg total) by mouth every evening. 90 tablet 1    [DISCONTINUED] omeprazole (PRILOSEC) 40 MG capsule Take 1 capsule (40 mg total) by mouth once daily. 90 capsule 1    [DISCONTINUED] pravastatin (PRAVACHOL) 40 MG tablet Take 1 tablet (40 mg total) by mouth once daily. 90 tablet 1    [DISCONTINUED] pregabalin (LYRICA) 225 MG Cap Take 1 capsule (225 mg total) by mouth 3 (three) times daily. 270 capsule 3    [DISCONTINUED] silodosin (RAPAFLO) 8 mg Cap capsule TAKE 1 CAPSULE(8 MG) BY MOUTH EVERY DAY 90 capsule 0    [DISCONTINUED] sulfaSALAzine (AZULFIDINE) 500 mg Tab Take 1 tablet (500 mg total) by mouth 2 (two) times daily. 180 tablet 1    [DISCONTINUED] tadalafiL (CIALIS) 20 MG Tab Take 10-20 mg by mouth daily as needed.         Allergies  Review of patient's allergies indicates:  No Known Allergies    Physical Examination     Vitals:    12/06/24 0825   BP: 124/76   Pulse: 85   Resp: 20   Temp: 96.9 °F (36.1 °C)     Physical Exam  Vitals and nursing note reviewed.   Constitutional:       General: He is awake.      Appearance: Normal appearance.   HENT:      Head: Normocephalic.      Right Ear: Tympanic membrane, ear canal and external ear normal.      Left Ear: Tympanic membrane, ear canal and external ear normal.      Nose: Nose normal.      Mouth/Throat:      Lips:  Pink.      Mouth: Mucous membranes are moist.      Pharynx: Oropharynx is clear. Uvula midline.   Cardiovascular:      Rate and Rhythm: Normal rate and regular rhythm.      Heart sounds: Normal heart sounds, S1 normal and S2 normal.   Pulmonary:      Effort: Pulmonary effort is normal. No respiratory distress.      Breath sounds: Normal breath sounds. No decreased breath sounds, wheezing, rhonchi or rales.   Abdominal:      General: Bowel sounds are normal.      Palpations: Abdomen is soft.      Tenderness: There is no abdominal tenderness.   Musculoskeletal:      Cervical back: Normal range of motion.   Skin:     General: Skin is warm.      Capillary Refill: Capillary refill takes less than 2 seconds.   Neurological:      Mental Status: He is alert and oriented to person, place, and time.   Psychiatric:         Thought Content: Thought content does not include homicidal or suicidal ideation. Thought content does not include homicidal or suicidal plan.               Procedures   Assessment and Plan (including Health Maintenance)   :        Problem List Items Addressed This Visit       Gastroesophageal reflux disease    Current Assessment & Plan      Reviewed pathology of current symptoms, medication side effects/risk/benefits/directions on taking medications, and to take medication as prescribed. Take medication as prescribed. Remain upright for at least one hour after eating or drinking, raise HOB 6-8 inches. Eat small, frequent bland meals instead of large meals. Avoid greasy/spicy/acidic foods. Avoid alcohol and NSAIDS. Monitor for changes in bowel color, texture, etc. Discussed what warrants emergent follow-up or treatment. Patient is to go to ED if they begin vomiting and it looks like blood or coffee grounds. Weight loss may help people who are overweight to reduce acid reflux. Weight loss has a number of other health benefits including decreases risk of heart disease and type 2 DM.         Relevant  Medications    omeprazole (PRILOSEC) 40 MG capsule    Vitamin D deficiency    Current Assessment & Plan     Continue current regimen         Relevant Orders    Vitamin D    Allergic rhinitis    Current Assessment & Plan     Symptoms controlled at present. Continue current medication regimen         Relevant Medications    montelukast (SINGULAIR) 10 mg tablet    fluticasone propionate (FLONASE) 50 mcg/actuation nasal spray    Chronic pain    Current Assessment & Plan     Pt is followed by Dr Singh for pain managemetn         Degeneration of lumbar intervertebral disc    Relevant Medications    sulfaSALAzine (AZULFIDINE) 500 mg Tab    Primary hypertension    Current Assessment & Plan     Monitor BP daily and keep BP daily log to bring to next visit. Exercise at least 5 times a week. Keep scheduled appts. RTC sooner if BP is staying >140/90. Dash diet.    DASH- includes foods rich in K+, calcium and Magnesium.The diet limits foods high in sodium, saturated fats and added sugars. This is a flexible balanced eating plan that helps create heart healthy eating style for life. Diet is rich in vegetable, whole grains and fruits. It includes fat free or low fat dairy products, fish, poultry, nuts. Chose foods high in K+, calcium, magnesium, fiber, protein, and low in saturated fats and sodium.          Relevant Medications    lisinopriL 10 MG tablet    furosemide (LASIX) 20 MG tablet    Anemia    Current Assessment & Plan     Lab work today and will call with results and any new orders         Relevant Orders    Iron and TIBC    Mixed hyperlipidemia    Current Assessment & Plan     Lipid panel obtained at today's visit. Goal LDL is less than 70. Continue current meds and low fat/low cholesterol diet with increased exercise as tolerated. Will follow up with labs. Patient to follow up in 3 months or as needed    A few changes in your diet can reduce cholesterol and improve your heart health. Saturated fats, found primarily in  "red meat and full-fat dairy products, raise your total cholesterol. Decreasing your consumption of saturated fats can reduce your low-density lipoprotein (LDL) cholesterol. rans fats, sometimes listed on food labels as "partially hydrogenated vegetable oil," are often used in margarines and store-bought cookies, crackers and cakes.     Trans fats raise overall cholesterol levels. Omega-3 fatty acids don't affect LDL cholesterol. But they have other heart-healthy benefits, including reducing blood pressure. Foods with omega-3 fatty acids include salmon, mackerel, herring, walnuts and flaxseeds.Soluble fiber can reduce the absorption of cholesterol into your bloodstream. Soluble fiber is found in such foods as oatmeal, kidney beans, [place] sprouts, apples and pears.  at least 30 minutes of exercise five times a week or vigorous aerobic activity for 20 minutes three times a week if tolerated.           Relevant Medications    pravastatin (PRAVACHOL) 40 MG tablet    Other Relevant Orders    Comprehensive Metabolic Panel    Lipid Panel    Postoperative hypothyroidism - Primary    Overview     Patient is status post thyroidectomy secondary to thyroid nodules.           Current Assessment & Plan     Patient is status post thyroidectomy secondary to thyroid nodules. Lab work today and will call with results         Relevant Medications    levothyroxine (SYNTHROID) 137 MCG Tab tablet    Other Relevant Orders    TSH    Stage 3b chronic kidney disease    Relevant Orders    CBC Auto Differential    Comprehensive Metabolic Panel    Hemoglobin A1C    Neuralgia and neuritis    Relevant Medications    pregabalin (LYRICA) 225 MG Cap    Urinary retention    Relevant Medications    silodosin (RAPAFLO) 8 mg Cap capsule    Erectile dysfunction    Relevant Medications    tadalafiL (CIALIS) 10 MG tablet     Other Visit Diagnoses       Long-term use of high-risk medication        Relevant Orders    Hemoglobin A1C    Chronic kidney " disease, stage 3b        Relevant Medications    ferrous sulfate (FEROSUL) 325 mg (65 mg iron) Tab tablet    Bilateral foot pain        Relevant Medications    amitriptyline (ELAVIL) 25 MG tablet    DULoxetine (CYMBALTA) 60 MG capsule    Multiple thyroid nodules        Relevant Medications    anastrozole (ARIMIDEX) 1 mg Tab    hydroxychloroquine (PLAQUENIL) 200 mg tablet    Other Relevant Orders    T4            Health Maintenance Topics with due status: Not Due       Topic Last Completion Date    Colorectal Cancer Screening 05/12/2022       Future Appointments   Date Time Provider Department Center   1/14/2025  8:00 AM AWV NURSE, [facility] FAMILY MEDICINE [facility] KAREEM [facility]   1/23/2025 11:00 AM Yaw Strauss MD [facility] NEURO [facility]   2/19/2025  1:40 PM Yoni Matthews DO [facility] CARD [facility]   3/6/2025  8:40 AM Antonietta Basilio MD [facility] FAMMED [facility]        Health Maintenance Due   Topic Date Due    TETANUS VACCINE  Never done    Shingles Vaccine (2 of 2) 04/29/2024    Influenza Vaccine (1) 09/01/2024    COVID-19 Vaccine (5 - 2024-25 season) 09/01/2024    Hemoglobin A1c (Prediabetes)  12/06/2024    Lipid Panel  12/06/2024        Follow up in about 6 months (around 6/6/2025).     Signature:  ROBBIE SEQUEIRA  [address]  [place]  [address]    Date of encounter: 12/6/24

## 2024-12-06 NOTE — ASSESSMENT & PLAN NOTE
"Lipid panel obtained at today's visit. Goal LDL is less than 70. Continue current meds and low fat/low cholesterol diet with increased exercise as tolerated. Will follow up with labs. Patient to follow up in 3 months or as needed    A few changes in your diet can reduce cholesterol and improve your heart health. Saturated fats, found primarily in red meat and full-fat dairy products, raise your total cholesterol. Decreasing your consumption of saturated fats can reduce your low-density lipoprotein (LDL) cholesterol. rans fats, sometimes listed on food labels as "partially hydrogenated vegetable oil," are often used in margarines and store-bought cookies, crackers and cakes.     Trans fats raise overall cholesterol levels. Omega-3 fatty acids don't affect LDL cholesterol. But they have other heart-healthy benefits, including reducing blood pressure. Foods with omega-3 fatty acids include salmon, mackerel, herring, walnuts and flaxseeds.Soluble fiber can reduce the absorption of cholesterol into your bloodstream. Soluble fiber is found in such foods as oatmeal, kidney beans, [place] sprouts, apples and pears.  at least 30 minutes of exercise five times a week or vigorous aerobic activity for 20 minutes three times a week if tolerated.    "

## 2024-12-11 DIAGNOSIS — E89.0 POSTOPERATIVE HYPOTHYROIDISM: Primary | ICD-10-CM

## 2024-12-11 RX ORDER — LEVOTHYROXINE SODIUM 150 UG/1
150 TABLET ORAL
Qty: 30 TABLET | Refills: 11 | Status: SHIPPED | OUTPATIENT
Start: 2024-12-11 | End: 2025-12-11

## 2025-01-23 ENCOUNTER — OFFICE VISIT (OUTPATIENT)
Dept: NEUROLOGY | Facility: CLINIC | Age: 70
End: 2025-01-23
Payer: COMMERCIAL

## 2025-01-23 VITALS
SYSTOLIC BLOOD PRESSURE: 120 MMHG | BODY MASS INDEX: 27.59 KG/M2 | HEIGHT: 74 IN | OXYGEN SATURATION: 95 % | HEART RATE: 92 BPM | RESPIRATION RATE: 18 BRPM | DIASTOLIC BLOOD PRESSURE: 75 MMHG | WEIGHT: 215 LBS

## 2025-01-23 DIAGNOSIS — M51.16 LUMBAR DISC HERNIATION WITH RADICULOPATHY: ICD-10-CM

## 2025-01-23 DIAGNOSIS — G95.20 CERVICAL SPINAL CORD COMPRESSION: Primary | ICD-10-CM

## 2025-01-23 PROCEDURE — 99214 OFFICE O/P EST MOD 30 MIN: CPT | Mod: S$PBB,,, | Performed by: PSYCHIATRY & NEUROLOGY

## 2025-01-23 PROCEDURE — 99215 OFFICE O/P EST HI 40 MIN: CPT | Mod: PBBFAC | Performed by: PSYCHIATRY & NEUROLOGY

## 2025-01-23 PROCEDURE — 99999 PR PBB SHADOW E&M-EST. PATIENT-LVL V: CPT | Mod: PBBFAC,,, | Performed by: PSYCHIATRY & NEUROLOGY

## 2025-01-23 NOTE — PROGRESS NOTES
Subjective:       Patient ID: Niranjan Whittaker Jr. is a 70 y.o. male     Chief Complaint:  No chief complaint on file.       Allergies:  Patient has no known allergies.    Current Medications:    Outpatient Encounter Medications as of 1/23/2025   Medication Sig Dispense Refill    anastrozole (ARIMIDEX) 1 mg Tab Take 1 tablet (1 mg total) by mouth every 7 days. 24 tablet 1    cetirizine (ZYRTEC) 10 MG tablet TAKE 1 TABLET BY MOUTH DAILY 90 tablet 0    DULoxetine (CYMBALTA) 60 MG capsule Take 1 capsule (60 mg total) by mouth 2 (two) times daily. 180 capsule 1    ergocalciferol, vitamin D2, (VITAMIN D ORAL) Take by mouth.      ferrous sulfate (FEROSUL) 325 mg (65 mg iron) Tab tablet Take 1 tablet (325 mg total) by mouth once daily. 90 tablet 1    fluticasone propionate (FLONASE) 50 mcg/actuation nasal spray 1 spray (50 mcg total) by Each Nostril route once daily. 15.8 mL 11    furosemide (LASIX) 20 MG tablet Take 1 tablet (20 mg total) by mouth once daily. 90 tablet 1    HYDROcodone-acetaminophen (NORCO)  mg per tablet Take 1 tablet by mouth every 4 (four) hours as needed for Pain. 15 tablet 0    hydroxychloroquine (PLAQUENIL) 200 mg tablet Take 1 tablet (200 mg total) by mouth 2 (two) times a day. 180 tablet 1    leflunomide (ARAVA) 20 MG Tab Take 20 mg by mouth 3 (three) times a week.      levothyroxine (SYNTHROID) 150 MCG tablet Take 1 tablet (150 mcg total) by mouth before breakfast. 30 tablet 11    lisinopriL 10 MG tablet Take 1 tablet (10 mg total) by mouth once daily. 90 tablet 1    methocarbamoL (ROBAXIN) 500 MG Tab Take 1 tablet (500 mg total) by mouth 2 (two) times daily. 180 tablet 0    montelukast (SINGULAIR) 10 mg tablet Take 1 tablet (10 mg total) by mouth every evening. 90 tablet 1    multivitamin-minerals-lutein Tab Take 1 tablet by mouth once daily.      omeprazole (PRILOSEC) 40 MG capsule Take 1 capsule (40 mg total) by mouth once daily. 90 capsule 1    oxyCODONE  (OXYCONTIN) 30 mg TR12 12 hr tablet Take 1 tablet (30 mg total) by mouth 2 (two) times daily as needed. 60 tablet 0    polyethylene glycol 3350 (MIRALAX ORAL) Take by mouth.      pravastatin (PRAVACHOL) 40 MG tablet Take 1 tablet (40 mg total) by mouth once daily. 90 tablet 1    pregabalin (LYRICA) 225 MG Cap Take 1 capsule (225 mg total) by mouth 3 (three) times daily. 270 capsule 3    promethazine (PHENERGAN) 25 MG tablet Take 1 tablet (25 mg total) by mouth 2 (two) times daily as needed for Nausea. 2 times daily prn headache no more then 2 days in a week 90 tablet 1    silodosin (RAPAFLO) 8 mg Cap capsule Take 1 capsule (8 mg total) by mouth once daily. 90 capsule 0    sulfaSALAzine (AZULFIDINE) 500 mg Tab Take 1 tablet (500 mg total) by mouth 2 (two) times daily. 180 tablet 1    tadalafiL (CIALIS) 10 MG tablet Take 2 tablets (20 mg total) by mouth daily as needed (ED). 20 tablet 1    amitriptyline (ELAVIL) 25 MG tablet Take 1 tablet (25 mg total) by mouth every evening. 90 tablet 1     No facility-administered encounter medications on file as of 1/23/2025.       History of Present Illness  69 yo BM w/ Chronic cervical and lumbar radiculopathy and s/p 3 prev cervical and lumbar surgeries per Nsgy   Recent MRI findings - severe spinal stenosis w/ cord flattening C3-4 and severe lumbar DDD w/ L4-5 nerve root contact   Pt never got recent Nsgy eval - I will send him to Dr Palmer for recommendation   NCV showed moderate b/l CTS from radiculopathy and incomplete healing from prev cervical decompression        Past Medical History:   Diagnosis Date    History of colon polyps 05/12/2022    History of GI diverticular bleed     Hyperlipidemia     Hypertension     Liver hemangioma 05/09/2023    Neuropathy     Personal history of gastric ulcer     Seronegative rheumatoid arthritis 09/22/2021    Goes to [facility] for Rheumatology  Last Assessment & Plan:  Formatting of this note might be different from the  "original. SNRA; Mod Active; Pt now off MTX and on Arava; However, pt continues to have persistent activity wrists; Intolerant to Enbrel; Poor response to Xeljanz; Unable to afford Humira in the past; Intolerant to chronic Nsaid use due to sig Gerd; Cont to be followed by local Pain man    Thyroid disease        Past Surgical History:   Procedure Laterality Date    BACK SURGERY      THYROIDECTOMY N/A 08/27/2021    Procedure: THYROIDECTOMY;  Surgeon: Manish Valadez MD;  Location: [facility];  Service: General;  Laterality: N/A;    TOTAL KNEE ARTHROPLASTY Right        Social History  Mr. Whittaker  reports that he quit smoking about 5 years ago. His smoking use included cigars. He has never used smokeless tobacco. He reports that he does not currently use alcohol. He reports current drug use. Drugs: Hydrocodone and Oxycodone.    Family History  Mr.'s Whittaker family history includes Heart disease in his mother; Hypertension in his father, mother, and sister.    Review of Systems  Review of Systems   Musculoskeletal:  Positive for back pain, joint pain and neck pain.   Neurological:  Positive for tingling and sensory change.    Objective:   /75 (BP Location: Left arm, Patient Position: Sitting)   Pulse 92   Resp 18   Ht 6' 2" (1.88 m)   Wt 97.5 kg (215 lb)   SpO2 95%   BMI 27.60 kg/m²    NEUROLOGICAL EXAMINATION:     MENTAL STATUS   Oriented to person, place, and time.   Level of consciousness: alert  Knowledge: good.     CRANIAL NERVES   Cranial nerves II through XII intact.     MOTOR EXAM     Strength   Strength 5/5 throughout.     SENSORY EXAM        Paresthesias in hands /feet     GAIT AND COORDINATION        Antalgic w/ cane      Physical Exam  Vitals reviewed.   Constitutional:       Appearance: He is normal weight.   Neurological:      Mental Status: He is alert and oriented to person, place, and time. Mental status is at baseline.      Cranial Nerves: Cranial nerves 2-12 are intact.      Motor: " Motor strength is normal.       Assessment:     There are no diagnoses linked to this encounter.     Primary Diagnosis and ICD10  No primary diagnosis found.    Plan:     There are no Patient Instructions on file for this visit.    There are no discontinued medications.    Requested Prescriptions      No prescriptions requested or ordered in this encounter

## 2025-01-23 NOTE — PATIENT INSTRUCTIONS
Cont Pain mgt  Cont current meds   Refer to Dr Bradley [facility] per pts request  - Eval severe spinal stenosis w/ cord flattening C3-4 and severe lumbar DDD w/ L4-5 nerve root contact   F/u 3 months

## 2025-02-10 ENCOUNTER — OFFICE VISIT (OUTPATIENT)
Dept: FAMILY MEDICINE | Facility: CLINIC | Age: 70
End: 2025-02-10
Payer: MEDICARE

## 2025-02-10 VITALS
BODY MASS INDEX: 27.44 KG/M2 | SYSTOLIC BLOOD PRESSURE: 118 MMHG | RESPIRATION RATE: 18 BRPM | HEIGHT: 74 IN | HEART RATE: 104 BPM | TEMPERATURE: 98 F | DIASTOLIC BLOOD PRESSURE: 78 MMHG | WEIGHT: 213.81 LBS | OXYGEN SATURATION: 94 %

## 2025-02-10 DIAGNOSIS — J02.9 SORE THROAT: ICD-10-CM

## 2025-02-10 DIAGNOSIS — R52 BODY ACHES: ICD-10-CM

## 2025-02-10 DIAGNOSIS — J30.9 ALLERGIC RHINITIS, UNSPECIFIED SEASONALITY, UNSPECIFIED TRIGGER: ICD-10-CM

## 2025-02-10 DIAGNOSIS — H66.91 RIGHT OTITIS MEDIA, UNSPECIFIED OTITIS MEDIA TYPE: Primary | ICD-10-CM

## 2025-02-10 DIAGNOSIS — R33.9 URINARY RETENTION: ICD-10-CM

## 2025-02-10 DIAGNOSIS — E04.2 MULTIPLE THYROID NODULES: ICD-10-CM

## 2025-02-10 DIAGNOSIS — E78.2 MIXED HYPERLIPIDEMIA: ICD-10-CM

## 2025-02-10 DIAGNOSIS — G89.4 CHRONIC PAIN SYNDROME: ICD-10-CM

## 2025-02-10 DIAGNOSIS — K21.9 GASTROESOPHAGEAL REFLUX DISEASE WITHOUT ESOPHAGITIS: ICD-10-CM

## 2025-02-10 DIAGNOSIS — R09.81 NASAL CONGESTION: ICD-10-CM

## 2025-02-10 LAB
CTP QC/QA: YES
MOLECULAR STREP A: NEGATIVE
POC MOLECULAR INFLUENZA A AGN: NEGATIVE
POC MOLECULAR INFLUENZA B AGN: NEGATIVE
POC RSV RAPID ANT MOLECULAR: NEGATIVE
SARS-COV-2 RDRP RESP QL NAA+PROBE: NEGATIVE

## 2025-02-10 PROCEDURE — 87502 INFLUENZA DNA AMP PROBE: CPT | Mod: RHCUB | Performed by: NURSE PRACTITIONER

## 2025-02-10 PROCEDURE — 96372 THER/PROPH/DIAG INJ SC/IM: CPT | Mod: ,,, | Performed by: NURSE PRACTITIONER

## 2025-02-10 PROCEDURE — 87651 STREP A DNA AMP PROBE: CPT | Mod: RHCUB | Performed by: NURSE PRACTITIONER

## 2025-02-10 PROCEDURE — 87634 RSV DNA/RNA AMP PROBE: CPT | Mod: RHCUB | Performed by: NURSE PRACTITIONER

## 2025-02-10 PROCEDURE — 87635 SARS-COV-2 COVID-19 AMP PRB: CPT | Mod: RHCUB | Performed by: NURSE PRACTITIONER

## 2025-02-10 PROCEDURE — 99214 OFFICE O/P EST MOD 30 MIN: CPT | Mod: ,,, | Performed by: NURSE PRACTITIONER

## 2025-02-10 RX ORDER — AZITHROMYCIN 250 MG/1
TABLET, FILM COATED ORAL
Qty: 6 TABLET | Refills: 0 | Status: SHIPPED | OUTPATIENT
Start: 2025-02-10 | End: 2025-02-15

## 2025-02-10 RX ORDER — METHOCARBAMOL 500 MG/1
500 TABLET, FILM COATED ORAL 2 TIMES DAILY
Qty: 180 TABLET | Refills: 0 | Status: SHIPPED | OUTPATIENT
Start: 2025-02-10

## 2025-02-10 RX ORDER — PRAVASTATIN SODIUM 40 MG/1
40 TABLET ORAL DAILY
Qty: 90 TABLET | Refills: 0 | Status: SHIPPED | OUTPATIENT
Start: 2025-02-10

## 2025-02-10 RX ORDER — DEXAMETHASONE SODIUM PHOSPHATE 4 MG/ML
4 INJECTION, SOLUTION INTRA-ARTICULAR; INTRALESIONAL; INTRAMUSCULAR; INTRAVENOUS; SOFT TISSUE
Status: COMPLETED | OUTPATIENT
Start: 2025-02-10 | End: 2025-02-10

## 2025-02-10 RX ORDER — MONTELUKAST SODIUM 10 MG/1
10 TABLET ORAL NIGHTLY
Qty: 90 TABLET | Refills: 0 | Status: SHIPPED | OUTPATIENT
Start: 2025-02-10

## 2025-02-10 RX ORDER — CEFTRIAXONE 1 G/1
1 INJECTION, POWDER, FOR SOLUTION INTRAMUSCULAR; INTRAVENOUS
Status: COMPLETED | OUTPATIENT
Start: 2025-02-10 | End: 2025-02-10

## 2025-02-10 RX ORDER — OMEPRAZOLE 40 MG/1
40 CAPSULE, DELAYED RELEASE ORAL DAILY
Qty: 90 CAPSULE | Refills: 0 | Status: SHIPPED | OUTPATIENT
Start: 2025-02-10

## 2025-02-10 RX ORDER — SILODOSIN 8 MG/1
8 CAPSULE ORAL DAILY
Qty: 90 CAPSULE | Refills: 0 | Status: SHIPPED | OUTPATIENT
Start: 2025-02-10

## 2025-02-10 RX ORDER — ANASTROZOLE 1 MG/1
1 TABLET ORAL
Qty: 24 TABLET | Refills: 0 | Status: SHIPPED | OUTPATIENT
Start: 2025-02-10

## 2025-02-10 RX ADMIN — DEXAMETHASONE SODIUM PHOSPHATE 4 MG: 4 INJECTION, SOLUTION INTRA-ARTICULAR; INTRALESIONAL; INTRAMUSCULAR; INTRAVENOUS; SOFT TISSUE at 04:02

## 2025-02-10 RX ADMIN — CEFTRIAXONE 1 G: 1 INJECTION, POWDER, FOR SOLUTION INTRAMUSCULAR; INTRAVENOUS at 04:02

## 2025-02-12 NOTE — PROGRESS NOTES
ROBBIE Li   [facility] [address] [place] [address]  [phone]      PATIENT NAME: Niranjan Whittaker Jr.  : 1955  DATE: 2/10/25  MRN: [id number]      Billing Provider: ROBBIE Li  Level of Service:   Patient PCP Information       Provider PCP Type    CRISTI GAMBOA MD General                Medications and Allergies     Medications  Outpatient Medications Marked as Taking for the 2/10/25 encounter (Office Visit) with Latha Alarcon FNP   Medication Sig Dispense Refill    cetirizine (ZYRTEC) 10 MG tablet TAKE 1 TABLET BY MOUTH DAILY 90 tablet 0    DULoxetine (CYMBALTA) 60 MG capsule Take 1 capsule (60 mg total) by mouth 2 (two) times daily. 180 capsule 1    ergocalciferol, vitamin D2, (VITAMIN D ORAL) Take by mouth.      ferrous sulfate (FEROSUL) 325 mg (65 mg iron) Tab tablet Take 1 tablet (325 mg total) by mouth once daily. 90 tablet 1    fluticasone propionate (FLONASE) 50 mcg/actuation nasal spray 1 spray (50 mcg total) by Each Nostril route once daily. 15.8 mL 11    furosemide (LASIX) 20 MG tablet Take 1 tablet (20 mg total) by mouth once daily. 90 tablet 1    HYDROcodone-acetaminophen (NORCO)  mg per tablet Take 1 tablet by mouth every 4 (four) hours as needed for Pain. 15 tablet 0    hydroxychloroquine (PLAQUENIL) 200 mg tablet Take 1 tablet (200 mg total) by mouth 2 (two) times a day. 180 tablet 1    leflunomide (ARAVA) 20 MG Tab Take 20 mg by mouth 3 (three) times a week.      levothyroxine (SYNTHROID) 150 MCG tablet Take 1 tablet (150 mcg total) by mouth before breakfast. 30 tablet 11    lisinopriL 10 MG tablet Take 1 tablet (10 mg total) by mouth once daily. 90 tablet 1    multivitamin-minerals-lutein Tab Take 1 tablet by mouth once daily.      oxyCODONE (OXYCONTIN) 30 mg TR12 12 hr tablet Take 1 tablet (30 mg total) by mouth 2 (two) times daily as needed. 60 tablet 0    polyethylene glycol 3350  "(MIRALAX ORAL) Take by mouth.      pregabalin (LYRICA) 225 MG Cap Take 1 capsule (225 mg total) by mouth 3 (three) times daily. 270 capsule 3    promethazine (PHENERGAN) 25 MG tablet Take 1 tablet (25 mg total) by mouth 2 (two) times daily as needed for Nausea. 2 times daily prn headache no more then 2 days in a week 90 tablet 1    sulfaSALAzine (AZULFIDINE) 500 mg Tab Take 1 tablet (500 mg total) by mouth 2 (two) times daily. 180 tablet 1    tadalafiL (CIALIS) 10 MG tablet Take 2 tablets (20 mg total) by mouth daily as needed (ED). 20 tablet 1    [DISCONTINUED] anastrozole (ARIMIDEX) 1 mg Tab Take 1 tablet (1 mg total) by mouth every 7 days. 24 tablet 1    [DISCONTINUED] methocarbamoL (ROBAXIN) 500 MG Tab Take 1 tablet (500 mg total) by mouth 2 (two) times daily. 180 tablet 0    [DISCONTINUED] montelukast (SINGULAIR) 10 mg tablet Take 1 tablet (10 mg total) by mouth every evening. 90 tablet 1    [DISCONTINUED] omeprazole (PRILOSEC) 40 MG capsule Take 1 capsule (40 mg total) by mouth once daily. 90 capsule 1    [DISCONTINUED] pravastatin (PRAVACHOL) 40 MG tablet Take 1 tablet (40 mg total) by mouth once daily. 90 tablet 1    [DISCONTINUED] silodosin (RAPAFLO) 8 mg Cap capsule Take 1 capsule (8 mg total) by mouth once daily. 90 capsule 0       Allergies  Review of patient's allergies indicates:  No Known Allergies    History of Present Illness    CHIEF COMPLAINT:  Patient presents today for sore throat and feeling of throat swollen.    HISTORY OF PRESENT ILLNESS:  He reports onset of symptoms Sunday morning at 7:00 AM, including sore throat with sensation of throat  swelling, fatigue described as being "in a fog," and mild fever with sweating. He denies cough. Testing was negative for strep, COVID-19, and influenza.    MEDICATIONS:  He reports needing refill of Metformin.        Review of Systems   Constitutional:  Positive for malaise/fatigue. Negative for chills and fever.   HENT:  Positive for congestion and sore " throat. Negative for ear pain.    Eyes:  Negative for pain.   Respiratory:  Positive for cough. Negative for sputum production and shortness of breath.    Cardiovascular:  Negative for chest pain and palpitations.   Gastrointestinal:  Negative for diarrhea, nausea and vomiting.   Musculoskeletal:  Positive for myalgias.   Neurological:  Positive for headaches. Negative for dizziness and weakness.   Psychiatric/Behavioral:  Negative for depression. The patient is not nervous/anxious.        Physical Exam    General: No acute distress. Well-developed. Well-nourished.  Eyes: EOMI. Sclerae anicteric.  HENT: Normocephalic. Atraumatic. Redness to posterior pharynx.  Ears: Right TM inflammation. Right TM with serous exudate.   Cardiovascular: Regular rate. Regular rhythm.   Respiratory: Normal respiratory effort. Clear to auscultation bilaterally.  Abdomen: Soft. Non-tender. Non-distended. Normoactive bowel sounds.  Musculoskeletal: No  obvious deformity.  Extremities: No lower extremity edema.  Neurological: Alert & oriented x3. No slurred speech. Normal gait.  Psychiatric: Normal mood. Normal affect. Good insight. Good judgment.  Skin: Warm. Dry. No rash.          Assessment & Plan    IMPRESSION:  - Diagnosed right otitis media and sinusitis based on physical exam findings  - Determined treatment plan: combination of intramuscular and oral antibiotics with steroid to address ear and sinus inflammation  - Considered patient's symptoms of sore throat and feeling of throat swelling, attributing to drainage from ear and sinuses  - Noted absence of strep, COVID, and flu based on negative test results        RIGHT OTITIS MEDIA:  - Examination revealed significant erythema in the right tympanic membrane and serous drainage.  - Prescribed Rocephin, Decadron IM and Zithromax po to treat the condition.  - Administered Rocephin and Decadron injections in the office.    SINUSITIS:  - Assessed that the patient's symptoms are due to  ear and sinus problems, which are draining down the throat.  - Treatment for sinusitis is included antibiotics and steroids prescribed for the otitis media.    SORE THROAT:  - Patient reports ear discomfort and sore throat, which started on Sunday morning, with a sensation of throat swelling.  - Examination of the throat revealed redness to the posterior pharynx.  - A strep test was performed and returned negative results.           Health Maintenance Due   Topic Date Due    TETANUS VACCINE  Never done    Shingles Vaccine (2 of 2) 04/29/2024    COVID-19 Vaccine (5 - 2024-25 season) 09/01/2024       Problem List Items Addressed This Visit          Neuro    Chronic pain    Relevant Medications    methocarbamoL (ROBAXIN) 500 MG Tab       ENT    Allergic rhinitis    Relevant Medications    montelukast (SINGULAIR) 10 mg tablet       Cardiac/Vascular    Mixed hyperlipidemia    Relevant Medications    pravastatin (PRAVACHOL) 40 MG tablet       Renal/    Urinary retention    Relevant Medications    silodosin (RAPAFLO) 8 mg Cap capsule       GI    Gastroesophageal reflux disease    Relevant Medications    omeprazole (PRILOSEC) 40 MG capsule     Other Visit Diagnoses       Right otitis media, unspecified otitis media type    -  Primary    Relevant Medications    cefTRIAXone injection 1 g (Completed)    dexAMETHasone injection 4 mg (Completed)    azithromycin (Z-SANDIP) 250 MG tablet    Sore throat        Relevant Orders    POCT COVID-19 Rapid Screening (Completed)    POCT Influenza A/B Molecular (Completed)    POCT respiratory syncytial virus (Completed)    POCT Strep A, Molecular (Completed)    Nasal congestion        Relevant Medications    dexAMETHasone injection 4 mg (Completed)    Other Relevant Orders    POCT COVID-19 Rapid Screening (Completed)    POCT Influenza A/B Molecular (Completed)    POCT respiratory syncytial virus (Completed)    POCT Strep A, Molecular (Completed)    Body aches        Relevant Orders    POCT  COVID-19 Rapid Screening (Completed)    POCT Influenza A/B Molecular (Completed)    POCT respiratory syncytial virus (Completed)    POCT Strep A, Molecular (Completed)    Multiple thyroid nodules        Relevant Medications    anastrozole (ARIMIDEX) 1 mg Tab            Health Maintenance Topics with due status: Not Due       Topic Last Completion Date    Colorectal Cancer Screening 05/12/2022    Hemoglobin A1c (Diabetic Prevention Screening) 12/06/2024    Lipid Panel 12/06/2024       Future Appointments   Date Time Provider Department Center   2/19/2025  1:40 PM Yoni Matthews DO [facility] CARD [facility]   3/6/2025  8:40 AM [facility]        This note was generated with the assistance of ambient listening technology. Verbal consent was obtained by the patient and accompanying visitor(s) for the recording of patient appointment to facilitate this note. I attest to having reviewed and edited the generated note for accuracy, though some syntax or spelling errors may persist. Please contact the author of this note for any clarification.     Signature:  ROBBIE Li  [facility] [address] [place] [address]  [phone]    Date of encounter: 2/10/25

## 2025-02-19 ENCOUNTER — OFFICE VISIT (OUTPATIENT)
Dept: CARDIOLOGY | Facility: CLINIC | Age: 70
End: 2025-02-19
Payer: MEDICARE

## 2025-02-19 VITALS
HEART RATE: 95 BPM | HEIGHT: 74 IN | OXYGEN SATURATION: 96 % | SYSTOLIC BLOOD PRESSURE: 114 MMHG | BODY MASS INDEX: 27.34 KG/M2 | DIASTOLIC BLOOD PRESSURE: 86 MMHG | WEIGHT: 213 LBS

## 2025-02-19 DIAGNOSIS — E66.01 MORBID OBESITY: ICD-10-CM

## 2025-02-19 DIAGNOSIS — I50.42 CHRONIC COMBINED SYSTOLIC AND DIASTOLIC HEART FAILURE: ICD-10-CM

## 2025-02-19 DIAGNOSIS — G47.33 OBSTRUCTIVE SLEEP APNEA OF ADULT: ICD-10-CM

## 2025-02-19 DIAGNOSIS — I10 PRIMARY HYPERTENSION: Primary | ICD-10-CM

## 2025-02-19 PROCEDURE — 99215 OFFICE O/P EST HI 40 MIN: CPT | Mod: PBBFAC | Performed by: INTERNAL MEDICINE

## 2025-02-19 PROCEDURE — 99214 OFFICE O/P EST MOD 30 MIN: CPT | Mod: S$PBB,,, | Performed by: INTERNAL MEDICINE

## 2025-02-19 NOTE — PROGRESS NOTES
Cardiology Clinic Note:    PCP: Antonietta Basilio MD    REFERRING PHYSICIAN: Antonietta Basilio MD    CHIEF COMPLAINT:   Chief Complaint   Patient presents with    Dizziness     A little bit.     Edema     Wrist and legs.         HISTORY OF PRESENT ILLNESS:  Niranjan Whittaker Jr. is a 70 y.o. male who presents for evaluation cardiovascular surgical risk, anticipates another lumbar spine surgery (#7).    He continues to experience bilat leg pain, 10/10 at night, improves with activity, has improved but not resolved with addition of Lyrica. He notes lower extremity edema has resolved.  He is using CPAP, now able to tolerate all night. He notes  dizziness  has resolved. He reports bilat lower ext cramping, awakens from sleep, and bilateral foot burning. No chest pain, presssure or shorteness of breath at very low levels of exertion due to chronic low back pain.                                  Review of Systems:  Review of Systems   Constitutional: Negative for diaphoresis, malaise/fatigue, night sweats and weight gain.   HENT:  Positive for sore throat. Negative for congestion, ear pain, hearing loss and nosebleeds.         Thyroidectomy due to abnormal poly   Eyes:  Positive for blurred vision and double vision. Negative for pain, photophobia and visual disturbance.   Cardiovascular:  Positive for dyspnea on exertion, leg swelling, near-syncope and palpitations. Negative for chest pain, claudication, irregular heartbeat, orthopnea and syncope.   Respiratory:  Positive for snoring. Negative for cough, shortness of breath, sleep disturbances due to breathing and wheezing.         On CPAP nightly   Endocrine: Negative for cold intolerance, heat intolerance, polydipsia, polyphagia and polyuria.   Hematologic/Lymphatic: Negative for bleeding problem. Does not bruise/bleed easily.        Hx FE def anemia, resolved    Skin:  Negative for dry skin, flushing, itching, rash and skin cancer.   Musculoskeletal:   Positive for arthritis, back pain, joint pain, muscle cramps, muscle weakness, myalgias, neck pain and stiffness. Negative for falls.   Gastrointestinal:  Positive for constipation and heartburn. Negative for abdominal pain, change in bowel habit, diarrhea, dysphagia, nausea and vomiting.   Genitourinary:  Negative for bladder incontinence, dysuria, flank pain, frequency and nocturia.        Enlarged prostate   Neurological:  Positive for dizziness and weakness. Negative for focal weakness, headaches, light-headedness, loss of balance, numbness, paresthesias and seizures.   Psychiatric/Behavioral:  Positive for depression. Negative for memory loss and substance abuse. The patient is not nervous/anxious.    Allergic/Immunologic: Negative for environmental allergies.          PAST MEDICAL HISTORY:  Past Medical History:   Diagnosis Date    History of colon polyps 05/12/2022    History of GI diverticular bleed     Hyperlipidemia     Hypertension     Liver hemangioma 05/09/2023    Neuropathy     Personal history of gastric ulcer     Seronegative rheumatoid arthritis 09/22/2021    Goes to [facility]ology  Last Assessment & Plan:  Formatting of this note might be different from the original. SNRA; Mod Active; Pt now off MTX and on Arava; However, pt continues to have persistent activity wrists; Intolerant to Enbrel; Poor response to Xeljanz; Unable to afford Humira in the past; Intolerant to chronic Nsaid use due to sig Gerd; Cont to be followed by local Pain man    Thyroid disease        PAST SURGICAL HISTORY:  Past Surgical History:   Procedure Laterality Date    BACK SURGERY      THYROIDECTOMY N/A 08/27/2021    Procedure: THYROIDECTOMY;  Surgeon: Manish Valadez MD;  Location: [facility];  Service: General;  Laterality: N/A;    TOTAL KNEE ARTHROPLASTY Right        SOCIAL HISTORY:  Social History     Socioeconomic History    Marital status:    Occupational History    Occupation: Disbaled   Tobacco  Use    Smoking status: Former     Types: Cigars     Quit date:      Years since quittin.1    Smokeless tobacco: Never   Substance and Sexual Activity    Alcohol use: Not Currently     Comment: beer wine liqour occasionally    Drug use: Yes     Types: Hydrocodone, Oxycodone    Sexual activity: Yes     Partners: Female     Social Drivers of Health     Financial Resource Strain: Low Risk  (2025)    Overall Financial Resource Strain (CARDIA)     Difficulty of Paying Living Expenses: Not very hard   Food Insecurity: No Food Insecurity (2025)    Hunger Vital Sign     Worried About Running Out of Food in the Last Year: Never true     Ran Out of Food in the Last Year: Never true   Transportation Needs: No Transportation Needs (2025)    PRAPARE - Transportation     Lack of Transportation (Medical): No     Lack of Transportation (Non-Medical): No   Physical Activity: Insufficiently Active (2025)    Exercise Vital Sign     Days of Exercise per Week: 2 days     Minutes of Exercise per Session: 20 min   Stress: No Stress Concern Present (2025)    [employer] - Occupational Stress Questionnaire     Feeling of Stress : Not at all   Housing Stability: Low Risk  (2025)    Housing Stability Vital Sign     Unable to Pay for Housing in the Last Year: No     Number of Times Moved in the Last Year: 0     Homeless in the Last Year: No       FAMILY HISTORY:  Family History   Problem Relation Name Age of Onset    Heart disease Mother      Hypertension Mother      Hypertension Father      Hypertension Sister         ALLERGIES:  Allergies as of 2025    (No Known Allergies)         MEDICATIONS:  Current Outpatient Medications on File Prior to Visit   Medication Sig Dispense Refill    anastrozole (ARIMIDEX) 1 mg Tab Take 1 tablet (1 mg total) by mouth every 7 days. 24 tablet 0    cetirizine (ZYRTEC) 10 MG tablet TAKE 1 TABLET BY MOUTH DAILY 90 tablet 0    DULoxetine  (CYMBALTA) 60 MG capsule Take 1 capsule (60 mg total) by mouth 2 (two) times daily. 180 capsule 1    ergocalciferol, vitamin D2, (VITAMIN D ORAL) Take by mouth.      ferrous sulfate (FEROSUL) 325 mg (65 mg iron) Tab tablet Take 1 tablet (325 mg total) by mouth once daily. 90 tablet 1    fluticasone propionate (FLONASE) 50 mcg/actuation nasal spray 1 spray (50 mcg total) by Each Nostril route once daily. 15.8 mL 11    furosemide (LASIX) 20 MG tablet Take 1 tablet (20 mg total) by mouth once daily. 90 tablet 1    HYDROcodone-acetaminophen (NORCO)  mg per tablet Take 1 tablet by mouth every 4 (four) hours as needed for Pain. 15 tablet 0    hydroxychloroquine (PLAQUENIL) 200 mg tablet Take 1 tablet (200 mg total) by mouth 2 (two) times a day. 180 tablet 1    leflunomide (ARAVA) 20 MG Tab Take 20 mg by mouth 3 (three) times a week.      levothyroxine (SYNTHROID) 150 MCG tablet Take 1 tablet (150 mcg total) by mouth before breakfast. 30 tablet 11    lisinopriL 10 MG tablet Take 1 tablet (10 mg total) by mouth once daily. 90 tablet 1    methocarbamoL (ROBAXIN) 500 MG Tab Take 1 tablet (500 mg total) by mouth 2 (two) times daily. 180 tablet 0    montelukast (SINGULAIR) 10 mg tablet Take 1 tablet (10 mg total) by mouth every evening. 90 tablet 0    multivitamin-minerals-lutein Tab Take 1 tablet by mouth once daily.      omeprazole (PRILOSEC) 40 MG capsule Take 1 capsule (40 mg total) by mouth once daily. 90 capsule 0    oxyCODONE (OXYCONTIN) 30 mg TR12 12 hr tablet Take 1 tablet (30 mg total) by mouth 2 (two) times daily as needed. 60 tablet 0    polyethylene glycol 3350 (MIRALAX ORAL) Take by mouth.      pravastatin (PRAVACHOL) 40 MG tablet Take 1 tablet (40 mg total) by mouth once daily. 90 tablet 0    pregabalin (LYRICA) 225 MG Cap Take 1 capsule (225 mg total) by mouth 3 (three) times daily. 270 capsule 3    silodosin (RAPAFLO) 8 mg Cap capsule Take 1 capsule (8 mg total) by mouth once daily. 90 capsule 0     "sulfaSALAzine (AZULFIDINE) 500 mg Tab Take 1 tablet (500 mg total) by mouth 2 (two) times daily. 180 tablet 1    tadalafiL (CIALIS) 10 MG tablet Take 2 tablets (20 mg total) by mouth daily as needed (ED). 20 tablet 1    promethazine (PHENERGAN) 25 MG tablet Take 1 tablet (25 mg total) by mouth 2 (two) times daily as needed for Nausea. 2 times daily prn headache no more then 2 days in a week 90 tablet 1     No current facility-administered medications on file prior to visit.          PHYSICAL EXAM:  Blood pressure 114/86, pulse 95, height 6' 2" (1.88 m), weight 96.6 kg (213 lb), SpO2 96%.  Wt Readings from Last 3 Encounters:   02/19/25 96.6 kg (213 lb)   02/10/25 97 kg (213 lb 12.8 oz)   01/23/25 97.5 kg (215 lb)      Body mass index is 27.35 kg/m².    Physical Exam  Vitals and nursing note reviewed.   Constitutional:       Appearance: Normal appearance. He is obese.   HENT:      Head: Normocephalic and atraumatic.      Right Ear: External ear normal.      Left Ear: External ear normal.   Eyes:      General: No scleral icterus.        Right eye: No discharge.         Left eye: No discharge.      Extraocular Movements: Extraocular movements intact.      Conjunctiva/sclera: Conjunctivae normal.      Pupils: Pupils are equal, round, and reactive to light.   Cardiovascular:      Rate and Rhythm: Normal rate and regular rhythm.      Pulses: Normal pulses.      Heart sounds: Normal heart sounds. No murmur heard.     No friction rub. No gallop.   Pulmonary:      Effort: Pulmonary effort is normal.      Breath sounds: Normal breath sounds. No wheezing, rhonchi or rales.   Chest:      Chest wall: No tenderness.   Abdominal:      General: Abdomen is flat. Bowel sounds are normal. There is no distension.      Palpations: Abdomen is soft.      Tenderness: There is no abdominal tenderness. There is no guarding or rebound.   Musculoskeletal:         General: No swelling or tenderness. Normal range of motion.      Cervical back: " Normal range of motion and neck supple.   Skin:     General: Skin is warm and dry.      Findings: No erythema or rash.   Neurological:      General: No focal deficit present.      Mental Status: He is alert and oriented to person, place, and time.      Cranial Nerves: No cranial nerve deficit.      Motor: No weakness.      Gait: Gait normal.   Psychiatric:         Mood and Affect: Mood normal.         Behavior: Behavior normal.         Thought Content: Thought content normal.         Judgment: Judgment normal.          LABS REVIEWED:  Lab Results   Component Value Date    WBC 4.75 12/06/2024    RBC 4.13 (L) 12/06/2024    HGB 11.9 (L) 12/06/2024    HCT 39.6 (L) 12/06/2024    MCV 95.9 12/06/2024    MCH 28.8 12/06/2024    MCHC 30.1 (L) 12/06/2024    RDW 13.9 12/06/2024     12/06/2024    MPV 10.6 12/06/2024    NRBC 0.0 12/06/2024    INR 0.94 07/25/2023     Lab Results   Component Value Date     12/06/2024    K 4.8 12/06/2024     12/06/2024    CO2 28 12/06/2024    BUN 15 12/06/2024    MG 2.4 (H) 07/25/2023     Lab Results   Component Value Date    AST 31 12/06/2024    ALT 23 12/06/2024     Lab Results   Component Value Date    GLU 75 (L) 12/06/2024    HGBA1C 4.8 12/06/2024     Lab Results   Component Value Date    CHOL 199 12/06/2024    HDL 55 12/06/2024    TRIG 100 12/06/2024    CHOLHDL 3.6 12/06/2024       CARDIAC STUDIES REVIEWED:    OTHER IMAGING STUDIES REVIEWED:        ASSESSMENT: PLAN:    1. Lower extremity swelling, resolved with addition of lasix, lexiscan did not show ischemic substrate, echo revealed mild LV systolic impairment with EF 46%, pt is at low risk from cardiovascular perspective if spinal surgery is undertaken.   2. Morbid obesity, no significant weight change,  discussed lifestyle changes..  3. Hypertension: notes orthostatic symptoms improved with  decrease lisinopril from 20 to 10 mg qd.   4. ZULLY: on CPAP nightly, using more consistently  5. Anemia, on FE replacement.   6.  Chronic low back pain secondary to advanced disc disease, is apparently a candidate for redo of lumbar spine surgery, is considering.  7. GERD: on Prilosec  8. Chronic Low back pain, on narcotic analgesia.  9. Hypothyroid: post surgical, on oral replacement       10. Claudication: essentially normal  bilat lower ext ABIs, arterial dopplers, will follow up with neurology.                                                                Follow up in six months, sooner if symptoms.

## 2025-02-20 PROBLEM — E66.01 MORBID OBESITY: Status: ACTIVE | Noted: 2025-02-20

## 2025-02-20 PROBLEM — I50.42 CHRONIC COMBINED SYSTOLIC AND DIASTOLIC HEART FAILURE: Status: ACTIVE | Noted: 2025-02-20

## 2025-03-04 RX ORDER — CETIRIZINE HYDROCHLORIDE 10 MG/1
10 TABLET ORAL
Qty: 90 TABLET | Refills: 0 | Status: SHIPPED | OUTPATIENT
Start: 2025-03-04

## 2025-03-06 ENCOUNTER — APPOINTMENT (OUTPATIENT)
Dept: RADIOLOGY | Facility: CLINIC | Age: 70
End: 2025-03-06
Attending: STUDENT IN AN ORGANIZED HEALTH CARE EDUCATION/TRAINING PROGRAM
Payer: MEDICARE

## 2025-03-06 ENCOUNTER — OFFICE VISIT (OUTPATIENT)
Dept: FAMILY MEDICINE | Facility: CLINIC | Age: 70
End: 2025-03-06
Payer: MEDICARE

## 2025-03-06 VITALS
TEMPERATURE: 97 F | DIASTOLIC BLOOD PRESSURE: 82 MMHG | OXYGEN SATURATION: 98 % | WEIGHT: 213.38 LBS | BODY MASS INDEX: 27.39 KG/M2 | SYSTOLIC BLOOD PRESSURE: 120 MMHG | HEIGHT: 74 IN | RESPIRATION RATE: 18 BRPM | HEART RATE: 103 BPM

## 2025-03-06 DIAGNOSIS — G89.29 CHRONIC PAIN OF LEFT ANKLE: ICD-10-CM

## 2025-03-06 DIAGNOSIS — N18.32 STAGE 3B CHRONIC KIDNEY DISEASE: ICD-10-CM

## 2025-03-06 DIAGNOSIS — E78.2 MIXED HYPERLIPIDEMIA: ICD-10-CM

## 2025-03-06 DIAGNOSIS — M25.572 CHRONIC PAIN OF LEFT ANKLE: ICD-10-CM

## 2025-03-06 DIAGNOSIS — K21.9 GASTROESOPHAGEAL REFLUX DISEASE, UNSPECIFIED WHETHER ESOPHAGITIS PRESENT: ICD-10-CM

## 2025-03-06 DIAGNOSIS — E89.0 POSTOPERATIVE HYPOTHYROIDISM: Primary | ICD-10-CM

## 2025-03-06 DIAGNOSIS — D64.9 ANEMIA, UNSPECIFIED TYPE: ICD-10-CM

## 2025-03-06 DIAGNOSIS — I10 PRIMARY HYPERTENSION: ICD-10-CM

## 2025-03-06 LAB
ANION GAP SERPL CALCULATED.3IONS-SCNC: 11 MMOL/L (ref 7–16)
BUN SERPL-MCNC: 16 MG/DL (ref 8–26)
BUN/CREAT SERPL: 11 (ref 6–20)
CALCIUM SERPL-MCNC: 9 MG/DL (ref 8.8–10)
CHLORIDE SERPL-SCNC: 105 MMOL/L (ref 98–107)
CO2 SERPL-SCNC: 28 MMOL/L (ref 23–31)
CREAT SERPL-MCNC: 1.4 MG/DL (ref 0.72–1.25)
EGFR (NO RACE VARIABLE) (RUSH/TITUS): 54 ML/MIN/1.73M2
GLUCOSE SERPL-MCNC: 77 MG/DL (ref 82–115)
POTASSIUM SERPL-SCNC: 4.1 MMOL/L (ref 3.5–5.1)
SODIUM SERPL-SCNC: 140 MMOL/L (ref 136–145)
TSH SERPL DL<=0.005 MIU/L-ACNC: 3.27 UIU/ML (ref 0.35–4.94)

## 2025-03-06 PROCEDURE — 99214 OFFICE O/P EST MOD 30 MIN: CPT | Mod: ,,, | Performed by: STUDENT IN AN ORGANIZED HEALTH CARE EDUCATION/TRAINING PROGRAM

## 2025-03-06 PROCEDURE — 73610 X-RAY EXAM OF ANKLE: CPT | Mod: 26,LT,, | Performed by: RADIOLOGY

## 2025-03-06 PROCEDURE — 1126F AMNT PAIN NOTED NONE PRSNT: CPT | Mod: ,,, | Performed by: STUDENT IN AN ORGANIZED HEALTH CARE EDUCATION/TRAINING PROGRAM

## 2025-03-06 PROCEDURE — 3079F DIAST BP 80-89 MM HG: CPT | Mod: ,,, | Performed by: STUDENT IN AN ORGANIZED HEALTH CARE EDUCATION/TRAINING PROGRAM

## 2025-03-06 PROCEDURE — 84443 ASSAY THYROID STIM HORMONE: CPT | Mod: ,,, | Performed by: CLINICAL MEDICAL LABORATORY

## 2025-03-06 PROCEDURE — 80048 BASIC METABOLIC PNL TOTAL CA: CPT | Mod: ,,, | Performed by: CLINICAL MEDICAL LABORATORY

## 2025-03-06 PROCEDURE — 73610 X-RAY EXAM OF ANKLE: CPT | Mod: TC,RHCUB,FY,LT | Performed by: STUDENT IN AN ORGANIZED HEALTH CARE EDUCATION/TRAINING PROGRAM

## 2025-03-06 PROCEDURE — 1101F PT FALLS ASSESS-DOCD LE1/YR: CPT | Mod: ,,, | Performed by: STUDENT IN AN ORGANIZED HEALTH CARE EDUCATION/TRAINING PROGRAM

## 2025-03-06 PROCEDURE — 3288F FALL RISK ASSESSMENT DOCD: CPT | Mod: ,,, | Performed by: STUDENT IN AN ORGANIZED HEALTH CARE EDUCATION/TRAINING PROGRAM

## 2025-03-06 PROCEDURE — 3074F SYST BP LT 130 MM HG: CPT | Mod: ,,, | Performed by: STUDENT IN AN ORGANIZED HEALTH CARE EDUCATION/TRAINING PROGRAM

## 2025-03-06 PROCEDURE — 3008F BODY MASS INDEX DOCD: CPT | Mod: ,,, | Performed by: STUDENT IN AN ORGANIZED HEALTH CARE EDUCATION/TRAINING PROGRAM

## 2025-03-06 PROCEDURE — 1159F MED LIST DOCD IN RCRD: CPT | Mod: ,,, | Performed by: STUDENT IN AN ORGANIZED HEALTH CARE EDUCATION/TRAINING PROGRAM

## 2025-03-06 NOTE — PROGRESS NOTES
Progress Note     CRISTI GAMBOA MD   [address]  [place] [address]     PATIENT NAME: Niranjan Whittaker Jr.  : 1955  DATE: 3/6/25  MRN: [id number]      Billing Provider: CRISTI GAMBOA MD  Level of Service:   Patient PCP Information       Provider PCP Type    CRISTI GAMBOA MD General                Hypothyroidism (Pt is here for 3 month follow up. ), Health Maintenance (Discussed care gaps with pt. Pt states he had vaccines at [facility] in [place] on [address]. Will attempt to find those records. ), and Medication Refill (Pt also states he is needing medication refills while in clinic. )      SUBJECTIVE:     Niranjan Whittaker Jr. is a 70 y.o.male who presents to clinic for Hypothyroidism (Pt is here for 3 month follow up. ), Health Maintenance (Discussed care gaps with pt. Pt states he had vaccines at [facility] in [place] on [address]. Will attempt to find those records. ), and Medication Refill (Pt also states he is needing medication refills while in clinic. )    Patient presents to clinic today for follow up for hypothyroidism.  Patient's TSH was high at last visit in December.  Dosage was adjusted from 137 mcg to 150 mcg.  Patient notes he is doing well with the adjustment.    Patient has longstanding history of iron-deficiency anemia.  Patient is up-to-date on colon cancer screening.  He takes iron supplementation.    Patient also has a history of chronic low back pain with neuropathy and left-sided footdrop.  Patient is now followed by Dr. Palmer who recommended surgery for cervical and lumbar spine.    Patient also has rheumatoid arthritis for which he is followed by Rheumatology.  He is on Plaquenil and sulfasalazine.      Patient is followed by Cardiology for CHF.  He saw Cardiology last month.  EF is 46%.  He is currently well compensated.      Patient has CKD.  He did see Nephrology and they wanted to continue to monitor  "creatinine.  They stated they did not need to see him again unless his creatinine trended up.      Patient also reports left ankle pain.  Patient does have left footdrop.  Patient does have a brace.  Patient notes occasional swelling in the left ankle.  Patient would like to have an x-ray.    All other pertinent review of systems negative. Please see HPI for details.     Past Medical History:  has a past medical history of History of colon polyps (05/12/2022), History of GI diverticular bleed, Hyperlipidemia, Hypertension, Liver hemangioma (05/09/2023), Neuropathy, Personal history of gastric ulcer, Seronegative rheumatoid arthritis (09/22/2021), and Thyroid disease.   Past Surgical History:  has a past surgical history that includes Back surgery; Thyroidectomy (N/A, 08/27/2021); and Total knee arthroplasty (Right).  Family History: family history includes Heart disease in his mother; Hypertension in his father, mother, and sister.  Social History:  reports that he quit smoking about 5 years ago. His smoking use included cigars. He has never used smokeless tobacco. He reports that he does not currently use alcohol. He reports current drug use. Drugs: Hydrocodone and Oxycodone.  Allergies: Review of patient's allergies indicates:  No Known Allergies    Current Medications[1]   OBJECTIVE:     Vital Signs   /82 (BP Location: Left arm, Patient Position: Sitting)   Pulse 103   Temp 97.3 °F (36.3 °C) (Temporal)   Resp 18   Ht 6' 2" (1.88 m)   Wt 96.8 kg (213 lb 6.4 oz)   SpO2 98%   BMI 27.40 kg/m²     Physical Exam  Constitutional:       General: He is not in acute distress.     Appearance: Normal appearance. He is not ill-appearing.   HENT:      Head: Normocephalic and atraumatic.   Eyes:      Extraocular Movements: Extraocular movements intact.      Pupils: Pupils are equal, round, and reactive to light.   Cardiovascular:      Rate and Rhythm: Normal rate and regular rhythm.      Heart sounds: No murmur " heard.     No friction rub. No gallop.   Pulmonary:      Effort: Pulmonary effort is normal. No respiratory distress.      Breath sounds: Normal breath sounds. No wheezing, rhonchi or rales.   Abdominal:      Palpations: Abdomen is soft.      Tenderness: There is no abdominal tenderness. There is no guarding or rebound.   Musculoskeletal:         General: Normal range of motion.      Comments: Tender to palpation over medial and lateral left ankle.   Skin:     General: Skin is warm and dry.      Capillary Refill: Capillary refill takes less than 2 seconds.   Neurological:      General: No focal deficit present.      Mental Status: He is alert.   Psychiatric:         Mood and Affect: Mood normal.         Behavior: Behavior normal.         ASSESSMENT/PLAN:     1. Postoperative hypothyroidism  Overview:  Patient is status post thyroidectomy secondary to thyroid nodules.      Orders:  -     TSH; Future; Expected date: 03/06/2025  Obtaining repeat TSH.  Patient to continue Synthroid.  We will adjust Synthroid if needed.      2. Chronic pain of left ankle  -     X-Ray Ankle Complete 3 View Left; Future; Expected date: 03/06/2025  Patient with pain of left ankle.  Patient with CKD.  Patient may take chronic Norco for pain control.  X-ray or for additional evaluation.      3. Primary hypertension  -     Basic Metabolic Panel; Future; Expected date: 03/06/2025  Patient with hypertension.  Blood pressure currently well-controlled.  Patient to continue lisinopril.  Patient may also continue Lasix.    4. Stage 3b chronic kidney disease  -     Basic Metabolic Panel; Future; Expected date: 03/06/2025  Patient with stage IIIB CKD.  Patient has been evaluated by Nephrology.  Creatinine is actually trending down.  Obtaining repeat creatinine today.    6. Anemia, unspecified type  Patient with a history of anemia.  Patient to continue iron tablets.  We will continue to monitor CBC periodically.  Last CBC was stable.        Follow up  in about 3 months (around 6/6/2025).      CRISTI GAMBOA MD  03/06/2025    Due to voice recognition software, sound alike and misspelled words may be contained in the documentation.              [1]   Current Outpatient Medications:     anastrozole (ARIMIDEX) 1 mg Tab, Take 1 tablet (1 mg total) by mouth every 7 days., Disp: 24 tablet, Rfl: 0    cetirizine (ZYRTEC) 10 MG tablet, TAKE 1 TABLET BY MOUTH DAILY, Disp: 90 tablet, Rfl: 0    DULoxetine (CYMBALTA) 60 MG capsule, Take 1 capsule (60 mg total) by mouth 2 (two) times daily., Disp: 180 capsule, Rfl: 1    ergocalciferol, vitamin D2, (VITAMIN D ORAL), Take by mouth., Disp: , Rfl:     ferrous sulfate (FEROSUL) 325 mg (65 mg iron) Tab tablet, Take 1 tablet (325 mg total) by mouth once daily., Disp: 90 tablet, Rfl: 1    fluticasone propionate (FLONASE) 50 mcg/actuation nasal spray, 1 spray (50 mcg total) by Each Nostril route once daily., Disp: 15.8 mL, Rfl: 11    furosemide (LASIX) 20 MG tablet, Take 1 tablet (20 mg total) by mouth once daily., Disp: 90 tablet, Rfl: 1    HYDROcodone-acetaminophen (NORCO)  mg per tablet, Take 1 tablet by mouth every 4 (four) hours as needed for Pain., Disp: 15 tablet, Rfl: 0    hydroxychloroquine (PLAQUENIL) 200 mg tablet, Take 1 tablet (200 mg total) by mouth 2 (two) times a day., Disp: 180 tablet, Rfl: 1    leflunomide (ARAVA) 20 MG Tab, Take 20 mg by mouth 3 (three) times a week., Disp: , Rfl:     levothyroxine (SYNTHROID) 150 MCG tablet, Take 1 tablet (150 mcg total) by mouth before breakfast., Disp: 30 tablet, Rfl: 11    lisinopriL 10 MG tablet, Take 1 tablet (10 mg total) by mouth once daily., Disp: 90 tablet, Rfl: 1    methocarbamoL (ROBAXIN) 500 MG Tab, Take 1 tablet (500 mg total) by mouth 2 (two) times daily., Disp: 180 tablet, Rfl: 0    montelukast (SINGULAIR) 10 mg tablet, Take 1 tablet (10 mg total) by mouth every evening., Disp: 90 tablet, Rfl: 0    multivitamin-minerals-lutein Tab, Take 1 tablet by mouth  once daily., Disp: , Rfl:     omeprazole (PRILOSEC) 40 MG capsule, Take 1 capsule (40 mg total) by mouth once daily., Disp: 90 capsule, Rfl: 0    oxyCODONE (OXYCONTIN) 30 mg TR12 12 hr tablet, Take 1 tablet (30 mg total) by mouth 2 (two) times daily as needed., Disp: 60 tablet, Rfl: 0    polyethylene glycol 3350 (MIRALAX ORAL), Take by mouth., Disp: , Rfl:     pravastatin (PRAVACHOL) 40 MG tablet, Take 1 tablet (40 mg total) by mouth once daily., Disp: 90 tablet, Rfl: 0    pregabalin (LYRICA) 225 MG Cap, Take 1 capsule (225 mg total) by mouth 3 (three) times daily., Disp: 270 capsule, Rfl: 3    promethazine (PHENERGAN) 25 MG tablet, Take 1 tablet (25 mg total) by mouth 2 (two) times daily as needed for Nausea. 2 times daily prn headache no more then 2 days in a week, Disp: 90 tablet, Rfl: 1    silodosin (RAPAFLO) 8 mg Cap capsule, Take 1 capsule (8 mg total) by mouth once daily., Disp: 90 capsule, Rfl: 0    sulfaSALAzine (AZULFIDINE) 500 mg Tab, Take 1 tablet (500 mg total) by mouth 2 (two) times daily., Disp: 180 tablet, Rfl: 1    tadalafiL (CIALIS) 10 MG tablet, Take 2 tablets (20 mg total) by mouth daily as needed (ED)., Disp: 20 tablet, Rfl: 1

## 2025-03-07 ENCOUNTER — RESULTS FOLLOW-UP (OUTPATIENT)
Dept: FAMILY MEDICINE | Facility: CLINIC | Age: 70
End: 2025-03-07

## 2025-03-07 DIAGNOSIS — N52.9 ERECTILE DYSFUNCTION, UNSPECIFIED ERECTILE DYSFUNCTION TYPE: ICD-10-CM

## 2025-03-07 DIAGNOSIS — E89.0 POSTOPERATIVE HYPOTHYROIDISM: ICD-10-CM

## 2025-03-07 DIAGNOSIS — M77.9 BONE SPUR: ICD-10-CM

## 2025-03-07 DIAGNOSIS — M19.072 ARTHRITIS OF LEFT ANKLE: Primary | ICD-10-CM

## 2025-03-07 RX ORDER — TADALAFIL 10 MG/1
20 TABLET ORAL DAILY PRN
Qty: 60 TABLET | Refills: 1 | Status: SHIPPED | OUTPATIENT
Start: 2025-03-07

## 2025-03-07 RX ORDER — TADALAFIL 10 MG/1
20 TABLET ORAL DAILY PRN
Qty: 20 TABLET | Refills: 1 | OUTPATIENT
Start: 2025-03-07

## 2025-03-07 RX ORDER — LEVOTHYROXINE SODIUM 150 UG/1
150 TABLET ORAL
Qty: 90 TABLET | Refills: 1 | Status: SHIPPED | OUTPATIENT
Start: 2025-03-07 | End: 2026-03-07

## 2025-03-07 NOTE — TELEPHONE ENCOUNTER
While discussing lab results over the phone pt wanted to know if his medication refills were sent in. I notified him that most of his meds should not need refills until May or later. He went through all of his medications and he stated he would run out of the synthroid 150 mg and Cialis. He was wanting Cialis in a 20 mg tablet if possible. Please advise.

## 2025-03-07 NOTE — TELEPHONE ENCOUNTER
Pt stated that his urologist originally wrote the Cialis for him. He states his cardiologist is aware that he is on it. He has been on this medication for 1 year and 3 months. No side effects or adverse reactions reported from pt. Please advise.

## 2025-03-07 NOTE — PROGRESS NOTES
Discussed results from lab and xray.  Voiced understanding.  Placed ortho referral to Ortho at [facility] per pt request.

## 2025-03-07 NOTE — PROGRESS NOTES
Please let patient know that he does have arthritis in his ankle and a small bone spur.  If he would like to see Orthopedics for additional evaluation and treatment, we can refer him.

## 2025-03-07 NOTE — TELEPHONE ENCOUNTER
Please ask patient and clarify how long he has been on the Cialis and if Cardiology is aware he is taking this medication. Please ensure he does not have any adverse effects with the medication. How often is he taking it? I have not prescribed it for him in the past.

## 2025-03-07 NOTE — PROGRESS NOTES
Please let patient know that his kidney function continues to improve.  His creatinine is now 1.4.  His electrolytes are normal.  His thyroid level is currently well-controlled.

## 2025-03-14 ENCOUNTER — OFFICE VISIT (OUTPATIENT)
Dept: ORTHOPEDICS | Facility: CLINIC | Age: 70
End: 2025-03-14
Payer: MEDICARE

## 2025-03-14 VITALS — WEIGHT: 213.38 LBS | HEIGHT: 74 IN | BODY MASS INDEX: 27.39 KG/M2

## 2025-03-14 DIAGNOSIS — M19.072 ARTHRITIS OF LEFT ANKLE: ICD-10-CM

## 2025-03-14 DIAGNOSIS — M77.9 BONE SPUR: ICD-10-CM

## 2025-03-14 PROCEDURE — 99203 OFFICE O/P NEW LOW 30 MIN: CPT | Mod: S$PBB,,,

## 2025-03-14 PROCEDURE — 99213 OFFICE O/P EST LOW 20 MIN: CPT | Mod: PBBFAC

## 2025-03-14 PROCEDURE — 3008F BODY MASS INDEX DOCD: CPT | Mod: CPTII,,,

## 2025-03-14 PROCEDURE — 99999 PR PBB SHADOW E&M-EST. PATIENT-LVL III: CPT | Mod: PBBFAC,,,

## 2025-03-17 NOTE — PROGRESS NOTES
CC:   Chief Complaint   Patient presents with    Left Ankle - Pain          Niranjan Whittaker Jr. is a 70 y.o. male seen today for Pain of the Left Ankle  Patient presents today with complaints of left ankle pain worsening over the past month.  Patient reports a long history of ankle pain dating back to 2023 where he was diagnosed with foot drop.  He also has lumbar spine issues and peripheral neuropathy seen by Dr. Pierre with pain management and Dr. EDDI Palmer ortho spine.  He iss seen by rheumatologist in [place]. Patient presents with both a dull aching pain in his left ankle and a burning sensation.  He denies any recent fall or injury.  He is wearing an AFO brace for footdrop on the left lower extremity.  No other complaints today.      PAST MEDICAL HISTORY:   Past Medical History:   Diagnosis Date    History of colon polyps 05/12/2022    History of GI diverticular bleed     Hyperlipidemia     Hypertension     Liver hemangioma 05/09/2023    Neuropathy     Personal history of gastric ulcer     Seronegative rheumatoid arthritis 09/22/2021    Goes to [facility] for Rheumatology  Last Assessment & Plan:  Formatting of this note might be different from the original. SNRA; Mod Active; Pt now off MTX and on Arava; However, pt continues to have persistent activity wrists; Intolerant to Enbrel; Poor response to Xeljanz; Unable to afford Humira in the past; Intolerant to chronic Nsaid use due to sig Gerd; Cont to be followed by local Pain man    Thyroid disease           PAST SURGICAL HISTORY:   Past Surgical History:   Procedure Laterality Date    BACK SURGERY      THYROIDECTOMY N/A 08/27/2021    Procedure: THYROIDECTOMY;  Surgeon: Manish Valadez MD;  Location: [facility];  Service: General;  Laterality: N/A;    TOTAL KNEE ARTHROPLASTY Right           ALLERGIES: Review of patient's allergies indicates:  No Known Allergies     MEDICATIONS :  Current Medications[1]     SOCIAL HISTORY: Social  History[2]     FAMILY HISTORY:   Family History   Problem Relation Name Age of Onset    Heart disease Mother      Hypertension Mother      Hypertension Father      Hypertension Sister            PHYSICAL EXAM:      There were no vitals filed for this visit.  Body mass index is 27.4 kg/m².    GENERAL: Well-developed, well-nourished male . The patient is alert, oriented and cooperative.    HEENT:  Normocephalic, atraumatic.  Extraocular movements are intact bilaterally.     NECK:  Nontender with good range of motion.    LUNGS:  Clear to auscultation bilaterally.    HEART:  Regular rate and rhythm.     ABDOMEN:  Soft, non-tender, non-distended.      EXTREMITIES:  Left ankle AFO brace, nontender to palpation at the insertion point of the Achilles tendon or plantar aspect of his heel, no pain with passive range of motion dorsiflexion and plantar flexion of the foot, neurovascularly intact      RADIOGRAPHIC FINDINGS:   X-Ray Ankle Complete 3 View Left  Result Date: 3/6/2025  EXAMINATION: XR ANKLE COMPLETE 3 VIEW LEFT CLINICAL HISTORY: Pain in left ankle and joints of left foot TECHNIQUE: Left ankle, AP lateral and oblique views: COMPARISON: None. FINDINGS: Early changes of osteoarthritis are present at the talotibial joint.  There is no evidence of an ankle effusion or acute fracture.  There is a small calcaneal spur at the insertion site of the Achilles tendon.     There is mild osteoarthritis at the talotibial joint and a small calcaneal spur. [facility] of [facility] Electronically signed by: Aislinn Harry Date:    03/06/2025 Time:    12:52       Patient Active Problem List    Diagnosis Date Noted    Morbid obesity 02/20/2025    Chronic combined systolic and diastolic heart failure 02/20/2025    Neuralgia and neuritis 12/06/2024    Urinary retention 12/06/2024    Erectile dysfunction 12/06/2024    Cervical spinal cord compression 10/22/2024    Lumbar disc herniation with radiculopathy 10/22/2024    KRUEGER  (dyspnea on exertion) 08/03/2023    Chest pain 08/03/2023    Abnormal electrocardiogram (ECG) (EKG) 08/03/2023    Claudication 08/03/2023    Obstructive sleep apnea of adult 08/01/2023    Stage 3b chronic kidney disease 08/01/2023    Syncope 07/26/2023    Postoperative hypothyroidism 06/30/2023    Simple renal cyst 06/30/2023    Liver hemangioma 05/09/2023    Mixed hyperlipidemia 04/17/2023    History of colon polyps 05/12/2022    Diverticula of colon 05/12/2022    Anemia 05/02/2022    Chronic pain 11/11/2021    Degeneration of lumbar intervertebral disc 11/11/2021    Primary hypertension 11/11/2021    Generalized osteoarthritis 11/11/2021    Gout 11/11/2021    Constipation 09/22/2021    Gastroesophageal reflux disease 09/22/2021    Seronegative rheumatoid arthritis 09/22/2021    Vitamin D deficiency 09/22/2021    Allergic rhinitis 09/22/2021    Migraine without aura and without status migrainosus, not intractable 03/18/2021     IMPRESSION AND PLAN:  Chronic left foot and ankle pain.  He describes the pain more as a burning and aching sensation that could be related more to his lumbar radiculopathy which he has been recommended surgery by Dr. Palmer.  Personally reviewed previous x-rays noting calcaneal spurring at the insertion of the Achilles tendon however he is nontender on exam today.  He does have some arthritis of the left ankle x-ray.  Discussed all treatment options with the patient today patient states he has seen Dr. Horton and I did discuss with the patient that he might benefit from a steroid injection of the ankle or a referral to a foot and ankle specialist.  Patient and his wife voiced understanding and stated that they would like to think about their options and we will call back if they want to scheduled with Dr. Horton or have a referral placed her in    No follow-ups on file.       Florence Grace PA-C      (Subject to voice recognition error, transcription service not allowed)           [1]    Current Outpatient Medications:     anastrozole (ARIMIDEX) 1 mg Tab, Take 1 tablet (1 mg total) by mouth every 7 days., Disp: 24 tablet, Rfl: 0    cetirizine (ZYRTEC) 10 MG tablet, TAKE 1 TABLET BY MOUTH DAILY, Disp: 90 tablet, Rfl: 0    DULoxetine (CYMBALTA) 60 MG capsule, Take 1 capsule (60 mg total) by mouth 2 (two) times daily., Disp: 180 capsule, Rfl: 1    ergocalciferol, vitamin D2, (VITAMIN D ORAL), Take by mouth., Disp: , Rfl:     ferrous sulfate (FEROSUL) 325 mg (65 mg iron) Tab tablet, Take 1 tablet (325 mg total) by mouth once daily., Disp: 90 tablet, Rfl: 1    fluticasone propionate (FLONASE) 50 mcg/actuation nasal spray, 1 spray (50 mcg total) by Each Nostril route once daily., Disp: 15.8 mL, Rfl: 11    furosemide (LASIX) 20 MG tablet, Take 1 tablet (20 mg total) by mouth once daily., Disp: 90 tablet, Rfl: 1    HYDROcodone-acetaminophen (NORCO)  mg per tablet, Take 1 tablet by mouth every 4 (four) hours as needed for Pain., Disp: 15 tablet, Rfl: 0    hydroxychloroquine (PLAQUENIL) 200 mg tablet, Take 1 tablet (200 mg total) by mouth 2 (two) times a day., Disp: 180 tablet, Rfl: 1    leflunomide (ARAVA) 20 MG Tab, Take 20 mg by mouth 3 (three) times a week., Disp: , Rfl:     levothyroxine (SYNTHROID) 150 MCG tablet, Take 1 tablet (150 mcg total) by mouth before breakfast., Disp: 90 tablet, Rfl: 1    lisinopriL 10 MG tablet, Take 1 tablet (10 mg total) by mouth once daily., Disp: 90 tablet, Rfl: 1    methocarbamoL (ROBAXIN) 500 MG Tab, Take 1 tablet (500 mg total) by mouth 2 (two) times daily., Disp: 180 tablet, Rfl: 0    montelukast (SINGULAIR) 10 mg tablet, Take 1 tablet (10 mg total) by mouth every evening., Disp: 90 tablet, Rfl: 0    multivitamin-minerals-lutein Tab, Take 1 tablet by mouth once daily., Disp: , Rfl:     omeprazole (PRILOSEC) 40 MG capsule, Take 1 capsule (40 mg total) by mouth once daily., Disp: 90 capsule, Rfl: 0    oxyCODONE (OXYCONTIN) 30 mg TR12 12 hr tablet, Take 1 tablet  (30 mg total) by mouth 2 (two) times daily as needed., Disp: 60 tablet, Rfl: 0    polyethylene glycol 3350 (MIRALAX ORAL), Take by mouth., Disp: , Rfl:     pravastatin (PRAVACHOL) 40 MG tablet, Take 1 tablet (40 mg total) by mouth once daily., Disp: 90 tablet, Rfl: 0    pregabalin (LYRICA) 225 MG Cap, Take 1 capsule (225 mg total) by mouth 3 (three) times daily., Disp: 270 capsule, Rfl: 3    promethazine (PHENERGAN) 25 MG tablet, Take 1 tablet (25 mg total) by mouth 2 (two) times daily as needed for Nausea. 2 times daily prn headache no more then 2 days in a week, Disp: 90 tablet, Rfl: 1    silodosin (RAPAFLO) 8 mg Cap capsule, Take 1 capsule (8 mg total) by mouth once daily., Disp: 90 capsule, Rfl: 0    sulfaSALAzine (AZULFIDINE) 500 mg Tab, Take 1 tablet (500 mg total) by mouth 2 (two) times daily., Disp: 180 tablet, Rfl: 1    tadalafiL (CIALIS) 10 MG tablet, Take 2 tablets (20 mg total) by mouth daily as needed (ED)., Disp: 60 tablet, Rfl: 1  [2]   Social History  Socioeconomic History    Marital status:    Occupational History    Occupation: Disbaled   Tobacco Use    Smoking status: Former     Types: Cigars     Quit date:      Years since quittin.2    Smokeless tobacco: Never   Substance and Sexual Activity    Alcohol use: Not Currently     Comment: beer wine liqour occasionally    Drug use: Yes     Types: Hydrocodone, Oxycodone    Sexual activity: Yes     Partners: Female     Social Drivers of Health     Financial Resource Strain: Low Risk  (2025)    Overall Financial Resource Strain (CARDIA)     Difficulty of Paying Living Expenses: Not very hard   Food Insecurity: No Food Insecurity (2025)    Hunger Vital Sign     Worried About Running Out of Food in the Last Year: Never true     Ran Out of Food in the Last Year: Never true   Transportation Needs: No Transportation Needs (2025)    PRAPARE - Transportation     Lack of Transportation (Medical): No     Lack of Transportation  (Non-Medical): No   Physical Activity: Insufficiently Active (2/13/2025)    Exercise Vital Sign     Days of Exercise per Week: 2 days     Minutes of Exercise per Session: 20 min   Stress: No Stress Concern Present (2/13/2025)    [employer] - Occupational Stress Questionnaire     Feeling of Stress : Not at all   Housing Stability: Low Risk  (2/13/2025)    Housing Stability Vital Sign     Unable to Pay for Housing in the Last Year: No     Number of Times Moved in the Last Year: 0     Homeless in the Last Year: No

## 2025-03-31 DIAGNOSIS — N18.32 CHRONIC KIDNEY DISEASE, STAGE 3B: ICD-10-CM

## 2025-04-01 RX ORDER — FERROUS SULFATE 325(65) MG
TABLET ORAL
Qty: 90 TABLET | Refills: 1 | Status: SHIPPED | OUTPATIENT
Start: 2025-04-01

## 2025-05-07 ENCOUNTER — TELEPHONE (OUTPATIENT)
Dept: CARDIOLOGY | Facility: CLINIC | Age: 70
End: 2025-05-07
Payer: MEDICARE

## 2025-05-07 NOTE — TELEPHONE ENCOUNTER
Spoke with Jaelyn on today, she informed me that the patient is having spine surgery and needs a clearance. Informed Jaelyn that they would have to fax over a form with instructions and the procedure as  requeted. Jaelyn verbalized understanding and informed me that she will fax it over. I verbalized understanding.----- Message from Becky sent at 5/6/2025  2:02 PM CDT -----  Regarding: Surgery Clearance  Who Called: Jaelyn with Dr Palmer's officeShe called to request a cardiac clearance for this patient's surgery on May 28thFax: [phone]'s Preferred Phone Number on File: [phone]

## 2025-05-15 ENCOUNTER — APPOINTMENT (OUTPATIENT)
Dept: RADIOLOGY | Facility: CLINIC | Age: 70
End: 2025-05-15
Attending: STUDENT IN AN ORGANIZED HEALTH CARE EDUCATION/TRAINING PROGRAM
Payer: MEDICARE

## 2025-05-15 ENCOUNTER — OFFICE VISIT (OUTPATIENT)
Dept: FAMILY MEDICINE | Facility: CLINIC | Age: 70
End: 2025-05-15
Payer: MEDICARE

## 2025-05-15 VITALS
TEMPERATURE: 99 F | RESPIRATION RATE: 20 BRPM | BODY MASS INDEX: 27.13 KG/M2 | HEIGHT: 74 IN | DIASTOLIC BLOOD PRESSURE: 82 MMHG | WEIGHT: 211.38 LBS | HEART RATE: 100 BPM | SYSTOLIC BLOOD PRESSURE: 136 MMHG | OXYGEN SATURATION: 95 %

## 2025-05-15 DIAGNOSIS — N18.9 CHRONIC KIDNEY DISEASE, UNSPECIFIED CKD STAGE: ICD-10-CM

## 2025-05-15 DIAGNOSIS — Z01.818 PREOPERATIVE CLEARANCE: ICD-10-CM

## 2025-05-15 DIAGNOSIS — I10 PRIMARY HYPERTENSION: ICD-10-CM

## 2025-05-15 DIAGNOSIS — E89.0 POSTOPERATIVE HYPOTHYROIDISM: ICD-10-CM

## 2025-05-15 DIAGNOSIS — I50.42 CHRONIC COMBINED SYSTOLIC AND DIASTOLIC HEART FAILURE: ICD-10-CM

## 2025-05-15 DIAGNOSIS — N18.32 STAGE 3B CHRONIC KIDNEY DISEASE: ICD-10-CM

## 2025-05-15 DIAGNOSIS — Z01.818 PREOPERATIVE CLEARANCE: Primary | ICD-10-CM

## 2025-05-15 DIAGNOSIS — Z13.1 SCREENING FOR DIABETES MELLITUS: ICD-10-CM

## 2025-05-15 LAB
ALBUMIN SERPL BCP-MCNC: 3.9 G/DL (ref 3.4–4.8)
ALBUMIN/GLOB SERPL: 1.1 {RATIO}
ALP SERPL-CCNC: 88 U/L (ref 40–150)
ALT SERPL W P-5'-P-CCNC: 27 U/L
ANION GAP SERPL CALCULATED.3IONS-SCNC: 15 MMOL/L (ref 7–16)
AST SERPL W P-5'-P-CCNC: 52 U/L (ref 11–45)
BASOPHILS # BLD AUTO: 0.07 K/UL (ref 0–0.2)
BASOPHILS NFR BLD AUTO: 1.5 % (ref 0–1)
BILIRUB SERPL-MCNC: 0.2 MG/DL
BILIRUB UR QL STRIP: NEGATIVE
BUN SERPL-MCNC: 19 MG/DL (ref 8–26)
BUN/CREAT SERPL: 11 (ref 6–20)
CALCIUM SERPL-MCNC: 8.8 MG/DL (ref 8.8–10)
CHLORIDE SERPL-SCNC: 104 MMOL/L (ref 98–107)
CLARITY UR: CLEAR
CO2 SERPL-SCNC: 24 MMOL/L (ref 23–31)
COLOR UR: YELLOW
CREAT SERPL-MCNC: 1.71 MG/DL (ref 0.72–1.25)
DIFFERENTIAL METHOD BLD: ABNORMAL
EGFR (NO RACE VARIABLE) (RUSH/TITUS): 43 ML/MIN/1.73M2
EOSINOPHIL # BLD AUTO: 0.24 K/UL (ref 0–0.5)
EOSINOPHIL NFR BLD AUTO: 5 % (ref 1–4)
ERYTHROCYTE [DISTWIDTH] IN BLOOD BY AUTOMATED COUNT: 13.5 % (ref 11.5–14.5)
EST. AVERAGE GLUCOSE BLD GHB EST-MCNC: 91 MG/DL
GLOBULIN SER-MCNC: 3.4 G/DL (ref 2–4)
GLUCOSE SERPL-MCNC: 101 MG/DL (ref 82–115)
GLUCOSE UR STRIP-MCNC: NORMAL MG/DL
HBA1C MFR BLD HPLC: 4.8 %
HCT VFR BLD AUTO: 38.7 % (ref 40–54)
HCV AB SER QL: NORMAL
HGB BLD-MCNC: 11.8 G/DL (ref 13.5–18)
HIV 1+O+2 AB SERPL QL: NORMAL
IMM GRANULOCYTES # BLD AUTO: 0.02 K/UL (ref 0–0.04)
IMM GRANULOCYTES NFR BLD: 0.4 % (ref 0–0.4)
KETONES UR STRIP-SCNC: NEGATIVE MG/DL
LEUKOCYTE ESTERASE UR QL STRIP: NEGATIVE
LYMPHOCYTES # BLD AUTO: 1.81 K/UL (ref 1–4.8)
LYMPHOCYTES NFR BLD AUTO: 37.8 % (ref 27–41)
MCH RBC QN AUTO: 29.1 PG (ref 27–31)
MCHC RBC AUTO-ENTMCNC: 30.5 G/DL (ref 32–36)
MCV RBC AUTO: 95.6 FL (ref 80–96)
MONOCYTES # BLD AUTO: 0.49 K/UL (ref 0–0.8)
MONOCYTES NFR BLD AUTO: 10.2 % (ref 2–6)
MPC BLD CALC-MCNC: 10.9 FL (ref 9.4–12.4)
NEUTROPHILS # BLD AUTO: 2.16 K/UL (ref 1.8–7.7)
NEUTROPHILS NFR BLD AUTO: 45.1 % (ref 53–65)
NITRITE UR QL STRIP: NEGATIVE
NRBC # BLD AUTO: 0 X10E3/UL
NRBC, AUTO (.00): 0 %
PH UR STRIP: 5.5 PH UNITS
PLATELET # BLD AUTO: 312 K/UL (ref 150–400)
POTASSIUM SERPL-SCNC: 4.5 MMOL/L (ref 3.5–5.1)
PROT SERPL-MCNC: 7.3 G/DL (ref 5.8–7.6)
PROT UR QL STRIP: NEGATIVE
RBC # BLD AUTO: 4.05 M/UL (ref 4.6–6.2)
RBC # UR STRIP: NEGATIVE /UL
SODIUM SERPL-SCNC: 138 MMOL/L (ref 136–145)
SP GR UR STRIP: 1.02
TSH SERPL DL<=0.005 MIU/L-ACNC: 2.26 UIU/ML (ref 0.35–4.94)
UROBILINOGEN UR STRIP-ACNC: NORMAL MG/DL
WBC # BLD AUTO: 4.79 K/UL (ref 4.5–11)

## 2025-05-15 PROCEDURE — 80053 COMPREHEN METABOLIC PANEL: CPT | Mod: ,,, | Performed by: CLINICAL MEDICAL LABORATORY

## 2025-05-15 PROCEDURE — 3079F DIAST BP 80-89 MM HG: CPT | Mod: ,,, | Performed by: STUDENT IN AN ORGANIZED HEALTH CARE EDUCATION/TRAINING PROGRAM

## 2025-05-15 PROCEDURE — 83036 HEMOGLOBIN GLYCOSYLATED A1C: CPT | Mod: ,,, | Performed by: CLINICAL MEDICAL LABORATORY

## 2025-05-15 PROCEDURE — 85025 COMPLETE CBC W/AUTO DIFF WBC: CPT | Mod: ,,, | Performed by: CLINICAL MEDICAL LABORATORY

## 2025-05-15 PROCEDURE — 81003 URINALYSIS AUTO W/O SCOPE: CPT | Mod: QW,,, | Performed by: CLINICAL MEDICAL LABORATORY

## 2025-05-15 PROCEDURE — 99214 OFFICE O/P EST MOD 30 MIN: CPT | Mod: ,,, | Performed by: STUDENT IN AN ORGANIZED HEALTH CARE EDUCATION/TRAINING PROGRAM

## 2025-05-15 PROCEDURE — 86803 HEPATITIS C AB TEST: CPT | Mod: ,,, | Performed by: CLINICAL MEDICAL LABORATORY

## 2025-05-15 PROCEDURE — 3008F BODY MASS INDEX DOCD: CPT | Mod: ,,, | Performed by: STUDENT IN AN ORGANIZED HEALTH CARE EDUCATION/TRAINING PROGRAM

## 2025-05-15 PROCEDURE — 84443 ASSAY THYROID STIM HORMONE: CPT | Mod: ,,, | Performed by: CLINICAL MEDICAL LABORATORY

## 2025-05-15 PROCEDURE — 1100F PTFALLS ASSESS-DOCD GE2>/YR: CPT | Mod: ,,, | Performed by: STUDENT IN AN ORGANIZED HEALTH CARE EDUCATION/TRAINING PROGRAM

## 2025-05-15 PROCEDURE — 3288F FALL RISK ASSESSMENT DOCD: CPT | Mod: ,,, | Performed by: STUDENT IN AN ORGANIZED HEALTH CARE EDUCATION/TRAINING PROGRAM

## 2025-05-15 PROCEDURE — 4010F ACE/ARB THERAPY RXD/TAKEN: CPT | Mod: ,,, | Performed by: STUDENT IN AN ORGANIZED HEALTH CARE EDUCATION/TRAINING PROGRAM

## 2025-05-15 PROCEDURE — 71046 X-RAY EXAM CHEST 2 VIEWS: CPT | Mod: TC,RHCUB,FY | Performed by: STUDENT IN AN ORGANIZED HEALTH CARE EDUCATION/TRAINING PROGRAM

## 2025-05-15 PROCEDURE — 3075F SYST BP GE 130 - 139MM HG: CPT | Mod: ,,, | Performed by: STUDENT IN AN ORGANIZED HEALTH CARE EDUCATION/TRAINING PROGRAM

## 2025-05-15 PROCEDURE — 3044F HG A1C LEVEL LT 7.0%: CPT | Mod: ,,, | Performed by: STUDENT IN AN ORGANIZED HEALTH CARE EDUCATION/TRAINING PROGRAM

## 2025-05-15 PROCEDURE — 87389 HIV-1 AG W/HIV-1&-2 AB AG IA: CPT | Mod: ,,, | Performed by: CLINICAL MEDICAL LABORATORY

## 2025-05-15 PROCEDURE — 93005 ELECTROCARDIOGRAM TRACING: CPT | Mod: ,,, | Performed by: STUDENT IN AN ORGANIZED HEALTH CARE EDUCATION/TRAINING PROGRAM

## 2025-05-15 PROCEDURE — 71046 X-RAY EXAM CHEST 2 VIEWS: CPT | Mod: 26,,, | Performed by: RADIOLOGY

## 2025-05-15 PROCEDURE — 1125F AMNT PAIN NOTED PAIN PRSNT: CPT | Mod: ,,, | Performed by: STUDENT IN AN ORGANIZED HEALTH CARE EDUCATION/TRAINING PROGRAM

## 2025-05-15 PROCEDURE — 93010 ELECTROCARDIOGRAM REPORT: CPT | Mod: ,,, | Performed by: STUDENT IN AN ORGANIZED HEALTH CARE EDUCATION/TRAINING PROGRAM

## 2025-05-15 RX ORDER — LEVOTHYROXINE SODIUM 150 UG/1
150 TABLET ORAL
Qty: 90 TABLET | Refills: 1 | Status: CANCELLED | OUTPATIENT
Start: 2025-05-15 | End: 2026-05-15

## 2025-05-15 RX ORDER — ANASTROZOLE 1 MG/1
1 TABLET ORAL
Qty: 24 TABLET | Refills: 0 | Status: CANCELLED | OUTPATIENT
Start: 2025-05-15

## 2025-05-15 RX ORDER — PROMETHAZINE HYDROCHLORIDE 25 MG/1
25 TABLET ORAL 2 TIMES DAILY PRN
Qty: 90 TABLET | Refills: 1 | Status: CANCELLED | OUTPATIENT
Start: 2025-05-15

## 2025-05-15 RX ORDER — MONTELUKAST SODIUM 10 MG/1
10 TABLET ORAL NIGHTLY
Qty: 90 TABLET | Refills: 0 | Status: CANCELLED | OUTPATIENT
Start: 2025-05-15

## 2025-05-15 RX ORDER — FUROSEMIDE 20 MG/1
20 TABLET ORAL DAILY
Qty: 90 TABLET | Refills: 1 | Status: CANCELLED | OUTPATIENT
Start: 2025-05-15

## 2025-05-15 RX ORDER — FLUTICASONE PROPIONATE 50 MCG
1 SPRAY, SUSPENSION (ML) NASAL DAILY
Qty: 15.8 ML | Refills: 11 | Status: CANCELLED | OUTPATIENT
Start: 2025-05-15

## 2025-05-15 RX ORDER — LISINOPRIL 10 MG/1
10 TABLET ORAL DAILY
Qty: 90 TABLET | Refills: 1 | Status: CANCELLED | OUTPATIENT
Start: 2025-05-15

## 2025-05-15 RX ORDER — SILODOSIN 8 MG/1
8 CAPSULE ORAL DAILY
Qty: 90 CAPSULE | Refills: 0 | Status: CANCELLED | OUTPATIENT
Start: 2025-05-15

## 2025-05-15 RX ORDER — METHOCARBAMOL 500 MG/1
500 TABLET, FILM COATED ORAL 2 TIMES DAILY
Qty: 180 TABLET | Refills: 0 | Status: CANCELLED | OUTPATIENT
Start: 2025-05-15

## 2025-05-15 RX ORDER — PRAVASTATIN SODIUM 40 MG/1
40 TABLET ORAL DAILY
Qty: 90 TABLET | Refills: 0 | Status: CANCELLED | OUTPATIENT
Start: 2025-05-15

## 2025-05-15 RX ORDER — PREGABALIN 225 MG/1
225 CAPSULE ORAL 3 TIMES DAILY
Qty: 270 CAPSULE | Refills: 3 | Status: CANCELLED | OUTPATIENT
Start: 2025-05-15

## 2025-05-15 RX ORDER — TADALAFIL 10 MG/1
20 TABLET ORAL DAILY PRN
Qty: 60 TABLET | Refills: 1 | Status: CANCELLED | OUTPATIENT
Start: 2025-05-15

## 2025-05-15 RX ORDER — FERROUS SULFATE 325(65) MG
325 TABLET ORAL DAILY
Qty: 90 TABLET | Refills: 1 | Status: CANCELLED | OUTPATIENT
Start: 2025-05-15

## 2025-05-15 RX ORDER — OMEPRAZOLE 40 MG/1
40 CAPSULE, DELAYED RELEASE ORAL DAILY
Qty: 90 CAPSULE | Refills: 0 | Status: CANCELLED | OUTPATIENT
Start: 2025-05-15

## 2025-05-15 NOTE — PROGRESS NOTES
Progress Note     CRISTI GAMBOA MD   [address]  [place] [address]     PATIENT NAME: Niranjan Whittaker Jr.  : 1955  DATE: 5/15/25  MRN: [id number]      Billing Provider: CRISTI GAMBOA MD  Level of Service:   Patient PCP Information       Provider PCP Type    CRISTI GAMBOA MD General                surgery clearence, refills , and Health Maintenance (There are no preventive care reminders to display for this patient./)      SUBJECTIVE:     Niranjan Whittaker Jr. is a 70 y.o.male who presents to clinic for surgery clearence, refills , and Health Maintenance (There are no preventive care reminders to display for this patient./)    Patient presents to clinic today for preoperative clearance.  Patient last had surgery approximately 3 years ago.  Patient tolerated the surgery without difficulty.  Patient states he has never had any kind of reaction to anesthesia in the past.  Patient notes that that he routinely tolerates and seizure well but they did have some difficulty waking him up after his last surgery.  Patient denies any personal or family history of bleeding disorders.  Patient denies any chest pain or shortness of breath.  Patient has smoked many years ago but does not smoke currently.  He has never been diagnosed with COPD.  Patient does have a cardiac history and is followed by Cardiology and reports he has been cleared by Cardiology for this particular surgery.  He states that his other specialists including pain management, Nephrology, and Rheumatology are aware that he is having this surgery.      DASI Score of 37.7, METS 7.37.  Physical activity is limited by pain.  Perioperative mortality risk 0.1-0.6%.     All other pertinent review of systems negative. Please see HPI for details.     Past Medical History:  has a past medical history of History of colon polyps (2022), History of GI diverticular bleed, Hyperlipidemia,  "Hypertension, Liver hemangioma (05/09/2023), Neuropathy, Personal history of gastric ulcer, Seronegative rheumatoid arthritis (09/22/2021), and Thyroid disease.   Past Surgical History:  has a past surgical history that includes Back surgery; Thyroidectomy (N/A, 08/27/2021); and Total knee arthroplasty (Right).  Family History: family history includes Heart disease in his mother; Hypertension in his father, mother, and sister.  Social History:  reports that he quit smoking about 5 years ago. His smoking use included cigars. He has never used smokeless tobacco. He reports that he does not currently use alcohol. He reports current drug use. Drugs: Hydrocodone and Oxycodone.  Allergies: Review of patient's allergies indicates:  No Known Allergies    Current Medications[1]   OBJECTIVE:     Vital Signs   /82 (BP Location: Left arm, Patient Position: Sitting)   Pulse 100   Temp 98.7 °F (37.1 °C) (Oral)   Resp 20   Ht 6' 2" (1.88 m)   Wt 95.9 kg (211 lb 6.4 oz)   SpO2 95%   BMI 27.14 kg/m²     Physical Exam  Constitutional:       General: He is not in acute distress.     Appearance: Normal appearance. He is not ill-appearing.   HENT:      Head: Normocephalic and atraumatic.   Eyes:      Extraocular Movements: Extraocular movements intact.      Pupils: Pupils are equal, round, and reactive to light.   Cardiovascular:      Rate and Rhythm: Normal rate and regular rhythm.      Heart sounds: No murmur heard.     No friction rub. No gallop.   Pulmonary:      Effort: Pulmonary effort is normal. No respiratory distress.      Breath sounds: Normal breath sounds. No wheezing, rhonchi or rales.   Abdominal:      Palpations: Abdomen is soft.      Tenderness: There is no abdominal tenderness. There is no guarding or rebound.   Musculoskeletal:         General: Normal range of motion.      Right lower leg: No edema.      Left lower leg: No edema.   Skin:     General: Skin is warm and dry.      Capillary Refill: Capillary " refill takes less than 2 seconds.   Neurological:      General: No focal deficit present.      Mental Status: He is alert.   Psychiatric:         Mood and Affect: Mood normal.         Behavior: Behavior normal.        Latest Reference Range & Units 05/15/25 15:50   WBC 4.50 - 11.00 K/uL 4.79   RBC 4.60 - 6.20 M/uL 4.05 (L)   Hemoglobin 13.5 - 18.0 g/dL 11.8 (L)   Hematocrit 40.0 - 54.0 % 38.7 (L)   MCV 80.0 - 96.0 fL 95.6   MCH 27.0 - 31.0 pg 29.1   MCHC 32.0 - 36.0 g/dL 30.5 (L)   RDW 11.5 - 14.5 % 13.5   Platelet Count 150 - 400 K/uL 312   MPV 9.4 - 12.4 fL 10.9   Neutrophils Relative 53.0 - 65.0 % 45.1 (L)   Lymph % 27.0 - 41.0 % 37.8   Mono % 2.0 - 6.0 % 10.2 (H)   Eos % 1.0 - 4.0 % 5.0 (H)   Basophil % 0.0 - 1.0 % 1.5 (H)   Immature Granulocytes 0.0 - 0.4 % 0.4   Neutrophils, Abs 1.80 - 7.70 K/uL 2.16   Lymph # 1.00 - 4.80 K/uL 1.81   Mono # 0.00 - 0.80 K/uL 0.49   Eos # 0.00 - 0.50 K/uL 0.24   Baso # 0.00 - 0.20 K/uL 0.07   Immature Grans (Abs) 0.00 - 0.04 K/uL 0.02   nRBC <=0.0 % 0.0   NUCLEATED RBC ABSOLUTE <=0.00 x10e3/uL 0.00   Differential Method  Auto   PT 11.7 - 14.7 seconds 12.0   INR <=3.30  0.87   PTT 25.2 - 37.3 seconds 29.5   Sodium 136 - 145 mmol/L 138   Potassium 3.5 - 5.1 mmol/L 4.5   Chloride 98 - 107 mmol/L 104   CO2 23 - 31 mmol/L 24   Anion Gap 7 - 16 mmol/L 15   BUN 8 - 26 mg/dL 19   Creatinine 0.72 - 1.25 mg/dL 1.71 (H)   BUN/CREAT RATIO 6 - 20  11   eGFR >=60 mL/min/1.73m2 43 (L)   Glucose 82 - 115 mg/dL 101   Calcium 8.8 - 10.0 mg/dL 8.8   ALP 40 - 150 U/L 88   PROTEIN TOTAL 5.8 - 7.6 g/dL 7.3   Albumin 3.4 - 4.8 g/dL 3.9   Albumin/Globulin Ratio  1.1   BILIRUBIN TOTAL <=1.5 mg/dL 0.2   AST 11 - 45 U/L 52 (H)   ALT <=55 U/L 27   Globulin, Total 2.0 - 4.0 g/dL 3.4   Hemoglobin A1C External <=7.0 % 4.8   Estimated Avg Glucose mg/dL 91   TSH 0.350 - 4.940 uIU/mL 2.256   Hepatitis C Ab Non-Reactive  Non-Reactive   HIV 1/2 Ag/Ab Non-Reactive  Non-Reactive   (L): Data is abnormally low  (H):  Data is abnormally high    EXAMINATION:  XR CHEST PA AND LATERAL     CLINICAL HISTORY:  Encounter for other preprocedural examination     TECHNIQUE:  PA and lateral chest     COMPARISON:  10/12/2016, 11/28/2016, 04/17/2023, 06/30/2023, and 07/25/2023     FINDINGS:  The cardiac size is normal.  There is mild hyperinflation but no infiltrates or effusions are seen.  Postsurgical changes are partially visualized in the cervical spine.  Mild degenerative changes are present in the thoracic spine.     Impression:     No evidence of acute disease.     Place of service: [facility]        Electronically signed by:Aislinn Harry  Date:                                            05/15/2025  Time:                                           17:19    ECG: Sinus rhythm     ASSESSMENT/PLAN:     1. Preoperative clearance  -     CBC Auto Differential; Future; Expected date: 05/15/2025  -     Comprehensive Metabolic Panel; Future; Expected date: 05/15/2025  -     PT and PTT; Future; Expected date: 05/15/2025  -     Hemoglobin A1C; Future; Expected date: 05/15/2025  -     Hepatitis C Antibody; Future; Expected date: 05/15/2025  -     HIV 1/2 Ag/Ab (4th Gen); Future; Expected date: 05/15/2025  -     X-Ray Chest PA And Lateral; Future; Expected date: 05/15/2025  -     POCT EKG 12-LEAD (Manually Resulted by Ordering Provider)  -     Urinalysis, Reflex to Urine Culture    2. Postoperative hypothyroidism  Overview:  Patient is status post thyroidectomy secondary to thyroid nodules.      Orders:  -     PT and PTT; Future; Expected date: 05/15/2025  -     TSH; Future; Expected date: 05/15/2025    3. Chronic kidney disease, unspecified CKD stage  -     PT and PTT; Future; Expected date: 05/15/2025    4. Screening for diabetes mellitus  -     Hemoglobin A1C; Future; Expected date: 05/15/2025    5. Chronic combined systolic and diastolic heart failure    6. Primary hypertension    7. Stage 3b chronic kidney disease    Patient  presenting to clinic today for preoperative evaluation.  Patient with various comorbidities that are currently well-controlled.  Patient has heart disease and is followed by Cardiology.  Patient will need Cardiology clearance prior to surgery. DASI Score of 37.7, METS 7.37.  Physical activity is limited by pain.  Perioperative mortality risk 0.1-0.6%.  Patient with CKD.  CKD stable.  Patient is followed by Nephrology and they are aware of upcoming procedure per patient report.  His hypothyroidism is well-controlled.  Patient does have anemia but it is stable.  Other blood work unremarkable. CXR without signs of acute disease. ECG with rapid sinus rhythm. Patient stable for surgery at this time pending cardiology clearance.    Follow up for Follow up next scheduled appointment.      CRISTI GAMBOA MD  05/21/2025    Due to voice recognition software, sound alike and misspelled words may be contained in the documentation.              [1]   Current Outpatient Medications:     anastrozole (ARIMIDEX) 1 mg Tab, Take 1 tablet (1 mg total) by mouth every 7 days., Disp: 24 tablet, Rfl: 0    cetirizine (ZYRTEC) 10 MG tablet, TAKE 1 TABLET BY MOUTH DAILY, Disp: 90 tablet, Rfl: 0    DULoxetine (CYMBALTA) 60 MG capsule, Take 1 capsule (60 mg total) by mouth 2 (two) times daily., Disp: 180 capsule, Rfl: 1    ergocalciferol, vitamin D2, (VITAMIN D ORAL), Take by mouth., Disp: , Rfl:     FEROSUL 325 mg (65 mg iron) Tab tablet, TAKE 1 TABLET BY MOUTH DAILY, Disp: 90 tablet, Rfl: 1    fluticasone propionate (FLONASE) 50 mcg/actuation nasal spray, 1 spray (50 mcg total) by Each Nostril route once daily., Disp: 15.8 mL, Rfl: 11    furosemide (LASIX) 20 MG tablet, Take 1 tablet (20 mg total) by mouth once daily., Disp: 90 tablet, Rfl: 1    HYDROcodone-acetaminophen (NORCO)  mg per tablet, Take 1 tablet by mouth every 4 (four) hours as needed for Pain., Disp: 15 tablet, Rfl: 0    hydroxychloroquine (PLAQUENIL) 200 mg  tablet, Take 1 tablet (200 mg total) by mouth 2 (two) times a day., Disp: 180 tablet, Rfl: 1    leflunomide (ARAVA) 20 MG Tab, Take 20 mg by mouth 3 (three) times a week., Disp: , Rfl:     levothyroxine (SYNTHROID) 150 MCG tablet, Take 1 tablet (150 mcg total) by mouth before breakfast., Disp: 90 tablet, Rfl: 1    lisinopriL 10 MG tablet, Take 1 tablet (10 mg total) by mouth once daily., Disp: 90 tablet, Rfl: 1    methocarbamoL (ROBAXIN) 500 MG Tab, Take 1 tablet (500 mg total) by mouth 2 (two) times daily., Disp: 180 tablet, Rfl: 0    montelukast (SINGULAIR) 10 mg tablet, Take 1 tablet (10 mg total) by mouth every evening., Disp: 90 tablet, Rfl: 0    multivitamin-minerals-lutein Tab, Take 1 tablet by mouth once daily., Disp: , Rfl:     omeprazole (PRILOSEC) 40 MG capsule, Take 1 capsule (40 mg total) by mouth once daily., Disp: 90 capsule, Rfl: 0    oxyCODONE (OXYCONTIN) 30 mg TR12 12 hr tablet, Take 1 tablet (30 mg total) by mouth 2 (two) times daily as needed., Disp: 60 tablet, Rfl: 0    polyethylene glycol 3350 (MIRALAX ORAL), Take by mouth., Disp: , Rfl:     pravastatin (PRAVACHOL) 40 MG tablet, Take 1 tablet (40 mg total) by mouth once daily., Disp: 90 tablet, Rfl: 0    pregabalin (LYRICA) 225 MG Cap, Take 1 capsule (225 mg total) by mouth 3 (three) times daily., Disp: 270 capsule, Rfl: 3    promethazine (PHENERGAN) 25 MG tablet, Take 1 tablet (25 mg total) by mouth 2 (two) times daily as needed for Nausea. 2 times daily prn headache no more then 2 days in a week, Disp: 90 tablet, Rfl: 1    silodosin (RAPAFLO) 8 mg Cap capsule, Take 1 capsule (8 mg total) by mouth once daily., Disp: 90 capsule, Rfl: 0    sulfaSALAzine (AZULFIDINE) 500 mg Tab, Take 1 tablet (500 mg total) by mouth 2 (two) times daily., Disp: 180 tablet, Rfl: 1    tadalafiL (CIALIS) 10 MG tablet, Take 2 tablets (20 mg total) by mouth daily as needed (ED)., Disp: 60 tablet, Rfl: 1

## 2025-05-16 LAB
APTT PPP: 29.5 SECONDS (ref 25.2–37.3)
INR BLD: 0.87
PROTHROMBIN TIME: 12 SECONDS (ref 11.7–14.7)

## 2025-05-20 ENCOUNTER — TELEPHONE (OUTPATIENT)
Dept: CARDIOLOGY | Facility: CLINIC | Age: 70
End: 2025-05-20
Payer: MEDICARE

## 2025-05-20 ENCOUNTER — RESULTS FOLLOW-UP (OUTPATIENT)
Dept: FAMILY MEDICINE | Facility: CLINIC | Age: 70
End: 2025-05-20

## 2025-05-20 NOTE — PROGRESS NOTES
Please let patient know that his kidney function is stable.  One of his liver enzymes is mildly elevated.  We will need a recheck that level in 3-6 months.  His blood count is stable.  His thyroid level is well-controlled.

## 2025-05-20 NOTE — TELEPHONE ENCOUNTER
Copied from CRM #[id number]. Topic: General Inquiry - Patient Advice  >> May 20, 2025 10:05 AM Rosaura wrote:  Who Called: Miranda from Dr Bradley Palmer's office - Niranjan Jean from Dr Bradley Palmer's office calling about cardiac clearance form that was faxed on 05/07/2025. Miranda would like an update due to patient being scheduled for surgery on 05/28/2025. Miranda can be reached at [phone] fax [phone].    Spoke with Miranda on today from Dr. Palmer's office, informed her that I have faxed over 's note with the clearance and that she should have it shortly. Miranda verbalized understanding.

## 2025-05-21 DIAGNOSIS — I10 PRIMARY HYPERTENSION: ICD-10-CM

## 2025-05-21 LAB
EKG 12-LEAD: NORMAL
PR INTERVAL: NORMAL
PRT AXES: NORMAL
QRS DURATION: 100
QT/QTC: NORMAL
VENTRICULAR RATE: 88

## 2025-05-21 RX ORDER — LISINOPRIL 10 MG/1
10 TABLET ORAL DAILY
Qty: 90 TABLET | Refills: 0 | Status: SHIPPED | OUTPATIENT
Start: 2025-05-21

## 2025-05-21 NOTE — TELEPHONE ENCOUNTER
Please advise. Pt contacted clinic stating that he needed a medication refill on Lisinopril. Verbalized understanding. No other questions or concerns stated.

## 2025-05-28 ENCOUNTER — ANESTHESIA EVENT (OUTPATIENT)
Dept: SURGERY | Facility: HOSPITAL | Age: 70
DRG: 029 | End: 2025-05-28
Payer: MEDICARE

## 2025-05-28 ENCOUNTER — HOSPITAL ENCOUNTER (INPATIENT)
Facility: HOSPITAL | Age: 70
LOS: 1 days | Discharge: HOME HEALTH | DRG: 473 | End: 2025-05-29
Attending: ORTHOPAEDIC SURGERY | Admitting: ORTHOPAEDIC SURGERY
Payer: MEDICARE

## 2025-05-28 ENCOUNTER — ANESTHESIA (OUTPATIENT)
Dept: SURGERY | Facility: HOSPITAL | Age: 70
DRG: 029 | End: 2025-05-28
Payer: MEDICARE

## 2025-05-28 DIAGNOSIS — M54.12 CERVICAL RADICULOPATHY: ICD-10-CM

## 2025-05-28 PROCEDURE — 99223 1ST HOSP IP/OBS HIGH 75: CPT | Mod: ,,, | Performed by: HOSPITALIST

## 2025-05-28 PROCEDURE — 37000008 HC ANESTHESIA 1ST 15 MINUTES: Performed by: ORTHOPAEDIC SURGERY

## 2025-05-28 PROCEDURE — 27800903 OPTIME MED/SURG SUP & DEVICES OTHER IMPLANTS: Performed by: ORTHOPAEDIC SURGERY

## 2025-05-28 PROCEDURE — 11000001 HC ACUTE MED/SURG PRIVATE ROOM

## 2025-05-28 PROCEDURE — 00NW0ZZ RELEASE CERVICAL SPINAL CORD, OPEN APPROACH: ICD-10-PCS | Performed by: ORTHOPAEDIC SURGERY

## 2025-05-28 PROCEDURE — 25000242 PHARM REV CODE 250 ALT 637 W/ HCPCS: Performed by: ORTHOPAEDIC SURGERY

## 2025-05-28 PROCEDURE — 63600175 PHARM REV CODE 636 W HCPCS: Performed by: ORTHOPAEDIC SURGERY

## 2025-05-28 PROCEDURE — 94761 N-INVAS EAR/PLS OXIMETRY MLT: CPT

## 2025-05-28 PROCEDURE — 25000003 PHARM REV CODE 250: Performed by: ORTHOPAEDIC SURGERY

## 2025-05-28 PROCEDURE — 25000003 PHARM REV CODE 250: Performed by: NURSE ANESTHETIST, CERTIFIED REGISTERED

## 2025-05-28 PROCEDURE — 63600175 PHARM REV CODE 636 W HCPCS: Performed by: NURSE ANESTHETIST, CERTIFIED REGISTERED

## 2025-05-28 PROCEDURE — 01N10ZZ RELEASE CERVICAL NERVE, OPEN APPROACH: ICD-10-PCS | Performed by: ORTHOPAEDIC SURGERY

## 2025-05-28 PROCEDURE — P9045 ALBUMIN (HUMAN), 5%, 250 ML: HCPCS | Mod: JZ,TB | Performed by: NURSE ANESTHETIST, CERTIFIED REGISTERED

## 2025-05-28 PROCEDURE — 97165 OT EVAL LOW COMPLEX 30 MIN: CPT

## 2025-05-28 PROCEDURE — 94799 UNLISTED PULMONARY SVC/PX: CPT

## 2025-05-28 PROCEDURE — 37000009 HC ANESTHESIA EA ADD 15 MINS: Performed by: ORTHOPAEDIC SURGERY

## 2025-05-28 PROCEDURE — 27201423 OPTIME MED/SURG SUP & DEVICES STERILE SUPPLY: Performed by: ORTHOPAEDIC SURGERY

## 2025-05-28 PROCEDURE — 71000033 HC RECOVERY, INTIAL HOUR: Performed by: ORTHOPAEDIC SURGERY

## 2025-05-28 PROCEDURE — 97530 THERAPEUTIC ACTIVITIES: CPT

## 2025-05-28 PROCEDURE — G0378 HOSPITAL OBSERVATION PER HR: HCPCS

## 2025-05-28 PROCEDURE — C1713 ANCHOR/SCREW BN/BN,TIS/BN: HCPCS | Performed by: ORTHOPAEDIC SURGERY

## 2025-05-28 PROCEDURE — 97161 PT EVAL LOW COMPLEX 20 MIN: CPT

## 2025-05-28 PROCEDURE — 0RG10A0 FUSION OF CERVICAL VERTEBRAL JOINT WITH INTERBODY FUSION DEVICE, ANTERIOR APPROACH, ANTERIOR COLUMN, OPEN APPROACH: ICD-10-PCS | Performed by: ORTHOPAEDIC SURGERY

## 2025-05-28 PROCEDURE — 36000711: Performed by: ORTHOPAEDIC SURGERY

## 2025-05-28 PROCEDURE — 36000710: Performed by: ORTHOPAEDIC SURGERY

## 2025-05-28 PROCEDURE — 99900035 HC TECH TIME PER 15 MIN (STAT)

## 2025-05-28 PROCEDURE — 0RT30ZZ RESECTION OF CERVICAL VERTEBRAL DISC, OPEN APPROACH: ICD-10-PCS | Performed by: ORTHOPAEDIC SURGERY

## 2025-05-28 RX ORDER — PROPOFOL 10 MG/ML
VIAL (ML) INTRAVENOUS
Status: DISCONTINUED | OUTPATIENT
Start: 2025-05-28 | End: 2025-05-28

## 2025-05-28 RX ORDER — ONDANSETRON HYDROCHLORIDE 2 MG/ML
4 INJECTION, SOLUTION INTRAVENOUS EVERY 12 HOURS PRN
Status: DISCONTINUED | OUTPATIENT
Start: 2025-05-28 | End: 2025-05-29 | Stop reason: HOSPADM

## 2025-05-28 RX ORDER — METHOCARBAMOL 500 MG/1
500 TABLET, FILM COATED ORAL 2 TIMES DAILY
Status: DISCONTINUED | OUTPATIENT
Start: 2025-05-28 | End: 2025-05-29 | Stop reason: HOSPADM

## 2025-05-28 RX ORDER — AMOXICILLIN 250 MG
1 CAPSULE ORAL 2 TIMES DAILY
Status: DISCONTINUED | OUTPATIENT
Start: 2025-05-28 | End: 2025-05-29 | Stop reason: HOSPADM

## 2025-05-28 RX ORDER — FUROSEMIDE 20 MG/1
20 TABLET ORAL DAILY
Status: DISCONTINUED | OUTPATIENT
Start: 2025-05-28 | End: 2025-05-29 | Stop reason: HOSPADM

## 2025-05-28 RX ORDER — OXYCODONE HYDROCHLORIDE 5 MG/1
10 TABLET ORAL EVERY 4 HOURS PRN
Status: DISCONTINUED | OUTPATIENT
Start: 2025-05-28 | End: 2025-05-29 | Stop reason: HOSPADM

## 2025-05-28 RX ORDER — CALCIUM CHLORIDE INJECTION 100 MG/ML
INJECTION, SOLUTION INTRAVENOUS
Status: DISCONTINUED | OUTPATIENT
Start: 2025-05-28 | End: 2025-05-28

## 2025-05-28 RX ORDER — FENTANYL CITRATE 50 UG/ML
INJECTION, SOLUTION INTRAMUSCULAR; INTRAVENOUS
Status: DISCONTINUED | OUTPATIENT
Start: 2025-05-28 | End: 2025-05-28

## 2025-05-28 RX ORDER — LISINOPRIL 10 MG/1
10 TABLET ORAL DAILY
Status: DISCONTINUED | OUTPATIENT
Start: 2025-05-29 | End: 2025-05-29 | Stop reason: HOSPADM

## 2025-05-28 RX ORDER — POLYETHYLENE GLYCOL 3350 17 G/17G
17 POWDER, FOR SOLUTION ORAL DAILY
Status: DISCONTINUED | OUTPATIENT
Start: 2025-05-28 | End: 2025-05-29 | Stop reason: HOSPADM

## 2025-05-28 RX ORDER — PHENYLEPHRINE HYDROCHLORIDE 10 MG/ML
INJECTION INTRAVENOUS
Status: DISCONTINUED | OUTPATIENT
Start: 2025-05-28 | End: 2025-05-28

## 2025-05-28 RX ORDER — SODIUM CHLORIDE 9 MG/ML
INJECTION, SOLUTION INTRAVENOUS
Status: COMPLETED | OUTPATIENT
Start: 2025-05-28 | End: 2025-05-28

## 2025-05-28 RX ORDER — SODIUM CHLORIDE, SODIUM LACTATE, POTASSIUM CHLORIDE, CALCIUM CHLORIDE 600; 310; 30; 20 MG/100ML; MG/100ML; MG/100ML; MG/100ML
INJECTION, SOLUTION INTRAVENOUS CONTINUOUS PRN
Status: DISCONTINUED | OUTPATIENT
Start: 2025-05-28 | End: 2025-05-28

## 2025-05-28 RX ORDER — DEXAMETHASONE SODIUM PHOSPHATE 4 MG/ML
8 INJECTION, SOLUTION INTRA-ARTICULAR; INTRALESIONAL; INTRAMUSCULAR; INTRAVENOUS; SOFT TISSUE EVERY 6 HOURS
Status: COMPLETED | OUTPATIENT
Start: 2025-05-28 | End: 2025-05-29

## 2025-05-28 RX ORDER — MORPHINE SULFATE 10 MG/ML
4 INJECTION INTRAMUSCULAR; INTRAVENOUS; SUBCUTANEOUS EVERY 5 MIN PRN
Status: DISCONTINUED | OUTPATIENT
Start: 2025-05-28 | End: 2025-05-28 | Stop reason: HOSPADM

## 2025-05-28 RX ORDER — TRANEXAMIC ACID 1 G/10ML
INJECTION, SOLUTION INTRAVENOUS
Status: DISCONTINUED | OUTPATIENT
Start: 2025-05-28 | End: 2025-05-28

## 2025-05-28 RX ORDER — GABAPENTIN 300 MG/1
600 CAPSULE ORAL ONCE
Status: COMPLETED | OUTPATIENT
Start: 2025-05-28 | End: 2025-05-28

## 2025-05-28 RX ORDER — LIDOCAINE HYDROCHLORIDE 20 MG/ML
INJECTION, SOLUTION EPIDURAL; INFILTRATION; INTRACAUDAL; PERINEURAL
Status: DISCONTINUED | OUTPATIENT
Start: 2025-05-28 | End: 2025-05-28

## 2025-05-28 RX ORDER — EPINEPHRINE 1 MG/ML
INJECTION INTRAMUSCULAR; INTRAVENOUS; SUBCUTANEOUS
Status: DISCONTINUED | OUTPATIENT
Start: 2025-05-28 | End: 2025-05-28 | Stop reason: HOSPADM

## 2025-05-28 RX ORDER — DOCUSATE SODIUM 100 MG/1
100 CAPSULE, LIQUID FILLED ORAL 2 TIMES DAILY
Status: DISCONTINUED | OUTPATIENT
Start: 2025-05-28 | End: 2025-05-29 | Stop reason: HOSPADM

## 2025-05-28 RX ORDER — MUPIROCIN 20 MG/G
OINTMENT TOPICAL 2 TIMES DAILY
Status: DISCONTINUED | OUTPATIENT
Start: 2025-05-28 | End: 2025-05-29 | Stop reason: HOSPADM

## 2025-05-28 RX ORDER — FAMOTIDINE 20 MG/1
20 TABLET, FILM COATED ORAL 2 TIMES DAILY
Status: DISCONTINUED | OUTPATIENT
Start: 2025-05-28 | End: 2025-05-29 | Stop reason: HOSPADM

## 2025-05-28 RX ORDER — ALBUMIN HUMAN 50 G/1000ML
SOLUTION INTRAVENOUS
Status: DISCONTINUED | OUTPATIENT
Start: 2025-05-28 | End: 2025-05-28

## 2025-05-28 RX ORDER — ROCURONIUM BROMIDE 10 MG/ML
INJECTION, SOLUTION INTRAVENOUS
Status: DISCONTINUED | OUTPATIENT
Start: 2025-05-28 | End: 2025-05-28

## 2025-05-28 RX ORDER — DEXMEDETOMIDINE HYDROCHLORIDE 100 UG/ML
INJECTION, SOLUTION INTRAVENOUS
Status: DISCONTINUED | OUTPATIENT
Start: 2025-05-28 | End: 2025-05-28

## 2025-05-28 RX ORDER — GLYCOPYRROLATE 0.2 MG/ML
INJECTION INTRAMUSCULAR; INTRAVENOUS
Status: DISCONTINUED | OUTPATIENT
Start: 2025-05-28 | End: 2025-05-28

## 2025-05-28 RX ORDER — TAMSULOSIN HYDROCHLORIDE 0.4 MG/1
0.4 CAPSULE ORAL DAILY
Status: DISCONTINUED | OUTPATIENT
Start: 2025-05-28 | End: 2025-05-29 | Stop reason: HOSPADM

## 2025-05-28 RX ORDER — LANOLIN ALCOHOL/MO/W.PET/CERES
1 CREAM (GRAM) TOPICAL DAILY
Status: DISCONTINUED | OUTPATIENT
Start: 2025-05-28 | End: 2025-05-29 | Stop reason: HOSPADM

## 2025-05-28 RX ORDER — ANASTROZOLE 1 MG/1
1 TABLET ORAL
Status: DISCONTINUED | OUTPATIENT
Start: 2025-05-28 | End: 2025-05-29 | Stop reason: HOSPADM

## 2025-05-28 RX ORDER — VECURONIUM BROMIDE 1 MG/ML
INJECTION, POWDER, LYOPHILIZED, FOR SOLUTION INTRAVENOUS
Status: DISCONTINUED | OUTPATIENT
Start: 2025-05-28 | End: 2025-05-28

## 2025-05-28 RX ORDER — DEXAMETHASONE SODIUM PHOSPHATE 4 MG/ML
INJECTION, SOLUTION INTRA-ARTICULAR; INTRALESIONAL; INTRAMUSCULAR; INTRAVENOUS; SOFT TISSUE
Status: DISCONTINUED | OUTPATIENT
Start: 2025-05-28 | End: 2025-05-28

## 2025-05-28 RX ORDER — SULFASALAZINE 500 MG/1
500 TABLET ORAL 2 TIMES DAILY
Status: DISCONTINUED | OUTPATIENT
Start: 2025-05-28 | End: 2025-05-29 | Stop reason: HOSPADM

## 2025-05-28 RX ORDER — MEPERIDINE HYDROCHLORIDE 25 MG/ML
25 INJECTION INTRAMUSCULAR; INTRAVENOUS; SUBCUTANEOUS EVERY 10 MIN PRN
Status: DISCONTINUED | OUTPATIENT
Start: 2025-05-28 | End: 2025-05-28 | Stop reason: HOSPADM

## 2025-05-28 RX ORDER — DIPHENHYDRAMINE HYDROCHLORIDE 50 MG/ML
25 INJECTION, SOLUTION INTRAMUSCULAR; INTRAVENOUS EVERY 6 HOURS PRN
Status: DISCONTINUED | OUTPATIENT
Start: 2025-05-28 | End: 2025-05-28 | Stop reason: HOSPADM

## 2025-05-28 RX ORDER — PANTOPRAZOLE SODIUM 40 MG/1
40 TABLET, DELAYED RELEASE ORAL DAILY
Status: DISCONTINUED | OUTPATIENT
Start: 2025-05-28 | End: 2025-05-29 | Stop reason: HOSPADM

## 2025-05-28 RX ORDER — SODIUM CHLORIDE 0.9 % (FLUSH) 0.9 %
10 SYRINGE (ML) INJECTION
Status: DISCONTINUED | OUTPATIENT
Start: 2025-05-28 | End: 2025-05-29 | Stop reason: HOSPADM

## 2025-05-28 RX ORDER — SODIUM CHLORIDE 9 MG/ML
INJECTION, SOLUTION INTRAVENOUS CONTINUOUS
Status: DISCONTINUED | OUTPATIENT
Start: 2025-05-28 | End: 2025-05-28

## 2025-05-28 RX ORDER — LEVOTHYROXINE SODIUM 150 UG/1
150 TABLET ORAL
Status: DISCONTINUED | OUTPATIENT
Start: 2025-05-29 | End: 2025-05-29 | Stop reason: HOSPADM

## 2025-05-28 RX ORDER — LIDOCAINE HYDROCHLORIDE 10 MG/ML
1 INJECTION, SOLUTION INFILTRATION; PERINEURAL ONCE AS NEEDED
Status: DISCONTINUED | OUTPATIENT
Start: 2025-05-28 | End: 2025-05-28

## 2025-05-28 RX ORDER — MONTELUKAST SODIUM 10 MG/1
10 TABLET ORAL NIGHTLY
Status: DISCONTINUED | OUTPATIENT
Start: 2025-05-28 | End: 2025-05-29 | Stop reason: HOSPADM

## 2025-05-28 RX ORDER — EPHEDRINE SULFATE 50 MG/ML
INJECTION, SOLUTION INTRAVENOUS
Status: DISCONTINUED | OUTPATIENT
Start: 2025-05-28 | End: 2025-05-28

## 2025-05-28 RX ORDER — CETIRIZINE HYDROCHLORIDE 10 MG/1
10 TABLET ORAL DAILY
Status: DISCONTINUED | OUTPATIENT
Start: 2025-05-28 | End: 2025-05-29 | Stop reason: HOSPADM

## 2025-05-28 RX ORDER — PREGABALIN 75 MG/1
225 CAPSULE ORAL 3 TIMES DAILY
Status: DISCONTINUED | OUTPATIENT
Start: 2025-05-28 | End: 2025-05-29 | Stop reason: HOSPADM

## 2025-05-28 RX ORDER — MORPHINE SULFATE 2 MG/ML
2 INJECTION, SOLUTION INTRAMUSCULAR; INTRAVENOUS
Status: DISCONTINUED | OUTPATIENT
Start: 2025-05-28 | End: 2025-05-29 | Stop reason: HOSPADM

## 2025-05-28 RX ORDER — ACETAMINOPHEN 500 MG
500 TABLET ORAL EVERY 4 HOURS
Status: DISCONTINUED | OUTPATIENT
Start: 2025-05-28 | End: 2025-05-29 | Stop reason: HOSPADM

## 2025-05-28 RX ORDER — ONDANSETRON HYDROCHLORIDE 2 MG/ML
4 INJECTION, SOLUTION INTRAVENOUS DAILY PRN
Status: DISCONTINUED | OUTPATIENT
Start: 2025-05-28 | End: 2025-05-28 | Stop reason: HOSPADM

## 2025-05-28 RX ORDER — CELECOXIB 100 MG/1
200 CAPSULE ORAL ONCE
Status: COMPLETED | OUTPATIENT
Start: 2025-05-28 | End: 2025-05-28

## 2025-05-28 RX ORDER — ONDANSETRON HYDROCHLORIDE 2 MG/ML
INJECTION, SOLUTION INTRAVENOUS
Status: DISCONTINUED | OUTPATIENT
Start: 2025-05-28 | End: 2025-05-28

## 2025-05-28 RX ORDER — PRAVASTATIN SODIUM 40 MG/1
40 TABLET ORAL NIGHTLY
Status: DISCONTINUED | OUTPATIENT
Start: 2025-05-28 | End: 2025-05-29 | Stop reason: HOSPADM

## 2025-05-28 RX ORDER — FLUTICASONE PROPIONATE 50 MCG
1 SPRAY, SUSPENSION (ML) NASAL DAILY
Status: DISCONTINUED | OUTPATIENT
Start: 2025-05-28 | End: 2025-05-29 | Stop reason: HOSPADM

## 2025-05-28 RX ORDER — OXYCODONE HCL 10 MG/1
10 TABLET, FILM COATED, EXTENDED RELEASE ORAL ONCE
Refills: 0 | Status: COMPLETED | OUTPATIENT
Start: 2025-05-28 | End: 2025-05-28

## 2025-05-28 RX ORDER — METOCLOPRAMIDE HYDROCHLORIDE 5 MG/ML
5 INJECTION INTRAMUSCULAR; INTRAVENOUS EVERY 6 HOURS PRN
Status: DISCONTINUED | OUTPATIENT
Start: 2025-05-28 | End: 2025-05-29 | Stop reason: HOSPADM

## 2025-05-28 RX ORDER — HYDROMORPHONE HYDROCHLORIDE 2 MG/ML
0.5 INJECTION, SOLUTION INTRAMUSCULAR; INTRAVENOUS; SUBCUTANEOUS EVERY 5 MIN PRN
Status: DISCONTINUED | OUTPATIENT
Start: 2025-05-28 | End: 2025-05-28 | Stop reason: HOSPADM

## 2025-05-28 RX ORDER — DULOXETIN HYDROCHLORIDE 30 MG/1
60 CAPSULE, DELAYED RELEASE ORAL 2 TIMES DAILY
Status: DISCONTINUED | OUTPATIENT
Start: 2025-05-28 | End: 2025-05-29 | Stop reason: HOSPADM

## 2025-05-28 RX ORDER — CEFAZOLIN 2 G/1
2 INJECTION, POWDER, FOR SOLUTION INTRAMUSCULAR; INTRAVENOUS
Status: COMPLETED | OUTPATIENT
Start: 2025-05-28 | End: 2025-05-28

## 2025-05-28 RX ORDER — GLUCAGON 1 MG
1 KIT INJECTION
Status: DISCONTINUED | OUTPATIENT
Start: 2025-05-28 | End: 2025-05-28 | Stop reason: HOSPADM

## 2025-05-28 RX ORDER — OXYCODONE HYDROCHLORIDE 5 MG/1
5 TABLET ORAL
Status: DISCONTINUED | OUTPATIENT
Start: 2025-05-28 | End: 2025-05-29 | Stop reason: HOSPADM

## 2025-05-28 RX ORDER — VANCOMYCIN HYDROCHLORIDE 1 G/20ML
INJECTION, POWDER, LYOPHILIZED, FOR SOLUTION INTRAVENOUS
Status: DISCONTINUED | OUTPATIENT
Start: 2025-05-28 | End: 2025-05-28 | Stop reason: HOSPADM

## 2025-05-28 RX ADMIN — VECURONIUM BROMIDE 1 MG: 1 INJECTION, POWDER, LYOPHILIZED, FOR SOLUTION INTRAVENOUS at 10:05

## 2025-05-28 RX ADMIN — ALBUMIN (HUMAN) 250 ML: 12.5 INJECTION, SOLUTION INTRAVENOUS at 10:05

## 2025-05-28 RX ADMIN — TAMSULOSIN HYDROCHLORIDE 0.4 MG: 0.4 CAPSULE ORAL at 03:05

## 2025-05-28 RX ADMIN — DEXTROSE MONOHYDRATE 2 G: 50 INJECTION, SOLUTION INTRAVENOUS at 09:05

## 2025-05-28 RX ADMIN — ACETAMINOPHEN 500 MG: 500 TABLET ORAL at 10:05

## 2025-05-28 RX ADMIN — EPHEDRINE SULFATE 25 MG: 50 INJECTION INTRAVENOUS at 10:05

## 2025-05-28 RX ADMIN — PHENYLEPHRINE HYDROCHLORIDE 200 MCG: 10 INJECTION INTRAVENOUS at 10:05

## 2025-05-28 RX ADMIN — CALCIUM CHLORIDE 0.5 G: 100 INJECTION INTRAVENOUS; INTRAVENTRICULAR at 11:05

## 2025-05-28 RX ADMIN — TRANEXAMIC ACID 1000 MG: 100 INJECTION, SOLUTION INTRAVENOUS at 10:05

## 2025-05-28 RX ADMIN — ACETAMINOPHEN 500 MG: 500 TABLET ORAL at 05:05

## 2025-05-28 RX ADMIN — DEXAMETHASONE SODIUM PHOSPHATE 8 MG: 4 INJECTION, SOLUTION INTRA-ARTICULAR; INTRALESIONAL; INTRAMUSCULAR; INTRAVENOUS; SOFT TISSUE at 11:05

## 2025-05-28 RX ADMIN — FERROUS SULFATE TAB EC 325 MG (65 MG FE EQUIVALENT) 1 EACH: 325 (65 FE) TABLET DELAYED RESPONSE at 03:05

## 2025-05-28 RX ADMIN — PREGABALIN 225 MG: 75 CAPSULE ORAL at 03:05

## 2025-05-28 RX ADMIN — OXYCODONE HYDROCHLORIDE 5 MG: 5 TABLET ORAL at 08:05

## 2025-05-28 RX ADMIN — GLYCOPYRROLATE 0.2 MG: 0.2 INJECTION INTRAMUSCULAR; INTRAVENOUS at 10:05

## 2025-05-28 RX ADMIN — DEXMEDETOMIDINE HYDROCHLORIDE 6 MCG: 100 INJECTION, SOLUTION, CONCENTRATE INTRAVENOUS at 11:05

## 2025-05-28 RX ADMIN — FUROSEMIDE 20 MG: 20 TABLET ORAL at 03:05

## 2025-05-28 RX ADMIN — PHENYLEPHRINE HYDROCHLORIDE 100 MCG: 10 INJECTION INTRAVENOUS at 09:05

## 2025-05-28 RX ADMIN — METHOCARBAMOL 500 MG: 500 TABLET ORAL at 08:05

## 2025-05-28 RX ADMIN — MUPIROCIN: 20 OINTMENT TOPICAL at 08:05

## 2025-05-28 RX ADMIN — FLUTICASONE PROPIONATE 50 MCG: 50 SPRAY, METERED NASAL at 03:05

## 2025-05-28 RX ADMIN — ONDANSETRON 4 MG: 2 INJECTION INTRAMUSCULAR; INTRAVENOUS at 11:05

## 2025-05-28 RX ADMIN — ACETAMINOPHEN 500 MG: 500 TABLET ORAL at 03:05

## 2025-05-28 RX ADMIN — GABAPENTIN 600 MG: 300 CAPSULE ORAL at 08:05

## 2025-05-28 RX ADMIN — FENTANYL CITRATE 100 MCG: 50 INJECTION, SOLUTION INTRAMUSCULAR; INTRAVENOUS at 11:05

## 2025-05-28 RX ADMIN — PREGABALIN 225 MG: 75 CAPSULE ORAL at 08:05

## 2025-05-28 RX ADMIN — PHENYLEPHRINE HYDROCHLORIDE 0.5 MCG/KG/MIN: 10 INJECTION INTRAVENOUS at 11:05

## 2025-05-28 RX ADMIN — DOCUSATE SODIUM 100 MG: 100 CAPSULE, LIQUID FILLED ORAL at 08:05

## 2025-05-28 RX ADMIN — SULFASALAZINE 500 MG: 500 TABLET ORAL at 08:05

## 2025-05-28 RX ADMIN — TRANEXAMIC ACID 1000 MG: 100 INJECTION, SOLUTION INTRAVENOUS at 11:05

## 2025-05-28 RX ADMIN — PROPOFOL 50 MG: 10 INJECTION, EMULSION INTRAVENOUS at 09:05

## 2025-05-28 RX ADMIN — CELECOXIB 200 MG: 100 CAPSULE ORAL at 08:05

## 2025-05-28 RX ADMIN — FAMOTIDINE 20 MG: 20 TABLET, FILM COATED ORAL at 08:05

## 2025-05-28 RX ADMIN — CEFAZOLIN 2 G: 2 INJECTION, POWDER, FOR SOLUTION INTRAMUSCULAR; INTRAVENOUS at 08:05

## 2025-05-28 RX ADMIN — MONTELUKAST 10 MG: 10 TABLET, FILM COATED ORAL at 08:05

## 2025-05-28 RX ADMIN — GLYCOPYRROLATE 0.2 MG: 0.2 INJECTION INTRAMUSCULAR; INTRAVENOUS at 11:05

## 2025-05-28 RX ADMIN — VASOPRESSIN 1 UNITS: 20 INJECTION INTRAVENOUS at 11:05

## 2025-05-28 RX ADMIN — LIDOCAINE HYDROCHLORIDE 80 MG: 20 INJECTION, SOLUTION EPIDURAL; INFILTRATION; INTRACAUDAL; PERINEURAL at 09:05

## 2025-05-28 RX ADMIN — CETIRIZINE HYDROCHLORIDE 10 MG: 10 TABLET, FILM COATED ORAL at 03:05

## 2025-05-28 RX ADMIN — DEXAMETHASONE SODIUM PHOSPHATE 8 MG: 4 INJECTION, SOLUTION INTRA-ARTICULAR; INTRALESIONAL; INTRAMUSCULAR; INTRAVENOUS; SOFT TISSUE at 09:05

## 2025-05-28 RX ADMIN — OXYCODONE HYDROCHLORIDE 10 MG: 10 TABLET, FILM COATED, EXTENDED RELEASE ORAL at 08:05

## 2025-05-28 RX ADMIN — DEXAMETHASONE SODIUM PHOSPHATE 8 MG: 4 INJECTION, SOLUTION INTRA-ARTICULAR; INTRALESIONAL; INTRAMUSCULAR; INTRAVENOUS; SOFT TISSUE at 05:05

## 2025-05-28 RX ADMIN — OXYCODONE HYDROCHLORIDE 5 MG: 5 TABLET ORAL at 11:05

## 2025-05-28 RX ADMIN — PRAVASTATIN SODIUM 40 MG: 40 TABLET ORAL at 08:05

## 2025-05-28 RX ADMIN — SUGAMMADEX 200 MG: 100 INJECTION, SOLUTION INTRAVENOUS at 11:05

## 2025-05-28 RX ADMIN — CEFAZOLIN 2 G: 2 INJECTION, POWDER, FOR SOLUTION INTRAMUSCULAR; INTRAVENOUS at 03:05

## 2025-05-28 RX ADMIN — SODIUM CHLORIDE: 9 INJECTION, SOLUTION INTRAVENOUS at 09:05

## 2025-05-28 RX ADMIN — PANTOPRAZOLE SODIUM 40 MG: 40 TABLET, DELAYED RELEASE ORAL at 03:05

## 2025-05-28 RX ADMIN — ROCURONIUM BROMIDE 50 MG: 10 INJECTION, SOLUTION INTRAVENOUS at 09:05

## 2025-05-28 RX ADMIN — DULOXETINE 60 MG: 30 CAPSULE, DELAYED RELEASE ORAL at 08:05

## 2025-05-28 RX ADMIN — PROPOFOL 100 MG: 10 INJECTION, EMULSION INTRAVENOUS at 09:05

## 2025-05-28 RX ADMIN — OXYCODONE HYDROCHLORIDE 5 MG: 5 TABLET ORAL at 03:05

## 2025-05-28 RX ADMIN — CALCIUM CHLORIDE 0.5 G: 100 INJECTION INTRAVENOUS; INTRAVENTRICULAR at 10:05

## 2025-05-28 RX ADMIN — FENTANYL CITRATE 100 MCG: 50 INJECTION, SOLUTION INTRAMUSCULAR; INTRAVENOUS at 09:05

## 2025-05-28 RX ADMIN — ALBUMIN (HUMAN) 250 ML: 12.5 INJECTION, SOLUTION INTRAVENOUS at 11:05

## 2025-05-28 RX ADMIN — SODIUM CHLORIDE, POTASSIUM CHLORIDE, SODIUM LACTATE AND CALCIUM CHLORIDE: 600; 310; 30; 20 INJECTION, SOLUTION INTRAVENOUS at 11:05

## 2025-05-28 NOTE — SUBJECTIVE & OBJECTIVE
Past Medical History:   Diagnosis Date    History of colon polyps 05/12/2022    History of GI diverticular bleed     Hyperlipidemia     Hypertension     Liver hemangioma 05/09/2023    Neuropathy     Personal history of gastric ulcer     Seronegative rheumatoid arthritis 09/22/2021    Goes to [facility] for Rheumatology  Last Assessment & Plan:  Formatting of this note might be different from the original. SNRA; Mod Active; Pt now off MTX and on Arava; However, pt continues to have persistent activity wrists; Intolerant to Enbrel; Poor response to Xeljanz; Unable to afford Humira in the past; Intolerant to chronic Nsaid use due to sig Gerd; Cont to be followed by local Pain man    Sleep apnea     Thyroid disease        Past Surgical History:   Procedure Laterality Date    BACK SURGERY      THYROIDECTOMY N/A 08/27/2021    Procedure: THYROIDECTOMY;  Surgeon: Manish Valadez MD;  Location: [facility];  Service: General;  Laterality: N/A;    TOTAL KNEE ARTHROPLASTY Right        Review of patient's allergies indicates:  No Known Allergies    No current facility-administered medications on file prior to encounter.     Current Outpatient Medications on File Prior to Encounter   Medication Sig    levothyroxine (SYNTHROID) 150 MCG tablet Take 1 tablet (150 mcg total) by mouth before breakfast.    anastrozole (ARIMIDEX) 1 mg Tab Take 1 tablet (1 mg total) by mouth every 7 days.    cetirizine (ZYRTEC) 10 MG tablet TAKE 1 TABLET BY MOUTH DAILY    DULoxetine (CYMBALTA) 60 MG capsule Take 1 capsule (60 mg total) by mouth 2 (two) times daily.    ergocalciferol, vitamin D2, (VITAMIN D ORAL) Take by mouth.    FEROSUL 325 mg (65 mg iron) Tab tablet TAKE 1 TABLET BY MOUTH DAILY    fluticasone propionate (FLONASE) 50 mcg/actuation nasal spray 1 spray (50 mcg total) by Each Nostril route once daily.    furosemide (LASIX) 20 MG tablet Take 1 tablet (20 mg total) by mouth once daily.    HYDROcodone-acetaminophen (NORCO)  mg per  tablet Take 1 tablet by mouth every 4 (four) hours as needed for Pain.    hydroxychloroquine (PLAQUENIL) 200 mg tablet Take 1 tablet (200 mg total) by mouth 2 (two) times a day.    leflunomide (ARAVA) 20 MG Tab Take 20 mg by mouth 3 (three) times a week.    methocarbamoL (ROBAXIN) 500 MG Tab Take 1 tablet (500 mg total) by mouth 2 (two) times daily.    montelukast (SINGULAIR) 10 mg tablet Take 1 tablet (10 mg total) by mouth every evening.    multivitamin-minerals-lutein Tab Take 1 tablet by mouth once daily.    omeprazole (PRILOSEC) 40 MG capsule Take 1 capsule (40 mg total) by mouth once daily.    oxyCODONE (OXYCONTIN) 30 mg TR12 12 hr tablet Take 1 tablet (30 mg total) by mouth 2 (two) times daily as needed.    polyethylene glycol 3350 (MIRALAX ORAL) Take by mouth.    pravastatin (PRAVACHOL) 40 MG tablet Take 1 tablet (40 mg total) by mouth once daily.    pregabalin (LYRICA) 225 MG Cap Take 1 capsule (225 mg total) by mouth 3 (three) times daily.    promethazine (PHENERGAN) 25 MG tablet Take 1 tablet (25 mg total) by mouth 2 (two) times daily as needed for Nausea. 2 times daily prn headache no more then 2 days in a week    silodosin (RAPAFLO) 8 mg Cap capsule Take 1 capsule (8 mg total) by mouth once daily.    sulfaSALAzine (AZULFIDINE) 500 mg Tab Take 1 tablet (500 mg total) by mouth 2 (two) times daily.    tadalafiL (CIALIS) 10 MG tablet Take 2 tablets (20 mg total) by mouth daily as needed (ED).     Family History       Problem Relation (Age of Onset)    Heart disease Mother    Hypertension Mother, Father, Sister          Tobacco Use    Smoking status: Former     Types: Cigars     Quit date: 2020     Years since quittin.4    Smokeless tobacco: Never   Substance and Sexual Activity    Alcohol use: Not Currently     Comment: beer wine liqour occasionally    Drug use: Yes     Types: Hydrocodone, Oxycodone    Sexual activity: Yes     Partners: Female     Review of Systems   Constitutional:  Negative for  activity change, chills, fatigue and fever.   HENT:  Negative for congestion and sore throat.    Respiratory:  Negative for chest tightness.    Gastrointestinal:  Negative for abdominal distention, abdominal pain and diarrhea.   Endocrine: Negative for cold intolerance and heat intolerance.   Genitourinary:  Negative for dysuria.   Musculoskeletal:  Positive for neck pain. Negative for arthralgias and back pain.   Skin:  Negative for color change and rash.   Neurological:  Negative for dizziness, tremors and headaches.   Psychiatric/Behavioral:  Negative for agitation. The patient is not nervous/anxious.      Objective:     Vital Signs (Most Recent):  Temp: 97.7 °F (36.5 °C) (05/28/25 1215)  Pulse: 88 (05/28/25 1405)  Resp: 18 (05/28/25 1532)  BP: 121/62 (05/28/25 1240)  SpO2: 97 % (05/28/25 1405) Vital Signs (24h Range):  Temp:  [97.7 °F (36.5 °C)-99.6 °F (37.6 °C)] 97.7 °F (36.5 °C)  Pulse:  [81-89] 88  Resp:  [12-21] 18  SpO2:  [92 %-100 %] 97 %  BP: (105-124)/(62-72) 121/62     Weight: 97.5 kg (215 lb)  Body mass index is 27.6 kg/m².     Physical Exam  Vitals and nursing note reviewed.   Constitutional:       Appearance: Normal appearance.   HENT:      Head: Normocephalic.      Right Ear: Tympanic membrane normal.      Nose: Nose normal.   Neck:      Comments: postoperative changes    Cardiovascular:      Rate and Rhythm: Normal rate and regular rhythm.      Pulses: Normal pulses.      Heart sounds: Normal heart sounds.   Pulmonary:      Effort: Pulmonary effort is normal.      Breath sounds: Normal breath sounds.   Abdominal:      General: Abdomen is flat. Bowel sounds are normal.      Palpations: Abdomen is soft.   Musculoskeletal:         General: No swelling.      Cervical back: Normal range of motion.      Right lower leg: No edema.      Left lower leg: No edema.   Skin:     General: Skin is warm and dry.   Neurological:      General: No focal deficit present.      Mental Status: He is alert and oriented to  person, place, and time.   Psychiatric:         Mood and Affect: Mood normal.          Significant Labs: All pertinent labs within the past 24 hours have been reviewed.    Significant Imaging: I have reviewed all pertinent imaging results/findings within the past 24 hours.

## 2025-05-28 NOTE — ASSESSMENT & PLAN NOTE
Patient's blood pressure range in the last 24 hours was: BP  Min: 105/63  Max: 124/72.The patient's inpatient anti-hypertensive regimen is listed below:  Current Antihypertensives  furosemide tablet 20 mg, Daily, Oral  lisinopriL tablet 10 mg, Daily, Oral    Plan  - BP is controlled, no changes needed to their regimen

## 2025-05-28 NOTE — PLAN OF CARE
Problem: Physical Therapy  Goal: Physical Therapy Goal  Description: Short Term Goals to be met by: 6/10/25    Patient will increase functional independence with mobility by performin. Supine to sit with Modified [place]  2. Sit to stand transfer with Modified [place]  3. Bed to chair transfer with Modified [place] using Rolling Walker  4. Gait  x 100 feet with Modified [place] using Rolling Walker.   5. Lower extremity exercise program x30 reps per handout, with assistance as needed  6. Verbalize/demo 3/3 spinal precautions  7. Pt to negotiate 2 steps with SPC SBA    Long Term Goals to be met by: 25     1. Pt with regain full independent functional mobility to return to prior activities of daily living   Outcome: Progressing     PT eval completed. Please see eval note for details.

## 2025-05-28 NOTE — PT/OT/SLP EVAL
"Physical Therapy Evaluation and Treatment    Patient Name: Niranjan Whittaker Jr.   MRN: [id number]  Recent Surgery: Procedure(s) (LRB):  DISCECTOMY, SPINE, CERVICAL, ANTERIOR, W/ ARTHRODESIS (N/A) * Day of Surgery *    Recommendations:     Discharge Recommendations: Low Intensity Therapy   Discharge Equipment Recommendations: none   Barriers to discharge: Increased level of assist and Ongoing medical treatment    Assessment:     Niranjan Whittaker Jr. is a 70 y.o. male admitted with a medical diagnosis of Cervical radiculopathy. He presents with the following impairments/functional limitations: weakness, impaired endurance, impaired functional mobility, gait instability, impaired balance, decreased lower extremity function, pain, impaired coordination, orthopedic precautions. PT has undergone C3-C4 ACDF with plans to d/c home tomorrow pending progress. Pt reports having weaker LLE with foot drop x 20 years following 2 back surgeries. Pt has a "brace" that he wears in his shoes when he is ambulating outside of home.     Rehab Prognosis: Good; patient would benefit from acute PT services to address these deficits and reach maximum level of function.    Plan:     During this hospitalization, patient to be seen daily to address the above listed problems via gait training, therapeutic activities, therapeutic exercises, neuromuscular re-education    Plan of Care Expires: 06/28/25    Subjective     Chief Complaint: Cervical radiculopathy   Patient Comments/Goals: agreeable  Pain/Comfort:  Pain Rating 1: 10/10  Location 1: neck  Pain Addressed 1: Pre-medicate for activity  Pain Rating Post-Intervention 1: 8/10  Pain Rating 2: 10/10  Location - Orientation 2: lower  Location 2: back  Pain Addressed 2: Pre-medicate for activity  Pain Rating Post-Intervention 2: 9/10    Social History:  Living Environment: Patient lives with their 2 grandsons and spouse in a single story home with number of outside stair(s): 2 no rails  Prior " Level of Function: Prior to admission, patient was modified independent with ADLs using straight cane and rollator for mobility and driving and retired  Equipment Used at Home: cane, straight, rollator, shower chair  DME owned (not currently used): none  Assistance Upon Discharge: family    Objective:     Communicated with RN prior to session. Patient found HOB elevated with SCD, blood pressure cuff, pulse ox (continuous), cervical collar, peripheral IV upon PT entry to room.    General Precautions: Standard, fall   Orthopedic Precautions: spinal precautions   Braces: Cervical collar    Respiratory Status: Room air    Exams:  RLE ROM: WNL  RLE Strength: WNL  LLE ROM: WFL  LLE Strength: Deficits: hip 2+/5, knee 2+/5, ankle 2/5 (foot drop)  Cognitive: Patient is oriented to Person, Place, Time, Situation  Sensation:    -       Intact but pt reports some impairments with tingling/stinging/pins and needles sensations at times    Functional Mobility:  Gait belt applied - Yes  Bed Mobility  Supine to Sit: contact guard assistance and minimum assistance for LE management and trunk management  Sit to Supine: contact guard assistance and minimum assistance for LE management and trunk management  Transfers  Sit to Stand: contact guard assistance and minimum assistance with rolling walker  Toilet Transfer: contact guard assistance and minimum assistance with rolling walker and with cues for hand placement and foot placement using Step Transfer  Gait  Patient ambulated 20ft x 2  with rolling walker and minimum assistance and of 2 persons. Patient required cues for position in walker, sequencing, step through gait pattern, upright posture, width of base of support, and L foot clearance to increase independence and safety. Patient required cues ~ 25% of the time.  Balance  Sitting: GOOD: Maintains balance through MODERATE excursions of active trunk movement  Standing: FAIR: Needs CONTACT GUARD during gait      Therapeutic  Activities and Exercises:  Patient educated on role of acute care PT and PT POC, safety while in hospital including calling nurse for mobility, and call light usage.  Educated on spinal precautions including avoidance of bending, lifting, and twisting. Patient expressed understanding. Educated on log roll technique, performed to left.  Educated about importance of OOB mobility and remaining up in chair most of the day.      AM-PAC 6 CLICK MOBILITY  Total Score:18    Patient left HOB elevated with all lines intact, call button in reach, and spouse present.    GOALS:   Multidisciplinary Problems       Physical Therapy Goals          Problem: Physical Therapy    Goal Priority Disciplines Outcome Interventions   Physical Therapy Goal     PT, PT/OT Progressing    Description: Short Term Goals to be met by: 6/10/25    Patient will increase functional independence with mobility by performin. Supine to sit with Modified [place]  2. Sit to stand transfer with Modified [place]  3. Bed to chair transfer with Modified [place] using Rolling Walker  4. Gait  x 100 feet with Modified [place] using Rolling Walker.   5. Lower extremity exercise program x30 reps per handout, with assistance as needed  6. Verbalize/demo 3/3 spinal precautions  7. Pt to negotiate 2 steps with SPC SBA    Long Term Goals to be met by: 25     1. Pt with regain full independent functional mobility to return to prior activities of daily living                        DME Justifications:  No DME recommended requiring DME justifications    History:     Past Medical History:   Diagnosis Date    History of colon polyps 2022    History of GI diverticular bleed     Hyperlipidemia     Hypertension     Liver hemangioma 2023    Neuropathy     Personal history of gastric ulcer     Seronegative rheumatoid arthritis 2021    Goes to [facility] for Rheumatology  Last Assessment & Plan:  Formatting of this note might be  different from the original. SNRA; Mod Active; Pt now off MTX and on Arava; However, pt continues to have persistent activity wrists; Intolerant to Enbrel; Poor response to Xeljanz; Unable to afford Humira in the past; Intolerant to chronic Nsaid use due to sig Gerd; Cont to be followed by local Pain man    Sleep apnea     Thyroid disease        Past Surgical History:   Procedure Laterality Date    BACK SURGERY      THYROIDECTOMY N/A 08/27/2021    Procedure: THYROIDECTOMY;  Surgeon: Manish Valadez MD;  Location: [facility];  Service: General;  Laterality: N/A;    TOTAL KNEE ARTHROPLASTY Right        Time Tracking:     PT Received On: 05/28/25  PT Start Time: 1543  PT Stop Time: 1612  PT Total Time (min): 29 min     Billable Minutes: Evaluation low complexity 15 and Therapeutic Activity 14    5/28/2025

## 2025-05-28 NOTE — HPI
Mr. Whittaker is a 69yo man history of seronegative rheumatoid arthritis, migraines, GERD, ZULLY, CKD stage 3, HLD, anemia who presents with progressive cervical radiculopathy that has failed conservative management.  She is now s/p cervical disketomy and fusion o fC3-C4 and insertion of biomechanical device.

## 2025-05-28 NOTE — ASSESSMENT & PLAN NOTE
Increased risk for cervical disease.  Information from rheumatology clinic.     SNRA; Mod Active; Pt now off MTX and on Arava; However, pt continues to have persistent activity wrists; Intolerant to Enbrel; Poor response to Xeljanz; Unable to afford Humira in the past; Intolerant to chronic Nsaid use due to sig Gerd; Cont to be followed by local Pain management for chronic low back pain; On Gabapentin per PCP for feet burning sensations

## 2025-05-28 NOTE — CONSULTS
[facility]  [facility] Medicine  Consult Note    Patient Name: Niranjan Whittaker Jr.  MRN: [id number]  Admission Date: 5/28/2025  Hospital Length of Stay: 0 days  Attending Physician: Ivan Palmer MD   Primary Care Provider: Antonietta Basilio MD           Patient information was obtained from patient, past medical records, and ER records.     Consults  Subjective:     Principal Problem: Cervical radiculopathy    Chief Complaint: No chief complaint on file.       HPI: Mr. Whittaker is a 69yo man history of seronegative rheumatoid arthritis, migraines, GERD, ZULLY, CKD stage 3, HLD, anemia who presents with progressive cervical radiculopathy that has failed conservative management.  She is now s/p cervical disketomy and fusion o fC3-C4 and insertion of biomechanical device.      Past Medical History:   Diagnosis Date    History of colon polyps 05/12/2022    History of GI diverticular bleed     Hyperlipidemia     Hypertension     Liver hemangioma 05/09/2023    Neuropathy     Personal history of gastric ulcer     Seronegative rheumatoid arthritis 09/22/2021    Goes to [facility] for Rheumatology  Last Assessment & Plan:  Formatting of this note might be different from the original. SNRA; Mod Active; Pt now off MTX and on Arava; However, pt continues to have persistent activity wrists; Intolerant to Enbrel; Poor response to Xeljanz; Unable to afford Humira in the past; Intolerant to chronic Nsaid use due to sig Gerd; Cont to be followed by local Pain man    Sleep apnea     Thyroid disease        Past Surgical History:   Procedure Laterality Date    BACK SURGERY      THYROIDECTOMY N/A 08/27/2021    Procedure: THYROIDECTOMY;  Surgeon: Manish Valadez MD;  Location: [facility];  Service: General;  Laterality: N/A;    TOTAL KNEE ARTHROPLASTY Right        Review of patient's allergies indicates:  No Known Allergies    No current facility-administered medications on file prior to encounter.      Current Outpatient Medications on File Prior to Encounter   Medication Sig    levothyroxine (SYNTHROID) 150 MCG tablet Take 1 tablet (150 mcg total) by mouth before breakfast.    anastrozole (ARIMIDEX) 1 mg Tab Take 1 tablet (1 mg total) by mouth every 7 days.    cetirizine (ZYRTEC) 10 MG tablet TAKE 1 TABLET BY MOUTH DAILY    DULoxetine (CYMBALTA) 60 MG capsule Take 1 capsule (60 mg total) by mouth 2 (two) times daily.    ergocalciferol, vitamin D2, (VITAMIN D ORAL) Take by mouth.    FEROSUL 325 mg (65 mg iron) Tab tablet TAKE 1 TABLET BY MOUTH DAILY    fluticasone propionate (FLONASE) 50 mcg/actuation nasal spray 1 spray (50 mcg total) by Each Nostril route once daily.    furosemide (LASIX) 20 MG tablet Take 1 tablet (20 mg total) by mouth once daily.    HYDROcodone-acetaminophen (NORCO)  mg per tablet Take 1 tablet by mouth every 4 (four) hours as needed for Pain.    hydroxychloroquine (PLAQUENIL) 200 mg tablet Take 1 tablet (200 mg total) by mouth 2 (two) times a day.    leflunomide (ARAVA) 20 MG Tab Take 20 mg by mouth 3 (three) times a week.    methocarbamoL (ROBAXIN) 500 MG Tab Take 1 tablet (500 mg total) by mouth 2 (two) times daily.    montelukast (SINGULAIR) 10 mg tablet Take 1 tablet (10 mg total) by mouth every evening.    multivitamin-minerals-lutein Tab Take 1 tablet by mouth once daily.    omeprazole (PRILOSEC) 40 MG capsule Take 1 capsule (40 mg total) by mouth once daily.    oxyCODONE (OXYCONTIN) 30 mg TR12 12 hr tablet Take 1 tablet (30 mg total) by mouth 2 (two) times daily as needed.    polyethylene glycol 3350 (MIRALAX ORAL) Take by mouth.    pravastatin (PRAVACHOL) 40 MG tablet Take 1 tablet (40 mg total) by mouth once daily.    pregabalin (LYRICA) 225 MG Cap Take 1 capsule (225 mg total) by mouth 3 (three) times daily.    promethazine (PHENERGAN) 25 MG tablet Take 1 tablet (25 mg total) by mouth 2 (two) times daily as needed for Nausea. 2 times daily prn headache no more then 2 days  in a week    silodosin (RAPAFLO) 8 mg Cap capsule Take 1 capsule (8 mg total) by mouth once daily.    sulfaSALAzine (AZULFIDINE) 500 mg Tab Take 1 tablet (500 mg total) by mouth 2 (two) times daily.    tadalafiL (CIALIS) 10 MG tablet Take 2 tablets (20 mg total) by mouth daily as needed (ED).     Family History       Problem Relation (Age of Onset)    Heart disease Mother    Hypertension Mother, Father, Sister          Tobacco Use    Smoking status: Former     Types: Cigars     Quit date:      Years since quittin.4    Smokeless tobacco: Never   Substance and Sexual Activity    Alcohol use: Not Currently     Comment: beer wine liqour occasionally    Drug use: Yes     Types: Hydrocodone, Oxycodone    Sexual activity: Yes     Partners: Female     Review of Systems   Constitutional:  Negative for activity change, chills, fatigue and fever.   HENT:  Negative for congestion and sore throat.    Respiratory:  Negative for chest tightness.    Gastrointestinal:  Negative for abdominal distention, abdominal pain and diarrhea.   Endocrine: Negative for cold intolerance and heat intolerance.   Genitourinary:  Negative for dysuria.   Musculoskeletal:  Positive for neck pain. Negative for arthralgias and back pain.   Skin:  Negative for color change and rash.   Neurological:  Negative for dizziness, tremors and headaches.   Psychiatric/Behavioral:  Negative for agitation. The patient is not nervous/anxious.      Objective:     Vital Signs (Most Recent):  Temp: 97.7 °F (36.5 °C) (25 1215)  Pulse: 88 (25 1405)  Resp: 18 (25 1532)  BP: 121/62 (25 1240)  SpO2: 97 % (25 1405) Vital Signs (24h Range):  Temp:  [97.7 °F (36.5 °C)-99.6 °F (37.6 °C)] 97.7 °F (36.5 °C)  Pulse:  [81-89] 88  Resp:  [12-21] 18  SpO2:  [92 %-100 %] 97 %  BP: (105-124)/(62-72) 121/62     Weight: 97.5 kg (215 lb)  Body mass index is 27.6 kg/m².     Physical Exam  Vitals and nursing note reviewed.   Constitutional:        Appearance: Normal appearance.   HENT:      Head: Normocephalic.      Right Ear: Tympanic membrane normal.      Nose: Nose normal.   Neck:      Comments: postoperative changes    Cardiovascular:      Rate and Rhythm: Normal rate and regular rhythm.      Pulses: Normal pulses.      Heart sounds: Normal heart sounds.   Pulmonary:      Effort: Pulmonary effort is normal.      Breath sounds: Normal breath sounds.   Abdominal:      General: Abdomen is flat. Bowel sounds are normal.      Palpations: Abdomen is soft.   Musculoskeletal:         General: No swelling.      Cervical back: Normal range of motion.      Right lower leg: No edema.      Left lower leg: No edema.   Skin:     General: Skin is warm and dry.   Neurological:      General: No focal deficit present.      Mental Status: He is alert and oriented to person, place, and time.   Psychiatric:         Mood and Affect: Mood normal.          Significant Labs: All pertinent labs within the past 24 hours have been reviewed.    Significant Imaging: I have reviewed all pertinent imaging results/findings within the past 24 hours.  Assessment/Plan:     * Cervical radiculopathy  Mgmt per primary team.       Seronegative rheumatoid arthritis  Increased risk for cervical disease.  Information from rheumatology clinic.     SNRA; Mod Active; Pt now off MTX and on Arava; However, pt continues to have persistent activity wrists; Intolerant to Enbrel; Poor response to Xeljanz; Unable to afford Humira in the past; Intolerant to chronic Nsaid use due to sig Gerd; Cont to be followed by local Pain management for chronic low back pain; On Gabapentin per PCP for feet burning sensations    Stage 3b chronic kidney disease  Creatine stable for now. BMP reviewed- noted CrCl cannot be calculated (Patient's most recent lab result is older than the maximum 7 days allowed.). according to latest data. Based on current GFR, CKD stage is stage 3 - GFR 30-59.  Monitor UOP and serial BMP and  adjust therapy as needed. Renally dose meds. Avoid nephrotoxic medications and procedures.    Postoperative hypothyroidism  Continue thyroid replacement therapy.       Gout  Continue home meds.         Primary hypertension  Patient's blood pressure range in the last 24 hours was: BP  Min: 105/63  Max: 124/72.The patient's inpatient anti-hypertensive regimen is listed below:  Current Antihypertensives  furosemide tablet 20 mg, Daily, Oral  lisinopriL tablet 10 mg, Daily, Oral    Plan  - BP is controlled, no changes needed to their regimen        VTE Risk Mitigation (From admission, onward)           Ordered     Place DEB hose  Until discontinued         05/28/25 1215     Place sequential compression device  Until discontinued         05/28/25 1215                        Thank you for your consult. I will follow-up with patient. Please contact us if you have any additional questions.    Davida Cassidy MD  Department of Hospital Medicine   [facility]

## 2025-05-28 NOTE — TRANSFER OF CARE
"Anesthesia Transfer of Care Note    Patient: Niranjan Whittaker Jr.    Procedure(s) Performed: Procedure(s) (LRB):  DISCECTOMY, SPINE, CERVICAL, ANTERIOR, W/ ARTHRODESIS (N/A)    Patient location: PACU    Anesthesia Type: general    Transport from OR: Transported from OR on 6-10 L/min O2 by face mask with adequate spontaneous ventilation    Post pain: adequate analgesia    Post assessment: no apparent anesthetic complications    Post vital signs: stable    Level of consciousness: awake and alert    Nausea/Vomiting: no nausea/vomiting    Complications: none    Transfer of care protocol was followed      Last vitals: Visit Vitals  /62   Pulse 89   Temp 36.5 °C (97.7 °F) (Oral)   Resp 17   Ht 6' 2" (1.88 m)   Wt 97.5 kg (215 lb)   SpO2 96%   BMI 27.60 kg/m²     "

## 2025-05-28 NOTE — PLAN OF CARE
1225: Patient transferred to , room 469. O2 96% on RA, HR 89, /78. Neuro intact. Pt with neck collar intact. Patient CC is pain. Report given to RAINA Antonio. Care released at this time.

## 2025-05-28 NOTE — ANESTHESIA PROCEDURE NOTES
Intubation    Date/Time: 5/28/2025 9:56 AM    Performed by: Sharda Carter CRNA  Authorized by: Bhavik Viera MD    Intubation:     Induction:  Intravenous    Intubated:  Postinduction    Mask Ventilation:  Easy mask    Attempts:  1    Attempted By:  CRNA    Method of Intubation:  Video laryngoscopy    Blade:  Glidescope 3    Laryngeal View Grade: Grade I - full view of cords      Difficult Airway Encountered?: No      Complications:  None    Airway Device:  Oral endotracheal tube    Airway Device Size:  7.5    Style/Cuff Inflation:  Cuffed (inflated to minimal occlusive pressure)    Inflation Amount (mL):  10    Tube secured:  23    Secured at:  The lips    Placement Verified By:  Capnometry and Revisualization with laryngoscopy    Complicating Factors:  None    Findings Post-Intubation:  BS equal bilateral and atraumatic/condition of teeth unchanged  Notes:      Head and neck neutral. Dentition unchanged. atraumatic

## 2025-05-28 NOTE — OP NOTE
[facility]  Surgery Department  Operative Note    SUMMARY     Date of Procedure: 5/28/2025     Procedure: Procedure(s) (LRB):  DISCECTOMY, SPINE, CERVICAL, ANTERIOR, W/ ARTHRODESIS (N/A) C3-4 ACDF    Surgeons and Role:     * Ivan Palmer MD - Primary    Assistant: None    Pre-Operative Diagnosis: Cervical radiculopathy [M54.12]    Post-Operative Diagnosis: Post-Op Diagnosis Codes:     * Cervical radiculopathy [M54.12]    Anesthesia: General    Technical Procedures Used:   1. Anterior cervical diskectomy and fusion C3-4, 22551  2. Insertion of biomechanical device (interbody cage), 22553  3. Placement and removal of Kulkarni-Wells tongs, 20660  4. Use of allograft for spine surgery, 20930    Description of the Findings of the Procedure:   Indications:  This is a 70 y.o. male with cervical myeloradiculopathy.  They failed attempted conservative measures.  Risks, benefits, and alternatives were discussed with the patient and they elected to proceed with surgery.    Procedure in detail:  The patient was identified in the preoperative holding area and the surgical site was marked. They were then taken back to the operating room where general endotracheal anesthesia was induced.  Kulkarni-Wells tongs were applied using the usual sterile technique.  They were then positioned in the supine position on the OR table with the arms tucked to the side and the shoulders taped in the depressed position.  15 lb of traction were applied to the Kulkarni-Wells tongs.  Care was taken to ensure proper padding of all bony prominences. The anterior cervical spine was prepped and draped in the normal sterile fashion. A timeout was performed. The incision site was localized with intraoperative fluoroscopy. After the incision was marked out a solution of epinephrine was injected in the skin and subcutaneous tissues. A left-sided anterior cervical incision was made. This was carried down to the platysma  muscle. A plane was developed over the platysma. The platysma was divided in line with the incision with use of electrocautery. A plane was developed beneath the platysma. The sternocleidomastoid muscle was identified and released from its fascia medially. A plane was developed medial to sternocleidomastoid and the carotid sheath and lateral to the trachea and esophagus exposing the anterior cervical spine. The prevertebral fascia was divided. The anterior cervical spine was exposed and a hemostat was used to grasp the fibers of the anterior longitudinal ligament overlying the suspected disc. Lateral fluoroscopic imaging was used to confirm the appropriate level for surgery.    Anterior osteophytes were removed with the use of Leksell rongeur. An annulotomy was performed with the use of a 15 blade scalpel. The disc material was debrided with a pituitary rongeur.  A complete diskectomy was performed.  The [place] distraction pins were then inserted. Endplate preparation was performed with use of curettes. Posterior osteophytes were removed with a high-speed bur. A plane was developed under the posterior longitudinal ligament with a microcurette. The posterior longitudinal ligament was then resected with use of Kerrison punch. The decompression was carried out into the foramen bilaterally with the Kerrison punch.  The central canal and neuroforamen bilaterally were observed to be adequately decompressed.  Trials were used to determine the appropriate size implant.  This was filled with Zavation allograft and inserted under fluoroscopic guidance.  The Integrated bone anchors were then deployed.  The locking tab was engaged.  10 lb of traction removed from the Kulkarni-Wells tongs.  Final implant placement and spinal alignment was verified on fluoroscopic imaging and found to be satisfactory.    The wound was copiously irrigated with normal saline. The wound was explored for any persistent bleeders and none were found,  good hemostasis had been achieved.  The platysma layer was closed with a running Stratafix #0 suture. A running subcuticular layer was performed with 2-0 Stratafix suture. Dermabond prineo was applied to cover the incision. A sterile dressing of gauze and Tegaderm was then applied.  The remaining traction was removed from the Kulkarni-Buggl tongs.  The tongs were removed without incident.  A three-inch soft foam collar was applied to the neck.    The patient was then extubated and awakened and taken to recovery in good condition having suffered no untoward event.      Complications: No    Estimated Blood Loss (EBL):  20 mL           Implants:  Genesys stand-alone titanium interbody cage, bone anchors.  Zavation allograft  Implant Name Type Inv. Item Serial No.  Lot No. LRB No. Used Action   LOG [id number] - ACDF DSS ([facility]) TRAY 2 OF 3 - 001 - 1               Specimens:   Specimen (24h ago, onward)      None                    Condition: Good    Disposition: PACU - hemodynamically stable.    Attestation: I was present and scrubbed for the entire procedure.

## 2025-05-28 NOTE — ANESTHESIA PREPROCEDURE EVALUATION
05/28/2025  Niranjan Whittaker Jr. is a 70 y.o., male.      Pre-op Assessment    I have reviewed the Patient Summary Reports.    I have reviewed the NPO Status.   I have reviewed the Medications.     Review of Systems         Anesthesia Plan  Type of Anesthesia, risks & benefits discussed:    Anesthesia Type: Gen ETT  Intra-op Monitoring Plan: Standard ASA Monitors  Post Op Pain Control Plan: IV/PO Opioids PRN  Induction:  IV  Informed Consent: Informed consent signed with the Patient and all parties understand the risks and agree with anesthesia plan.  All questions answered.   ASA Score: 3    Ready For Surgery From Anesthesia Perspective.     .  NPO greater than 8 hours  No anesthetic complications  NKDA    Hct 39  Cr 1.7  2/19/25 EKG: NSR; 85 bpm  8/4/23 Echo: mild paravalvular regurgitation; EF 46%    HTN  GERD  ZULLY (CPAP)    MP II; adequate ROM at neck     Detail Level: Zone Continue Regimen: metrogel QD

## 2025-05-28 NOTE — H&P
Office: [phone]    Orthopedic Spine Surgery Consult/H&P    ASSESSMENT:  70 y.o. male with cervical myelopathy    PLAN:  He would like to proceed with surgery for cervical myelopathy.  This will be a C3-4 ACDF    HPI:  70 y.o. male Reports neck pain that radiates into the right arm with numbness in the right hand. This has been going on for several years. Has had 3 prior cervical surgeries and 3 prior lumbar surgeries. Did get some improvement with surgery. Reports difficulty with hand dysfunction. Also reports difficulty with balance. Reports decreased walking tolerance and a positive shopping cart sign. Has a left foot drop and wears an AFO. Has completed physical therapy without much relief. He has had injections in the past without much relief. Denies any bowel bladder incontinence    Past Medical History:   Diagnosis Date    History of colon polyps 05/12/2022    History of GI diverticular bleed     Hyperlipidemia     Hypertension     Liver hemangioma 05/09/2023    Neuropathy     Personal history of gastric ulcer     Seronegative rheumatoid arthritis 09/22/2021    Goes to [facility]  Last Assessment & Plan:  Formatting of this note might be different from the original. SNRA; Mod Active; Pt now off MTX and on Arava; However, pt continues to have persistent activity wrists; Intolerant to Enbrel; Poor response to Xeljanz; Unable to afford Humira in the past; Intolerant to chronic Nsaid use due to sig Gerd; Cont to be followed by local Pain man    Thyroid disease       Past Surgical History:   Procedure Laterality Date    BACK SURGERY      THYROIDECTOMY N/A 08/27/2021    Procedure: THYROIDECTOMY;  Surgeon: Manish Valadez MD;  Location: [facility];  Service: General;  Laterality: N/A;    TOTAL KNEE ARTHROPLASTY Right       Review of patient's allergies indicates:  No Known Allergies   Current Medications[1]     Review of systems:  Denies chest pain, nausea,vomiting, abdominal pain,  cough, runny nose, eye pain, ear pain, fevers, chills, weight loss, weight gain, dysuria, hematuria, changes in mood    IMAGING:  MRI cervical spine at [facility] 9/11/2024 reviewed shows:   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 Severe bilateral foraminal stenosis From C4-C7 there is prior ACDF with satisfactory central decompression   At C7-T1 there is grade 1 spondylolisthesis. Moderate central stenosis. Severe bilateral foraminal stenosis    There were no vitals filed for this visit.     EXAM:  Constitutional  General Appearance:  There is no height or weight on file to calculate BMI., NAD  Spine exam:    Motor  C5 C6 C7 C8 T1  L2 L3 L4 L5 S1      Left  5 5 5 5 5  5 5 5 5 5      Right  5 5 5 5 5  5 5 5 5 5   Sensory                 Left  + + + + +  + + + + +     Right + + + + +  + + + + +     Babinski: downgoing  Hoffmans: negative  Clonus: none    Reflexes:   Bicep 2+ left/right  Tricep 2+ left/right  BR 2+ left/right  Patella 2+ left/right  Achilles 2+ left/right        [1]   Current Facility-Administered Medications:     0.9% NaCl infusion, , Intravenous, Continuous, Ivan Palmer MD    celecoxib capsule 200 mg, 200 mg, Oral, Once, Ivan Palmer MD    gabapentin capsule 600 mg, 600 mg, Oral, Once, Ivan Palmer MD    LIDOcaine HCL 10 mg/ml (1%) injection 1 mL, 1 mL, Intradermal, Once PRN, Ivan Palmer MD    oxyCODONE 12 hr tablet 10 mg, 10 mg, Oral, Once, Ivan Palmer MD

## 2025-05-29 VITALS
OXYGEN SATURATION: 93 % | HEIGHT: 74 IN | RESPIRATION RATE: 18 BRPM | WEIGHT: 215 LBS | TEMPERATURE: 98 F | HEART RATE: 100 BPM | DIASTOLIC BLOOD PRESSURE: 79 MMHG | BODY MASS INDEX: 27.59 KG/M2 | SYSTOLIC BLOOD PRESSURE: 135 MMHG

## 2025-05-29 PROCEDURE — 25000242 PHARM REV CODE 250 ALT 637 W/ HCPCS: Performed by: ORTHOPAEDIC SURGERY

## 2025-05-29 PROCEDURE — G0378 HOSPITAL OBSERVATION PER HR: HCPCS

## 2025-05-29 PROCEDURE — 63600175 PHARM REV CODE 636 W HCPCS: Performed by: ORTHOPAEDIC SURGERY

## 2025-05-29 PROCEDURE — 25000003 PHARM REV CODE 250: Performed by: ORTHOPAEDIC SURGERY

## 2025-05-29 PROCEDURE — 97110 THERAPEUTIC EXERCISES: CPT | Mod: CQ

## 2025-05-29 PROCEDURE — 97116 GAIT TRAINING THERAPY: CPT | Mod: CQ

## 2025-05-29 RX ADMIN — SULFASALAZINE 500 MG: 500 TABLET ORAL at 09:05

## 2025-05-29 RX ADMIN — MUPIROCIN: 20 OINTMENT TOPICAL at 09:05

## 2025-05-29 RX ADMIN — FLUTICASONE PROPIONATE 50 MCG: 50 SPRAY, METERED NASAL at 09:05

## 2025-05-29 RX ADMIN — ACETAMINOPHEN 500 MG: 500 TABLET ORAL at 01:05

## 2025-05-29 RX ADMIN — POLYETHYLENE GLYCOL 3350 17 G: 17 POWDER, FOR SOLUTION ORAL at 09:05

## 2025-05-29 RX ADMIN — PANTOPRAZOLE SODIUM 40 MG: 40 TABLET, DELAYED RELEASE ORAL at 09:05

## 2025-05-29 RX ADMIN — FERROUS SULFATE TAB EC 325 MG (65 MG FE EQUIVALENT) 1 EACH: 325 (65 FE) TABLET DELAYED RESPONSE at 09:05

## 2025-05-29 RX ADMIN — TAMSULOSIN HYDROCHLORIDE 0.4 MG: 0.4 CAPSULE ORAL at 09:05

## 2025-05-29 RX ADMIN — METHOCARBAMOL 500 MG: 500 TABLET ORAL at 09:05

## 2025-05-29 RX ADMIN — FAMOTIDINE 20 MG: 20 TABLET, FILM COATED ORAL at 09:05

## 2025-05-29 RX ADMIN — DOCUSATE SODIUM 100 MG: 100 CAPSULE, LIQUID FILLED ORAL at 09:05

## 2025-05-29 RX ADMIN — CETIRIZINE HYDROCHLORIDE 10 MG: 10 TABLET, FILM COATED ORAL at 09:05

## 2025-05-29 RX ADMIN — ACETAMINOPHEN 500 MG: 500 TABLET ORAL at 09:05

## 2025-05-29 RX ADMIN — ACETAMINOPHEN 500 MG: 500 TABLET ORAL at 05:05

## 2025-05-29 RX ADMIN — SENNOSIDES AND DOCUSATE SODIUM 1 TABLET: 50; 8.6 TABLET ORAL at 09:05

## 2025-05-29 RX ADMIN — FUROSEMIDE 20 MG: 20 TABLET ORAL at 09:05

## 2025-05-29 RX ADMIN — PREGABALIN 225 MG: 75 CAPSULE ORAL at 09:05

## 2025-05-29 RX ADMIN — OXYCODONE HYDROCHLORIDE 5 MG: 5 TABLET ORAL at 09:05

## 2025-05-29 RX ADMIN — DULOXETINE 60 MG: 30 CAPSULE, DELAYED RELEASE ORAL at 09:05

## 2025-05-29 RX ADMIN — OXYCODONE HYDROCHLORIDE 5 MG: 5 TABLET ORAL at 04:05

## 2025-05-29 RX ADMIN — DEXAMETHASONE SODIUM PHOSPHATE 8 MG: 4 INJECTION, SOLUTION INTRA-ARTICULAR; INTRALESIONAL; INTRAMUSCULAR; INTRAVENOUS; SOFT TISSUE at 05:05

## 2025-05-29 RX ADMIN — LISINOPRIL 10 MG: 10 TABLET ORAL at 09:05

## 2025-05-29 RX ADMIN — LEVOTHYROXINE SODIUM 150 MCG: 150 TABLET ORAL at 06:05

## 2025-05-29 NOTE — PT/OT/SLP EVAL
Occupational Therapy   Evaluation    Name: Niranjan Whittaker Jr.  MRN: [id number]  Admitting Diagnosis: Cervical radiculopathy  Recent Surgery: Procedure(s) (LRB):  DISCECTOMY, SPINE, CERVICAL, ANTERIOR, W/ ARTHRODESIS (N/A) * Day of Surgery *    Recommendations:     Discharge Recommendations:    Discharge Equipment Recommendations:  none  Barriers to discharge:  None    Assessment:     Niranjan Whittaker Jr. is a 70 y.o. male with a medical diagnosis of Cervical radiculopathy.  He presents with alert and agreeable to evaluation. Performance deficits affecting function: impaired endurance, weakness, impaired self care skills, impaired functional mobility, gait instability, impaired balance, pain, orthopedic precautions.      Rehab Prognosis: Good; patient would benefit from acute skilled OT services to address these deficits and reach maximum level of function.       Plan:     Patient to be seen 5 x/week to address the above listed problems via self-care/home management, therapeutic exercises, therapeutic activities  Plan of Care Expires: 07/03/25  Plan of Care Reviewed with: patient, spouse    Subjective     Chief Complaint: Cervical radiculopathy    Patient/Family Comments/goals: Pt c/o severe neck and back pain    Occupational Profile:  Living Environment: Pt lives in a single level home with his spouse  Previous level of function: Pt is independent with self-care and uses a rollator for mobility  Roles and Routines: Pt is , retired/ disabled. Pt kept up with the housework while his spouse was working prior to his neck and back getting so painful and causing decreased strength  Equipment Used at Home: cane, straight, rollator, shower chair  Assistance upon Discharge: Pt will have assistance from family    Pain/Comfort:  Pain Rating 1: 10/10  Location 1: neck  Pain Addressed 1: Pre-medicate for activity, Reposition, Distraction, Cessation of Activity  Pain Rating Post-Intervention 1: 8/10  Pain Rating 2:  10/10  Location - Orientation 2: lower  Location 2: back  Pain Addressed 2: Pre-medicate for activity, Reposition, Distraction  Pain Rating Post-Intervention 2: 9/10    Patients cultural, spiritual, Scientology conflicts given the current situation: no    Objective:     Communicated with: RAINA Whittaker prior to session.  Patient found HOB elevated with SCD, blood pressure cuff, pulse ox (continuous), cervical collar, peripheral IV upon OT entry to room.    General Precautions: Standard, fall  Orthopedic Precautions: spinal precautions  Braces: Cervical collar  Respiratory Status: Room air    Occupational Performance:    Bed Mobility:    Patient completed Supine to Sit with contact guard assistance  Patient completed Sit to Supine with minimum assistance    Functional Mobility/Transfers:  Patient completed Sit <> Stand Transfer with minimum assistance  with  rolling walker   Patient completed Toilet Transfer Step Transfer technique with contact guard assistance with  rolling walker  Functional Mobility: Pt ambulated in room with tendency to drag his (L) foot some and decreased stability in (B) LE    Activities of Daily Living:  Grooming: contact guard assistance to wash hands at the sink  Toileting: contact guard assistance for clothes management,    Cognitive/Visual Perceptual:  Cognitive/Psychosocial Skills:     -       Oriented to: Person, Place, and Situation   -       Follows Commands/attention:Follows one-step commands  -       Communication: clear/fluent  -       Safety awareness/insight to disability: intact   -       Mood/Affect/Coping skills/emotional control: Cooperative  Visual/Perceptual:      -Intact      Physical Exam:  Balance:    -       sitting with independence, standing with RW and CGA  Upper Extremity Range of Motion:     -       Right Upper Extremity: WFL  -       Left Upper Extremity: WFL  Upper Extremity Strength:    -       Right Upper Extremity: WFL  -       Left Upper Extremity: WFL  Gross  motor coordination:   WFL    AMPAC 6 Click ADL:  AMPAC Total Score: 24    Treatment & Education:  Pt educated on OT role/POC.   Importance of OOB activity with staff assistance.  Importance of sitting up in the chair throughout the day as tolerated, especially for meals   Safety during functional t/f and mobility with use of RW  Importance of assisting with self-care activities   All questions/concerns answered within OT scope of practice      Patient left HOB elevated with all lines intact, call button in reach, RN notified, and spouse present    GOALS:   Multidisciplinary Problems       Occupational Therapy Goals          Problem: Occupational Therapy    Goal Priority Disciplines Outcome Interventions   Occupational Therapy Goal     OT, PT/OT Ongoing    Description: STG: (in 1 week)  Pt will perform grooming with setup  Pt will bathe with CGA  Pt will perform UE dressing with iCGA  Pt will perform LE dressing with CGA  Pt will transfer bed/chair/bsc with RW and SBA  Pt will perform standing task x 5 min with RW and SBA   Pt will tolerate 15 minutes of tx without fatigue      LTG: (in 5 weeks)  1.Restore to max I with self care and mobility.                           History:     Past Medical History:   Diagnosis Date    History of colon polyps 05/12/2022    History of GI diverticular bleed     Hyperlipidemia     Hypertension     Liver hemangioma 05/09/2023    Neuropathy     Personal history of gastric ulcer     Seronegative rheumatoid arthritis 09/22/2021    Goes to [facility]  Last Assessment & Plan:  Formatting of this note might be different from the original. SNRA; Mod Active; Pt now off MTX and on Arava; However, pt continues to have persistent activity wrists; Intolerant to Enbrel; Poor response to Xeljanz; Unable to afford Humira in the past; Intolerant to chronic Nsaid use due to sig Gerd; Cont to be followed by local Pain man    Sleep apnea     Thyroid disease          Past Surgical  History:   Procedure Laterality Date    BACK SURGERY      THYROIDECTOMY N/A 08/27/2021    Procedure: THYROIDECTOMY;  Surgeon: Manish Valadez MD;  Location: [facility];  Service: General;  Laterality: N/A;    TOTAL KNEE ARTHROPLASTY Right        Time Tracking:     OT Date of Treatment: 05/28/25  OT Start Time: 1543  OT Stop Time: 1613  OT Total Time (min): 30 min    Billable Minutes:Evaluation low complexity    5/28/2025

## 2025-05-29 NOTE — DISCHARGE INSTRUCTIONS
Office: [phone]      Spine Discharge Instructions    - Follow up with Dr. Palmer in 10-14 days. Please call for appointment (if not already scheduled).   - Keep dressing clean and dry. May remove dressing 3 days after surgery  - May shower upon discharge. Do not scrub, tub bathe, or submerge the incision.  - No lifting greater than 10 pounds.  - Ambulate multiple times each day   - You may bend and twist for usual daily activities  - You may sleep in any position that is comfortable  - Do not drive a motor vehicle while on narcotic pain medication.   - Utilize pain medication (narcotics) as prescribed  - Do not exceed 3g Tylenol in a 24 hour period.   - Use stool softeners while taking narcotics to prevent constipation   - Resume your usual diet

## 2025-05-29 NOTE — DISCHARGE SUMMARY
[facility]  Discharge Note  Short Stay    Procedure(s) (LRB):  DISCECTOMY, SPINE, CERVICAL, ANTERIOR, W/ ARTHRODESIS (N/A)      OUTCOME: Patient tolerated treatment/procedure well without complication and is now ready for discharge.    DISPOSITION: Home-Health Care [facility]    FINAL DIAGNOSIS:  Cervical radiculopathy    FOLLOWUP: In clinic    DISCHARGE INSTRUCTIONS:    Discharge Procedure Orders   Diet general   Order Comments: Resume home diet     Lifting restrictions   Order Comments: No heavy lifting or strenuous activity     No driving, operating heavy equipment or signing legal documents while taking pain medication     Other restrictions (specify):   Order Comments: [facility]  Office: [phone]    Spine Discharge Instructions    - Follow up with Dr. Palmer in 10-14 days. Please call for appointment (if not already scheduled).   - Keep dressing clean and dry. May remove dressing 3 days after surgery  - May shower upon discharge. Do not scrub, tub bathe, or submerge the incision.  - No lifting greater than 10 pounds.  - Ambulate multiple times each day   - You may bend and twist for usual daily activities  - You may sleep in any position that is comfortable  - Do not drive a motor vehicle while on narcotic pain medication.   - Utilize pain medication (narcotics) as prescribed  - Do not exceed 3g Tylenol in a 24 hour period.   - Use stool softeners while taking narcotics to prevent constipation   - Resume your usual diet     Wound care routine (specify)   Order Comments: Remove Tegaderm and gauze dressing in 72 hours.  Leave Dermabond mesh glued onto skin.  Okay to shower with running water, no soaking or submerging.     Call MD for:  temperature >100.4     Call MD for:  persistent nausea and vomiting     Call MD for:  severe uncontrolled pain     Call MD for:  difficulty breathing, headache or visual disturbances     Call MD for:  redness, tenderness, or signs of infection (pain,  swelling, redness, odor or green/yellow discharge around incision site)     Call MD for:  hives     Call MD for:  persistent dizziness or light-headedness     Call MD for:  extreme fatigue         Clinical Reference Documents Added to Patient Instructions         Document    STANDING EXERCISES (ENGLISH)

## 2025-05-29 NOTE — PLAN OF CARE
Problem: Occupational Therapy  Goal: Occupational Therapy Goal  Description: STG: (in 1 week)  Pt will perform grooming with setup  Pt will bathe with CGA  Pt will perform UE dressing with iCGA  Pt will perform LE dressing with CGA  Pt will transfer bed/chair/bsc with RW and SBA  Pt will perform standing task x 5 min with RW and SBA   Pt will tolerate 15 minutes of tx without fatigue      LTG: (in 5 weeks)  1.Restore to max I with self care and mobility.    Outcome: Ongoing

## 2025-05-29 NOTE — PLAN OF CARE
[facility]  Discharge Final Note    Primary Care Provider: Antonietta Basilio MD    Expected Discharge Date: 5/29/2025    Final Discharge Note (most recent)       Final Note - 05/29/25 1058          Final Note    Assessment Type Final Discharge Note     [place] [facility]     Hospital Resources/Appts/Education Provided Provided patient/caregiver with written discharge plan information        Post-Acute Status    Post-Acute Authorization Home Health     Home Health Status Set-up Complete/Auth obtained     Patient choice form signed by patient/caregiver List with quality metrics by geographic area provided;List from CMS Compare;List from System Post-Acute Care     Discharge Delays None known at this time                     Important Message from Medicare  Important Message from Medicare regarding Discharge Appeal Rights: Given to patient/caregiver, Explained to patient/caregiver, Signed/date by patient/caregiver     Date IMM was signed: 05/29/25  Time IMM was signed: 0955    Contact Info       Ivan Palmer MD   Specialty: Orthopedic Surgery, Spine Surgery    [address]  [address] [facility] [address]   Phone: [phone]       Next Steps: Go in 10 day(s)    Instructions: hosp f/u appt: June 12, 2025 at 12:30 pm          Pt discharging home today with Vivek JACKSON

## 2025-05-29 NOTE — PT/OT/SLP PROGRESS
"Physical Therapy Treatment    Patient Name:  Niranjan Whittaker Jr.   MRN:  [id number]    Recommendations:     Discharge Recommendations: Low Intensity Therapy  Discharge Equipment Recommendations: none  Barriers to discharge: None    Assessment:     Niranjan Whittaker Jr. is a 70 y.o. male admitted with a medical diagnosis of Cervical radiculopathy.  He presents with the following impairments/functional limitations: orthopedic precautions.    Pt participated wll with PT, demo sig increased gait distance and able to ascend/descend stairs without supervision only. Pt scheduled for discharge this date    PT POC discussed with Nadeem BarriosPT      Rehab Prognosis: Good; patient would benefit from acute skilled PT services to address these deficits and reach maximum level of function.    Recent Surgery: Procedure(s) (LRB):  DISCECTOMY, SPINE, CERVICAL, ANTERIOR, W/ ARTHRODESIS (N/A) 1 Day Post-Op    Plan:     During this hospitalization, patient to be seen daily to address the identified rehab impairments via gait training, therapeutic activities, therapeutic exercises, neuromuscular re-education and progress toward the following goals:    Plan of Care Expires:  06/28/25    Subjective     Chief Complaint: Cervical radiculopathy   Patient/Family Comments/goals: "I've been going to the bathroom by myself"  Pain/Comfort:         Objective:     Communicated with Evelia Mcneill RN prior to session.  Patient found HOB elevated with cervical collar, peripheral IV upon PT entry to room.     General Precautions: Standard, fall  Orthopedic Precautions: spinal precautions  Braces: Cervical collar  Respiratory Status: Room air     Functional Mobility:  Bed Mobility:     Supine to Sit: modified independence  Transfers:     Sit to Stand:  stand by assistance with rolling walker  Toilet Transfer: modified independence with  rolling walker  using; independent for hygiene  Gait: 236' with RW and standby assist; slow abdiaziz, L foot " drop  Stairs:  Pt ascended/descended 5 stair(s) with Rolling Walker with ;cueing for proper sequence        AM-PAC 6 CLICK MOBILITY  Turning over in bed (including adjusting bedclothes, sheets and blankets)?: 4  Sitting down on and standing up from a chair with arms (e.g., wheelchair, bedside commode, etc.): 4  Moving from lying on back to sitting on the side of the bed?: 4  Moving to and from a bed to a chair (including a wheelchair)?: 4  Need to walk in hospital room?: 3  Climbing 3-5 steps with a railing?: 3  Basic Mobility Total Score: 22       Treatment & Education:  Standing exercise x 10 reps - marching, hip ab/adduction, heel raise with seated rest break between exercise    Patient left up in chair with all lines intact and call button in reach..    GOALS:   Multidisciplinary Problems       Physical Therapy Goals          Problem: Physical Therapy    Goal Priority Disciplines Outcome Interventions   Physical Therapy Goal     PT, PT/OT Progressing    Description: Short Term Goals to be met by: 6/10/25    Patient will increase functional independence with mobility by performin. Supine to sit with Modified [place]  2. Sit to stand transfer with Modified [place]  3. Bed to chair transfer with Modified [place] using Rolling Walker  4. Gait  x 100 feet with Modified [place] using Rolling Walker.   5. Lower extremity exercise program x30 reps per handout, with assistance as needed  6. Verbalize/demo 3/3 spinal precautions  7. Pt to negotiate 2 steps with SPC SBA    Long Term Goals to be met by: 25     1. Pt with regain full independent functional mobility to return to prior activities of daily living                        DME Justifications:      Time Tracking:     PT Received On: 25  PT Start Time: 934     PT Stop Time: 959  PT Total Time (min): 25 min     Billable Minutes: Gait Training 17 and Therapeutic Exercise 8    Treatment Type: Treatment  PT/PTA: PTA     Number of  PTA visits since last PT visit: 1 05/29/2025

## 2025-05-29 NOTE — ANESTHESIA POSTPROCEDURE EVALUATION
Anesthesia Post Evaluation    Patient: Niranjan Whittaker JrLauryn    Procedure(s) Performed: Procedure(s) (LRB):  DISCECTOMY, SPINE, CERVICAL, ANTERIOR, W/ ARTHRODESIS (N/A)    Final Anesthesia Type: general      Patient location during evaluation: PACU  Post-procedure vital signs: reviewed and stable  Pain management: adequate  Airway patency: patent    PONV status at discharge: No PONV  Anesthetic complications: no      Cardiovascular status: hemodynamically stable  Respiratory status: unassisted  Hydration status: euvolemic  Follow-up not needed.              Vitals Value Taken Time   /71 05/28/25 20:46   Temp 36.7 °C (98 °F) 05/28/25 20:46   Pulse 102 05/28/25 20:46   Resp 18 05/28/25 20:12   SpO2 95 % 05/28/25 20:46         Event Time   Out of Recovery 12:44:00         Pain/Tanya Score: Pain Rating Prior to Med Admin: 7 (5/28/2025  8:12 PM)  Pain Rating Post Med Admin: 0 (5/28/2025  4:32 PM)  Tanya Score: 10 (5/28/2025 12:40 PM)

## 2025-05-29 NOTE — PLAN OF CARE
[facility]  Initial Discharge Assessment       Primary Care Provider: Antonietta Basilio MD    Admission Diagnosis: Cervical radiculopathy [M54.12]    Admission Date: 5/28/2025  Expected Discharge Date: 5/29/2025    Transition of Care Barriers: None    Payor: [facility] MANAGED MCARE / Plan: [facility] MEDICARE COMPLETE / Product Type: Medicare Advantage /     Extended Emergency Contact Information  Primary Emergency Contact: Holly Whittaker  Mobile Phone: [phone]  Relation: Spouse  Preferred language: English   needed? No    Discharge Plan A: Home with family, Home Health  Discharge Plan B: Home with family, Home Health      [id number] [place] - [address] [facility] [address]  [address]  [place] [address]  Phone: [phone] Fax: [phone]      Initial Assessment (most recent)       Adult Discharge Assessment - 05/29/25 0956          Discharge Assessment    Assessment Type Discharge Planning Assessment     Source of Information patient     People in Home spouse     Facility Arrived From: Home     Do you expect to return to your current living situation? Yes     Do you have help at home or someone to help you manage your care at home? Yes     Who are your caregiver(s) and their phone number(s)? Holly Whittaker     Prior to hospitilization cognitive status: Unable to Assess     Current cognitive status: Alert/Oriented     Walking or Climbing Stairs Difficulty yes     Walking or Climbing Stairs ambulation difficulty, requires equipment     Mobility Management Rollator/ Cane     Dressing/Bathing Difficulty no     Home Accessibility stairs to enter home     Number of Stairs, Main Entrance two     Home Layout Able to live on 1st floor     Equipment Currently Used at Home cane, straight;rollator;shower chair     Patient currently being followed by outpatient case management? No     Do you currently have service(s) that help you  manage your care at home? No     Do you take prescription medications? Yes     Do you have prescription coverage? Yes     Do you have any problems affording any of your prescribed medications? Yes     If yes, what medications? Oxycontin     Is the patient taking medications as prescribed? yes     Who is going to help you get home at discharge? Wife     How do you get to doctors appointments? car, drives self;family or friend will provide     Are you on dialysis? No     Do you take coumadin? No     Discharge Plan A Home with family;Home Health     Discharge Plan B Home with family;Home Health     DME Needed Upon Discharge  none     Discharge Plan discussed with: Patient     Transition of Care Barriers None        Social Isolation    How often do you feel lonely or isolated from those around you?  Never        Utilities    In the past 12 months has the electric, gas, oil, or water company threatened to shut off services in your home? No        Health Literacy    How often do you need to have someone help you when you read instructions, pamphlets, or other written material from your doctor or pharmacy? Never                          CM spoke with pt in room. CM was consulted on pt for home health. Pt has used [facility] in recent past and would like to use them again. CM obtained a choice and will send information to [facility]. No other needs voiced. CM will follow for discharge needs.

## 2025-05-29 NOTE — NURSING
Discharge instructions reviewed with patient and spouse; and copy given to patient. Patient and spouse voiced understanding regarding:meds, appt., DEB,Incentive spirometer, soft neck collar; signs and symptoms to report to physician.   Sylvie Oconnell supervisor to call Dr Palmer regarding patient inability to get meds filled.

## 2025-05-29 NOTE — PROGRESS NOTES
[facility]  Wound Care    Patient Name:  Niranjan Whittaker Jr.   MRN:  [id number]  Date: 2025  Diagnosis: Cervical radiculopathy    History:     Past Medical History:   Diagnosis Date    History of colon polyps 2022    History of GI diverticular bleed     Hyperlipidemia     Hypertension     Liver hemangioma 2023    Neuropathy     Personal history of gastric ulcer     Seronegative rheumatoid arthritis 2021    Goes to [facility] for [facility]ology  Last Assessment & Plan:  Formatting of this note might be different from the original. SNRA; Mod Active; Pt now off MTX and on Arava; However, pt continues to have persistent activity wrists; Intolerant to Enbrel; Poor response to Xeljanz; Unable to afford Humira in the past; Intolerant to chronic Nsaid use due to sig Gerd; Cont to be followed by local Pain man    Sleep apnea     Thyroid disease        Social History[1]    Precautions:     Allergies as of 05/15/2025    (No Known Allergies)       [facility] Assessment Details/Treatment     Narrative: Seen patient for initiation of preventative skin care measures    Patient in bed, On phone with d/c planner. Has cervical collar on. See nursing notes for skin assessment.  Yg score 22    D/c home today        2025         [1]   Social History  Socioeconomic History    Marital status:    Occupational History    Occupation: Disbaled   Tobacco Use    Smoking status: Former     Types: Cigars     Quit date:      Years since quittin.4    Smokeless tobacco: Never   Substance and Sexual Activity    Alcohol use: Not Currently     Comment: beer wine liqour occasionally    Drug use: Yes     Types: Hydrocodone, Oxycodone    Sexual activity: Yes     Partners: Female     Social Drivers of Health     Financial Resource Strain: Patient Declined (2025)    Overall Financial Resource Strain (CARDIA)     Difficulty of Paying Living Expenses: Patient declined   Food Insecurity:  Patient Declined (5/28/2025)    Hunger Vital Sign     Worried About Running Out of Food in the Last Year: Patient declined     Ran Out of Food in the Last Year: Patient declined   Transportation Needs: Patient Declined (5/28/2025)    PRAPARE - Transportation     Lack of Transportation (Medical): Patient declined     Lack of Transportation (Non-Medical): Patient declined   Physical Activity: Insufficiently Active (2/13/2025)    Exercise Vital Sign     Days of Exercise per Week: 2 days     Minutes of Exercise per Session: 20 min   Stress: Patient Declined (5/28/2025)    [employer] - Occupational Stress Questionnaire     Feeling of Stress : Patient declined   Housing Stability: Patient Declined (5/28/2025)    Housing Stability Vital Sign     Unable to Pay for Housing in the Last Year: Patient declined     Number of Times Moved in the Last Year: 0     Homeless in the Last Year: Patient declined

## 2025-05-30 ENCOUNTER — PATIENT OUTREACH (OUTPATIENT)
Dept: ADMINISTRATIVE | Facility: CLINIC | Age: 70
End: 2025-05-30
Payer: MEDICARE

## 2025-05-30 NOTE — PROGRESS NOTES
C3 nurse spoke with Niranjan Whittaker Jr.  for a TCC post hospital discharge follow up call. The patient does not have a scheduled HOSFU appointment with Antonietta Basilio MD  within 5-7 days post hospital discharge date 5/29/25. C3 nurse was unable to schedule HOSFU appointment in [facility].    Message sent to PCP staff requesting they contact patient and schedule follow up appointment.

## 2025-06-03 NOTE — PHYSICIAN QUERY
Please provide the diagnosis or diagnoses associated with the removal of Osteophytes the below clinical findings.  Osteophytes associated with spondylosis

## 2025-06-09 ENCOUNTER — TELEPHONE (OUTPATIENT)
Dept: RADIOLOGY | Facility: CLINIC | Age: 70
End: 2025-06-09
Payer: MEDICARE

## 2025-06-10 RX ORDER — CETIRIZINE HYDROCHLORIDE 10 MG/1
10 TABLET ORAL
Qty: 90 TABLET | Refills: 0 | Status: SHIPPED | OUTPATIENT
Start: 2025-06-10

## 2025-06-13 DIAGNOSIS — G89.4 CHRONIC PAIN SYNDROME: ICD-10-CM

## 2025-06-13 RX ORDER — METHOCARBAMOL 500 MG/1
500 TABLET, FILM COATED ORAL 2 TIMES DAILY
Qty: 60 TABLET | Refills: 0 | Status: SHIPPED | OUTPATIENT
Start: 2025-06-13

## 2025-07-11 DIAGNOSIS — J30.9 ALLERGIC RHINITIS, UNSPECIFIED SEASONALITY, UNSPECIFIED TRIGGER: ICD-10-CM

## 2025-07-15 RX ORDER — MONTELUKAST SODIUM 10 MG/1
10 TABLET ORAL NIGHTLY
Qty: 90 TABLET | Refills: 0 | Status: SHIPPED | OUTPATIENT
Start: 2025-07-15

## 2025-07-24 DIAGNOSIS — K21.9 GASTROESOPHAGEAL REFLUX DISEASE WITHOUT ESOPHAGITIS: ICD-10-CM

## 2025-07-24 RX ORDER — OMEPRAZOLE 40 MG/1
CAPSULE, DELAYED RELEASE ORAL
Qty: 90 CAPSULE | Refills: 1 | Status: SHIPPED | OUTPATIENT
Start: 2025-07-24

## 2025-07-29 DIAGNOSIS — G89.4 CHRONIC PAIN SYNDROME: ICD-10-CM

## 2025-07-30 RX ORDER — METHOCARBAMOL 500 MG/1
500 TABLET, FILM COATED ORAL 2 TIMES DAILY
Qty: 180 TABLET | Refills: 0 | Status: SHIPPED | OUTPATIENT
Start: 2025-07-30

## 2025-08-05 ENCOUNTER — OFFICE VISIT (OUTPATIENT)
Dept: FAMILY MEDICINE | Facility: CLINIC | Age: 70
End: 2025-08-05
Payer: MEDICARE

## 2025-08-05 VITALS
WEIGHT: 209.19 LBS | HEIGHT: 74 IN | BODY MASS INDEX: 26.85 KG/M2 | TEMPERATURE: 97 F | RESPIRATION RATE: 18 BRPM | OXYGEN SATURATION: 96 % | DIASTOLIC BLOOD PRESSURE: 80 MMHG | HEART RATE: 101 BPM | SYSTOLIC BLOOD PRESSURE: 112 MMHG

## 2025-08-05 DIAGNOSIS — M51.362 DEGENERATION OF INTERVERTEBRAL DISC OF LUMBAR REGION WITH DISCOGENIC BACK PAIN AND LOWER EXTREMITY PAIN: ICD-10-CM

## 2025-08-05 DIAGNOSIS — I10 PRIMARY HYPERTENSION: Primary | ICD-10-CM

## 2025-08-05 DIAGNOSIS — E78.2 MIXED HYPERLIPIDEMIA: ICD-10-CM

## 2025-08-05 DIAGNOSIS — N40.1 BENIGN PROSTATIC HYPERPLASIA WITH URINARY RETENTION: ICD-10-CM

## 2025-08-05 DIAGNOSIS — N18.32 CHRONIC KIDNEY DISEASE, STAGE 3B: ICD-10-CM

## 2025-08-05 DIAGNOSIS — R29.898 WEAKNESS OF LEFT LOWER EXTREMITY: ICD-10-CM

## 2025-08-05 DIAGNOSIS — E89.0 POSTOPERATIVE HYPOTHYROIDISM: ICD-10-CM

## 2025-08-05 DIAGNOSIS — G89.4 CHRONIC PAIN SYNDROME: ICD-10-CM

## 2025-08-05 DIAGNOSIS — G62.9 NEUROPATHY: ICD-10-CM

## 2025-08-05 DIAGNOSIS — N52.9 ERECTILE DYSFUNCTION, UNSPECIFIED ERECTILE DYSFUNCTION TYPE: ICD-10-CM

## 2025-08-05 DIAGNOSIS — J30.9 ALLERGIC RHINITIS, UNSPECIFIED SEASONALITY, UNSPECIFIED TRIGGER: ICD-10-CM

## 2025-08-05 DIAGNOSIS — R33.8 BENIGN PROSTATIC HYPERPLASIA WITH URINARY RETENTION: ICD-10-CM

## 2025-08-05 PROCEDURE — 4010F ACE/ARB THERAPY RXD/TAKEN: CPT | Mod: ,,, | Performed by: STUDENT IN AN ORGANIZED HEALTH CARE EDUCATION/TRAINING PROGRAM

## 2025-08-05 PROCEDURE — 99214 OFFICE O/P EST MOD 30 MIN: CPT | Mod: ,,, | Performed by: STUDENT IN AN ORGANIZED HEALTH CARE EDUCATION/TRAINING PROGRAM

## 2025-08-05 PROCEDURE — 1126F AMNT PAIN NOTED NONE PRSNT: CPT | Mod: ,,, | Performed by: STUDENT IN AN ORGANIZED HEALTH CARE EDUCATION/TRAINING PROGRAM

## 2025-08-05 PROCEDURE — 3074F SYST BP LT 130 MM HG: CPT | Mod: ,,, | Performed by: STUDENT IN AN ORGANIZED HEALTH CARE EDUCATION/TRAINING PROGRAM

## 2025-08-05 PROCEDURE — 1101F PT FALLS ASSESS-DOCD LE1/YR: CPT | Mod: ,,, | Performed by: STUDENT IN AN ORGANIZED HEALTH CARE EDUCATION/TRAINING PROGRAM

## 2025-08-05 PROCEDURE — 3079F DIAST BP 80-89 MM HG: CPT | Mod: ,,, | Performed by: STUDENT IN AN ORGANIZED HEALTH CARE EDUCATION/TRAINING PROGRAM

## 2025-08-05 PROCEDURE — 3288F FALL RISK ASSESSMENT DOCD: CPT | Mod: ,,, | Performed by: STUDENT IN AN ORGANIZED HEALTH CARE EDUCATION/TRAINING PROGRAM

## 2025-08-05 PROCEDURE — 3008F BODY MASS INDEX DOCD: CPT | Mod: ,,, | Performed by: STUDENT IN AN ORGANIZED HEALTH CARE EDUCATION/TRAINING PROGRAM

## 2025-08-05 PROCEDURE — 1159F MED LIST DOCD IN RCRD: CPT | Mod: ,,, | Performed by: STUDENT IN AN ORGANIZED HEALTH CARE EDUCATION/TRAINING PROGRAM

## 2025-08-05 PROCEDURE — 3044F HG A1C LEVEL LT 7.0%: CPT | Mod: ,,, | Performed by: STUDENT IN AN ORGANIZED HEALTH CARE EDUCATION/TRAINING PROGRAM

## 2025-08-05 RX ORDER — MONTELUKAST SODIUM 10 MG/1
10 TABLET ORAL NIGHTLY
Qty: 90 TABLET | Refills: 1 | Status: SHIPPED | OUTPATIENT
Start: 2025-08-05

## 2025-08-05 RX ORDER — HYDROXYCHLOROQUINE SULFATE 200 MG/1
200 TABLET, FILM COATED ORAL 2 TIMES DAILY
Qty: 180 TABLET | Refills: 1 | Status: CANCELLED | OUTPATIENT
Start: 2025-08-05

## 2025-08-05 RX ORDER — CETIRIZINE HYDROCHLORIDE 10 MG/1
10 TABLET ORAL DAILY
Qty: 90 TABLET | Refills: 1 | Status: SHIPPED | OUTPATIENT
Start: 2025-08-05

## 2025-08-05 RX ORDER — FUROSEMIDE 20 MG/1
20 TABLET ORAL DAILY
Qty: 90 TABLET | Refills: 1 | Status: CANCELLED | OUTPATIENT
Start: 2025-08-05

## 2025-08-05 RX ORDER — LISINOPRIL 10 MG/1
10 TABLET ORAL DAILY
Qty: 90 TABLET | Refills: 1 | Status: SHIPPED | OUTPATIENT
Start: 2025-08-05

## 2025-08-05 RX ORDER — PREGABALIN 225 MG/1
225 CAPSULE ORAL 3 TIMES DAILY
Qty: 270 CAPSULE | Refills: 3 | Status: CANCELLED | OUTPATIENT
Start: 2025-08-05

## 2025-08-05 RX ORDER — PRAVASTATIN SODIUM 40 MG/1
40 TABLET ORAL DAILY
Qty: 90 TABLET | Refills: 0 | Status: SHIPPED | OUTPATIENT
Start: 2025-08-05

## 2025-08-05 RX ORDER — FERROUS SULFATE 325(65) MG
325 TABLET ORAL DAILY
Qty: 90 TABLET | Refills: 1 | Status: SHIPPED | OUTPATIENT
Start: 2025-08-05

## 2025-08-05 RX ORDER — CHLORHEXIDINE GLUCONATE ORAL RINSE 1.2 MG/ML
SOLUTION DENTAL
COMMUNITY
Start: 2025-07-16

## 2025-08-05 RX ORDER — ANASTROZOLE 1 MG/1
1 TABLET ORAL
Qty: 24 TABLET | Refills: 0 | Status: SHIPPED | OUTPATIENT
Start: 2025-08-05

## 2025-08-05 RX ORDER — SILODOSIN 8 MG/1
8 CAPSULE ORAL DAILY
Qty: 90 CAPSULE | Refills: 0 | Status: SHIPPED | OUTPATIENT
Start: 2025-08-05

## 2025-08-05 RX ORDER — SULFASALAZINE 500 MG/1
500 TABLET ORAL 2 TIMES DAILY
Qty: 180 TABLET | Refills: 1 | Status: CANCELLED | OUTPATIENT
Start: 2025-08-05

## 2025-08-05 RX ORDER — TADALAFIL 10 MG/1
20 TABLET ORAL DAILY PRN
Qty: 60 TABLET | Refills: 1 | Status: SHIPPED | OUTPATIENT
Start: 2025-08-05

## 2025-08-05 RX ORDER — LEVOTHYROXINE SODIUM 150 UG/1
150 TABLET ORAL
Qty: 90 TABLET | Refills: 1 | Status: SHIPPED | OUTPATIENT
Start: 2025-08-05 | End: 2026-08-05

## 2025-08-05 RX ORDER — DULOXETIN HYDROCHLORIDE 60 MG/1
60 CAPSULE, DELAYED RELEASE ORAL 2 TIMES DAILY
Qty: 180 CAPSULE | Refills: 1 | Status: SHIPPED | OUTPATIENT
Start: 2025-08-05

## 2025-08-05 RX ORDER — METHOCARBAMOL 500 MG/1
500 TABLET, FILM COATED ORAL 2 TIMES DAILY
Qty: 180 TABLET | Refills: 0 | Status: SHIPPED | OUTPATIENT
Start: 2025-08-05

## 2025-08-05 NOTE — PROGRESS NOTES
Progress Note     CRISTI GAMBOA MD   [address]  [place] [address]     PATIENT NAME: Niranjan Whittaker Jr.  : 1955  DATE: 25  MRN: [id number]      Billing Provider: CRISTI GAMBOA MD  Level of Service: MT OFFICE/OUTPT VISIT, EST, LEVL IV, 30-39 MIN  Patient PCP Information       Provider PCP Type    CRISTI GAMBOA MD General                Medication Refill (Pt presents to clinic for medication refills. ), Back Pain (He also wants to discuss his back and leg pain. He states that his in home service recommended him to discuss a motorized chair. ), and Tinnitus (Pt also states that he has had ringing in his ears that started back about a week and a half ago. He states it is only in his left ear and it comes and goes. )      SUBJECTIVE:     Niranjan Whittaker Jr. is a 70 y.o.male who presents to clinic for Medication Refill (Pt presents to clinic for medication refills. ), Back Pain (He also wants to discuss his back and leg pain. He states that his in home service recommended him to discuss a motorized chair. ), and Tinnitus (Pt also states that he has had ringing in his ears that started back about a week and a half ago. He states it is only in his left ear and it comes and goes. )    Patient presents to clinic today for medication refill and to see about getting a prescription for power wheelchair.  Patient is currently being evaluated by home health and they told him he would be a good candidate for motorized wheelchair.  Patient notes that he has difficulty walking secondary to pain in his low back.  He also has associated weakness in his left leg secondary to his degenerative disc disease.  He also has decreased sensation in his bilateral feet secondary to his back.  Patient is followed by Neurosurgery and is scheduled to have additional surgery on his back.  Patient notes that having a power wheelchair would assist him in mobility  in his home to complete activities of daily living such as cooking, getting to and from the bathroom, as well as bathing.  Patient notes that he also has some intermittent difficulty with incontinence secondary to decreased mobility.  He was using a diuretic in having more difficulties but discontinued that medication with significant improvement.  He is followed by Dr. Ladd with Urology for this concern.     Has had anemia for decades. Has been on iron 3-4 years. Does not want additional ROSE at this time.  Patient just wants to trend his level.  Patient states that if it is trending down he would be willing to see Hematology for additional evaluation at that time.    Patient is also having some ringing in his right ear. Had many years ago and it stayed for 6 months and then left. Started again 1.5 weeks ago.  He denies any associated hearing loss.  He is not interested in ENT referral at this time.    All other pertinent review of systems negative. Please see HPI for details.     Past Medical History:  has a past medical history of History of colon polyps (05/12/2022), History of GI diverticular bleed, Hyperlipidemia, Hypertension, Liver hemangioma (05/09/2023), Neuropathy, Personal history of gastric ulcer, Seronegative rheumatoid arthritis (09/22/2021), Sleep apnea, and Thyroid disease.   Past Surgical History:  has a past surgical history that includes Back surgery; Thyroidectomy (N/A, 08/27/2021); Total knee arthroplasty (Right); and Anterior cervical discectomy w/ fusion (N/A, 5/28/2025).  Family History: family history includes Heart disease in his mother; Hypertension in his father, mother, and sister.  Social History:  reports that he quit smoking about 5 years ago. His smoking use included cigars. He has never used smokeless tobacco. He reports that he does not currently use alcohol. He reports current drug use. Drugs: Hydrocodone and Oxycodone.  Allergies: Review of patient's allergies indicates:  No  "Known Allergies    Current Medications[1]   OBJECTIVE:     Vital Signs   /80 (BP Location: Left arm, Patient Position: Sitting)   Pulse 101   Temp 97.1 °F (36.2 °C) (Oral)   Resp 18   Ht 6' 2" (1.88 m)   Wt 94.9 kg (209 lb 3.2 oz)   SpO2 96%   BMI 26.86 kg/m²     Physical Exam  Constitutional:       General: He is not in acute distress.     Appearance: Normal appearance. He is not ill-appearing.   HENT:      Head: Normocephalic and atraumatic.   Eyes:      Extraocular Movements: Extraocular movements intact.      Pupils: Pupils are equal, round, and reactive to light.   Cardiovascular:      Rate and Rhythm: Normal rate and regular rhythm.      Heart sounds: No murmur heard.     No friction rub. No gallop.   Pulmonary:      Effort: Pulmonary effort is normal. No respiratory distress.      Breath sounds: Normal breath sounds. No wheezing, rhonchi or rales.   Abdominal:      Palpations: Abdomen is soft.      Tenderness: There is no abdominal tenderness. There is no guarding or rebound.   Musculoskeletal:         General: Normal range of motion.   Skin:     General: Skin is warm and dry.      Capillary Refill: Capillary refill takes less than 2 seconds.   Neurological:      General: No focal deficit present.      Mental Status: He is alert.   Psychiatric:         Mood and Affect: Mood normal.         Behavior: Behavior normal.         ASSESSMENT/PLAN:     1. Primary hypertension  -     lisinopriL 10 MG tablet; Take 1 tablet (10 mg total) by mouth once daily.  Dispense: 90 tablet; Refill: 1  Currently well-controlled.  Patient to continue lisinopril.  Refill provided.      2. Postoperative hypothyroidism  Overview:  Patient is status post thyroidectomy secondary to thyroid nodules.      Orders:  -     levothyroxine (SYNTHROID) 150 MCG tablet; Take 1 tablet (150 mcg total) by mouth before breakfast.  Dispense: 90 tablet; Refill: 1  Patient up-to-date on TSH.  TSH within normal limits.  Refill for Synthroid " provided.  Patient to continue.      3. Chronic pain syndrome  -     DULoxetine (CYMBALTA) 60 MG capsule; Take 1 capsule (60 mg total) by mouth 2 (two) times daily.  Dispense: 180 capsule; Refill: 1  -     methocarbamoL (ROBAXIN) 500 MG Tab; Take 1 tablet (500 mg total) by mouth 2 (two) times daily.  Dispense: 180 tablet; Refill: 0  Patient has chronic pain syndrome.  Patient to continue Robaxin in Cymbalta.  Patient tolerates these medications without difficulty.  Refills provided.    4. Chronic kidney disease, stage 3b  -     ferrous sulfate (FEROSUL) 325 mg (65 mg iron) Tab tablet; Take 1 tablet (325 mg total) by mouth once daily.  Dispense: 90 tablet; Refill: 1  Patient has history of iron-deficiency in setting of CKD and has a associated anemia.  Patient may continue iron supplementation.  We will continue to monitor CBC.    5. Allergic rhinitis, unspecified seasonality, unspecified trigger  -     montelukast (SINGULAIR) 10 mg tablet; Take 1 tablet (10 mg total) by mouth every evening.  Dispense: 90 tablet; Refill: 1  Well-controlled with Singulair.  Patient to continue.  Refill provided.      6. Mixed hyperlipidemia  -     pravastatin (PRAVACHOL) 40 MG tablet; Take 1 tablet (40 mg total) by mouth once daily.  Dispense: 90 tablet; Refill: 0  Continue pravastatin.  Patient up-to-date on blood work.  Refill provided.    9. Degeneration of lumbar intervertebral disc/neuropathy/weakness of left lower extremities  Patient has longstanding history of degeneration of lumbar spine with a associated lower extremity weakness and decreased mobility.  Patient is interested in getting a motorized wheelchair.  We will discussed with home health to assist him in getting wheelchair.  Nursing staff to contact [facility] to we can discuss next steps.    10. Erectile dysfunction, unspecified erectile dysfunction type  -     tadalafiL (CIALIS) 10 MG tablet; Take 2 tablets (20 mg total) by mouth daily as needed (ED).  Dispense: 60  tablet; Refill: 1    11. Benign prostatic hyperplasia with urinary retention  -     anastrozole (ARIMIDEX) 1 mg Tab; Take 1 tablet (1 mg total) by mouth every 7 days.  Dispense: 24 tablet; Refill: 0  -     silodosin (RAPAFLO) 8 mg Cap capsule; Take 1 capsule (8 mg total) by mouth once daily.  Dispense: 90 capsule; Refill: 0  Patient has a history of ED and BPH.  Patient followed by Urology and currently on Rapaflo and anastrozole.  Patient to follow up with Urology.  Counseled he would need to get his Rapaflo and anastrozole from Urology in the future that we would no longer be able to provide these prescriptions for him.  Patient verbalized understanding.    Other orders  -     cetirizine (ZYRTEC) 10 MG tablet; Take 1 tablet (10 mg total) by mouth once daily.  Dispense: 90 tablet; Refill: 1        Follow up in about 3 months (around 11/5/2025).      CRISTI GAMBOA MD  08/06/2025    Due to voice recognition software, sound alike and misspelled words may be contained in the documentation.              [1]   Current Outpatient Medications:     chlorhexidine (PERIDEX) 0.12 % solution, SMARTSIG:15 Milliliter(s) Twice Daily, Disp: , Rfl:     ergocalciferol, vitamin D2, (VITAMIN D ORAL), Take by mouth., Disp: , Rfl:     fluticasone propionate (FLONASE) 50 mcg/actuation nasal spray, 1 spray (50 mcg total) by Each Nostril route once daily., Disp: 15.8 mL, Rfl: 11    furosemide (LASIX) 20 MG tablet, Take 1 tablet (20 mg total) by mouth once daily., Disp: 90 tablet, Rfl: 1    HYDROcodone-acetaminophen (NORCO)  mg per tablet, Take 1 tablet by mouth every 4 (four) hours as needed for Pain., Disp: 15 tablet, Rfl: 0    hydroxychloroquine (PLAQUENIL) 200 mg tablet, Take 1 tablet (200 mg total) by mouth 2 (two) times a day., Disp: 180 tablet, Rfl: 1    leflunomide (ARAVA) 20 MG Tab, Take 20 mg by mouth 3 (three) times a week., Disp: , Rfl:     multivitamin-minerals-lutein Tab, Take 1 tablet by mouth once daily.,  Disp: , Rfl:     omeprazole (PRILOSEC) 40 MG capsule, TAKE 1 CAPSULE(40 MG) BY MOUTH DAILY, Disp: 90 capsule, Rfl: 1    oxyCODONE (OXYCONTIN) 30 mg TR12 12 hr tablet, Take 1 tablet (30 mg total) by mouth 2 (two) times daily as needed., Disp: 60 tablet, Rfl: 0    polyethylene glycol 3350 (MIRALAX ORAL), Take by mouth., Disp: , Rfl:     pregabalin (LYRICA) 225 MG Cap, Take 1 capsule (225 mg total) by mouth 3 (three) times daily., Disp: 270 capsule, Rfl: 3    promethazine (PHENERGAN) 25 MG tablet, Take 1 tablet (25 mg total) by mouth 2 (two) times daily as needed for Nausea. 2 times daily prn headache no more then 2 days in a week, Disp: 90 tablet, Rfl: 1    sulfaSALAzine (AZULFIDINE) 500 mg Tab, Take 1 tablet (500 mg total) by mouth 2 (two) times daily., Disp: 180 tablet, Rfl: 1    anastrozole (ARIMIDEX) 1 mg Tab, Take 1 tablet (1 mg total) by mouth every 7 days., Disp: 24 tablet, Rfl: 0    cetirizine (ZYRTEC) 10 MG tablet, Take 1 tablet (10 mg total) by mouth once daily., Disp: 90 tablet, Rfl: 1    DULoxetine (CYMBALTA) 60 MG capsule, Take 1 capsule (60 mg total) by mouth 2 (two) times daily., Disp: 180 capsule, Rfl: 1    ferrous sulfate (FEROSUL) 325 mg (65 mg iron) Tab tablet, Take 1 tablet (325 mg total) by mouth once daily., Disp: 90 tablet, Rfl: 1    levothyroxine (SYNTHROID) 150 MCG tablet, Take 1 tablet (150 mcg total) by mouth before breakfast., Disp: 90 tablet, Rfl: 1    lisinopriL 10 MG tablet, Take 1 tablet (10 mg total) by mouth once daily., Disp: 90 tablet, Rfl: 1    methocarbamoL (ROBAXIN) 500 MG Tab, Take 1 tablet (500 mg total) by mouth 2 (two) times daily., Disp: 180 tablet, Rfl: 0    montelukast (SINGULAIR) 10 mg tablet, Take 1 tablet (10 mg total) by mouth every evening., Disp: 90 tablet, Rfl: 1    pravastatin (PRAVACHOL) 40 MG tablet, Take 1 tablet (40 mg total) by mouth once daily., Disp: 90 tablet, Rfl: 0    silodosin (RAPAFLO) 8 mg Cap capsule, Take 1 capsule (8 mg total) by mouth once daily.,  Disp: 90 capsule, Rfl: 0    tadalafiL (CIALIS) 10 MG tablet, Take 2 tablets (20 mg total) by mouth daily as needed (ED)., Disp: 60 tablet, Rfl: 1

## 2025-08-06 PROBLEM — N40.1 BENIGN PROSTATIC HYPERPLASIA WITH URINARY RETENTION: Status: ACTIVE | Noted: 2025-08-06

## 2025-08-06 PROBLEM — R33.8 BENIGN PROSTATIC HYPERPLASIA WITH URINARY RETENTION: Status: ACTIVE | Noted: 2025-08-06

## 2025-09-02 ENCOUNTER — OFFICE VISIT (OUTPATIENT)
Dept: FAMILY MEDICINE | Facility: CLINIC | Age: 70
End: 2025-09-02
Payer: MEDICARE

## 2025-09-02 VITALS
HEART RATE: 96 BPM | RESPIRATION RATE: 18 BRPM | SYSTOLIC BLOOD PRESSURE: 132 MMHG | DIASTOLIC BLOOD PRESSURE: 86 MMHG | OXYGEN SATURATION: 97 % | TEMPERATURE: 98 F | WEIGHT: 207 LBS | BODY MASS INDEX: 26.56 KG/M2 | HEIGHT: 74 IN

## 2025-09-02 DIAGNOSIS — Z00.00 ENCOUNTER FOR SUBSEQUENT ANNUAL WELLNESS VISIT (AWV) IN MEDICARE PATIENT: Primary | ICD-10-CM

## 2025-09-02 DIAGNOSIS — E78.2 MIXED HYPERLIPIDEMIA: ICD-10-CM

## 2025-09-02 PROCEDURE — 1170F FXNL STATUS ASSESSED: CPT | Mod: ,,, | Performed by: STUDENT IN AN ORGANIZED HEALTH CARE EDUCATION/TRAINING PROGRAM

## 2025-09-02 PROCEDURE — 3075F SYST BP GE 130 - 139MM HG: CPT | Mod: ,,, | Performed by: STUDENT IN AN ORGANIZED HEALTH CARE EDUCATION/TRAINING PROGRAM

## 2025-09-02 PROCEDURE — 3044F HG A1C LEVEL LT 7.0%: CPT | Mod: ,,, | Performed by: STUDENT IN AN ORGANIZED HEALTH CARE EDUCATION/TRAINING PROGRAM

## 2025-09-02 PROCEDURE — 1159F MED LIST DOCD IN RCRD: CPT | Mod: ,,, | Performed by: STUDENT IN AN ORGANIZED HEALTH CARE EDUCATION/TRAINING PROGRAM

## 2025-09-02 PROCEDURE — 1125F AMNT PAIN NOTED PAIN PRSNT: CPT | Mod: ,,, | Performed by: STUDENT IN AN ORGANIZED HEALTH CARE EDUCATION/TRAINING PROGRAM

## 2025-09-02 PROCEDURE — 3288F FALL RISK ASSESSMENT DOCD: CPT | Mod: ,,, | Performed by: STUDENT IN AN ORGANIZED HEALTH CARE EDUCATION/TRAINING PROGRAM

## 2025-09-02 PROCEDURE — 3079F DIAST BP 80-89 MM HG: CPT | Mod: ,,, | Performed by: STUDENT IN AN ORGANIZED HEALTH CARE EDUCATION/TRAINING PROGRAM

## 2025-09-02 PROCEDURE — 1100F PTFALLS ASSESS-DOCD GE2>/YR: CPT | Mod: ,,, | Performed by: STUDENT IN AN ORGANIZED HEALTH CARE EDUCATION/TRAINING PROGRAM

## 2025-09-02 PROCEDURE — 4010F ACE/ARB THERAPY RXD/TAKEN: CPT | Mod: ,,, | Performed by: STUDENT IN AN ORGANIZED HEALTH CARE EDUCATION/TRAINING PROGRAM

## 2025-09-02 PROCEDURE — G0439 PPPS, SUBSEQ VISIT: HCPCS | Mod: ,,, | Performed by: STUDENT IN AN ORGANIZED HEALTH CARE EDUCATION/TRAINING PROGRAM

## (undated) DEVICE — NEEDLE ECLIPSE 25GX1 1/2 IN

## (undated) DEVICE — DRAPE THREE-QTR REINF 53X77IN

## (undated) DEVICE — GLOVE SENSICARE PI GRN 8.5

## (undated) DEVICE — GLOVE SURGICAL PROTEXIS PI SIZE 7

## (undated) DEVICE — SUTURE MONOCRYL 4-0 27IN

## (undated) DEVICE — FOAM POSITIONER ARM SURGICAL

## (undated) DEVICE — GLOVE SURGICAL PROTEXIS PI SIZE 6.5

## (undated) DEVICE — GLOVE SENSICARE PI GRN 7.5

## (undated) DEVICE — SURGICEL HEMOSTATIC 4X8 OXIDIZED CELLULOSE ABSORBABLE SHEET

## (undated) DEVICE — ENDOSCOPIC LIGASURE EXACT DISSECTOR

## (undated) DEVICE — POSITIONER HEAD DONUT 9IN FOAM

## (undated) DEVICE — SYRINGE 10-12CC LURE -LOK TIP

## (undated) DEVICE — APPLICATOR CHLORAPREP HI-LITE TINTED ORANGE 26ML

## (undated) DEVICE — GLOVE SENSICARE PI GRN 6.5

## (undated) DEVICE — ADHESIVE MASTISOL VIAL 48/BX

## (undated) DEVICE — CATH IV INTROCAN 14G X 2.

## (undated) DEVICE — Device

## (undated) DEVICE — GLOVE SENSICARE PI SURG 7.5

## (undated) DEVICE — SYS CLSR DERMABOND PRINEO 22CM

## (undated) DEVICE — MATRIX FLOSEAL HEMOSTATIC 10ML

## (undated) DEVICE — SUT VICRYL PLUS CT-1 1 8-27IN

## (undated) DEVICE — GLOVE SENSICARE PI SURG 8.5

## (undated) DEVICE — SUT STRATAFIX PDS 2-0 SH 5IN

## (undated) DEVICE — GOWN SURGICAL SMARTGOWN LEVEL 4 / EXTRA LARGE STERILE

## (undated) DEVICE — GLOVE SENSICARE PI GRN 6

## (undated) DEVICE — COLLAR LO CONTOUR FIRM XL 3IN

## (undated) DEVICE — SUTURE VICRYL 3-0 SH UNDYED 27IN

## (undated) DEVICE — DRILL NEURO SOFT TOUCH 2X3.1MM

## (undated) DEVICE — GLOVE SENSICARE PI SURG 7

## (undated) DEVICE — DRESSING TRANS 4X4 TEGADERM

## (undated) DEVICE — SPONGE COTTON TRAY 4X4IN

## (undated) DEVICE — GAUZE SPONGE PEANUT STRL

## (undated) DEVICE — ADHESIVE LIQUID MASTISOL 2/3CC

## (undated) DEVICE — GOWN ASTOUND AAMI LVL3 BLUE LG

## (undated) DEVICE — DRAPE C-ARMOR EQUIPMENT COVER

## (undated) DEVICE — SOL NACL IRR 1000ML BTL

## (undated) DEVICE — GLOVE SURGICAL PROTEXIS PI SIZE 7.5

## (undated) DEVICE — DECANTER FLUID TRNSF WHITE 9IN

## (undated) DEVICE — SOL IRRIGATION SALINE 0.9% 1000ML BOTTLE

## (undated) DEVICE — CDS ENT

## (undated) DEVICE — SUT 3-0 12-18IN SILK

## (undated) DEVICE — GLOVE SURGICAL PROTEXIS PI BLUE SIZE 7.0

## (undated) DEVICE — APPLICATOR CHLORAPREP ORN 26ML

## (undated) DEVICE — SUTURE STRATAFIX CP-2 24X24